# Patient Record
Sex: MALE | Race: WHITE | NOT HISPANIC OR LATINO | Employment: OTHER | URBAN - METROPOLITAN AREA
[De-identification: names, ages, dates, MRNs, and addresses within clinical notes are randomized per-mention and may not be internally consistent; named-entity substitution may affect disease eponyms.]

---

## 2017-05-03 PROBLEM — K22.70 BARRETT ESOPHAGUS: Status: ACTIVE | Noted: 2017-05-03

## 2017-05-17 PROBLEM — K22.2 ESOPHAGEAL STENOSIS: Status: ACTIVE | Noted: 2017-05-17

## 2019-06-05 PROBLEM — K22.70 BARRETT'S ESOPHAGUS: Status: ACTIVE | Noted: 2019-06-05

## 2021-03-04 PROBLEM — I73.9 CLAUDICATION, CLASS II: Status: ACTIVE | Noted: 2021-03-04

## 2023-01-01 ENCOUNTER — HOSPITAL ENCOUNTER (INPATIENT)
Facility: HOSPITAL | Age: 83
LOS: 14 days | DRG: 064 | End: 2023-12-07
Attending: EMERGENCY MEDICINE | Admitting: PSYCHIATRY & NEUROLOGY
Payer: MEDICARE

## 2023-01-01 ENCOUNTER — ANESTHESIA EVENT (OUTPATIENT)
Dept: NEUROSURGERY | Facility: HOSPITAL | Age: 83
End: 2023-01-01

## 2023-01-01 ENCOUNTER — ANESTHESIA (OUTPATIENT)
Dept: ENDOSCOPY | Facility: HOSPITAL | Age: 83
DRG: 064 | End: 2023-01-01
Payer: MEDICARE

## 2023-01-01 ENCOUNTER — ANESTHESIA EVENT (OUTPATIENT)
Dept: ENDOSCOPY | Facility: HOSPITAL | Age: 83
DRG: 064 | End: 2023-01-01
Payer: MEDICARE

## 2023-01-01 ENCOUNTER — ANESTHESIA (OUTPATIENT)
Dept: NEUROSURGERY | Facility: HOSPITAL | Age: 83
End: 2023-01-01

## 2023-01-01 VITALS
OXYGEN SATURATION: 91 % | TEMPERATURE: 97 F | HEIGHT: 63 IN | BODY MASS INDEX: 30.3 KG/M2 | WEIGHT: 171 LBS | SYSTOLIC BLOOD PRESSURE: 147 MMHG | DIASTOLIC BLOOD PRESSURE: 63 MMHG

## 2023-01-01 DIAGNOSIS — R56.9 SEIZURE-LIKE ACTIVITY: ICD-10-CM

## 2023-01-01 DIAGNOSIS — E11.9 TYPE 2 DIABETES MELLITUS WITHOUT COMPLICATION, WITHOUT LONG-TERM CURRENT USE OF INSULIN: ICD-10-CM

## 2023-01-01 DIAGNOSIS — I47.29 OTHER VENTRICULAR TACHYCARDIA: ICD-10-CM

## 2023-01-01 DIAGNOSIS — K72.00 ACUTE LIVER FAILURE WITHOUT HEPATIC COMA: ICD-10-CM

## 2023-01-01 DIAGNOSIS — I63.411 EMBOLIC STROKE INVOLVING RIGHT MIDDLE CEREBRAL ARTERY: ICD-10-CM

## 2023-01-01 DIAGNOSIS — I48.0 PAROXYSMAL ATRIAL FIBRILLATION: ICD-10-CM

## 2023-01-01 DIAGNOSIS — N17.9 AKI (ACUTE KIDNEY INJURY): ICD-10-CM

## 2023-01-01 DIAGNOSIS — I63.9 CEREBROVASCULAR ACCIDENT (CVA), UNSPECIFIED MECHANISM: Primary | ICD-10-CM

## 2023-01-01 DIAGNOSIS — J69.0 ASPIRATION PNEUMONIA DUE TO VOMIT, UNSPECIFIED LATERALITY, UNSPECIFIED PART OF LUNG: ICD-10-CM

## 2023-01-01 DIAGNOSIS — Z97.8 ENDOTRACHEALLY INTUBATED: ICD-10-CM

## 2023-01-01 DIAGNOSIS — I63.9 STROKE: ICD-10-CM

## 2023-01-01 DIAGNOSIS — R65.21 SEPTIC SHOCK: ICD-10-CM

## 2023-01-01 DIAGNOSIS — Z43.1 ENCOUNTER FOR PEG (PERCUTANEOUS ENDOSCOPIC GASTROSTOMY): ICD-10-CM

## 2023-01-01 DIAGNOSIS — D72.829 LEUKOCYTOSIS, UNSPECIFIED TYPE: ICD-10-CM

## 2023-01-01 DIAGNOSIS — D65 DIC (DISSEMINATED INTRAVASCULAR COAGULATION): ICD-10-CM

## 2023-01-01 DIAGNOSIS — R94.31 QT PROLONGATION: ICD-10-CM

## 2023-01-01 DIAGNOSIS — R13.10 DYSPHAGIA, UNSPECIFIED TYPE: ICD-10-CM

## 2023-01-01 DIAGNOSIS — R57.9 SHOCK: ICD-10-CM

## 2023-01-01 DIAGNOSIS — D69.6 THROMBOCYTOPENIA: ICD-10-CM

## 2023-01-01 DIAGNOSIS — I49.9 ARRHYTHMIA: ICD-10-CM

## 2023-01-01 DIAGNOSIS — A41.9 SEPTIC SHOCK: ICD-10-CM

## 2023-01-01 DIAGNOSIS — E87.5 HYPERKALEMIA: ICD-10-CM

## 2023-01-01 DIAGNOSIS — Z99.11 ON MECHANICALLY ASSISTED VENTILATION: ICD-10-CM

## 2023-01-01 LAB
ABO + RH BLD: NORMAL
ACINETOBACTER CALCOACETICUS/BAUMANNII COMPLEX: NOT DETECTED
ALBUMIN SERPL BCP-MCNC: 1.4 G/DL (ref 3.5–5.2)
ALBUMIN SERPL BCP-MCNC: 1.4 G/DL (ref 3.5–5.2)
ALBUMIN SERPL BCP-MCNC: 1.5 G/DL (ref 3.5–5.2)
ALBUMIN SERPL BCP-MCNC: 1.6 G/DL (ref 3.5–5.2)
ALBUMIN SERPL BCP-MCNC: 1.7 G/DL (ref 3.5–5.2)
ALBUMIN SERPL BCP-MCNC: 1.9 G/DL (ref 3.5–5.2)
ALBUMIN SERPL BCP-MCNC: 2.3 G/DL (ref 3.5–5.2)
ALBUMIN SERPL BCP-MCNC: 2.4 G/DL (ref 3.5–5.2)
ALBUMIN SERPL BCP-MCNC: 2.5 G/DL (ref 3.5–5.2)
ALBUMIN SERPL BCP-MCNC: 2.6 G/DL (ref 3.5–5.2)
ALBUMIN SERPL BCP-MCNC: 2.6 G/DL (ref 3.5–5.2)
ALBUMIN SERPL BCP-MCNC: 2.7 G/DL (ref 3.5–5.2)
ALBUMIN SERPL BCP-MCNC: 2.8 G/DL (ref 3.5–5.2)
ALBUMIN SERPL BCP-MCNC: 2.9 G/DL (ref 3.5–5.2)
ALBUMIN SERPL BCP-MCNC: 3 G/DL (ref 3.5–5.2)
ALBUMIN SERPL BCP-MCNC: 3 G/DL (ref 3.5–5.2)
ALLENS TEST: ABNORMAL
ALP SERPL-CCNC: 101 U/L (ref 55–135)
ALP SERPL-CCNC: 106 U/L (ref 55–135)
ALP SERPL-CCNC: 107 U/L (ref 55–135)
ALP SERPL-CCNC: 107 U/L (ref 55–135)
ALP SERPL-CCNC: 115 U/L (ref 55–135)
ALP SERPL-CCNC: 118 U/L (ref 55–135)
ALP SERPL-CCNC: 60 U/L (ref 55–135)
ALP SERPL-CCNC: 61 U/L (ref 55–135)
ALP SERPL-CCNC: 62 U/L (ref 55–135)
ALP SERPL-CCNC: 65 U/L (ref 55–135)
ALP SERPL-CCNC: 65 U/L (ref 55–135)
ALP SERPL-CCNC: 67 U/L (ref 55–135)
ALP SERPL-CCNC: 70 U/L (ref 55–135)
ALP SERPL-CCNC: 72 U/L (ref 55–135)
ALP SERPL-CCNC: 73 U/L (ref 55–135)
ALP SERPL-CCNC: 73 U/L (ref 55–135)
ALP SERPL-CCNC: 74 U/L (ref 55–135)
ALP SERPL-CCNC: 74 U/L (ref 55–135)
ALP SERPL-CCNC: 75 U/L (ref 55–135)
ALP SERPL-CCNC: 75 U/L (ref 55–135)
ALP SERPL-CCNC: 76 U/L (ref 55–135)
ALP SERPL-CCNC: 76 U/L (ref 55–135)
ALP SERPL-CCNC: 78 U/L (ref 55–135)
ALP SERPL-CCNC: 85 U/L (ref 55–135)
ALP SERPL-CCNC: 91 U/L (ref 55–135)
ALP SERPL-CCNC: 94 U/L (ref 55–135)
ALP SERPL-CCNC: 99 U/L (ref 55–135)
ALT SERPL W/O P-5'-P-CCNC: 1213 U/L (ref 10–44)
ALT SERPL W/O P-5'-P-CCNC: 28 U/L (ref 10–44)
ALT SERPL W/O P-5'-P-CCNC: 30 U/L (ref 10–44)
ALT SERPL W/O P-5'-P-CCNC: 30 U/L (ref 10–44)
ALT SERPL W/O P-5'-P-CCNC: 32 U/L (ref 10–44)
ALT SERPL W/O P-5'-P-CCNC: 33 U/L (ref 10–44)
ALT SERPL W/O P-5'-P-CCNC: 34 U/L (ref 10–44)
ALT SERPL W/O P-5'-P-CCNC: 36 U/L (ref 10–44)
ALT SERPL W/O P-5'-P-CCNC: 37 U/L (ref 10–44)
ALT SERPL W/O P-5'-P-CCNC: 37 U/L (ref 10–44)
ALT SERPL W/O P-5'-P-CCNC: 41 U/L (ref 10–44)
ALT SERPL W/O P-5'-P-CCNC: 51 U/L (ref 10–44)
ALT SERPL W/O P-5'-P-CCNC: 810 U/L (ref 10–44)
ALT SERPL W/O P-5'-P-CCNC: 862 U/L (ref 10–44)
ALT SERPL W/O P-5'-P-CCNC: 862 U/L (ref 10–44)
ALT SERPL W/O P-5'-P-CCNC: 871 U/L (ref 10–44)
ALT SERPL W/O P-5'-P-CCNC: 881 U/L (ref 10–44)
ALT SERPL W/O P-5'-P-CCNC: 885 U/L (ref 10–44)
ALT SERPL W/O P-5'-P-CCNC: 890 U/L (ref 10–44)
ALT SERPL W/O P-5'-P-CCNC: 893 U/L (ref 10–44)
ALT SERPL W/O P-5'-P-CCNC: 913 U/L (ref 10–44)
ALT SERPL W/O P-5'-P-CCNC: 916 U/L (ref 10–44)
ALT SERPL W/O P-5'-P-CCNC: 923 U/L (ref 10–44)
ALT SERPL W/O P-5'-P-CCNC: 927 U/L (ref 10–44)
ALT SERPL W/O P-5'-P-CCNC: 927 U/L (ref 10–44)
ALT SERPL W/O P-5'-P-CCNC: 958 U/L (ref 10–44)
ALT SERPL W/O P-5'-P-CCNC: 993 U/L (ref 10–44)
AMYLASE SERPL-CCNC: 46 U/L (ref 20–110)
ANION GAP SERPL CALC-SCNC: 10 MMOL/L (ref 8–16)
ANION GAP SERPL CALC-SCNC: 11 MMOL/L (ref 8–16)
ANION GAP SERPL CALC-SCNC: 12 MMOL/L (ref 8–16)
ANION GAP SERPL CALC-SCNC: 14 MMOL/L (ref 8–16)
ANION GAP SERPL CALC-SCNC: 15 MMOL/L (ref 8–16)
ANION GAP SERPL CALC-SCNC: 16 MMOL/L (ref 8–16)
ANION GAP SERPL CALC-SCNC: 17 MMOL/L (ref 8–16)
ANION GAP SERPL CALC-SCNC: 18 MMOL/L (ref 8–16)
ANION GAP SERPL CALC-SCNC: 19 MMOL/L (ref 8–16)
ANION GAP SERPL CALC-SCNC: 19 MMOL/L (ref 8–16)
ANION GAP SERPL CALC-SCNC: 21 MMOL/L (ref 8–16)
ANION GAP SERPL CALC-SCNC: 23 MMOL/L (ref 8–16)
ANION GAP SERPL CALC-SCNC: 29 MMOL/L (ref 8–16)
ANION GAP SERPL CALC-SCNC: 29 MMOL/L (ref 8–16)
ANISOCYTOSIS BLD QL SMEAR: SLIGHT
APTT PPP: 36.7 SEC (ref 21–32)
APTT PPP: 73.2 SEC (ref 21–32)
APTT PPP: 76.6 SEC (ref 21–32)
ASCENDING AORTA: 3 CM
ASCENDING AORTA: 3.06 CM
AST SERPL-CCNC: 1002 U/L (ref 10–40)
AST SERPL-CCNC: 118 U/L (ref 10–40)
AST SERPL-CCNC: 1252 U/L (ref 10–40)
AST SERPL-CCNC: 1311 U/L (ref 10–40)
AST SERPL-CCNC: 1429 U/L (ref 10–40)
AST SERPL-CCNC: 27 U/L (ref 10–40)
AST SERPL-CCNC: 27 U/L (ref 10–40)
AST SERPL-CCNC: 30 U/L (ref 10–40)
AST SERPL-CCNC: 31 U/L (ref 10–40)
AST SERPL-CCNC: 32 U/L (ref 10–40)
AST SERPL-CCNC: 34 U/L (ref 10–40)
AST SERPL-CCNC: 55 U/L (ref 10–40)
AST SERPL-CCNC: 596 U/L (ref 10–40)
AST SERPL-CCNC: 633 U/L (ref 10–40)
AST SERPL-CCNC: 636 U/L (ref 10–40)
AST SERPL-CCNC: 65 U/L (ref 10–40)
AST SERPL-CCNC: 657 U/L (ref 10–40)
AST SERPL-CCNC: 683 U/L (ref 10–40)
AST SERPL-CCNC: 70 U/L (ref 10–40)
AST SERPL-CCNC: 71 U/L (ref 10–40)
AST SERPL-CCNC: 718 U/L (ref 10–40)
AST SERPL-CCNC: 750 U/L (ref 10–40)
AST SERPL-CCNC: 759 U/L (ref 10–40)
AST SERPL-CCNC: 77 U/L (ref 10–40)
AST SERPL-CCNC: 772 U/L (ref 10–40)
AST SERPL-CCNC: 801 U/L (ref 10–40)
AST SERPL-CCNC: 841 U/L (ref 10–40)
AST SERPL-CCNC: 943 U/L (ref 10–40)
AST SERPL-CCNC: 97 U/L (ref 10–40)
AV INDEX (PROSTH): 0.68
AV INDEX (PROSTH): 0.82
AV MEAN GRADIENT: 4 MMHG
AV MEAN GRADIENT: 4 MMHG
AV PEAK GRADIENT: 5 MMHG
AV PEAK GRADIENT: 7 MMHG
AV VALVE AREA BY VELOCITY RATIO: 2.12 CM²
AV VALVE AREA BY VELOCITY RATIO: 2.68 CM²
AV VALVE AREA: 2.2 CM²
AV VALVE AREA: 2.67 CM²
AV VELOCITY RATIO: 0.66
AV VELOCITY RATIO: 0.82
BACTERIA #/AREA URNS AUTO: ABNORMAL /HPF
BACTERIA BLD CULT: ABNORMAL
BACTERIA BLD CULT: NORMAL
BACTERIA SPEC AEROBE CULT: ABNORMAL
BACTERIA SPEC AEROBE CULT: ABNORMAL
BACTEROIDES FRAGILIS: NOT DETECTED
BASO STIPL BLD QL SMEAR: ABNORMAL
BASO STIPL BLD QL SMEAR: ABNORMAL
BASOPHILS # BLD AUTO: 0.03 K/UL (ref 0–0.2)
BASOPHILS # BLD AUTO: 0.04 K/UL (ref 0–0.2)
BASOPHILS # BLD AUTO: 0.05 K/UL (ref 0–0.2)
BASOPHILS # BLD AUTO: 0.06 K/UL (ref 0–0.2)
BASOPHILS # BLD AUTO: 0.06 K/UL (ref 0–0.2)
BASOPHILS # BLD AUTO: 0.07 K/UL (ref 0–0.2)
BASOPHILS # BLD AUTO: 0.08 K/UL (ref 0–0.2)
BASOPHILS # BLD AUTO: 0.08 K/UL (ref 0–0.2)
BASOPHILS # BLD AUTO: 0.09 K/UL (ref 0–0.2)
BASOPHILS # BLD AUTO: 0.11 K/UL (ref 0–0.2)
BASOPHILS # BLD AUTO: 0.12 K/UL (ref 0–0.2)
BASOPHILS # BLD AUTO: ABNORMAL K/UL (ref 0–0.2)
BASOPHILS NFR BLD: 0 % (ref 0–1.9)
BASOPHILS NFR BLD: 0.3 % (ref 0–1.9)
BASOPHILS NFR BLD: 0.4 % (ref 0–1.9)
BASOPHILS NFR BLD: 0.5 % (ref 0–1.9)
BASOPHILS NFR BLD: 0.6 % (ref 0–1.9)
BILIRUB DIRECT SERPL-MCNC: 0.9 MG/DL (ref 0.1–0.3)
BILIRUB SERPL-MCNC: 0.6 MG/DL (ref 0.1–1)
BILIRUB SERPL-MCNC: 0.7 MG/DL (ref 0.1–1)
BILIRUB SERPL-MCNC: 0.8 MG/DL (ref 0.1–1)
BILIRUB SERPL-MCNC: 0.9 MG/DL (ref 0.1–1)
BILIRUB SERPL-MCNC: 1 MG/DL (ref 0.1–1)
BILIRUB SERPL-MCNC: 1.1 MG/DL (ref 0.1–1)
BILIRUB SERPL-MCNC: 1.3 MG/DL (ref 0.1–1)
BILIRUB SERPL-MCNC: 1.4 MG/DL (ref 0.1–1)
BILIRUB SERPL-MCNC: 1.5 MG/DL (ref 0.1–1)
BILIRUB SERPL-MCNC: 1.5 MG/DL (ref 0.1–1)
BILIRUB SERPL-MCNC: 1.6 MG/DL (ref 0.1–1)
BILIRUB SERPL-MCNC: 1.6 MG/DL (ref 0.1–1)
BILIRUB SERPL-MCNC: 2.1 MG/DL (ref 0.1–1)
BILIRUB UR QL STRIP: NEGATIVE
BLD GP AB SCN CELLS X3 SERPL QL: NORMAL
BLD PROD TYP BPU: NORMAL
BLOOD UNIT EXPIRATION DATE: NORMAL
BLOOD UNIT TYPE CODE: 5100
BLOOD UNIT TYPE: NORMAL
BSA FOR ECHO PROCEDURE: 1.86 M2
BSA FOR ECHO PROCEDURE: 1.86 M2
BUN SERPL-MCNC: 20 MG/DL (ref 8–23)
BUN SERPL-MCNC: 21 MG/DL (ref 8–23)
BUN SERPL-MCNC: 22 MG/DL (ref 8–23)
BUN SERPL-MCNC: 25 MG/DL (ref 8–23)
BUN SERPL-MCNC: 26 MG/DL (ref 8–23)
BUN SERPL-MCNC: 26 MG/DL (ref 8–23)
BUN SERPL-MCNC: 31 MG/DL (ref 8–23)
BUN SERPL-MCNC: 33 MG/DL (ref 8–23)
BUN SERPL-MCNC: 35 MG/DL (ref 8–23)
BUN SERPL-MCNC: 38 MG/DL (ref 8–23)
BUN SERPL-MCNC: 50 MG/DL (ref 8–23)
BUN SERPL-MCNC: 51 MG/DL (ref 8–23)
BUN SERPL-MCNC: 51 MG/DL (ref 8–23)
BUN SERPL-MCNC: 52 MG/DL (ref 8–23)
BUN SERPL-MCNC: 53 MG/DL (ref 8–23)
BUN SERPL-MCNC: 54 MG/DL (ref 8–23)
BUN SERPL-MCNC: 56 MG/DL (ref 8–23)
BUN SERPL-MCNC: 57 MG/DL (ref 8–23)
BUN SERPL-MCNC: 58 MG/DL (ref 8–23)
BUN SERPL-MCNC: 60 MG/DL (ref 8–23)
BUN SERPL-MCNC: 61 MG/DL (ref 8–23)
BUN SERPL-MCNC: 63 MG/DL (ref 8–23)
BUN SERPL-MCNC: 65 MG/DL (ref 8–23)
BURR CELLS BLD QL SMEAR: ABNORMAL
CA-I BLDV-SCNC: 0.9 MMOL/L (ref 1.06–1.42)
CA-I BLDV-SCNC: 0.98 MMOL/L (ref 1.06–1.42)
CA-I BLDV-SCNC: 1.01 MMOL/L (ref 1.06–1.42)
CA-I BLDV-SCNC: 1.01 MMOL/L (ref 1.06–1.42)
CA-I BLDV-SCNC: 1.02 MMOL/L (ref 1.06–1.42)
CALCIUM SERPL-MCNC: 6.8 MG/DL (ref 8.7–10.5)
CALCIUM SERPL-MCNC: 6.9 MG/DL (ref 8.7–10.5)
CALCIUM SERPL-MCNC: 6.9 MG/DL (ref 8.7–10.5)
CALCIUM SERPL-MCNC: 7 MG/DL (ref 8.7–10.5)
CALCIUM SERPL-MCNC: 7 MG/DL (ref 8.7–10.5)
CALCIUM SERPL-MCNC: 7.3 MG/DL (ref 8.7–10.5)
CALCIUM SERPL-MCNC: 7.4 MG/DL (ref 8.7–10.5)
CALCIUM SERPL-MCNC: 7.5 MG/DL (ref 8.7–10.5)
CALCIUM SERPL-MCNC: 7.8 MG/DL (ref 8.7–10.5)
CALCIUM SERPL-MCNC: 7.9 MG/DL (ref 8.7–10.5)
CALCIUM SERPL-MCNC: 8.3 MG/DL (ref 8.7–10.5)
CALCIUM SERPL-MCNC: 8.6 MG/DL (ref 8.7–10.5)
CALCIUM SERPL-MCNC: 8.7 MG/DL (ref 8.7–10.5)
CALCIUM SERPL-MCNC: 8.7 MG/DL (ref 8.7–10.5)
CALCIUM SERPL-MCNC: 8.8 MG/DL (ref 8.7–10.5)
CALCIUM SERPL-MCNC: 8.8 MG/DL (ref 8.7–10.5)
CALCIUM SERPL-MCNC: 9.1 MG/DL (ref 8.7–10.5)
CALCIUM SERPL-MCNC: 9.2 MG/DL (ref 8.7–10.5)
CALCIUM SERPL-MCNC: 9.5 MG/DL (ref 8.7–10.5)
CANDIDA ALBICANS: NOT DETECTED
CANDIDA AURIS: NOT DETECTED
CANDIDA GLABRATA: NOT DETECTED
CANDIDA KRUSEI: NOT DETECTED
CANDIDA PARAPSILOSIS: NOT DETECTED
CANDIDA TROPICALIS: NOT DETECTED
CHLORIDE SERPL-SCNC: 107 MMOL/L (ref 95–110)
CHLORIDE SERPL-SCNC: 107 MMOL/L (ref 95–110)
CHLORIDE SERPL-SCNC: 108 MMOL/L (ref 95–110)
CHLORIDE SERPL-SCNC: 109 MMOL/L (ref 95–110)
CHLORIDE SERPL-SCNC: 110 MMOL/L (ref 95–110)
CHLORIDE SERPL-SCNC: 112 MMOL/L (ref 95–110)
CHLORIDE SERPL-SCNC: 113 MMOL/L (ref 95–110)
CHLORIDE SERPL-SCNC: 114 MMOL/L (ref 95–110)
CHLORIDE SERPL-SCNC: 115 MMOL/L (ref 95–110)
CHLORIDE SERPL-SCNC: 116 MMOL/L (ref 95–110)
CHLORIDE SERPL-SCNC: 117 MMOL/L (ref 95–110)
CHLORIDE SERPL-SCNC: 117 MMOL/L (ref 95–110)
CHLORIDE SERPL-SCNC: 118 MMOL/L (ref 95–110)
CHLORIDE SERPL-SCNC: 118 MMOL/L (ref 95–110)
CHLORIDE SERPL-SCNC: 121 MMOL/L (ref 95–110)
CHLORIDE SERPL-SCNC: 122 MMOL/L (ref 95–110)
CHLORIDE SERPL-SCNC: 122 MMOL/L (ref 95–110)
CHLORIDE SERPL-SCNC: 123 MMOL/L (ref 95–110)
CHLORIDE SERPL-SCNC: 124 MMOL/L (ref 95–110)
CHLORIDE SERPL-SCNC: 124 MMOL/L (ref 95–110)
CHLORIDE SERPL-SCNC: 126 MMOL/L (ref 95–110)
CHLORIDE SERPL-SCNC: 126 MMOL/L (ref 95–110)
CHLORIDE SERPL-SCNC: 127 MMOL/L (ref 95–110)
CHOLEST SERPL-MCNC: 94 MG/DL (ref 120–199)
CHOLEST/HDLC SERPL: 2.9 {RATIO} (ref 2–5)
CK SERPL-CCNC: 1125 U/L (ref 20–200)
CK SERPL-CCNC: 2239 U/L (ref 20–200)
CK SERPL-CCNC: 2960 U/L (ref 20–200)
CK SERPL-CCNC: 3817 U/L (ref 20–200)
CK SERPL-CCNC: 5407 U/L (ref 20–200)
CK SERPL-CCNC: 6408 U/L (ref 20–200)
CK SERPL-CCNC: 7119 U/L (ref 20–200)
CLARITY UR REFRACT.AUTO: ABNORMAL
CLARITY UR REFRACT.AUTO: CLEAR
CLARITY UR REFRACT.AUTO: CLEAR
CO2 SERPL-SCNC: 10 MMOL/L (ref 23–29)
CO2 SERPL-SCNC: 12 MMOL/L (ref 23–29)
CO2 SERPL-SCNC: 13 MMOL/L (ref 23–29)
CO2 SERPL-SCNC: 14 MMOL/L (ref 23–29)
CO2 SERPL-SCNC: 14 MMOL/L (ref 23–29)
CO2 SERPL-SCNC: 15 MMOL/L (ref 23–29)
CO2 SERPL-SCNC: 16 MMOL/L (ref 23–29)
CO2 SERPL-SCNC: 17 MMOL/L (ref 23–29)
CO2 SERPL-SCNC: 17 MMOL/L (ref 23–29)
CO2 SERPL-SCNC: 18 MMOL/L (ref 23–29)
CO2 SERPL-SCNC: 19 MMOL/L (ref 23–29)
CO2 SERPL-SCNC: 20 MMOL/L (ref 23–29)
CO2 SERPL-SCNC: 20 MMOL/L (ref 23–29)
CO2 SERPL-SCNC: 21 MMOL/L (ref 23–29)
CO2 SERPL-SCNC: 22 MMOL/L (ref 23–29)
CO2 SERPL-SCNC: 7 MMOL/L (ref 23–29)
CODING SYSTEM: NORMAL
COLOR UR AUTO: ABNORMAL
COLOR UR AUTO: YELLOW
COLOR UR AUTO: YELLOW
CREAT SERPL-MCNC: 0.8 MG/DL (ref 0.5–1.4)
CREAT SERPL-MCNC: 0.9 MG/DL (ref 0.5–1.4)
CREAT SERPL-MCNC: 1 MG/DL (ref 0.5–1.4)
CREAT SERPL-MCNC: 1.1 MG/DL (ref 0.5–1.4)
CREAT SERPL-MCNC: 1.2 MG/DL (ref 0.5–1.4)
CREAT SERPL-MCNC: 1.3 MG/DL (ref 0.5–1.4)
CREAT SERPL-MCNC: 1.4 MG/DL (ref 0.5–1.4)
CREAT SERPL-MCNC: 1.4 MG/DL (ref 0.5–1.4)
CREAT SERPL-MCNC: 1.5 MG/DL (ref 0.5–1.4)
CREAT SERPL-MCNC: 1.6 MG/DL (ref 0.5–1.4)
CREAT SERPL-MCNC: 1.6 MG/DL (ref 0.5–1.4)
CREAT SERPL-MCNC: 1.7 MG/DL (ref 0.5–1.4)
CREAT SERPL-MCNC: 1.8 MG/DL (ref 0.5–1.4)
CREAT SERPL-MCNC: 2 MG/DL (ref 0.5–1.4)
CREAT SERPL-MCNC: 2 MG/DL (ref 0.5–1.4)
CREAT SERPL-MCNC: 2.1 MG/DL (ref 0.5–1.4)
CREAT SERPL-MCNC: 2.2 MG/DL (ref 0.5–1.4)
CREAT SERPL-MCNC: 2.3 MG/DL (ref 0.5–1.4)
CREAT SERPL-MCNC: 2.3 MG/DL (ref 0.5–1.4)
CREAT SERPL-MCNC: 2.8 MG/DL (ref 0.5–1.4)
CROSSMATCH INTERPRETATION: NORMAL
CRYPTOCOCCUS NEOFORMANS/GATTII: NOT DETECTED
CTX-M GENE: ABNORMAL
CV ECHO LV RWT: 0.39 CM
CV ECHO LV RWT: 0.48 CM
DELSYS: ABNORMAL
DIFFERENTIAL METHOD: ABNORMAL
DISPENSE STATUS: NORMAL
DOHLE BOD BLD QL SMEAR: PRESENT
DOP CALC AO PEAK VEL: 1.17 M/S
DOP CALC AO PEAK VEL: 1.34 M/S
DOP CALC AO VTI: 14.25 CM
DOP CALC AO VTI: 31.75 CM
DOP CALC LVOT AREA: 3.2 CM2
DOP CALC LVOT AREA: 3.3 CM2
DOP CALC LVOT DIAMETER: 2.03 CM
DOP CALC LVOT DIAMETER: 2.04 CM
DOP CALC LVOT PEAK VEL: 0.88 M/S
DOP CALC LVOT PEAK VEL: 0.96 M/S
DOP CALC LVOT STROKE VOLUME: 38.09 CM3
DOP CALC LVOT STROKE VOLUME: 69.81 CM3
DOP CALCLVOT PEAK VEL VTI: 11.66 CM
DOP CALCLVOT PEAK VEL VTI: 21.58 CM
E WAVE DECELERATION TIME: 222.89 MSEC
E WAVE DECELERATION TIME: 424.45 MSEC
E/A RATIO: 0.74
E/A RATIO: 1
E/E' RATIO: 6.13 M/S
E/E' RATIO: 9.86 M/S
ECHO LV POSTERIOR WALL: 0.83 CM (ref 0.6–1.1)
ECHO LV POSTERIOR WALL: 1 CM (ref 0.6–1.1)
EJECTION FRACTION: 53 %
EJECTION FRACTION: 60 %
ENTEROBACTER CLOACAE COMPLEX: NOT DETECTED
ENTEROBACTERALES: NOT DETECTED
ENTEROCOCCUS FAECALIS: NOT DETECTED
ENTEROCOCCUS FAECIUM: NOT DETECTED
EOSINOPHIL # BLD AUTO: 0 K/UL (ref 0–0.5)
EOSINOPHIL # BLD AUTO: 0.1 K/UL (ref 0–0.5)
EOSINOPHIL # BLD AUTO: 0.2 K/UL (ref 0–0.5)
EOSINOPHIL # BLD AUTO: 0.3 K/UL (ref 0–0.5)
EOSINOPHIL # BLD AUTO: ABNORMAL K/UL (ref 0–0.5)
EOSINOPHIL NFR BLD: 0 % (ref 0–8)
EOSINOPHIL NFR BLD: 0.1 % (ref 0–8)
EOSINOPHIL NFR BLD: 0.3 % (ref 0–8)
EOSINOPHIL NFR BLD: 0.4 % (ref 0–8)
EOSINOPHIL NFR BLD: 0.5 % (ref 0–8)
EOSINOPHIL NFR BLD: 0.6 % (ref 0–8)
EOSINOPHIL NFR BLD: 1 % (ref 0–8)
EOSINOPHIL NFR BLD: 1.5 % (ref 0–8)
EOSINOPHIL NFR BLD: 2 % (ref 0–8)
ERYTHROCYTE [DISTWIDTH] IN BLOOD BY AUTOMATED COUNT: 14 % (ref 11.5–14.5)
ERYTHROCYTE [DISTWIDTH] IN BLOOD BY AUTOMATED COUNT: 14.4 % (ref 11.5–14.5)
ERYTHROCYTE [DISTWIDTH] IN BLOOD BY AUTOMATED COUNT: 14.5 % (ref 11.5–14.5)
ERYTHROCYTE [DISTWIDTH] IN BLOOD BY AUTOMATED COUNT: 14.5 % (ref 11.5–14.5)
ERYTHROCYTE [DISTWIDTH] IN BLOOD BY AUTOMATED COUNT: 14.6 % (ref 11.5–14.5)
ERYTHROCYTE [DISTWIDTH] IN BLOOD BY AUTOMATED COUNT: 14.8 % (ref 11.5–14.5)
ERYTHROCYTE [DISTWIDTH] IN BLOOD BY AUTOMATED COUNT: 14.8 % (ref 11.5–14.5)
ERYTHROCYTE [DISTWIDTH] IN BLOOD BY AUTOMATED COUNT: 14.9 % (ref 11.5–14.5)
ERYTHROCYTE [DISTWIDTH] IN BLOOD BY AUTOMATED COUNT: 15 % (ref 11.5–14.5)
ERYTHROCYTE [DISTWIDTH] IN BLOOD BY AUTOMATED COUNT: 15 % (ref 11.5–14.5)
ERYTHROCYTE [DISTWIDTH] IN BLOOD BY AUTOMATED COUNT: 15.1 % (ref 11.5–14.5)
ERYTHROCYTE [DISTWIDTH] IN BLOOD BY AUTOMATED COUNT: 15.2 % (ref 11.5–14.5)
ERYTHROCYTE [DISTWIDTH] IN BLOOD BY AUTOMATED COUNT: 15.3 % (ref 11.5–14.5)
ERYTHROCYTE [DISTWIDTH] IN BLOOD BY AUTOMATED COUNT: 15.3 % (ref 11.5–14.5)
ERYTHROCYTE [DISTWIDTH] IN BLOOD BY AUTOMATED COUNT: 15.5 % (ref 11.5–14.5)
ERYTHROCYTE [DISTWIDTH] IN BLOOD BY AUTOMATED COUNT: 15.6 % (ref 11.5–14.5)
ERYTHROCYTE [DISTWIDTH] IN BLOOD BY AUTOMATED COUNT: 15.7 % (ref 11.5–14.5)
ERYTHROCYTE [DISTWIDTH] IN BLOOD BY AUTOMATED COUNT: 15.8 % (ref 11.5–14.5)
ERYTHROCYTE [SEDIMENTATION RATE] IN BLOOD BY WESTERGREN METHOD: 14 MM/H
ERYTHROCYTE [SEDIMENTATION RATE] IN BLOOD BY WESTERGREN METHOD: 16 MM/H
ERYTHROCYTE [SEDIMENTATION RATE] IN BLOOD BY WESTERGREN METHOD: 18 MM/H
ESCHERICHIA COLI: NOT DETECTED
EST. GFR  (NO RACE VARIABLE): 22 ML/MIN/1.73 M^2
EST. GFR  (NO RACE VARIABLE): 27.8 ML/MIN/1.73 M^2
EST. GFR  (NO RACE VARIABLE): 27.8 ML/MIN/1.73 M^2
EST. GFR  (NO RACE VARIABLE): 29.4 ML/MIN/1.73 M^2
EST. GFR  (NO RACE VARIABLE): 31 ML/MIN/1.73 M^2
EST. GFR  (NO RACE VARIABLE): 32.9 ML/MIN/1.73 M^2
EST. GFR  (NO RACE VARIABLE): 32.9 ML/MIN/1.73 M^2
EST. GFR  (NO RACE VARIABLE): 37.3 ML/MIN/1.73 M^2
EST. GFR  (NO RACE VARIABLE): 40 ML/MIN/1.73 M^2
EST. GFR  (NO RACE VARIABLE): 43 ML/MIN/1.73 M^2
EST. GFR  (NO RACE VARIABLE): 43 ML/MIN/1.73 M^2
EST. GFR  (NO RACE VARIABLE): 46.5 ML/MIN/1.73 M^2
EST. GFR  (NO RACE VARIABLE): 50.5 ML/MIN/1.73 M^2
EST. GFR  (NO RACE VARIABLE): 50.5 ML/MIN/1.73 M^2
EST. GFR  (NO RACE VARIABLE): 55.2 ML/MIN/1.73 M^2
EST. GFR  (NO RACE VARIABLE): >60 ML/MIN/1.73 M^2
ESTIMATED AVG GLUCOSE: 143 MG/DL (ref 68–131)
FIBRINOGEN PPP-MCNC: 106 MG/DL (ref 182–400)
FIBRINOGEN PPP-MCNC: 116 MG/DL (ref 182–400)
FIBRINOGEN PPP-MCNC: 140 MG/DL (ref 182–400)
FIBRINOGEN PPP-MCNC: 166 MG/DL (ref 182–400)
FIBRINOGEN PPP-MCNC: 175 MG/DL (ref 182–400)
FIO2: 100
FIO2: 30
FIO2: 40
FIO2: 50
FIO2: 50
FIO2: 60
FIO2: 70
FLOW: 10
FLOW: 15
FLOW: 4
FRACTIONAL SHORTENING: 31 % (ref 28–44)
FRACTIONAL SHORTENING: 39 % (ref 28–44)
GIANT PLATELETS BLD QL SMEAR: PRESENT
GLUCOSE SERPL-MCNC: 105 MG/DL (ref 70–110)
GLUCOSE SERPL-MCNC: 105 MG/DL (ref 70–110)
GLUCOSE SERPL-MCNC: 116 MG/DL (ref 70–110)
GLUCOSE SERPL-MCNC: 117 MG/DL (ref 70–110)
GLUCOSE SERPL-MCNC: 132 MG/DL (ref 70–110)
GLUCOSE SERPL-MCNC: 134 MG/DL (ref 70–110)
GLUCOSE SERPL-MCNC: 135 MG/DL (ref 70–110)
GLUCOSE SERPL-MCNC: 136 MG/DL (ref 70–110)
GLUCOSE SERPL-MCNC: 140 MG/DL (ref 70–110)
GLUCOSE SERPL-MCNC: 141 MG/DL (ref 70–110)
GLUCOSE SERPL-MCNC: 145 MG/DL (ref 70–110)
GLUCOSE SERPL-MCNC: 147 MG/DL (ref 70–110)
GLUCOSE SERPL-MCNC: 151 MG/DL (ref 70–110)
GLUCOSE SERPL-MCNC: 160 MG/DL (ref 70–110)
GLUCOSE SERPL-MCNC: 161 MG/DL (ref 70–110)
GLUCOSE SERPL-MCNC: 176 MG/DL (ref 70–110)
GLUCOSE SERPL-MCNC: 176 MG/DL (ref 70–110)
GLUCOSE SERPL-MCNC: 184 MG/DL (ref 70–110)
GLUCOSE SERPL-MCNC: 184 MG/DL (ref 70–110)
GLUCOSE SERPL-MCNC: 189 MG/DL (ref 70–110)
GLUCOSE SERPL-MCNC: 195 MG/DL (ref 70–110)
GLUCOSE SERPL-MCNC: 196 MG/DL (ref 70–110)
GLUCOSE SERPL-MCNC: 196 MG/DL (ref 70–110)
GLUCOSE SERPL-MCNC: 203 MG/DL (ref 70–110)
GLUCOSE SERPL-MCNC: 221 MG/DL (ref 70–110)
GLUCOSE SERPL-MCNC: 225 MG/DL (ref 70–110)
GLUCOSE SERPL-MCNC: 287 MG/DL (ref 70–110)
GLUCOSE SERPL-MCNC: 312 MG/DL (ref 70–110)
GLUCOSE SERPL-MCNC: 327 MG/DL (ref 70–110)
GLUCOSE SERPL-MCNC: 334 MG/DL (ref 70–110)
GLUCOSE SERPL-MCNC: 526 MG/DL (ref 70–110)
GLUCOSE UR QL STRIP: ABNORMAL
GRAM STN SPEC: ABNORMAL
HAEMOPHILUS INFLUENZAE: NOT DETECTED
HBA1C MFR BLD: 6.6 % (ref 4–5.6)
HCO3 UR-SCNC: 12.1 MMOL/L (ref 24–28)
HCO3 UR-SCNC: 12.2 MMOL/L (ref 24–28)
HCO3 UR-SCNC: 12.7 MMOL/L (ref 24–28)
HCO3 UR-SCNC: 13.1 MMOL/L (ref 24–28)
HCO3 UR-SCNC: 13.9 MMOL/L (ref 24–28)
HCO3 UR-SCNC: 15.1 MMOL/L (ref 24–28)
HCO3 UR-SCNC: 15.1 MMOL/L (ref 24–28)
HCO3 UR-SCNC: 15.5 MMOL/L (ref 24–28)
HCO3 UR-SCNC: 15.7 MMOL/L (ref 24–28)
HCO3 UR-SCNC: 15.7 MMOL/L (ref 24–28)
HCO3 UR-SCNC: 17 MMOL/L (ref 24–28)
HCO3 UR-SCNC: 17.5 MMOL/L (ref 24–28)
HCO3 UR-SCNC: 17.6 MMOL/L (ref 24–28)
HCO3 UR-SCNC: 17.7 MMOL/L (ref 24–28)
HCO3 UR-SCNC: 18.6 MMOL/L (ref 24–28)
HCO3 UR-SCNC: 18.9 MMOL/L (ref 24–28)
HCO3 UR-SCNC: 19.2 MMOL/L (ref 24–28)
HCO3 UR-SCNC: 20.4 MMOL/L (ref 24–28)
HCO3 UR-SCNC: 20.6 MMOL/L (ref 24–28)
HCO3 UR-SCNC: 21.4 MMOL/L (ref 24–28)
HCO3 UR-SCNC: 21.5 MMOL/L (ref 24–28)
HCO3 UR-SCNC: 21.7 MMOL/L (ref 24–28)
HCO3 UR-SCNC: 22.3 MMOL/L (ref 24–28)
HCO3 UR-SCNC: 22.5 MMOL/L (ref 24–28)
HCO3 UR-SCNC: 23.9 MMOL/L (ref 24–28)
HCO3 UR-SCNC: 7.5 MMOL/L (ref 24–28)
HCT VFR BLD AUTO: 18.9 % (ref 40–54)
HCT VFR BLD AUTO: 20.8 % (ref 40–54)
HCT VFR BLD AUTO: 21.7 % (ref 40–54)
HCT VFR BLD AUTO: 22.7 % (ref 40–54)
HCT VFR BLD AUTO: 22.8 % (ref 40–54)
HCT VFR BLD AUTO: 23.8 % (ref 40–54)
HCT VFR BLD AUTO: 25 % (ref 40–54)
HCT VFR BLD AUTO: 27.8 % (ref 40–54)
HCT VFR BLD AUTO: 28.8 % (ref 40–54)
HCT VFR BLD AUTO: 31.6 % (ref 40–54)
HCT VFR BLD AUTO: 34.6 % (ref 40–54)
HCT VFR BLD AUTO: 40.1 % (ref 40–54)
HCT VFR BLD AUTO: 41 % (ref 40–54)
HCT VFR BLD AUTO: 41.1 % (ref 40–54)
HCT VFR BLD AUTO: 41.5 % (ref 40–54)
HCT VFR BLD AUTO: 42.3 % (ref 40–54)
HCT VFR BLD AUTO: 42.9 % (ref 40–54)
HCT VFR BLD AUTO: 43.9 % (ref 40–54)
HCT VFR BLD AUTO: 44.1 % (ref 40–54)
HCT VFR BLD AUTO: 44.5 % (ref 40–54)
HCT VFR BLD AUTO: 45.7 % (ref 40–54)
HCT VFR BLD AUTO: 47.3 % (ref 40–54)
HCT VFR BLD AUTO: 51.5 % (ref 40–54)
HCT VFR BLD CALC: 16 %PCV (ref 36–54)
HCT VFR BLD CALC: 17 %PCV (ref 36–54)
HCT VFR BLD CALC: 17 %PCV (ref 36–54)
HCT VFR BLD CALC: 18 %PCV (ref 36–54)
HCT VFR BLD CALC: 19 %PCV (ref 36–54)
HCT VFR BLD CALC: 20 %PCV (ref 36–54)
HCT VFR BLD CALC: 20 %PCV (ref 36–54)
HCT VFR BLD CALC: 24 %PCV (ref 36–54)
HCT VFR BLD CALC: 25 %PCV (ref 36–54)
HCT VFR BLD CALC: 27 %PCV (ref 36–54)
HCT VFR BLD CALC: 29 %PCV (ref 36–54)
HCT VFR BLD CALC: 31 %PCV (ref 36–54)
HCT VFR BLD CALC: 37 %PCV (ref 36–54)
HCT VFR BLD CALC: 38 %PCV (ref 36–54)
HCT VFR BLD CALC: 38 %PCV (ref 36–54)
HCT VFR BLD CALC: 39 %PCV (ref 36–54)
HCV AB SERPL QL IA: NORMAL
HDLC SERPL-MCNC: 32 MG/DL (ref 40–75)
HDLC SERPL: 34 % (ref 20–50)
HGB BLD-MCNC: 10.2 G/DL (ref 14–18)
HGB BLD-MCNC: 10.3 G/DL (ref 14–18)
HGB BLD-MCNC: 12.5 G/DL (ref 14–18)
HGB BLD-MCNC: 12.5 G/DL (ref 14–18)
HGB BLD-MCNC: 12.8 G/DL (ref 14–18)
HGB BLD-MCNC: 13.2 G/DL (ref 14–18)
HGB BLD-MCNC: 13.4 G/DL (ref 14–18)
HGB BLD-MCNC: 13.7 G/DL (ref 14–18)
HGB BLD-MCNC: 13.8 G/DL (ref 14–18)
HGB BLD-MCNC: 13.9 G/DL (ref 14–18)
HGB BLD-MCNC: 13.9 G/DL (ref 14–18)
HGB BLD-MCNC: 14.3 G/DL (ref 14–18)
HGB BLD-MCNC: 14.4 G/DL (ref 14–18)
HGB BLD-MCNC: 16 G/DL (ref 14–18)
HGB BLD-MCNC: 6.1 G/DL (ref 14–18)
HGB BLD-MCNC: 6.5 G/DL (ref 14–18)
HGB BLD-MCNC: 7.1 G/DL (ref 14–18)
HGB BLD-MCNC: 7.5 G/DL (ref 14–18)
HGB BLD-MCNC: 7.6 G/DL (ref 14–18)
HGB BLD-MCNC: 8.3 G/DL (ref 14–18)
HGB BLD-MCNC: 8.4 G/DL (ref 14–18)
HGB BLD-MCNC: 9.1 G/DL (ref 14–18)
HGB BLD-MCNC: 9.2 G/DL (ref 14–18)
HGB UR QL STRIP: ABNORMAL
HIV 1+2 AB+HIV1 P24 AG SERPL QL IA: NORMAL
HOWELL-JOLLY BOD BLD QL SMEAR: ABNORMAL
HYALINE CASTS UR QL AUTO: 0 /LPF
HYALINE CASTS UR QL AUTO: 0 /LPF
HYPOCHROMIA BLD QL SMEAR: ABNORMAL
IMM GRANULOCYTES # BLD AUTO: 0.05 K/UL (ref 0–0.04)
IMM GRANULOCYTES # BLD AUTO: 0.05 K/UL (ref 0–0.04)
IMM GRANULOCYTES # BLD AUTO: 0.07 K/UL (ref 0–0.04)
IMM GRANULOCYTES # BLD AUTO: 0.07 K/UL (ref 0–0.04)
IMM GRANULOCYTES # BLD AUTO: 0.08 K/UL (ref 0–0.04)
IMM GRANULOCYTES # BLD AUTO: 0.09 K/UL (ref 0–0.04)
IMM GRANULOCYTES # BLD AUTO: 0.1 K/UL (ref 0–0.04)
IMM GRANULOCYTES # BLD AUTO: 0.13 K/UL (ref 0–0.04)
IMM GRANULOCYTES # BLD AUTO: 0.13 K/UL (ref 0–0.04)
IMM GRANULOCYTES # BLD AUTO: 0.15 K/UL (ref 0–0.04)
IMM GRANULOCYTES # BLD AUTO: 0.17 K/UL (ref 0–0.04)
IMM GRANULOCYTES # BLD AUTO: 0.44 K/UL (ref 0–0.04)
IMM GRANULOCYTES # BLD AUTO: 0.93 K/UL (ref 0–0.04)
IMM GRANULOCYTES # BLD AUTO: 0.95 K/UL (ref 0–0.04)
IMM GRANULOCYTES # BLD AUTO: 1.26 K/UL (ref 0–0.04)
IMM GRANULOCYTES # BLD AUTO: ABNORMAL K/UL (ref 0–0.04)
IMM GRANULOCYTES NFR BLD AUTO: 0.4 % (ref 0–0.5)
IMM GRANULOCYTES NFR BLD AUTO: 0.4 % (ref 0–0.5)
IMM GRANULOCYTES NFR BLD AUTO: 0.5 % (ref 0–0.5)
IMM GRANULOCYTES NFR BLD AUTO: 0.5 % (ref 0–0.5)
IMM GRANULOCYTES NFR BLD AUTO: 0.7 % (ref 0–0.5)
IMM GRANULOCYTES NFR BLD AUTO: 0.7 % (ref 0–0.5)
IMM GRANULOCYTES NFR BLD AUTO: 0.8 % (ref 0–0.5)
IMM GRANULOCYTES NFR BLD AUTO: 0.8 % (ref 0–0.5)
IMM GRANULOCYTES NFR BLD AUTO: 0.9 % (ref 0–0.5)
IMM GRANULOCYTES NFR BLD AUTO: 1 % (ref 0–0.5)
IMM GRANULOCYTES NFR BLD AUTO: 1.1 % (ref 0–0.5)
IMM GRANULOCYTES NFR BLD AUTO: 2 % (ref 0–0.5)
IMM GRANULOCYTES NFR BLD AUTO: 3.6 % (ref 0–0.5)
IMM GRANULOCYTES NFR BLD AUTO: 3.8 % (ref 0–0.5)
IMM GRANULOCYTES NFR BLD AUTO: 4.8 % (ref 0–0.5)
IMM GRANULOCYTES NFR BLD AUTO: ABNORMAL % (ref 0–0.5)
IMP GENE: ABNORMAL
INR PPP: 1.3 (ref 0.8–1.2)
INR PPP: 1.5 (ref 0.8–1.2)
INR PPP: 1.8 (ref 0.8–1.2)
INTERVENTRICULAR SEPTUM: 0.73 CM (ref 0.6–1.1)
INTERVENTRICULAR SEPTUM: 1.2 CM (ref 0.6–1.1)
IVRT: 83.73 MSEC
KETONES UR QL STRIP: ABNORMAL
KETONES UR QL STRIP: ABNORMAL
KETONES UR QL STRIP: NEGATIVE
KLEBSIELLA AEROGENES: NOT DETECTED
KLEBSIELLA OXYTOCA: NOT DETECTED
KLEBSIELLA PNEUMONIAE GROUP: NOT DETECTED
KPC: ABNORMAL
LA MAJOR: 5.11 CM
LA MAJOR: 5.53 CM
LA MINOR: 5.07 CM
LA MINOR: 5.13 CM
LA WIDTH: 3.53 CM
LA WIDTH: 4 CM
LACTATE SERPL-SCNC: 1 MMOL/L (ref 0.5–2.2)
LACTATE SERPL-SCNC: 1.2 MMOL/L (ref 0.5–2.2)
LACTATE SERPL-SCNC: 1.2 MMOL/L (ref 0.5–2.2)
LACTATE SERPL-SCNC: 1.6 MMOL/L (ref 0.5–2.2)
LACTATE SERPL-SCNC: 3.1 MMOL/L (ref 0.5–2.2)
LACTATE SERPL-SCNC: 3.7 MMOL/L (ref 0.5–2.2)
LACTATE SERPL-SCNC: 4.2 MMOL/L (ref 0.5–2.2)
LACTATE SERPL-SCNC: 5 MMOL/L (ref 0.5–2.2)
LACTATE SERPL-SCNC: 5.4 MMOL/L (ref 0.5–2.2)
LACTATE SERPL-SCNC: 5.6 MMOL/L (ref 0.5–2.2)
LDH SERPL L TO P-CCNC: 14.71 MMOL/L (ref 0.36–1.25)
LDH SERPL L TO P-CCNC: 16.66 MMOL/L (ref 0.36–1.25)
LDH SERPL L TO P-CCNC: 2.09 MMOL/L (ref 0.36–1.25)
LDH SERPL L TO P-CCNC: 2.54 MMOL/L (ref 0.36–1.25)
LDH SERPL L TO P-CCNC: 2489 U/L (ref 110–260)
LDH SERPL L TO P-CCNC: 3.32 MMOL/L (ref 0.36–1.25)
LDH SERPL L TO P-CCNC: 3.48 MMOL/L (ref 0.36–1.25)
LDH SERPL L TO P-CCNC: 3.79 MMOL/L (ref 0.36–1.25)
LDH SERPL L TO P-CCNC: 3.87 MMOL/L (ref 0.36–1.25)
LDH SERPL L TO P-CCNC: 4.23 MMOL/L (ref 0.36–1.25)
LDH SERPL L TO P-CCNC: 4.34 MMOL/L (ref 0.36–1.25)
LDH SERPL L TO P-CCNC: 4.58 MMOL/L (ref 0.36–1.25)
LDH SERPL L TO P-CCNC: 5.18 MMOL/L (ref 0.36–1.25)
LDH SERPL L TO P-CCNC: 5.54 MMOL/L (ref 0.36–1.25)
LDLC SERPL CALC-MCNC: 48.4 MG/DL (ref 63–159)
LEFT ATRIUM SIZE: 2.5 CM
LEFT ATRIUM SIZE: 2.69 CM
LEFT ATRIUM VOLUME INDEX MOD: 30.2 ML/M2
LEFT ATRIUM VOLUME INDEX: 22.7 ML/M2
LEFT ATRIUM VOLUME INDEX: 25 ML/M2
LEFT ATRIUM VOLUME MOD: 54.58 CM3
LEFT ATRIUM VOLUME: 41.08 CM3
LEFT ATRIUM VOLUME: 45.24 CM3
LEFT INTERNAL DIMENSION IN SYSTOLE: 2.57 CM (ref 2.1–4)
LEFT INTERNAL DIMENSION IN SYSTOLE: 2.96 CM (ref 2.1–4)
LEFT VENTRICLE DIASTOLIC VOLUME INDEX: 28.61 ML/M2
LEFT VENTRICLE DIASTOLIC VOLUME INDEX: 45.73 ML/M2
LEFT VENTRICLE DIASTOLIC VOLUME: 51.78 ML
LEFT VENTRICLE DIASTOLIC VOLUME: 82.77 ML
LEFT VENTRICLE MASS INDEX: 56 G/M2
LEFT VENTRICLE MASS INDEX: 87 G/M2
LEFT VENTRICLE SYSTOLIC VOLUME INDEX: 13.2 ML/M2
LEFT VENTRICLE SYSTOLIC VOLUME INDEX: 18.7 ML/M2
LEFT VENTRICLE SYSTOLIC VOLUME: 23.88 ML
LEFT VENTRICLE SYSTOLIC VOLUME: 33.87 ML
LEFT VENTRICULAR INTERNAL DIMENSION IN DIASTOLE: 4.2 CM (ref 3.5–6)
LEFT VENTRICULAR INTERNAL DIMENSION IN DIASTOLE: 4.29 CM (ref 3.5–6)
LEFT VENTRICULAR MASS: 101.48 G
LEFT VENTRICULAR MASS: 157.06 G
LEUKOCYTE ESTERASE UR QL STRIP: ABNORMAL
LEUKOCYTE ESTERASE UR QL STRIP: NEGATIVE
LEUKOCYTE ESTERASE UR QL STRIP: NEGATIVE
LIDOCAIN SERPL-MCNC: 3.5 MCG/ML (ref 1.5–5)
LIPASE SERPL-CCNC: 15 U/L (ref 4–60)
LISTERIA MONOCYTOGENES: NOT DETECTED
LV LATERAL E/E' RATIO: 4.18 M/S
LV LATERAL E/E' RATIO: 8.63 M/S
LV SEPTAL E/E' RATIO: 11.5 M/S
LV SEPTAL E/E' RATIO: 11.5 M/S
LYMPHOCYTES # BLD AUTO: 0.6 K/UL (ref 1–4.8)
LYMPHOCYTES # BLD AUTO: 0.7 K/UL (ref 1–4.8)
LYMPHOCYTES # BLD AUTO: 0.8 K/UL (ref 1–4.8)
LYMPHOCYTES # BLD AUTO: 0.9 K/UL (ref 1–4.8)
LYMPHOCYTES # BLD AUTO: 1.1 K/UL (ref 1–4.8)
LYMPHOCYTES # BLD AUTO: 1.1 K/UL (ref 1–4.8)
LYMPHOCYTES # BLD AUTO: 1.2 K/UL (ref 1–4.8)
LYMPHOCYTES # BLD AUTO: 1.3 K/UL (ref 1–4.8)
LYMPHOCYTES # BLD AUTO: 1.3 K/UL (ref 1–4.8)
LYMPHOCYTES # BLD AUTO: 1.4 K/UL (ref 1–4.8)
LYMPHOCYTES # BLD AUTO: 1.9 K/UL (ref 1–4.8)
LYMPHOCYTES # BLD AUTO: 2 K/UL (ref 1–4.8)
LYMPHOCYTES # BLD AUTO: 2.7 K/UL (ref 1–4.8)
LYMPHOCYTES # BLD AUTO: ABNORMAL K/UL (ref 1–4.8)
LYMPHOCYTES NFR BLD: 0 % (ref 18–48)
LYMPHOCYTES NFR BLD: 1 % (ref 18–48)
LYMPHOCYTES NFR BLD: 1 % (ref 18–48)
LYMPHOCYTES NFR BLD: 10 % (ref 18–48)
LYMPHOCYTES NFR BLD: 11.1 % (ref 18–48)
LYMPHOCYTES NFR BLD: 11.4 % (ref 18–48)
LYMPHOCYTES NFR BLD: 12.1 % (ref 18–48)
LYMPHOCYTES NFR BLD: 4.2 % (ref 18–48)
LYMPHOCYTES NFR BLD: 5 % (ref 18–48)
LYMPHOCYTES NFR BLD: 5 % (ref 18–48)
LYMPHOCYTES NFR BLD: 5.3 % (ref 18–48)
LYMPHOCYTES NFR BLD: 5.5 % (ref 18–48)
LYMPHOCYTES NFR BLD: 6.3 % (ref 18–48)
LYMPHOCYTES NFR BLD: 6.6 % (ref 18–48)
LYMPHOCYTES NFR BLD: 7 % (ref 18–48)
LYMPHOCYTES NFR BLD: 7.1 % (ref 18–48)
LYMPHOCYTES NFR BLD: 7.2 % (ref 18–48)
LYMPHOCYTES NFR BLD: 7.4 % (ref 18–48)
LYMPHOCYTES NFR BLD: 7.4 % (ref 18–48)
LYMPHOCYTES NFR BLD: 8.9 % (ref 18–48)
LYMPHOCYTES NFR BLD: 9.3 % (ref 18–48)
MAGNESIUM SERPL-MCNC: 1.9 MG/DL (ref 1.6–2.6)
MAGNESIUM SERPL-MCNC: 2 MG/DL (ref 1.6–2.6)
MAGNESIUM SERPL-MCNC: 2.1 MG/DL (ref 1.6–2.6)
MAGNESIUM SERPL-MCNC: 2.2 MG/DL (ref 1.6–2.6)
MAGNESIUM SERPL-MCNC: 2.3 MG/DL (ref 1.6–2.6)
MAGNESIUM SERPL-MCNC: 2.4 MG/DL (ref 1.6–2.6)
MAGNESIUM SERPL-MCNC: 2.5 MG/DL (ref 1.6–2.6)
MAGNESIUM SERPL-MCNC: 2.5 MG/DL (ref 1.6–2.6)
MAGNESIUM SERPL-MCNC: 2.6 MG/DL (ref 1.6–2.6)
MAGNESIUM SERPL-MCNC: 2.7 MG/DL (ref 1.6–2.6)
MAP: 7.2
MCH RBC QN AUTO: 27.6 PG (ref 27–31)
MCH RBC QN AUTO: 27.6 PG (ref 27–31)
MCH RBC QN AUTO: 27.7 PG (ref 27–31)
MCH RBC QN AUTO: 27.8 PG (ref 27–31)
MCH RBC QN AUTO: 27.9 PG (ref 27–31)
MCH RBC QN AUTO: 28 PG (ref 27–31)
MCH RBC QN AUTO: 28.1 PG (ref 27–31)
MCH RBC QN AUTO: 28.2 PG (ref 27–31)
MCH RBC QN AUTO: 28.2 PG (ref 27–31)
MCH RBC QN AUTO: 28.4 PG (ref 27–31)
MCH RBC QN AUTO: 28.4 PG (ref 27–31)
MCH RBC QN AUTO: 28.8 PG (ref 27–31)
MCH RBC QN AUTO: 28.8 PG (ref 27–31)
MCH RBC QN AUTO: 28.9 PG (ref 27–31)
MCH RBC QN AUTO: 29.1 PG (ref 27–31)
MCH RBC QN AUTO: 29.2 PG (ref 27–31)
MCH RBC QN AUTO: 29.2 PG (ref 27–31)
MCH RBC QN AUTO: 29.7 PG (ref 27–31)
MCH RBC QN AUTO: 29.9 PG (ref 27–31)
MCHC RBC AUTO-ENTMCNC: 29.5 G/DL (ref 32–36)
MCHC RBC AUTO-ENTMCNC: 30.1 G/DL (ref 32–36)
MCHC RBC AUTO-ENTMCNC: 30.4 G/DL (ref 32–36)
MCHC RBC AUTO-ENTMCNC: 30.4 G/DL (ref 32–36)
MCHC RBC AUTO-ENTMCNC: 30.5 G/DL (ref 32–36)
MCHC RBC AUTO-ENTMCNC: 31.1 G/DL (ref 32–36)
MCHC RBC AUTO-ENTMCNC: 31.1 G/DL (ref 32–36)
MCHC RBC AUTO-ENTMCNC: 31.2 G/DL (ref 32–36)
MCHC RBC AUTO-ENTMCNC: 31.3 G/DL (ref 32–36)
MCHC RBC AUTO-ENTMCNC: 31.3 G/DL (ref 32–36)
MCHC RBC AUTO-ENTMCNC: 31.9 G/DL (ref 32–36)
MCHC RBC AUTO-ENTMCNC: 31.9 G/DL (ref 32–36)
MCHC RBC AUTO-ENTMCNC: 32.2 G/DL (ref 32–36)
MCHC RBC AUTO-ENTMCNC: 32.2 G/DL (ref 32–36)
MCHC RBC AUTO-ENTMCNC: 32.3 G/DL (ref 32–36)
MCHC RBC AUTO-ENTMCNC: 32.6 G/DL (ref 32–36)
MCHC RBC AUTO-ENTMCNC: 32.7 G/DL (ref 32–36)
MCHC RBC AUTO-ENTMCNC: 32.7 G/DL (ref 32–36)
MCHC RBC AUTO-ENTMCNC: 32.9 G/DL (ref 32–36)
MCHC RBC AUTO-ENTMCNC: 33 G/DL (ref 32–36)
MCHC RBC AUTO-ENTMCNC: 33.2 G/DL (ref 32–36)
MCHC RBC AUTO-ENTMCNC: 33.3 G/DL (ref 32–36)
MCHC RBC AUTO-ENTMCNC: 35.3 G/DL (ref 32–36)
MCR-1: ABNORMAL
MCV RBC AUTO: 85 FL (ref 82–98)
MCV RBC AUTO: 86 FL (ref 82–98)
MCV RBC AUTO: 86 FL (ref 82–98)
MCV RBC AUTO: 87 FL (ref 82–98)
MCV RBC AUTO: 88 FL (ref 82–98)
MCV RBC AUTO: 88 FL (ref 82–98)
MCV RBC AUTO: 89 FL (ref 82–98)
MCV RBC AUTO: 90 FL (ref 82–98)
MCV RBC AUTO: 90 FL (ref 82–98)
MCV RBC AUTO: 91 FL (ref 82–98)
MCV RBC AUTO: 92 FL (ref 82–98)
MCV RBC AUTO: 93 FL (ref 82–98)
MCV RBC AUTO: 94 FL (ref 82–98)
MCV RBC AUTO: 95 FL (ref 82–98)
MCV RBC AUTO: 96 FL (ref 82–98)
MEC A/C AND MREJ (MRSA): ABNORMAL
MEC A/C: DETECTED
METAMYELOCYTES NFR BLD MANUAL: 1 %
METAMYELOCYTES NFR BLD MANUAL: 1 %
METAMYELOCYTES NFR BLD MANUAL: 2 %
METAMYELOCYTES NFR BLD MANUAL: 3 %
MICROSCOPIC COMMENT: ABNORMAL
MIN VOL: 10.2
MIN VOL: 11.3
MIN VOL: 11.5
MIN VOL: 12.4
MIN VOL: 12.4
MIN VOL: 12.8
MIN VOL: 13.8
MIN VOL: 14.3
MIN VOL: 14.4
MIN VOL: 15.3
MIN VOL: 9.3
MIN VOL: 9.98
MODE: ABNORMAL
MONOCYTES # BLD AUTO: 0.4 K/UL (ref 0.3–1)
MONOCYTES # BLD AUTO: 0.6 K/UL (ref 0.3–1)
MONOCYTES # BLD AUTO: 0.9 K/UL (ref 0.3–1)
MONOCYTES # BLD AUTO: 0.9 K/UL (ref 0.3–1)
MONOCYTES # BLD AUTO: 1 K/UL (ref 0.3–1)
MONOCYTES # BLD AUTO: 1.1 K/UL (ref 0.3–1)
MONOCYTES # BLD AUTO: 1.2 K/UL (ref 0.3–1)
MONOCYTES # BLD AUTO: 1.3 K/UL (ref 0.3–1)
MONOCYTES # BLD AUTO: 1.4 K/UL (ref 0.3–1)
MONOCYTES # BLD AUTO: 1.6 K/UL (ref 0.3–1)
MONOCYTES # BLD AUTO: 1.6 K/UL (ref 0.3–1)
MONOCYTES # BLD AUTO: 1.7 K/UL (ref 0.3–1)
MONOCYTES # BLD AUTO: 1.9 K/UL (ref 0.3–1)
MONOCYTES # BLD AUTO: ABNORMAL K/UL (ref 0.3–1)
MONOCYTES NFR BLD: 0 % (ref 4–15)
MONOCYTES NFR BLD: 1 % (ref 4–15)
MONOCYTES NFR BLD: 3.6 % (ref 4–15)
MONOCYTES NFR BLD: 4 % (ref 4–15)
MONOCYTES NFR BLD: 4.5 % (ref 4–15)
MONOCYTES NFR BLD: 5 % (ref 4–15)
MONOCYTES NFR BLD: 5.5 % (ref 4–15)
MONOCYTES NFR BLD: 6 % (ref 4–15)
MONOCYTES NFR BLD: 6.2 % (ref 4–15)
MONOCYTES NFR BLD: 6.4 % (ref 4–15)
MONOCYTES NFR BLD: 7 % (ref 4–15)
MONOCYTES NFR BLD: 7 % (ref 4–15)
MONOCYTES NFR BLD: 7.5 % (ref 4–15)
MONOCYTES NFR BLD: 7.7 % (ref 4–15)
MONOCYTES NFR BLD: 8.1 % (ref 4–15)
MONOCYTES NFR BLD: 8.5 % (ref 4–15)
MONOCYTES NFR BLD: 8.7 % (ref 4–15)
MONOCYTES NFR BLD: 9.4 % (ref 4–15)
MONOCYTES NFR BLD: 9.4 % (ref 4–15)
MONOCYTES NFR BLD: 9.9 % (ref 4–15)
MONOCYTES NFR BLD: 9.9 % (ref 4–15)
MPV, BLUE TOP: NORMAL FL (ref 9.2–12.9)
MV PEAK A VEL: 0.62 M/S
MV PEAK A VEL: 0.69 M/S
MV PEAK E VEL: 0.46 M/S
MV PEAK E VEL: 0.69 M/S
MV STENOSIS PRESSURE HALF TIME: 123.09 MS
MV STENOSIS PRESSURE HALF TIME: 64.64 MS
MV VALVE AREA P 1/2 METHOD: 1.79 CM2
MV VALVE AREA P 1/2 METHOD: 3.4 CM2
MYELOCYTES NFR BLD MANUAL: 1 %
MYELOCYTES NFR BLD MANUAL: 2 %
MYELOCYTES NFR BLD MANUAL: 3 %
NDM GENE: ABNORMAL
NEISSERIA MENINGITIDIS: NOT DETECTED
NEUTROPHILS # BLD AUTO: 10.8 K/UL (ref 1.8–7.7)
NEUTROPHILS # BLD AUTO: 11.4 K/UL (ref 1.8–7.7)
NEUTROPHILS # BLD AUTO: 13.7 K/UL (ref 1.8–7.7)
NEUTROPHILS # BLD AUTO: 15.1 K/UL (ref 1.8–7.7)
NEUTROPHILS # BLD AUTO: 16.8 K/UL (ref 1.8–7.7)
NEUTROPHILS # BLD AUTO: 17.4 K/UL (ref 1.8–7.7)
NEUTROPHILS # BLD AUTO: 21.2 K/UL (ref 1.8–7.7)
NEUTROPHILS # BLD AUTO: 21.3 K/UL (ref 1.8–7.7)
NEUTROPHILS # BLD AUTO: 22.2 K/UL (ref 1.8–7.7)
NEUTROPHILS # BLD AUTO: 8.5 K/UL (ref 1.8–7.7)
NEUTROPHILS # BLD AUTO: 9.2 K/UL (ref 1.8–7.7)
NEUTROPHILS # BLD AUTO: 9.3 K/UL (ref 1.8–7.7)
NEUTROPHILS # BLD AUTO: 9.4 K/UL (ref 1.8–7.7)
NEUTROPHILS # BLD AUTO: 9.9 K/UL (ref 1.8–7.7)
NEUTROPHILS # BLD AUTO: 9.9 K/UL (ref 1.8–7.7)
NEUTROPHILS NFR BLD: 74 % (ref 38–73)
NEUTROPHILS NFR BLD: 77.2 % (ref 38–73)
NEUTROPHILS NFR BLD: 78 % (ref 38–73)
NEUTROPHILS NFR BLD: 78.4 % (ref 38–73)
NEUTROPHILS NFR BLD: 79 % (ref 38–73)
NEUTROPHILS NFR BLD: 79 % (ref 38–73)
NEUTROPHILS NFR BLD: 79.1 % (ref 38–73)
NEUTROPHILS NFR BLD: 80.9 % (ref 38–73)
NEUTROPHILS NFR BLD: 81.7 % (ref 38–73)
NEUTROPHILS NFR BLD: 81.7 % (ref 38–73)
NEUTROPHILS NFR BLD: 82 % (ref 38–73)
NEUTROPHILS NFR BLD: 82.4 % (ref 38–73)
NEUTROPHILS NFR BLD: 83.9 % (ref 38–73)
NEUTROPHILS NFR BLD: 84 % (ref 38–73)
NEUTROPHILS NFR BLD: 85.3 % (ref 38–73)
NEUTROPHILS NFR BLD: 85.8 % (ref 38–73)
NEUTROPHILS NFR BLD: 86 % (ref 38–73)
NEUTROPHILS NFR BLD: 87 % (ref 38–73)
NEUTROPHILS NFR BLD: 87.9 % (ref 38–73)
NEUTROPHILS NFR BLD: 87.9 % (ref 38–73)
NEUTROPHILS NFR BLD: 91 % (ref 38–73)
NEUTROPHILS NFR BLD: 93 % (ref 38–73)
NEUTROPHILS NFR BLD: 96 % (ref 38–73)
NEUTS BAND NFR BLD MANUAL: 1 %
NEUTS BAND NFR BLD MANUAL: 1 %
NEUTS BAND NFR BLD MANUAL: 4 %
NEUTS BAND NFR BLD MANUAL: 7 %
NEUTS BAND NFR BLD MANUAL: 8 %
NEUTS BAND NFR BLD MANUAL: 9 %
NITRITE UR QL STRIP: NEGATIVE
NONHDLC SERPL-MCNC: 62 MG/DL
NRBC BLD-RTO: 0 /100 WBC
NRBC BLD-RTO: 1 /100 WBC
NRBC BLD-RTO: 15 /100 WBC
NRBC BLD-RTO: 17 /100 WBC
NRBC BLD-RTO: 3 /100 WBC
NRBC BLD-RTO: 5 /100 WBC
OVALOCYTES BLD QL SMEAR: ABNORMAL
OXA-48-LIKE: ABNORMAL
PCO2 BLDA: 21.7 MMHG (ref 35–45)
PCO2 BLDA: 22.7 MMHG (ref 35–45)
PCO2 BLDA: 22.9 MMHG (ref 35–45)
PCO2 BLDA: 23.1 MMHG (ref 35–45)
PCO2 BLDA: 23.2 MMHG (ref 35–45)
PCO2 BLDA: 23.4 MMHG (ref 35–45)
PCO2 BLDA: 23.6 MMHG (ref 35–45)
PCO2 BLDA: 23.6 MMHG (ref 35–45)
PCO2 BLDA: 25.3 MMHG (ref 35–45)
PCO2 BLDA: 25.6 MMHG (ref 35–45)
PCO2 BLDA: 25.7 MMHG (ref 35–45)
PCO2 BLDA: 27.1 MMHG (ref 35–45)
PCO2 BLDA: 27.2 MMHG (ref 35–45)
PCO2 BLDA: 27.4 MMHG (ref 35–45)
PCO2 BLDA: 28.2 MMHG (ref 35–45)
PCO2 BLDA: 29.1 MMHG (ref 35–45)
PCO2 BLDA: 29.2 MMHG (ref 35–45)
PCO2 BLDA: 29.5 MMHG (ref 35–45)
PCO2 BLDA: 32.6 MMHG (ref 35–45)
PCO2 BLDA: 32.6 MMHG (ref 35–45)
PCO2 BLDA: 34 MMHG (ref 35–45)
PCO2 BLDA: 35.4 MMHG (ref 35–45)
PCO2 BLDA: 35.8 MMHG (ref 35–45)
PCO2 BLDA: 36.9 MMHG (ref 35–45)
PCO2 BLDA: 38.1 MMHG (ref 35–45)
PCO2 BLDA: 40.3 MMHG (ref 35–45)
PEEP: 5
PF4 HEPARIN CMPLX AB SER QL: 2.53 OD (ref 0–0.4)
PH SMN: 7.09 [PH] (ref 7.35–7.45)
PH SMN: 7.12 [PH] (ref 7.35–7.45)
PH SMN: 7.24 [PH] (ref 7.35–7.45)
PH SMN: 7.32 [PH] (ref 7.35–7.45)
PH SMN: 7.35 [PH] (ref 7.35–7.45)
PH SMN: 7.36 [PH] (ref 7.35–7.45)
PH SMN: 7.37 [PH] (ref 7.35–7.45)
PH SMN: 7.37 [PH] (ref 7.35–7.45)
PH SMN: 7.38 [PH] (ref 7.35–7.45)
PH SMN: 7.39 [PH] (ref 7.35–7.45)
PH SMN: 7.39 [PH] (ref 7.35–7.45)
PH SMN: 7.4 [PH] (ref 7.35–7.45)
PH SMN: 7.4 [PH] (ref 7.35–7.45)
PH SMN: 7.41 [PH] (ref 7.35–7.45)
PH SMN: 7.41 [PH] (ref 7.35–7.45)
PH SMN: 7.42 [PH] (ref 7.35–7.45)
PH SMN: 7.42 [PH] (ref 7.35–7.45)
PH SMN: 7.44 [PH] (ref 7.35–7.45)
PH SMN: 7.45 [PH] (ref 7.35–7.45)
PH SMN: 7.45 [PH] (ref 7.35–7.45)
PH SMN: 7.46 [PH] (ref 7.35–7.45)
PH SMN: 7.47 [PH] (ref 7.35–7.45)
PH SMN: 7.5 [PH] (ref 7.35–7.45)
PH SMN: 7.59 [PH] (ref 7.35–7.45)
PH UR STRIP: 5 [PH] (ref 5–8)
PH UR STRIP: 6 [PH] (ref 5–8)
PH UR STRIP: 6 [PH] (ref 5–8)
PHOSPHATE SERPL-MCNC: 10.4 MG/DL (ref 2.7–4.5)
PHOSPHATE SERPL-MCNC: 2.4 MG/DL (ref 2.7–4.5)
PHOSPHATE SERPL-MCNC: 2.6 MG/DL (ref 2.7–4.5)
PHOSPHATE SERPL-MCNC: 2.8 MG/DL (ref 2.7–4.5)
PHOSPHATE SERPL-MCNC: 2.9 MG/DL (ref 2.7–4.5)
PHOSPHATE SERPL-MCNC: 3 MG/DL (ref 2.7–4.5)
PHOSPHATE SERPL-MCNC: 3 MG/DL (ref 2.7–4.5)
PHOSPHATE SERPL-MCNC: 3.4 MG/DL (ref 2.7–4.5)
PHOSPHATE SERPL-MCNC: 3.4 MG/DL (ref 2.7–4.5)
PHOSPHATE SERPL-MCNC: 3.6 MG/DL (ref 2.7–4.5)
PHOSPHATE SERPL-MCNC: 3.7 MG/DL (ref 2.7–4.5)
PHOSPHATE SERPL-MCNC: 4 MG/DL (ref 2.7–4.5)
PHOSPHATE SERPL-MCNC: 4.1 MG/DL (ref 2.7–4.5)
PHOSPHATE SERPL-MCNC: 4.3 MG/DL (ref 2.7–4.5)
PHOSPHATE SERPL-MCNC: 4.3 MG/DL (ref 2.7–4.5)
PHOSPHATE SERPL-MCNC: 5.1 MG/DL (ref 2.7–4.5)
PHOSPHATE SERPL-MCNC: 5.2 MG/DL (ref 2.7–4.5)
PHOSPHATE SERPL-MCNC: 5.5 MG/DL (ref 2.7–4.5)
PHOSPHATE SERPL-MCNC: 5.7 MG/DL (ref 2.7–4.5)
PHOSPHATE SERPL-MCNC: 6.6 MG/DL (ref 2.7–4.5)
PHOSPHATE SERPL-MCNC: 7.7 MG/DL (ref 2.7–4.5)
PHOSPHATE SERPL-MCNC: 8.5 MG/DL (ref 2.7–4.5)
PIP: 12
PIP: 12
PIP: 13
PIP: 14
PIP: 17
PIP: 18
PIP: 20
PISA TR MAX VEL: 2.3 M/S
PISA TR MAX VEL: 2.42 M/S
PLATELET # BLD AUTO: 166 K/UL (ref 150–450)
PLATELET # BLD AUTO: 17 K/UL (ref 150–450)
PLATELET # BLD AUTO: 181 K/UL (ref 150–450)
PLATELET # BLD AUTO: 198 K/UL (ref 150–450)
PLATELET # BLD AUTO: 201 K/UL (ref 150–450)
PLATELET # BLD AUTO: 203 K/UL (ref 150–450)
PLATELET # BLD AUTO: 21 K/UL (ref 150–450)
PLATELET # BLD AUTO: 210 K/UL (ref 150–450)
PLATELET # BLD AUTO: 226 K/UL (ref 150–450)
PLATELET # BLD AUTO: 23 K/UL (ref 150–450)
PLATELET # BLD AUTO: 240 K/UL (ref 150–450)
PLATELET # BLD AUTO: 241 K/UL (ref 150–450)
PLATELET # BLD AUTO: 246 K/UL (ref 150–450)
PLATELET # BLD AUTO: 25 K/UL (ref 150–450)
PLATELET # BLD AUTO: 25 K/UL (ref 150–450)
PLATELET # BLD AUTO: 26 K/UL (ref 150–450)
PLATELET # BLD AUTO: 28 K/UL (ref 150–450)
PLATELET # BLD AUTO: 28 K/UL (ref 150–450)
PLATELET # BLD AUTO: 29 K/UL (ref 150–450)
PLATELET # BLD AUTO: 40 K/UL (ref 150–450)
PLATELET # BLD AUTO: 42 K/UL (ref 150–450)
PLATELET # BLD AUTO: 54 K/UL (ref 150–450)
PLATELET # BLD AUTO: 59 K/UL (ref 150–450)
PLATELET BLD QL SMEAR: ABNORMAL
PLATELET, BLUE TOP: NORMAL K/UL (ref 150–450)
PMV BLD AUTO: 10 FL (ref 9.2–12.9)
PMV BLD AUTO: 10.1 FL (ref 9.2–12.9)
PMV BLD AUTO: 10.2 FL (ref 9.2–12.9)
PMV BLD AUTO: 10.5 FL (ref 9.2–12.9)
PMV BLD AUTO: 10.8 FL (ref 9.2–12.9)
PMV BLD AUTO: 11 FL (ref 9.2–12.9)
PMV BLD AUTO: 11.3 FL (ref 9.2–12.9)
PMV BLD AUTO: 12 FL (ref 9.2–12.9)
PMV BLD AUTO: 12.9 FL (ref 9.2–12.9)
PMV BLD AUTO: 14.1 FL (ref 9.2–12.9)
PMV BLD AUTO: 9.3 FL (ref 9.2–12.9)
PMV BLD AUTO: 9.4 FL (ref 9.2–12.9)
PMV BLD AUTO: 9.4 FL (ref 9.2–12.9)
PMV BLD AUTO: 9.5 FL (ref 9.2–12.9)
PMV BLD AUTO: 9.8 FL (ref 9.2–12.9)
PMV BLD AUTO: 9.9 FL (ref 9.2–12.9)
PMV BLD AUTO: ABNORMAL FL (ref 9.2–12.9)
PO2 BLDA: 105 MMHG (ref 80–100)
PO2 BLDA: 110 MMHG (ref 80–100)
PO2 BLDA: 112 MMHG (ref 80–100)
PO2 BLDA: 135 MMHG (ref 80–100)
PO2 BLDA: 14 MMHG (ref 40–60)
PO2 BLDA: 141 MMHG (ref 80–100)
PO2 BLDA: 150 MMHG (ref 80–100)
PO2 BLDA: 152 MMHG (ref 80–100)
PO2 BLDA: 153 MMHG (ref 80–100)
PO2 BLDA: 160 MMHG (ref 80–100)
PO2 BLDA: 162 MMHG (ref 80–100)
PO2 BLDA: 163 MMHG (ref 80–100)
PO2 BLDA: 172 MMHG (ref 80–100)
PO2 BLDA: 187 MMHG (ref 80–100)
PO2 BLDA: 190 MMHG (ref 80–100)
PO2 BLDA: 23 MMHG (ref 40–60)
PO2 BLDA: 255 MMHG (ref 80–100)
PO2 BLDA: 345 MMHG (ref 80–100)
PO2 BLDA: 406 MMHG (ref 80–100)
PO2 BLDA: 74 MMHG (ref 80–100)
PO2 BLDA: 85 MMHG (ref 80–100)
PO2 BLDA: 87 MMHG (ref 80–100)
PO2 BLDA: 92 MMHG (ref 80–100)
PO2 BLDA: 92 MMHG (ref 80–100)
PO2 BLDA: 94 MMHG (ref 80–100)
PO2 BLDA: 99 MMHG (ref 80–100)
POC BE: -1 MMOL/L
POC BE: -10 MMOL/L
POC BE: -11 MMOL/L
POC BE: -12 MMOL/L
POC BE: -14 MMOL/L
POC BE: -15 MMOL/L
POC BE: -18 MMOL/L
POC BE: -22 MMOL/L
POC BE: -3 MMOL/L
POC BE: -3 MMOL/L
POC BE: -4 MMOL/L
POC BE: -4 MMOL/L
POC BE: -5 MMOL/L
POC BE: -7 MMOL/L
POC BE: -8 MMOL/L
POC BE: -8 MMOL/L
POC BE: -9 MMOL/L
POC BE: 0 MMOL/L
POC BE: 0 MMOL/L
POC IONIZED CALCIUM: 0.85 MMOL/L (ref 1.06–1.42)
POC IONIZED CALCIUM: 0.94 MMOL/L (ref 1.06–1.42)
POC IONIZED CALCIUM: 0.98 MMOL/L (ref 1.06–1.42)
POC IONIZED CALCIUM: 1.05 MMOL/L (ref 1.06–1.42)
POC IONIZED CALCIUM: 1.08 MMOL/L (ref 1.06–1.42)
POC IONIZED CALCIUM: 1.09 MMOL/L (ref 1.06–1.42)
POC IONIZED CALCIUM: 1.11 MMOL/L (ref 1.06–1.42)
POC IONIZED CALCIUM: 1.11 MMOL/L (ref 1.06–1.42)
POC IONIZED CALCIUM: 1.13 MMOL/L (ref 1.06–1.42)
POC IONIZED CALCIUM: 1.16 MMOL/L (ref 1.06–1.42)
POC IONIZED CALCIUM: 1.19 MMOL/L (ref 1.06–1.42)
POC IONIZED CALCIUM: 1.2 MMOL/L (ref 1.06–1.42)
POC IONIZED CALCIUM: 1.22 MMOL/L (ref 1.06–1.42)
POC SATURATED O2: 100 % (ref 95–100)
POC SATURATED O2: 14 % (ref 95–100)
POC SATURATED O2: 25 % (ref 95–100)
POC SATURATED O2: 96 % (ref 95–100)
POC SATURATED O2: 97 % (ref 95–100)
POC SATURATED O2: 98 % (ref 95–100)
POC SATURATED O2: 99 % (ref 95–100)
POC TCO2: 13 MMOL/L (ref 23–27)
POC TCO2: 13 MMOL/L (ref 24–29)
POC TCO2: 14 MMOL/L (ref 23–27)
POC TCO2: 14 MMOL/L (ref 24–29)
POC TCO2: 15 MMOL/L (ref 23–27)
POC TCO2: 16 MMOL/L (ref 23–27)
POC TCO2: 17 MMOL/L (ref 23–27)
POC TCO2: 18 MMOL/L (ref 23–27)
POC TCO2: 19 MMOL/L (ref 23–27)
POC TCO2: 20 MMOL/L (ref 23–27)
POC TCO2: 21 MMOL/L (ref 23–27)
POC TCO2: 22 MMOL/L (ref 23–27)
POC TCO2: 22 MMOL/L (ref 23–27)
POC TCO2: 23 MMOL/L (ref 23–27)
POC TCO2: 25 MMOL/L (ref 23–27)
POC TCO2: 8 MMOL/L (ref 23–27)
POCT GLUCOSE: 119 MG/DL (ref 70–110)
POCT GLUCOSE: 122 MG/DL (ref 70–110)
POCT GLUCOSE: 123 MG/DL (ref 70–110)
POCT GLUCOSE: 126 MG/DL (ref 70–110)
POCT GLUCOSE: 127 MG/DL (ref 70–110)
POCT GLUCOSE: 128 MG/DL (ref 70–110)
POCT GLUCOSE: 130 MG/DL (ref 70–110)
POCT GLUCOSE: 133 MG/DL (ref 70–110)
POCT GLUCOSE: 133 MG/DL (ref 70–110)
POCT GLUCOSE: 134 MG/DL (ref 70–110)
POCT GLUCOSE: 137 MG/DL (ref 70–110)
POCT GLUCOSE: 140 MG/DL (ref 70–110)
POCT GLUCOSE: 140 MG/DL (ref 70–110)
POCT GLUCOSE: 141 MG/DL (ref 70–110)
POCT GLUCOSE: 141 MG/DL (ref 70–110)
POCT GLUCOSE: 142 MG/DL (ref 70–110)
POCT GLUCOSE: 143 MG/DL (ref 70–110)
POCT GLUCOSE: 144 MG/DL (ref 70–110)
POCT GLUCOSE: 146 MG/DL (ref 70–110)
POCT GLUCOSE: 146 MG/DL (ref 70–110)
POCT GLUCOSE: 147 MG/DL (ref 70–110)
POCT GLUCOSE: 148 MG/DL (ref 70–110)
POCT GLUCOSE: 149 MG/DL (ref 70–110)
POCT GLUCOSE: 149 MG/DL (ref 70–110)
POCT GLUCOSE: 151 MG/DL (ref 70–110)
POCT GLUCOSE: 151 MG/DL (ref 70–110)
POCT GLUCOSE: 152 MG/DL (ref 70–110)
POCT GLUCOSE: 155 MG/DL (ref 70–110)
POCT GLUCOSE: 156 MG/DL (ref 70–110)
POCT GLUCOSE: 158 MG/DL (ref 70–110)
POCT GLUCOSE: 158 MG/DL (ref 70–110)
POCT GLUCOSE: 160 MG/DL (ref 70–110)
POCT GLUCOSE: 160 MG/DL (ref 70–110)
POCT GLUCOSE: 163 MG/DL (ref 70–110)
POCT GLUCOSE: 165 MG/DL (ref 70–110)
POCT GLUCOSE: 165 MG/DL (ref 70–110)
POCT GLUCOSE: 170 MG/DL (ref 70–110)
POCT GLUCOSE: 171 MG/DL (ref 70–110)
POCT GLUCOSE: 172 MG/DL (ref 70–110)
POCT GLUCOSE: 174 MG/DL (ref 70–110)
POCT GLUCOSE: 174 MG/DL (ref 70–110)
POCT GLUCOSE: 177 MG/DL (ref 70–110)
POCT GLUCOSE: 178 MG/DL (ref 70–110)
POCT GLUCOSE: 179 MG/DL (ref 70–110)
POCT GLUCOSE: 180 MG/DL (ref 70–110)
POCT GLUCOSE: 180 MG/DL (ref 70–110)
POCT GLUCOSE: 181 MG/DL (ref 70–110)
POCT GLUCOSE: 186 MG/DL (ref 70–110)
POCT GLUCOSE: 189 MG/DL (ref 70–110)
POCT GLUCOSE: 190 MG/DL (ref 70–110)
POCT GLUCOSE: 192 MG/DL (ref 70–110)
POCT GLUCOSE: 193 MG/DL (ref 70–110)
POCT GLUCOSE: 197 MG/DL (ref 70–110)
POCT GLUCOSE: 201 MG/DL (ref 70–110)
POCT GLUCOSE: 202 MG/DL (ref 70–110)
POCT GLUCOSE: 203 MG/DL (ref 70–110)
POCT GLUCOSE: 209 MG/DL (ref 70–110)
POCT GLUCOSE: 212 MG/DL (ref 70–110)
POCT GLUCOSE: 218 MG/DL (ref 70–110)
POCT GLUCOSE: 220 MG/DL (ref 70–110)
POCT GLUCOSE: 226 MG/DL (ref 70–110)
POCT GLUCOSE: 227 MG/DL (ref 70–110)
POCT GLUCOSE: 230 MG/DL (ref 70–110)
POCT GLUCOSE: 233 MG/DL (ref 70–110)
POCT GLUCOSE: 234 MG/DL (ref 70–110)
POCT GLUCOSE: 241 MG/DL (ref 70–110)
POCT GLUCOSE: 257 MG/DL (ref 70–110)
POCT GLUCOSE: 260 MG/DL (ref 70–110)
POCT GLUCOSE: 266 MG/DL (ref 70–110)
POCT GLUCOSE: 266 MG/DL (ref 70–110)
POCT GLUCOSE: 271 MG/DL (ref 70–110)
POCT GLUCOSE: 282 MG/DL (ref 70–110)
POCT GLUCOSE: 285 MG/DL (ref 70–110)
POCT GLUCOSE: 287 MG/DL (ref 70–110)
POCT GLUCOSE: 295 MG/DL (ref 70–110)
POCT GLUCOSE: 300 MG/DL (ref 70–110)
POCT GLUCOSE: 304 MG/DL (ref 70–110)
POCT GLUCOSE: 312 MG/DL (ref 70–110)
POCT GLUCOSE: 312 MG/DL (ref 70–110)
POCT GLUCOSE: 314 MG/DL (ref 70–110)
POCT GLUCOSE: 315 MG/DL (ref 70–110)
POCT GLUCOSE: 326 MG/DL (ref 70–110)
POCT GLUCOSE: 333 MG/DL (ref 70–110)
POCT GLUCOSE: 334 MG/DL (ref 70–110)
POCT GLUCOSE: 335 MG/DL (ref 70–110)
POCT GLUCOSE: 340 MG/DL (ref 70–110)
POCT GLUCOSE: 344 MG/DL (ref 70–110)
POCT GLUCOSE: 346 MG/DL (ref 70–110)
POCT GLUCOSE: 347 MG/DL (ref 70–110)
POCT GLUCOSE: 348 MG/DL (ref 70–110)
POCT GLUCOSE: 357 MG/DL (ref 70–110)
POCT GLUCOSE: 475 MG/DL (ref 70–110)
POIKILOCYTOSIS BLD QL SMEAR: ABNORMAL
POIKILOCYTOSIS BLD QL SMEAR: ABNORMAL
POIKILOCYTOSIS BLD QL SMEAR: SLIGHT
POLYCHROMASIA BLD QL SMEAR: ABNORMAL
POTASSIUM BLD-SCNC: 3 MMOL/L (ref 3.5–5.1)
POTASSIUM BLD-SCNC: 3.2 MMOL/L (ref 3.5–5.1)
POTASSIUM BLD-SCNC: 3.3 MMOL/L (ref 3.5–5.1)
POTASSIUM BLD-SCNC: 3.6 MMOL/L (ref 3.5–5.1)
POTASSIUM BLD-SCNC: 3.7 MMOL/L (ref 3.5–5.1)
POTASSIUM BLD-SCNC: 3.8 MMOL/L (ref 3.5–5.1)
POTASSIUM BLD-SCNC: 3.8 MMOL/L (ref 3.5–5.1)
POTASSIUM BLD-SCNC: 3.9 MMOL/L (ref 3.5–5.1)
POTASSIUM BLD-SCNC: 4 MMOL/L (ref 3.5–5.1)
POTASSIUM BLD-SCNC: 4.2 MMOL/L (ref 3.5–5.1)
POTASSIUM BLD-SCNC: 4.2 MMOL/L (ref 3.5–5.1)
POTASSIUM BLD-SCNC: 4.6 MMOL/L (ref 3.5–5.1)
POTASSIUM BLD-SCNC: 4.6 MMOL/L (ref 3.5–5.1)
POTASSIUM BLD-SCNC: 4.9 MMOL/L (ref 3.5–5.1)
POTASSIUM BLD-SCNC: 5.4 MMOL/L (ref 3.5–5.1)
POTASSIUM SERPL-SCNC: 3 MMOL/L (ref 3.5–5.1)
POTASSIUM SERPL-SCNC: 3.2 MMOL/L (ref 3.5–5.1)
POTASSIUM SERPL-SCNC: 3.4 MMOL/L (ref 3.5–5.1)
POTASSIUM SERPL-SCNC: 3.4 MMOL/L (ref 3.5–5.1)
POTASSIUM SERPL-SCNC: 3.5 MMOL/L (ref 3.5–5.1)
POTASSIUM SERPL-SCNC: 3.6 MMOL/L (ref 3.5–5.1)
POTASSIUM SERPL-SCNC: 3.6 MMOL/L (ref 3.5–5.1)
POTASSIUM SERPL-SCNC: 3.7 MMOL/L (ref 3.5–5.1)
POTASSIUM SERPL-SCNC: 3.8 MMOL/L (ref 3.5–5.1)
POTASSIUM SERPL-SCNC: 3.8 MMOL/L (ref 3.5–5.1)
POTASSIUM SERPL-SCNC: 3.9 MMOL/L (ref 3.5–5.1)
POTASSIUM SERPL-SCNC: 4 MMOL/L (ref 3.5–5.1)
POTASSIUM SERPL-SCNC: 4.1 MMOL/L (ref 3.5–5.1)
POTASSIUM SERPL-SCNC: 4.2 MMOL/L (ref 3.5–5.1)
POTASSIUM SERPL-SCNC: 4.2 MMOL/L (ref 3.5–5.1)
POTASSIUM SERPL-SCNC: 4.3 MMOL/L (ref 3.5–5.1)
POTASSIUM SERPL-SCNC: 4.9 MMOL/L (ref 3.5–5.1)
POTASSIUM SERPL-SCNC: 5 MMOL/L (ref 3.5–5.1)
POTASSIUM SERPL-SCNC: 5.1 MMOL/L (ref 3.5–5.1)
POTASSIUM SERPL-SCNC: 5.6 MMOL/L (ref 3.5–5.1)
PROCALCITONIN SERPL IA-MCNC: 0.2 NG/ML
PROCALCITONIN SERPL IA-MCNC: 0.21 NG/ML
PROCALCITONIN SERPL IA-MCNC: 0.43 NG/ML
PROMYELOCYTES NFR BLD MANUAL: 1 %
PROMYELOCYTES NFR BLD MANUAL: 1 %
PROT SERPL-MCNC: 3.8 G/DL (ref 6–8.4)
PROT SERPL-MCNC: 4.2 G/DL (ref 6–8.4)
PROT SERPL-MCNC: 4.4 G/DL (ref 6–8.4)
PROT SERPL-MCNC: 4.5 G/DL (ref 6–8.4)
PROT SERPL-MCNC: 4.6 G/DL (ref 6–8.4)
PROT SERPL-MCNC: 4.6 G/DL (ref 6–8.4)
PROT SERPL-MCNC: 4.7 G/DL (ref 6–8.4)
PROT SERPL-MCNC: 4.8 G/DL (ref 6–8.4)
PROT SERPL-MCNC: 5 G/DL (ref 6–8.4)
PROT SERPL-MCNC: 5.2 G/DL (ref 6–8.4)
PROT SERPL-MCNC: 6.3 G/DL (ref 6–8.4)
PROT SERPL-MCNC: 6.4 G/DL (ref 6–8.4)
PROT SERPL-MCNC: 6.5 G/DL (ref 6–8.4)
PROT SERPL-MCNC: 6.5 G/DL (ref 6–8.4)
PROT SERPL-MCNC: 6.7 G/DL (ref 6–8.4)
PROT SERPL-MCNC: 7 G/DL (ref 6–8.4)
PROT SERPL-MCNC: 7.2 G/DL (ref 6–8.4)
PROT SERPL-MCNC: 7.4 G/DL (ref 6–8.4)
PROT SERPL-MCNC: 7.9 G/DL (ref 6–8.4)
PROT UR QL STRIP: ABNORMAL
PROTEUS SPECIES: NOT DETECTED
PROTHROMBIN TIME: 14 SEC (ref 9–12.5)
PROTHROMBIN TIME: 15.6 SEC (ref 9–12.5)
PROTHROMBIN TIME: 19 SEC (ref 9–12.5)
PS: 5
PSEUDOMONAS AERUGINOSA: NOT DETECTED
RA MAJOR: 4.6 CM
RA MAJOR: 4.86 CM
RA PRESSURE ESTIMATED: 3 MMHG
RA WIDTH: 2.28 CM
RA WIDTH: 3.47 CM
RBC # BLD AUTO: 2.12 M/UL (ref 4.6–6.2)
RBC # BLD AUTO: 2.19 M/UL (ref 4.6–6.2)
RBC # BLD AUTO: 2.44 M/UL (ref 4.6–6.2)
RBC # BLD AUTO: 2.6 M/UL (ref 4.6–6.2)
RBC # BLD AUTO: 2.67 M/UL (ref 4.6–6.2)
RBC # BLD AUTO: 2.81 M/UL (ref 4.6–6.2)
RBC # BLD AUTO: 2.96 M/UL (ref 4.6–6.2)
RBC # BLD AUTO: 3.12 M/UL (ref 4.6–6.2)
RBC # BLD AUTO: 3.2 M/UL (ref 4.6–6.2)
RBC # BLD AUTO: 3.57 M/UL (ref 4.6–6.2)
RBC # BLD AUTO: 3.62 M/UL (ref 4.6–6.2)
RBC # BLD AUTO: 4.4 M/UL (ref 4.6–6.2)
RBC # BLD AUTO: 4.43 M/UL (ref 4.6–6.2)
RBC # BLD AUTO: 4.5 M/UL (ref 4.6–6.2)
RBC # BLD AUTO: 4.69 M/UL (ref 4.6–6.2)
RBC # BLD AUTO: 4.78 M/UL (ref 4.6–6.2)
RBC # BLD AUTO: 4.9 M/UL (ref 4.6–6.2)
RBC # BLD AUTO: 4.94 M/UL (ref 4.6–6.2)
RBC # BLD AUTO: 4.97 M/UL (ref 4.6–6.2)
RBC # BLD AUTO: 5.04 M/UL (ref 4.6–6.2)
RBC # BLD AUTO: 5.13 M/UL (ref 4.6–6.2)
RBC # BLD AUTO: 5.2 M/UL (ref 4.6–6.2)
RBC # BLD AUTO: 5.8 M/UL (ref 4.6–6.2)
RBC #/AREA URNS AUTO: 14 /HPF (ref 0–4)
RBC #/AREA URNS AUTO: >100 /HPF (ref 0–4)
RBC #/AREA URNS AUTO: >100 /HPF (ref 0–4)
RIGHT VENTRICULAR END-DIASTOLIC DIMENSION: 3.31 CM
RIGHT VENTRICULAR END-DIASTOLIC DIMENSION: 3.42 CM
RV TB RVSP: 5 MMHG
SALMONELLA SP: NOT DETECTED
SAMPLE: ABNORMAL
SCHISTOCYTES BLD QL SMEAR: ABNORMAL
SCHISTOCYTES BLD QL SMEAR: PRESENT
SCHISTOCYTES BLD QL SMEAR: PRESENT
SERRATIA MARCESCENS: NOT DETECTED
SINUS: 3.13 CM
SINUS: 3.5 CM
SITE: ABNORMAL
SMUDGE CELLS BLD QL SMEAR: PRESENT
SODIUM BLD-SCNC: 135 MMOL/L (ref 136–145)
SODIUM BLD-SCNC: 137 MMOL/L (ref 136–145)
SODIUM BLD-SCNC: 139 MMOL/L (ref 136–145)
SODIUM BLD-SCNC: 140 MMOL/L (ref 136–145)
SODIUM BLD-SCNC: 140 MMOL/L (ref 136–145)
SODIUM BLD-SCNC: 141 MMOL/L (ref 136–145)
SODIUM BLD-SCNC: 143 MMOL/L (ref 136–145)
SODIUM BLD-SCNC: 143 MMOL/L (ref 136–145)
SODIUM BLD-SCNC: 145 MMOL/L (ref 136–145)
SODIUM BLD-SCNC: 146 MMOL/L (ref 136–145)
SODIUM BLD-SCNC: 146 MMOL/L (ref 136–145)
SODIUM BLD-SCNC: 149 MMOL/L (ref 136–145)
SODIUM BLD-SCNC: 150 MMOL/L (ref 136–145)
SODIUM BLD-SCNC: 151 MMOL/L (ref 136–145)
SODIUM BLD-SCNC: 156 MMOL/L (ref 136–145)
SODIUM SERPL-SCNC: 137 MMOL/L (ref 136–145)
SODIUM SERPL-SCNC: 138 MMOL/L (ref 136–145)
SODIUM SERPL-SCNC: 139 MMOL/L (ref 136–145)
SODIUM SERPL-SCNC: 140 MMOL/L (ref 136–145)
SODIUM SERPL-SCNC: 141 MMOL/L (ref 136–145)
SODIUM SERPL-SCNC: 142 MMOL/L (ref 136–145)
SODIUM SERPL-SCNC: 143 MMOL/L (ref 136–145)
SODIUM SERPL-SCNC: 143 MMOL/L (ref 136–145)
SODIUM SERPL-SCNC: 144 MMOL/L (ref 136–145)
SODIUM SERPL-SCNC: 144 MMOL/L (ref 136–145)
SODIUM SERPL-SCNC: 145 MMOL/L (ref 136–145)
SODIUM SERPL-SCNC: 145 MMOL/L (ref 136–145)
SODIUM SERPL-SCNC: 146 MMOL/L (ref 136–145)
SODIUM SERPL-SCNC: 147 MMOL/L (ref 136–145)
SODIUM SERPL-SCNC: 147 MMOL/L (ref 136–145)
SODIUM SERPL-SCNC: 148 MMOL/L (ref 136–145)
SODIUM SERPL-SCNC: 148 MMOL/L (ref 136–145)
SODIUM SERPL-SCNC: 150 MMOL/L (ref 136–145)
SODIUM SERPL-SCNC: 151 MMOL/L (ref 136–145)
SODIUM SERPL-SCNC: 152 MMOL/L (ref 136–145)
SODIUM SERPL-SCNC: 153 MMOL/L (ref 136–145)
SODIUM SERPL-SCNC: 155 MMOL/L (ref 136–145)
SODIUM SERPL-SCNC: 156 MMOL/L (ref 136–145)
SODIUM SERPL-SCNC: 157 MMOL/L (ref 136–145)
SODIUM SERPL-SCNC: 157 MMOL/L (ref 136–145)
SODIUM SERPL-SCNC: 160 MMOL/L (ref 136–145)
SODIUM SERPL-SCNC: 161 MMOL/L (ref 136–145)
SODIUM SERPL-SCNC: 163 MMOL/L (ref 136–145)
SODIUM SERPL-SCNC: 164 MMOL/L (ref 136–145)
SP GR UR STRIP: >1.03 (ref 1–1.03)
SP02: 100
SP02: 94
SP02: 95
SP02: 97
SP02: 98
SP02: 98
SP02: 99
SPECIMEN OUTDATE: NORMAL
SPHEROCYTES BLD QL SMEAR: ABNORMAL
SPONT RATE: 30
SQUAMOUS #/AREA URNS AUTO: 1 /HPF
SQUAMOUS #/AREA URNS AUTO: 3 /HPF
STAPHYLOCOCCUS AUREUS: NOT DETECTED
STAPHYLOCOCCUS EPIDERMIDIS: DETECTED
STAPHYLOCOCCUS LUGDUNESIS: NOT DETECTED
STAPHYLOCOCCUS SPECIES: ABNORMAL
STENOTROPHOMONAS MALTOPHILIA: NOT DETECTED
STJ: 2.73 CM
STJ: 3.02 CM
STREPTOCOCCUS AGALACTIAE: NOT DETECTED
STREPTOCOCCUS PNEUMONIAE: NOT DETECTED
STREPTOCOCCUS PYOGENES: NOT DETECTED
STREPTOCOCCUS SPECIES: NOT DETECTED
TDI LATERAL: 0.08 M/S
TDI LATERAL: 0.11 M/S
TDI SEPTAL: 0.04 M/S
TDI SEPTAL: 0.06 M/S
TDI: 0.07 M/S
TDI: 0.08 M/S
TOXIC GRANULES BLD QL SMEAR: PRESENT
TR MAX PG: 21 MMHG
TR MAX PG: 23 MMHG
TRANS ERYTHROCYTES VOL PATIENT: NORMAL ML
TRANS ERYTHROCYTES VOL PATIENT: NORMAL ML
TRICUSPID ANNULAR PLANE SYSTOLIC EXCURSION: 1.68 CM
TRICUSPID ANNULAR PLANE SYSTOLIC EXCURSION: 1.98 CM
TRIGL SERPL-MCNC: 127 MG/DL (ref 30–150)
TRIGL SERPL-MCNC: 139 MG/DL (ref 30–150)
TRIGL SERPL-MCNC: 68 MG/DL (ref 30–150)
TROPONIN I SERPL DL<=0.01 NG/ML-MCNC: 0.05 NG/ML (ref 0–0.03)
TROPONIN I SERPL DL<=0.01 NG/ML-MCNC: 0.1 NG/ML (ref 0–0.03)
TROPONIN I SERPL DL<=0.01 NG/ML-MCNC: 0.11 NG/ML (ref 0–0.03)
TROPONIN I SERPL DL<=0.01 NG/ML-MCNC: 0.12 NG/ML (ref 0–0.03)
TROPONIN I SERPL DL<=0.01 NG/ML-MCNC: 0.13 NG/ML (ref 0–0.03)
TROPONIN I SERPL DL<=0.01 NG/ML-MCNC: 0.13 NG/ML (ref 0–0.03)
TSH SERPL DL<=0.005 MIU/L-ACNC: 2.12 UIU/ML (ref 0.4–4)
TV REST PULMONARY ARTERY PRESSURE: 26 MMHG
UNIT NUMBER: NORMAL
UNSPECIFIED CRY UR QL COMP ASSIST: 1
URN SPEC COLLECT METH UR: ABNORMAL
VAN A/B: ABNORMAL
VANCOMYCIN SERPL-MCNC: 10.5 UG/ML
VANCOMYCIN SERPL-MCNC: 14.5 UG/ML
VANCOMYCIN SERPL-MCNC: 15.8 UG/ML
VANCOMYCIN SERPL-MCNC: <1.4 UG/ML
VIM GENE: ABNORMAL
VOL: 366
VT: 440
VT: 500
WBC # BLD AUTO: 11.66 K/UL (ref 3.9–12.7)
WBC # BLD AUTO: 11.76 K/UL (ref 3.9–12.7)
WBC # BLD AUTO: 11.86 K/UL (ref 3.9–12.7)
WBC # BLD AUTO: 11.87 K/UL (ref 3.9–12.7)
WBC # BLD AUTO: 11.91 K/UL (ref 3.9–12.7)
WBC # BLD AUTO: 13.63 K/UL (ref 3.9–12.7)
WBC # BLD AUTO: 13.92 K/UL (ref 3.9–12.7)
WBC # BLD AUTO: 16.77 K/UL (ref 3.9–12.7)
WBC # BLD AUTO: 17.54 K/UL (ref 3.9–12.7)
WBC # BLD AUTO: 18.33 K/UL (ref 3.9–12.7)
WBC # BLD AUTO: 20.07 K/UL (ref 3.9–12.7)
WBC # BLD AUTO: 20.96 K/UL (ref 3.9–12.7)
WBC # BLD AUTO: 21.98 K/UL (ref 3.9–12.7)
WBC # BLD AUTO: 23.88 K/UL (ref 3.9–12.7)
WBC # BLD AUTO: 24.11 K/UL (ref 3.9–12.7)
WBC # BLD AUTO: 25.25 K/UL (ref 3.9–12.7)
WBC # BLD AUTO: 25.8 K/UL (ref 3.9–12.7)
WBC # BLD AUTO: 25.81 K/UL (ref 3.9–12.7)
WBC # BLD AUTO: 25.81 K/UL (ref 3.9–12.7)
WBC # BLD AUTO: 26.23 K/UL (ref 3.9–12.7)
WBC # BLD AUTO: 31.59 K/UL (ref 3.9–12.7)
WBC # BLD AUTO: 32.67 K/UL (ref 3.9–12.7)
WBC # BLD AUTO: 9.72 K/UL (ref 3.9–12.7)
WBC #/AREA URNS AUTO: 2 /HPF (ref 0–5)
WBC #/AREA URNS AUTO: 3 /HPF (ref 0–5)
WBC #/AREA URNS AUTO: 5 /HPF (ref 0–5)
YEAST UR QL AUTO: ABNORMAL
Z-SCORE OF LEFT VENTRICULAR DIMENSION IN END DIASTOLE: -1.55
Z-SCORE OF LEFT VENTRICULAR DIMENSION IN END DIASTOLE: -1.76
Z-SCORE OF LEFT VENTRICULAR DIMENSION IN END SYSTOLE: -0.35
Z-SCORE OF LEFT VENTRICULAR DIMENSION IN END SYSTOLE: -1.47

## 2023-01-01 PROCEDURE — 25000003 PHARM REV CODE 250

## 2023-01-01 PROCEDURE — 94003 VENT MGMT INPAT SUBQ DAY: CPT

## 2023-01-01 PROCEDURE — 99223 1ST HOSP IP/OBS HIGH 75: CPT | Mod: ,,, | Performed by: NURSE PRACTITIONER

## 2023-01-01 PROCEDURE — 85610 PROTHROMBIN TIME: CPT | Performed by: PSYCHIATRY & NEUROLOGY

## 2023-01-01 PROCEDURE — 82800 BLOOD PH: CPT

## 2023-01-01 PROCEDURE — 93010 EKG 12-LEAD: ICD-10-PCS | Mod: ,,, | Performed by: INTERNAL MEDICINE

## 2023-01-01 PROCEDURE — 84132 ASSAY OF SERUM POTASSIUM: CPT

## 2023-01-01 PROCEDURE — 51798 US URINE CAPACITY MEASURE: CPT

## 2023-01-01 PROCEDURE — 94002 VENT MGMT INPAT INIT DAY: CPT

## 2023-01-01 PROCEDURE — 85049 AUTOMATED PLATELET COUNT: CPT | Performed by: PSYCHIATRY & NEUROLOGY

## 2023-01-01 PROCEDURE — 63600175 PHARM REV CODE 636 W HCPCS: Performed by: PSYCHIATRY & NEUROLOGY

## 2023-01-01 PROCEDURE — 63600175 PHARM REV CODE 636 W HCPCS: Performed by: STUDENT IN AN ORGANIZED HEALTH CARE EDUCATION/TRAINING PROGRAM

## 2023-01-01 PROCEDURE — 36415 COLL VENOUS BLD VENIPUNCTURE: CPT | Performed by: PHYSICIAN ASSISTANT

## 2023-01-01 PROCEDURE — 25000003 PHARM REV CODE 250: Performed by: NURSE PRACTITIONER

## 2023-01-01 PROCEDURE — 94761 N-INVAS EAR/PLS OXIMETRY MLT: CPT | Mod: XB

## 2023-01-01 PROCEDURE — 84100 ASSAY OF PHOSPHORUS: CPT | Mod: 91

## 2023-01-01 PROCEDURE — 25000003 PHARM REV CODE 250: Performed by: REGISTERED NURSE

## 2023-01-01 PROCEDURE — 82803 BLOOD GASES ANY COMBINATION: CPT

## 2023-01-01 PROCEDURE — 99223 PR INITIAL HOSPITAL CARE,LEVL III: ICD-10-PCS | Mod: ,,, | Performed by: NURSE PRACTITIONER

## 2023-01-01 PROCEDURE — 37799 UNLISTED PX VASCULAR SURGERY: CPT

## 2023-01-01 PROCEDURE — 83735 ASSAY OF MAGNESIUM: CPT | Mod: 91

## 2023-01-01 PROCEDURE — 84443 ASSAY THYROID STIM HORMONE: CPT | Performed by: PHYSICIAN ASSISTANT

## 2023-01-01 PROCEDURE — 99900035 HC TECH TIME PER 15 MIN (STAT)

## 2023-01-01 PROCEDURE — 63600175 PHARM REV CODE 636 W HCPCS

## 2023-01-01 PROCEDURE — 25000003 PHARM REV CODE 250: Performed by: NURSE ANESTHETIST, CERTIFIED REGISTERED

## 2023-01-01 PROCEDURE — P9612 CATHETERIZE FOR URINE SPEC: HCPCS

## 2023-01-01 PROCEDURE — 87154 CUL TYP ID BLD PTHGN 6+ TRGT: CPT | Performed by: NURSE PRACTITIONER

## 2023-01-01 PROCEDURE — 63600175 PHARM REV CODE 636 W HCPCS: Performed by: NURSE PRACTITIONER

## 2023-01-01 PROCEDURE — 84145 PROCALCITONIN (PCT): CPT | Performed by: NURSE PRACTITIONER

## 2023-01-01 PROCEDURE — 84100 ASSAY OF PHOSPHORUS: CPT | Performed by: NURSE PRACTITIONER

## 2023-01-01 PROCEDURE — 83605 ASSAY OF LACTIC ACID: CPT

## 2023-01-01 PROCEDURE — 36600 WITHDRAWAL OF ARTERIAL BLOOD: CPT

## 2023-01-01 PROCEDURE — 85384 FIBRINOGEN ACTIVITY: CPT

## 2023-01-01 PROCEDURE — 99499 UNLISTED E&M SERVICE: CPT | Mod: ,,, | Performed by: NURSE PRACTITIONER

## 2023-01-01 PROCEDURE — 25000242 PHARM REV CODE 250 ALT 637 W/ HCPCS: Performed by: NURSE ANESTHETIST, CERTIFIED REGISTERED

## 2023-01-01 PROCEDURE — 63600175 PHARM REV CODE 636 W HCPCS: Performed by: REGISTERED NURSE

## 2023-01-01 PROCEDURE — 97112 NEUROMUSCULAR REEDUCATION: CPT

## 2023-01-01 PROCEDURE — 83735 ASSAY OF MAGNESIUM: CPT | Mod: 91 | Performed by: PSYCHIATRY & NEUROLOGY

## 2023-01-01 PROCEDURE — P9035 PLATELET PHERES LEUKOREDUCED: HCPCS

## 2023-01-01 PROCEDURE — 99223 PR INITIAL HOSPITAL CARE,LEVL III: ICD-10-PCS | Mod: GC,,, | Performed by: HOSPITALIST

## 2023-01-01 PROCEDURE — 83605 ASSAY OF LACTIC ACID: CPT | Mod: 91 | Performed by: NURSE PRACTITIONER

## 2023-01-01 PROCEDURE — 81001 URINALYSIS AUTO W/SCOPE: CPT | Performed by: NURSE PRACTITIONER

## 2023-01-01 PROCEDURE — 99291 PR CRITICAL CARE, E/M 30-74 MINUTES: ICD-10-PCS | Mod: FS,,, | Performed by: PSYCHIATRY & NEUROLOGY

## 2023-01-01 PROCEDURE — 83735 ASSAY OF MAGNESIUM: CPT | Performed by: NURSE PRACTITIONER

## 2023-01-01 PROCEDURE — 27201018 HC KIT, PEG (ANY): Performed by: INTERNAL MEDICINE

## 2023-01-01 PROCEDURE — 99900026 HC AIRWAY MAINTENANCE (STAT)

## 2023-01-01 PROCEDURE — 80053 COMPREHEN METABOLIC PANEL: CPT | Mod: 91

## 2023-01-01 PROCEDURE — 84295 ASSAY OF SERUM SODIUM: CPT

## 2023-01-01 PROCEDURE — 84478 ASSAY OF TRIGLYCERIDES: CPT

## 2023-01-01 PROCEDURE — D9220A PRA ANESTHESIA: ICD-10-PCS | Mod: ANES,,, | Performed by: ANESTHESIOLOGY

## 2023-01-01 PROCEDURE — 84132 ASSAY OF SERUM POTASSIUM: CPT | Performed by: NURSE PRACTITIONER

## 2023-01-01 PROCEDURE — 25000003 PHARM REV CODE 250: Performed by: PHYSICIAN ASSISTANT

## 2023-01-01 PROCEDURE — 99233 SBSQ HOSP IP/OBS HIGH 50: CPT | Mod: FS,,, | Performed by: PSYCHIATRY & NEUROLOGY

## 2023-01-01 PROCEDURE — 99291 CRITICAL CARE FIRST HOUR: CPT | Mod: FS,,, | Performed by: PSYCHIATRY & NEUROLOGY

## 2023-01-01 PROCEDURE — 82330 ASSAY OF CALCIUM: CPT

## 2023-01-01 PROCEDURE — 99499 NO LOS: ICD-10-PCS | Mod: ,,, | Performed by: NURSE PRACTITIONER

## 2023-01-01 PROCEDURE — 99233 PR SUBSEQUENT HOSPITAL CARE,LEVL III: ICD-10-PCS | Mod: FS,,, | Performed by: PSYCHIATRY & NEUROLOGY

## 2023-01-01 PROCEDURE — 85027 COMPLETE CBC AUTOMATED: CPT | Performed by: PSYCHIATRY & NEUROLOGY

## 2023-01-01 PROCEDURE — 25000003 PHARM REV CODE 250: Performed by: PSYCHIATRY & NEUROLOGY

## 2023-01-01 PROCEDURE — 83735 ASSAY OF MAGNESIUM: CPT | Performed by: PHYSICIAN ASSISTANT

## 2023-01-01 PROCEDURE — 84100 ASSAY OF PHOSPHORUS: CPT

## 2023-01-01 PROCEDURE — 80053 COMPREHEN METABOLIC PANEL: CPT | Performed by: NURSE PRACTITIONER

## 2023-01-01 PROCEDURE — 99291 PR CRITICAL CARE, E/M 30-74 MINUTES: ICD-10-PCS | Mod: ,,, | Performed by: NURSE PRACTITIONER

## 2023-01-01 PROCEDURE — 51702 INSERT TEMP BLADDER CATH: CPT

## 2023-01-01 PROCEDURE — 99223 PR INITIAL HOSPITAL CARE,LEVL III: ICD-10-PCS | Mod: ,,, | Performed by: INTERNAL MEDICINE

## 2023-01-01 PROCEDURE — 63600175 PHARM REV CODE 636 W HCPCS: Performed by: PHYSICIAN ASSISTANT

## 2023-01-01 PROCEDURE — 94640 AIRWAY INHALATION TREATMENT: CPT

## 2023-01-01 PROCEDURE — 25000242 PHARM REV CODE 250 ALT 637 W/ HCPCS: Performed by: PHYSICIAN ASSISTANT

## 2023-01-01 PROCEDURE — 85347 COAGULATION TIME ACTIVATED: CPT

## 2023-01-01 PROCEDURE — 63600175 PHARM REV CODE 636 W HCPCS: Performed by: NURSE ANESTHETIST, CERTIFIED REGISTERED

## 2023-01-01 PROCEDURE — 85027 COMPLETE CBC AUTOMATED: CPT | Performed by: NURSE PRACTITIONER

## 2023-01-01 PROCEDURE — 95720 EEG PHY/QHP EA INCR W/VEEG: CPT | Mod: ,,, | Performed by: STUDENT IN AN ORGANIZED HEALTH CARE EDUCATION/TRAINING PROGRAM

## 2023-01-01 PROCEDURE — 43246 PR EGD, FLEX, W/PLCMT, GASTROSTOMY TUBE: ICD-10-PCS | Mod: ,,, | Performed by: INTERNAL MEDICINE

## 2023-01-01 PROCEDURE — 25000003 PHARM REV CODE 250: Performed by: STUDENT IN AN ORGANIZED HEALTH CARE EDUCATION/TRAINING PROGRAM

## 2023-01-01 PROCEDURE — 99233 PR SUBSEQUENT HOSPITAL CARE,LEVL III: ICD-10-PCS | Mod: ,,, | Performed by: PSYCHIATRY & NEUROLOGY

## 2023-01-01 PROCEDURE — 84132 ASSAY OF SERUM POTASSIUM: CPT | Performed by: PSYCHIATRY & NEUROLOGY

## 2023-01-01 PROCEDURE — 82150 ASSAY OF AMYLASE: CPT

## 2023-01-01 PROCEDURE — 85014 HEMATOCRIT: CPT

## 2023-01-01 PROCEDURE — D9220A PRA ANESTHESIA: Mod: ANES,,, | Performed by: ANESTHESIOLOGY

## 2023-01-01 PROCEDURE — 83690 ASSAY OF LIPASE: CPT

## 2023-01-01 PROCEDURE — 25500020 PHARM REV CODE 255: Performed by: PSYCHIATRY & NEUROLOGY

## 2023-01-01 PROCEDURE — 27100171 HC OXYGEN HIGH FLOW UP TO 24 HOURS

## 2023-01-01 PROCEDURE — 85027 COMPLETE CBC AUTOMATED: CPT | Mod: 91

## 2023-01-01 PROCEDURE — P9021 RED BLOOD CELLS UNIT: HCPCS

## 2023-01-01 PROCEDURE — 87077 CULTURE AEROBIC IDENTIFY: CPT | Performed by: NURSE PRACTITIONER

## 2023-01-01 PROCEDURE — 94761 N-INVAS EAR/PLS OXIMETRY MLT: CPT

## 2023-01-01 PROCEDURE — 84100 ASSAY OF PHOSPHORUS: CPT | Performed by: PSYCHIATRY & NEUROLOGY

## 2023-01-01 PROCEDURE — 97168 OT RE-EVAL EST PLAN CARE: CPT

## 2023-01-01 PROCEDURE — 93005 ELECTROCARDIOGRAM TRACING: CPT

## 2023-01-01 PROCEDURE — 82542 COL CHROMOTOGRAPHY QUAL/QUAN: CPT | Performed by: STUDENT IN AN ORGANIZED HEALTH CARE EDUCATION/TRAINING PROGRAM

## 2023-01-01 PROCEDURE — 86850 RBC ANTIBODY SCREEN: CPT | Performed by: NURSE PRACTITIONER

## 2023-01-01 PROCEDURE — 99233 SBSQ HOSP IP/OBS HIGH 50: CPT | Mod: FS,,, | Performed by: NURSE PRACTITIONER

## 2023-01-01 PROCEDURE — 99233 PR SUBSEQUENT HOSPITAL CARE,LEVL III: ICD-10-PCS | Mod: FS,,, | Performed by: NURSE PRACTITIONER

## 2023-01-01 PROCEDURE — 80053 COMPREHEN METABOLIC PANEL: CPT

## 2023-01-01 PROCEDURE — 80048 BASIC METABOLIC PNL TOTAL CA: CPT | Mod: XB | Performed by: PSYCHIATRY & NEUROLOGY

## 2023-01-01 PROCEDURE — 84132 ASSAY OF SERUM POTASSIUM: CPT | Performed by: PHYSICIAN ASSISTANT

## 2023-01-01 PROCEDURE — 99291 PR CRITICAL CARE, E/M 30-74 MINUTES: ICD-10-PCS | Mod: 25,,, | Performed by: NURSE PRACTITIONER

## 2023-01-01 PROCEDURE — 99285 EMERGENCY DEPT VISIT HI MDM: CPT | Mod: 25

## 2023-01-01 PROCEDURE — P9012 CRYOPRECIPITATE EACH UNIT: HCPCS

## 2023-01-01 PROCEDURE — 85025 COMPLETE CBC W/AUTO DIFF WBC: CPT | Performed by: PHYSICIAN ASSISTANT

## 2023-01-01 PROCEDURE — 99223 1ST HOSP IP/OBS HIGH 75: CPT | Mod: ,,, | Performed by: INTERNAL MEDICINE

## 2023-01-01 PROCEDURE — P9021 RED BLOOD CELLS UNIT: HCPCS | Performed by: PSYCHIATRY & NEUROLOGY

## 2023-01-01 PROCEDURE — 83605 ASSAY OF LACTIC ACID: CPT | Mod: 91 | Performed by: PSYCHIATRY & NEUROLOGY

## 2023-01-01 PROCEDURE — 51701 INSERT BLADDER CATHETER: CPT

## 2023-01-01 PROCEDURE — 90945 DIALYSIS ONE EVALUATION: CPT

## 2023-01-01 PROCEDURE — 85730 THROMBOPLASTIN TIME PARTIAL: CPT | Performed by: REGISTERED NURSE

## 2023-01-01 PROCEDURE — 83735 ASSAY OF MAGNESIUM: CPT | Mod: 91 | Performed by: STUDENT IN AN ORGANIZED HEALTH CARE EDUCATION/TRAINING PROGRAM

## 2023-01-01 PROCEDURE — 99223 1ST HOSP IP/OBS HIGH 75: CPT | Mod: ,,, | Performed by: PHYSICIAN ASSISTANT

## 2023-01-01 PROCEDURE — 31500 INSERT EMERGENCY AIRWAY: CPT | Mod: ,,, | Performed by: PSYCHIATRY & NEUROLOGY

## 2023-01-01 PROCEDURE — 82550 ASSAY OF CK (CPK): CPT | Performed by: NURSE PRACTITIONER

## 2023-01-01 PROCEDURE — 82330 ASSAY OF CALCIUM: CPT | Mod: 91 | Performed by: PSYCHIATRY & NEUROLOGY

## 2023-01-01 PROCEDURE — 85025 COMPLETE CBC W/AUTO DIFF WBC: CPT | Performed by: PSYCHIATRY & NEUROLOGY

## 2023-01-01 PROCEDURE — 85025 COMPLETE CBC W/AUTO DIFF WBC: CPT | Performed by: NURSE PRACTITIONER

## 2023-01-01 PROCEDURE — 99291 CRITICAL CARE FIRST HOUR: CPT | Mod: 25,,, | Performed by: NURSE PRACTITIONER

## 2023-01-01 PROCEDURE — 99292 PR CRITICAL CARE, ADDL 30 MIN: ICD-10-PCS | Mod: 25,,, | Performed by: PSYCHIATRY & NEUROLOGY

## 2023-01-01 PROCEDURE — 82565 ASSAY OF CREATININE: CPT | Performed by: REGISTERED NURSE

## 2023-01-01 PROCEDURE — 87040 BLOOD CULTURE FOR BACTERIA: CPT | Performed by: NURSE PRACTITIONER

## 2023-01-01 PROCEDURE — 85384 FIBRINOGEN ACTIVITY: CPT | Performed by: REGISTERED NURSE

## 2023-01-01 PROCEDURE — 97535 SELF CARE MNGMENT TRAINING: CPT

## 2023-01-01 PROCEDURE — 97163 PT EVAL HIGH COMPLEX 45 MIN: CPT

## 2023-01-01 PROCEDURE — 84484 ASSAY OF TROPONIN QUANT: CPT | Mod: 91 | Performed by: NURSE PRACTITIONER

## 2023-01-01 PROCEDURE — 36620 INSERTION CATHETER ARTERY: CPT | Mod: ,,, | Performed by: PSYCHIATRY & NEUROLOGY

## 2023-01-01 PROCEDURE — 85007 BL SMEAR W/DIFF WBC COUNT: CPT | Mod: 91

## 2023-01-01 PROCEDURE — 20000000 HC ICU ROOM

## 2023-01-01 PROCEDURE — 83615 LACTATE (LD) (LDH) ENZYME: CPT

## 2023-01-01 PROCEDURE — 80076 HEPATIC FUNCTION PANEL: CPT | Performed by: REGISTERED NURSE

## 2023-01-01 PROCEDURE — 43246 EGD PLACE GASTROSTOMY TUBE: CPT | Mod: ,,, | Performed by: INTERNAL MEDICINE

## 2023-01-01 PROCEDURE — 99291 PR CRITICAL CARE, E/M 30-74 MINUTES: ICD-10-PCS | Mod: ,,, | Performed by: PSYCHIATRY & NEUROLOGY

## 2023-01-01 PROCEDURE — 80069 RENAL FUNCTION PANEL: CPT | Performed by: STUDENT IN AN ORGANIZED HEALTH CARE EDUCATION/TRAINING PROGRAM

## 2023-01-01 PROCEDURE — 27000513 HC SENSOR FLOTRAC

## 2023-01-01 PROCEDURE — 27201089 HC SNARE, DISP (ANY): Performed by: INTERNAL MEDICINE

## 2023-01-01 PROCEDURE — 86965 POOLING BLOOD PLATELETS: CPT

## 2023-01-01 PROCEDURE — 87040 BLOOD CULTURE FOR BACTERIA: CPT | Mod: 59

## 2023-01-01 PROCEDURE — 86901 BLOOD TYPING SEROLOGIC RH(D): CPT | Performed by: PSYCHIATRY & NEUROLOGY

## 2023-01-01 PROCEDURE — 83735 ASSAY OF MAGNESIUM: CPT

## 2023-01-01 PROCEDURE — 85025 COMPLETE CBC W/AUTO DIFF WBC: CPT | Mod: 91 | Performed by: NURSE PRACTITIONER

## 2023-01-01 PROCEDURE — 86920 COMPATIBILITY TEST SPIN: CPT | Performed by: PSYCHIATRY & NEUROLOGY

## 2023-01-01 PROCEDURE — 84100 ASSAY OF PHOSPHORUS: CPT | Mod: 91 | Performed by: PSYCHIATRY & NEUROLOGY

## 2023-01-01 PROCEDURE — 86022 PLATELET ANTIBODIES: CPT

## 2023-01-01 PROCEDURE — 84484 ASSAY OF TROPONIN QUANT: CPT | Performed by: NURSE PRACTITIONER

## 2023-01-01 PROCEDURE — 80202 ASSAY OF VANCOMYCIN: CPT | Performed by: PSYCHIATRY & NEUROLOGY

## 2023-01-01 PROCEDURE — 99291 PR CRITICAL CARE, E/M 30-74 MINUTES: ICD-10-PCS | Mod: FS,,, | Performed by: NURSE PRACTITIONER

## 2023-01-01 PROCEDURE — 92610 EVALUATE SWALLOWING FUNCTION: CPT

## 2023-01-01 PROCEDURE — 97530 THERAPEUTIC ACTIVITIES: CPT

## 2023-01-01 PROCEDURE — 99292 CRITICAL CARE ADDL 30 MIN: CPT | Mod: FS,,, | Performed by: PSYCHIATRY & NEUROLOGY

## 2023-01-01 PROCEDURE — 99291 CRITICAL CARE FIRST HOUR: CPT | Mod: ,,, | Performed by: NURSE PRACTITIONER

## 2023-01-01 PROCEDURE — 85610 PROTHROMBIN TIME: CPT

## 2023-01-01 PROCEDURE — 31500 INTUBATION: ICD-10-PCS | Mod: ,,, | Performed by: PSYCHIATRY & NEUROLOGY

## 2023-01-01 PROCEDURE — 87106 FUNGI IDENTIFICATION YEAST: CPT | Performed by: NURSE PRACTITIONER

## 2023-01-01 PROCEDURE — 87070 CULTURE OTHR SPECIMN AEROBIC: CPT | Performed by: NURSE PRACTITIONER

## 2023-01-01 PROCEDURE — 80176 ASSAY OF LIDOCAINE: CPT | Performed by: PSYCHIATRY & NEUROLOGY

## 2023-01-01 PROCEDURE — D9220A PRA ANESTHESIA: ICD-10-PCS | Mod: CRNA,,, | Performed by: NURSE ANESTHETIST, CERTIFIED REGISTERED

## 2023-01-01 PROCEDURE — 83036 HEMOGLOBIN GLYCOSYLATED A1C: CPT | Performed by: PHYSICIAN ASSISTANT

## 2023-01-01 PROCEDURE — 99233 SBSQ HOSP IP/OBS HIGH 50: CPT | Mod: ,,, | Performed by: INTERNAL MEDICINE

## 2023-01-01 PROCEDURE — 99233 SBSQ HOSP IP/OBS HIGH 50: CPT | Mod: ,,, | Performed by: NURSE PRACTITIONER

## 2023-01-01 PROCEDURE — 82550 ASSAY OF CK (CPK): CPT

## 2023-01-01 PROCEDURE — 20600001 HC STEP DOWN PRIVATE ROOM

## 2023-01-01 PROCEDURE — 37000008 HC ANESTHESIA 1ST 15 MINUTES: Performed by: INTERNAL MEDICINE

## 2023-01-01 PROCEDURE — 86920 COMPATIBILITY TEST SPIN: CPT

## 2023-01-01 PROCEDURE — D9220A PRA ANESTHESIA: ICD-10-PCS | Mod: CRNA,,, | Performed by: STUDENT IN AN ORGANIZED HEALTH CARE EDUCATION/TRAINING PROGRAM

## 2023-01-01 PROCEDURE — 99233 SBSQ HOSP IP/OBS HIGH 50: CPT | Mod: ,,, | Performed by: PSYCHIATRY & NEUROLOGY

## 2023-01-01 PROCEDURE — 43246 EGD PLACE GASTROSTOMY TUBE: CPT | Performed by: INTERNAL MEDICINE

## 2023-01-01 PROCEDURE — 27200966 HC CLOSED SUCTION SYSTEM

## 2023-01-01 PROCEDURE — 85007 BL SMEAR W/DIFF WBC COUNT: CPT | Performed by: PSYCHIATRY & NEUROLOGY

## 2023-01-01 PROCEDURE — 85007 BL SMEAR W/DIFF WBC COUNT: CPT

## 2023-01-01 PROCEDURE — 85384 FIBRINOGEN ACTIVITY: CPT | Mod: 91

## 2023-01-01 PROCEDURE — 99292 PR CRITICAL CARE, ADDL 30 MIN: ICD-10-PCS | Mod: FS,,, | Performed by: PSYCHIATRY & NEUROLOGY

## 2023-01-01 PROCEDURE — 84295 ASSAY OF SERUM SODIUM: CPT | Performed by: NURSE PRACTITIONER

## 2023-01-01 PROCEDURE — 99223 PR INITIAL HOSPITAL CARE,LEVL III: ICD-10-PCS | Mod: ,,, | Performed by: PHYSICIAN ASSISTANT

## 2023-01-01 PROCEDURE — 82330 ASSAY OF CALCIUM: CPT | Mod: 91 | Performed by: STUDENT IN AN ORGANIZED HEALTH CARE EDUCATION/TRAINING PROGRAM

## 2023-01-01 PROCEDURE — 84295 ASSAY OF SERUM SODIUM: CPT | Mod: 91 | Performed by: PHYSICIAN ASSISTANT

## 2023-01-01 PROCEDURE — 99291 PR CRITICAL CARE, E/M 30-74 MINUTES: ICD-10-PCS | Mod: 25,,, | Performed by: PSYCHIATRY & NEUROLOGY

## 2023-01-01 PROCEDURE — 27000221 HC OXYGEN, UP TO 24 HOURS

## 2023-01-01 PROCEDURE — 84132 ASSAY OF SERUM POTASSIUM: CPT | Mod: 91

## 2023-01-01 PROCEDURE — 99291 CRITICAL CARE FIRST HOUR: CPT | Mod: GC,,, | Performed by: PSYCHIATRY & NEUROLOGY

## 2023-01-01 PROCEDURE — 99291 CRITICAL CARE FIRST HOUR: CPT | Mod: FS,,, | Performed by: NURSE PRACTITIONER

## 2023-01-01 PROCEDURE — D9220A PRA ANESTHESIA: Mod: CRNA,,, | Performed by: STUDENT IN AN ORGANIZED HEALTH CARE EDUCATION/TRAINING PROGRAM

## 2023-01-01 PROCEDURE — 99291 CRITICAL CARE FIRST HOUR: CPT | Mod: 25,,, | Performed by: PSYCHIATRY & NEUROLOGY

## 2023-01-01 PROCEDURE — 83605 ASSAY OF LACTIC ACID: CPT | Mod: 91 | Performed by: PHYSICIAN ASSISTANT

## 2023-01-01 PROCEDURE — 85384 FIBRINOGEN ACTIVITY: CPT | Mod: 91 | Performed by: REGISTERED NURSE

## 2023-01-01 PROCEDURE — 82550 ASSAY OF CK (CPK): CPT | Mod: 91

## 2023-01-01 PROCEDURE — 94010 BREATHING CAPACITY TEST: CPT

## 2023-01-01 PROCEDURE — 80053 COMPREHEN METABOLIC PANEL: CPT | Mod: 91 | Performed by: PSYCHIATRY & NEUROLOGY

## 2023-01-01 PROCEDURE — 37000008 HC ANESTHESIA 1ST 15 MINUTES

## 2023-01-01 PROCEDURE — 86850 RBC ANTIBODY SCREEN: CPT

## 2023-01-01 PROCEDURE — 93010 ELECTROCARDIOGRAM REPORT: CPT | Mod: ,,, | Performed by: INTERNAL MEDICINE

## 2023-01-01 PROCEDURE — 99233 PR SUBSEQUENT HOSPITAL CARE,LEVL III: ICD-10-PCS | Mod: ,,, | Performed by: INTERNAL MEDICINE

## 2023-01-01 PROCEDURE — 81001 URINALYSIS AUTO W/SCOPE: CPT

## 2023-01-01 PROCEDURE — 85025 COMPLETE CBC W/AUTO DIFF WBC: CPT

## 2023-01-01 PROCEDURE — 83735 ASSAY OF MAGNESIUM: CPT | Mod: 91 | Performed by: NURSE PRACTITIONER

## 2023-01-01 PROCEDURE — 83605 ASSAY OF LACTIC ACID: CPT | Performed by: NURSE PRACTITIONER

## 2023-01-01 PROCEDURE — 37000009 HC ANESTHESIA EA ADD 15 MINS

## 2023-01-01 PROCEDURE — 84100 ASSAY OF PHOSPHORUS: CPT | Performed by: PHYSICIAN ASSISTANT

## 2023-01-01 PROCEDURE — 99291 CRITICAL CARE FIRST HOUR: CPT | Mod: ,,, | Performed by: PSYCHIATRY & NEUROLOGY

## 2023-01-01 PROCEDURE — D9220A PRA ANESTHESIA: Mod: CRNA,,, | Performed by: NURSE ANESTHETIST, CERTIFIED REGISTERED

## 2023-01-01 PROCEDURE — 87040 BLOOD CULTURE FOR BACTERIA: CPT

## 2023-01-01 PROCEDURE — 87040 BLOOD CULTURE FOR BACTERIA: CPT | Mod: 59 | Performed by: NURSE PRACTITIONER

## 2023-01-01 PROCEDURE — 99292 CRITICAL CARE ADDL 30 MIN: CPT | Mod: 25,,, | Performed by: PSYCHIATRY & NEUROLOGY

## 2023-01-01 PROCEDURE — 84295 ASSAY OF SERUM SODIUM: CPT | Performed by: PHYSICIAN ASSISTANT

## 2023-01-01 PROCEDURE — 83735 ASSAY OF MAGNESIUM: CPT | Performed by: PSYCHIATRY & NEUROLOGY

## 2023-01-01 PROCEDURE — 82550 ASSAY OF CK (CPK): CPT | Performed by: PHYSICIAN ASSISTANT

## 2023-01-01 PROCEDURE — 94150 VITAL CAPACITY TEST: CPT

## 2023-01-01 PROCEDURE — 99291 PR CRITICAL CARE, E/M 30-74 MINUTES: ICD-10-PCS | Mod: GC,,, | Performed by: PSYCHIATRY & NEUROLOGY

## 2023-01-01 PROCEDURE — 85007 BL SMEAR W/DIFF WBC COUNT: CPT | Mod: 91 | Performed by: PSYCHIATRY & NEUROLOGY

## 2023-01-01 PROCEDURE — 80053 COMPREHEN METABOLIC PANEL: CPT | Performed by: PSYCHIATRY & NEUROLOGY

## 2023-01-01 PROCEDURE — 85027 COMPLETE CBC AUTOMATED: CPT | Mod: 91 | Performed by: PSYCHIATRY & NEUROLOGY

## 2023-01-01 PROCEDURE — 85007 BL SMEAR W/DIFF WBC COUNT: CPT | Performed by: NURSE PRACTITIONER

## 2023-01-01 PROCEDURE — 80053 COMPREHEN METABOLIC PANEL: CPT | Mod: 91 | Performed by: PHYSICIAN ASSISTANT

## 2023-01-01 PROCEDURE — 95720 PR EEG, W/VIDEO, CONT RECORD, I&R, >12<26 HRS: ICD-10-PCS | Mod: ,,, | Performed by: STUDENT IN AN ORGANIZED HEALTH CARE EDUCATION/TRAINING PROGRAM

## 2023-01-01 PROCEDURE — 85027 COMPLETE CBC AUTOMATED: CPT

## 2023-01-01 PROCEDURE — 87205 SMEAR GRAM STAIN: CPT | Performed by: NURSE PRACTITIONER

## 2023-01-01 PROCEDURE — 86803 HEPATITIS C AB TEST: CPT | Performed by: PHYSICIAN ASSISTANT

## 2023-01-01 PROCEDURE — 82330 ASSAY OF CALCIUM: CPT | Performed by: PSYCHIATRY & NEUROLOGY

## 2023-01-01 PROCEDURE — 87186 SC STD MICRODIL/AGAR DIL: CPT | Performed by: NURSE PRACTITIONER

## 2023-01-01 PROCEDURE — 97167 OT EVAL HIGH COMPLEX 60 MIN: CPT

## 2023-01-01 PROCEDURE — 80061 LIPID PANEL: CPT | Performed by: PHYSICIAN ASSISTANT

## 2023-01-01 PROCEDURE — 36430 TRANSFUSION BLD/BLD COMPNT: CPT

## 2023-01-01 PROCEDURE — 80053 COMPREHEN METABOLIC PANEL: CPT | Mod: 91 | Performed by: NURSE PRACTITIONER

## 2023-01-01 PROCEDURE — 36410 VNPNXR 3YR/> PHY/QHP DX/THER: CPT

## 2023-01-01 PROCEDURE — 87389 HIV-1 AG W/HIV-1&-2 AB AG IA: CPT | Performed by: PHYSICIAN ASSISTANT

## 2023-01-01 PROCEDURE — 99223 1ST HOSP IP/OBS HIGH 75: CPT | Mod: GC,,, | Performed by: HOSPITALIST

## 2023-01-01 PROCEDURE — 36620 ARTERIAL LINE: ICD-10-PCS | Mod: ,,, | Performed by: PSYCHIATRY & NEUROLOGY

## 2023-01-01 PROCEDURE — 37000009 HC ANESTHESIA EA ADD 15 MINS: Performed by: INTERNAL MEDICINE

## 2023-01-01 PROCEDURE — C1751 CATH, INF, PER/CENT/MIDLINE: HCPCS

## 2023-01-01 PROCEDURE — 99233 PR SUBSEQUENT HOSPITAL CARE,LEVL III: ICD-10-PCS | Mod: ,,, | Performed by: NURSE PRACTITIONER

## 2023-01-01 PROCEDURE — 76937 US GUIDE VASCULAR ACCESS: CPT

## 2023-01-01 RX ORDER — LORAZEPAM 2 MG/ML
INJECTION INTRAMUSCULAR
Status: COMPLETED
Start: 2023-01-01 | End: 2023-01-01

## 2023-01-01 RX ORDER — DIPHENHYDRAMINE HYDROCHLORIDE 50 MG/ML
INJECTION INTRAMUSCULAR; INTRAVENOUS
Status: COMPLETED
Start: 2023-01-01 | End: 2023-01-01

## 2023-01-01 RX ORDER — METOPROLOL SUCCINATE 100 MG/1
100 TABLET, EXTENDED RELEASE ORAL DAILY
COMMUNITY
Start: 2023-01-01

## 2023-01-01 RX ORDER — DILTIAZEM HYDROCHLORIDE 5 MG/ML
5 INJECTION INTRAVENOUS
Status: COMPLETED | OUTPATIENT
Start: 2023-01-01 | End: 2023-01-01

## 2023-01-01 RX ORDER — DEXTROSE MONOHYDRATE 100 MG/ML
INJECTION, SOLUTION INTRAVENOUS CONTINUOUS PRN
Status: DISCONTINUED | OUTPATIENT
Start: 2023-01-01 | End: 2023-01-01

## 2023-01-01 RX ORDER — ACETAMINOPHEN 325 MG/1
650 TABLET ORAL EVERY 6 HOURS PRN
Status: DISCONTINUED | OUTPATIENT
Start: 2023-01-01 | End: 2023-01-01

## 2023-01-01 RX ORDER — ROCURONIUM BROMIDE 10 MG/ML
70 INJECTION, SOLUTION INTRAVENOUS ONCE
Status: COMPLETED | OUTPATIENT
Start: 2023-01-01 | End: 2023-01-01

## 2023-01-01 RX ORDER — DEXMEDETOMIDINE HYDROCHLORIDE 4 UG/ML
0-.6 INJECTION, SOLUTION INTRAVENOUS CONTINUOUS
Status: DISCONTINUED | OUTPATIENT
Start: 2023-01-01 | End: 2023-01-01

## 2023-01-01 RX ORDER — INDOMETHACIN 25 MG/1
CAPSULE ORAL
Status: DISPENSED
Start: 2023-01-01 | End: 2023-01-01

## 2023-01-01 RX ORDER — FENTANYL CITRATE 50 UG/ML
25 INJECTION, SOLUTION INTRAMUSCULAR; INTRAVENOUS ONCE
Status: COMPLETED | OUTPATIENT
Start: 2023-01-01 | End: 2023-01-01

## 2023-01-01 RX ORDER — INDOMETHACIN 25 MG/1
50 CAPSULE ORAL ONCE
Status: COMPLETED | OUTPATIENT
Start: 2023-01-01 | End: 2023-01-01

## 2023-01-01 RX ORDER — OLANZAPINE 10 MG/2ML
5 INJECTION, POWDER, FOR SOLUTION INTRAMUSCULAR ONCE AS NEEDED
Status: COMPLETED | OUTPATIENT
Start: 2023-01-01 | End: 2023-01-01

## 2023-01-01 RX ORDER — DEXMEDETOMIDINE HYDROCHLORIDE 100 UG/ML
INJECTION, SOLUTION INTRAVENOUS
Status: DISCONTINUED | OUTPATIENT
Start: 2023-01-01 | End: 2023-01-01

## 2023-01-01 RX ORDER — LABETALOL HCL 20 MG/4 ML
10 SYRINGE (ML) INTRAVENOUS EVERY 4 HOURS PRN
Status: DISCONTINUED | OUTPATIENT
Start: 2023-01-01 | End: 2023-01-01

## 2023-01-01 RX ORDER — SODIUM CHLORIDE 0.9 % (FLUSH) 0.9 %
10 SYRINGE (ML) INJECTION
Status: DISCONTINUED | OUTPATIENT
Start: 2023-01-01 | End: 2023-01-01

## 2023-01-01 RX ORDER — NOREPINEPHRINE BITARTRATE/D5W 4MG/250ML
0-.2 PLASTIC BAG, INJECTION (ML) INTRAVENOUS CONTINUOUS
Status: DISCONTINUED | OUTPATIENT
Start: 2023-01-01 | End: 2023-01-01

## 2023-01-01 RX ORDER — ORAL SEMAGLUTIDE 14 MG/1
14 TABLET ORAL DAILY
COMMUNITY
Start: 2023-01-01

## 2023-01-01 RX ORDER — LORAZEPAM 2 MG/ML
2 INJECTION INTRAMUSCULAR
Status: DISCONTINUED | OUTPATIENT
Start: 2023-01-01 | End: 2023-01-01

## 2023-01-01 RX ORDER — LIDOCAINE HYDROCHLORIDE ANHYDROUS AND DEXTROSE MONOHYDRATE .8; 5 G/100ML; G/100ML
1 INJECTION, SOLUTION INTRAVENOUS CONTINUOUS
Status: DISCONTINUED | OUTPATIENT
Start: 2023-01-01 | End: 2023-01-01

## 2023-01-01 RX ORDER — POLYETHYLENE GLYCOL 3350 17 G/17G
17 POWDER, FOR SOLUTION ORAL 2 TIMES DAILY
Status: DISCONTINUED | OUTPATIENT
Start: 2023-01-01 | End: 2023-01-01

## 2023-01-01 RX ORDER — POTASSIUM CHLORIDE 7.45 MG/ML
40 INJECTION INTRAVENOUS
Status: DISCONTINUED | OUTPATIENT
Start: 2023-01-01 | End: 2023-01-01

## 2023-01-01 RX ORDER — ALBUTEROL SULFATE 90 UG/1
AEROSOL, METERED RESPIRATORY (INHALATION)
Status: DISCONTINUED | OUTPATIENT
Start: 2023-01-01 | End: 2023-01-01

## 2023-01-01 RX ORDER — MAGNESIUM SULFATE HEPTAHYDRATE 40 MG/ML
2 INJECTION, SOLUTION INTRAVENOUS
Status: DISCONTINUED | OUTPATIENT
Start: 2023-01-01 | End: 2023-01-01

## 2023-01-01 RX ORDER — ATORVASTATIN CALCIUM 40 MG/1
40 TABLET, FILM COATED ORAL DAILY
Status: DISCONTINUED | OUTPATIENT
Start: 2023-01-01 | End: 2023-01-01

## 2023-01-01 RX ORDER — SODIUM,POTASSIUM PHOSPHATES 280-250MG
2 POWDER IN PACKET (EA) ORAL
Status: DISCONTINUED | OUTPATIENT
Start: 2023-01-01 | End: 2023-01-01

## 2023-01-01 RX ORDER — INSULIN ASPART 100 [IU]/ML
0-10 INJECTION, SOLUTION INTRAVENOUS; SUBCUTANEOUS EVERY 6 HOURS PRN
Status: DISCONTINUED | OUTPATIENT
Start: 2023-01-01 | End: 2023-01-01

## 2023-01-01 RX ORDER — PHENYLEPHRINE HCL IN 0.9% NACL 20MG/250ML
0-5 PLASTIC BAG, INJECTION (ML) INTRAVENOUS CONTINUOUS
Status: DISCONTINUED | OUTPATIENT
Start: 2023-01-01 | End: 2023-01-01

## 2023-01-01 RX ORDER — HYDROCODONE BITARTRATE AND ACETAMINOPHEN 500; 5 MG/1; MG/1
TABLET ORAL
Status: DISCONTINUED | OUTPATIENT
Start: 2023-01-01 | End: 2023-01-01

## 2023-01-01 RX ORDER — ATROPINE SULFATE 0.1 MG/ML
INJECTION INTRAVENOUS
Status: DISPENSED
Start: 2023-01-01 | End: 2023-01-01

## 2023-01-01 RX ORDER — HYDRALAZINE HYDROCHLORIDE 20 MG/ML
10 INJECTION INTRAMUSCULAR; INTRAVENOUS EVERY 4 HOURS PRN
Status: DISCONTINUED | OUTPATIENT
Start: 2023-01-01 | End: 2023-01-01

## 2023-01-01 RX ORDER — METOPROLOL TARTRATE 1 MG/ML
5 INJECTION, SOLUTION INTRAVENOUS ONCE
Status: DISCONTINUED | OUTPATIENT
Start: 2023-01-01 | End: 2023-01-01

## 2023-01-01 RX ORDER — INDOMETHACIN 25 MG/1
CAPSULE ORAL
Status: COMPLETED
Start: 2023-01-01 | End: 2023-01-01

## 2023-01-01 RX ORDER — FENTANYL CITRATE-0.9 % NACL/PF 10 MCG/ML
0-200 PLASTIC BAG, INJECTION (ML) INTRAVENOUS CONTINUOUS
Status: DISCONTINUED | OUTPATIENT
Start: 2023-01-01 | End: 2023-01-01

## 2023-01-01 RX ORDER — AMOXICILLIN 250 MG
2 CAPSULE ORAL 2 TIMES DAILY
Status: DISCONTINUED | OUTPATIENT
Start: 2023-01-01 | End: 2023-01-01

## 2023-01-01 RX ORDER — PHENYLEPHRINE HCL IN 0.9% NACL 20MG/250ML
PLASTIC BAG, INJECTION (ML) INTRAVENOUS
Status: COMPLETED
Start: 2023-01-01 | End: 2023-01-01

## 2023-01-01 RX ORDER — LORAZEPAM 2 MG/ML
1 INJECTION INTRAMUSCULAR
Status: DISCONTINUED | OUTPATIENT
Start: 2023-01-01 | End: 2023-01-01 | Stop reason: HOSPADM

## 2023-01-01 RX ORDER — AMLODIPINE BESYLATE 10 MG/1
10 TABLET ORAL DAILY
COMMUNITY
Start: 2023-01-01

## 2023-01-01 RX ORDER — MAGNESIUM SULFATE HEPTAHYDRATE 40 MG/ML
4 INJECTION, SOLUTION INTRAVENOUS
Status: DISCONTINUED | OUTPATIENT
Start: 2023-01-01 | End: 2023-01-01

## 2023-01-01 RX ORDER — POTASSIUM CHLORIDE 29.8 MG/ML
40 INJECTION INTRAVENOUS ONCE
Status: COMPLETED | OUTPATIENT
Start: 2023-01-01 | End: 2023-01-01

## 2023-01-01 RX ORDER — HALOPERIDOL 5 MG/ML
1 INJECTION INTRAMUSCULAR ONCE
Status: COMPLETED | OUTPATIENT
Start: 2023-01-01 | End: 2023-01-01

## 2023-01-01 RX ORDER — DIPHENHYDRAMINE HYDROCHLORIDE 50 MG/ML
25 INJECTION INTRAMUSCULAR; INTRAVENOUS ONCE
Status: COMPLETED | OUTPATIENT
Start: 2023-01-01 | End: 2023-01-01

## 2023-01-01 RX ORDER — SODIUM CHLORIDE, SODIUM LACTATE, POTASSIUM CHLORIDE, CALCIUM CHLORIDE 600; 310; 30; 20 MG/100ML; MG/100ML; MG/100ML; MG/100ML
INJECTION, SOLUTION INTRAVENOUS CONTINUOUS
Status: DISCONTINUED | OUTPATIENT
Start: 2023-01-01 | End: 2023-01-01

## 2023-01-01 RX ORDER — CALCIUM GLUCONATE 20 MG/ML
INJECTION, SOLUTION INTRAVENOUS
Status: DISCONTINUED
Start: 2023-01-01 | End: 2023-01-01 | Stop reason: WASHOUT

## 2023-01-01 RX ORDER — HYDROMORPHONE HYDROCHLORIDE 1 MG/ML
0.5 INJECTION, SOLUTION INTRAMUSCULAR; INTRAVENOUS; SUBCUTANEOUS ONCE
Status: COMPLETED | OUTPATIENT
Start: 2023-01-01 | End: 2023-01-01

## 2023-01-01 RX ORDER — IPRATROPIUM BROMIDE AND ALBUTEROL SULFATE 2.5; .5 MG/3ML; MG/3ML
3 SOLUTION RESPIRATORY (INHALATION) EVERY 4 HOURS PRN
Status: DISCONTINUED | OUTPATIENT
Start: 2023-01-01 | End: 2023-01-01

## 2023-01-01 RX ORDER — ACETAMINOPHEN 650 MG/20.3ML
650 LIQUID ORAL EVERY 4 HOURS PRN
Status: DISCONTINUED | OUTPATIENT
Start: 2023-01-01 | End: 2023-01-01

## 2023-01-01 RX ORDER — LEVETIRACETAM 500 MG/5ML
INJECTION, SOLUTION, CONCENTRATE INTRAVENOUS
Status: COMPLETED
Start: 2023-01-01 | End: 2023-01-01

## 2023-01-01 RX ORDER — EMPAGLIFLOZIN 25 MG/1
25 TABLET, FILM COATED ORAL DAILY
COMMUNITY
Start: 2023-01-01

## 2023-01-01 RX ORDER — POLYETHYLENE GLYCOL 3350 17 G/17G
17 POWDER, FOR SOLUTION ORAL DAILY
Status: DISCONTINUED | OUTPATIENT
Start: 2023-01-01 | End: 2023-01-01

## 2023-01-01 RX ORDER — LEVETIRACETAM 500 MG/5ML
2000 INJECTION, SOLUTION, CONCENTRATE INTRAVENOUS ONCE
Status: COMPLETED | OUTPATIENT
Start: 2023-01-01 | End: 2023-01-01

## 2023-01-01 RX ORDER — PROPOFOL 10 MG/ML
0-50 INJECTION, EMULSION INTRAVENOUS CONTINUOUS
Status: DISCONTINUED | OUTPATIENT
Start: 2023-01-01 | End: 2023-01-01

## 2023-01-01 RX ORDER — VASOPRESSIN 20 [USP'U]/ML
INJECTION, SOLUTION INTRAMUSCULAR; SUBCUTANEOUS
Status: DISCONTINUED | OUTPATIENT
Start: 2023-01-01 | End: 2023-01-01

## 2023-01-01 RX ORDER — LANOLIN ALCOHOL/MO/W.PET/CERES
800 CREAM (GRAM) TOPICAL
Status: DISCONTINUED | OUTPATIENT
Start: 2023-01-01 | End: 2023-01-01

## 2023-01-01 RX ORDER — ERYTHROMYCIN 5 MG/G
OINTMENT OPHTHALMIC 4 TIMES DAILY
Status: DISCONTINUED | OUTPATIENT
Start: 2023-01-01 | End: 2023-01-01

## 2023-01-01 RX ORDER — NOREPINEPHRINE BITARTRATE/D5W 8 MG/250ML
0-3 PLASTIC BAG, INJECTION (ML) INTRAVENOUS CONTINUOUS
Status: DISCONTINUED | OUTPATIENT
Start: 2023-01-01 | End: 2023-01-01

## 2023-01-01 RX ORDER — INDOMETHACIN 25 MG/1
100 CAPSULE ORAL ONCE
Status: COMPLETED | OUTPATIENT
Start: 2023-01-01 | End: 2023-01-01

## 2023-01-01 RX ORDER — SODIUM CHLORIDE, SODIUM LACTATE, POTASSIUM CHLORIDE, CALCIUM CHLORIDE 600; 310; 30; 20 MG/100ML; MG/100ML; MG/100ML; MG/100ML
INJECTION, SOLUTION INTRAVENOUS CONTINUOUS
Status: ACTIVE | OUTPATIENT
Start: 2023-01-01 | End: 2023-01-01

## 2023-01-01 RX ORDER — LEVETIRACETAM 500 MG/5ML
750 INJECTION, SOLUTION, CONCENTRATE INTRAVENOUS EVERY 12 HOURS
Status: DISCONTINUED | OUTPATIENT
Start: 2023-01-01 | End: 2023-01-01 | Stop reason: HOSPADM

## 2023-01-01 RX ORDER — NOREPINEPHRINE BITARTRATE/D5W 4MG/250ML
0-3 PLASTIC BAG, INJECTION (ML) INTRAVENOUS CONTINUOUS
Status: DISCONTINUED | OUTPATIENT
Start: 2023-01-01 | End: 2023-01-01

## 2023-01-01 RX ORDER — HYDROCODONE BITARTRATE AND ACETAMINOPHEN 500; 5 MG/1; MG/1
TABLET ORAL CONTINUOUS
Status: DISCONTINUED | OUTPATIENT
Start: 2023-01-01 | End: 2023-01-01

## 2023-01-01 RX ORDER — PHENYLEPHRINE HYDROCHLORIDE 10 MG/ML
INJECTION INTRAVENOUS
Status: DISCONTINUED | OUTPATIENT
Start: 2023-01-01 | End: 2023-01-01

## 2023-01-01 RX ORDER — MAGNESIUM SULFATE HEPTAHYDRATE 40 MG/ML
2 INJECTION, SOLUTION INTRAVENOUS ONCE
Status: COMPLETED | OUTPATIENT
Start: 2023-01-01 | End: 2023-01-01

## 2023-01-01 RX ORDER — POTASSIUM CHLORIDE 14.9 MG/ML
20 INJECTION INTRAVENOUS ONCE
Status: COMPLETED | OUTPATIENT
Start: 2023-01-01 | End: 2023-01-01

## 2023-01-01 RX ORDER — FENTANYL CITRATE 50 UG/ML
50 INJECTION, SOLUTION INTRAMUSCULAR; INTRAVENOUS ONCE AS NEEDED
Status: DISCONTINUED | OUTPATIENT
Start: 2023-01-01 | End: 2023-01-01

## 2023-01-01 RX ORDER — CALCIUM GLUCONATE 20 MG/ML
1 INJECTION, SOLUTION INTRAVENOUS
Status: DISCONTINUED | OUTPATIENT
Start: 2023-01-01 | End: 2023-01-01

## 2023-01-01 RX ORDER — MIDAZOLAM HYDROCHLORIDE 1 MG/ML
INJECTION, SOLUTION INTRAMUSCULAR; INTRAVENOUS
Status: DISCONTINUED | OUTPATIENT
Start: 2023-01-01 | End: 2023-01-01

## 2023-01-01 RX ORDER — LIDOCAINE HYDROCHLORIDE 20 MG/ML
INJECTION INTRAVENOUS
Status: COMPLETED
Start: 2023-01-01 | End: 2023-01-01

## 2023-01-01 RX ORDER — PROPOFOL 10 MG/ML
INJECTION, EMULSION INTRAVENOUS
Status: DISPENSED
Start: 2023-01-01 | End: 2023-01-01

## 2023-01-01 RX ORDER — FUROSEMIDE 10 MG/ML
40 INJECTION INTRAMUSCULAR; INTRAVENOUS ONCE
Status: DISCONTINUED | OUTPATIENT
Start: 2023-01-01 | End: 2023-01-01

## 2023-01-01 RX ORDER — FAMOTIDINE 20 MG/1
20 TABLET, FILM COATED ORAL DAILY
Status: DISCONTINUED | OUTPATIENT
Start: 2023-01-01 | End: 2023-01-01

## 2023-01-01 RX ORDER — NAPROXEN SODIUM 220 MG/1
81 TABLET, FILM COATED ORAL DAILY
Status: DISCONTINUED | OUTPATIENT
Start: 2023-01-01 | End: 2023-01-01

## 2023-01-01 RX ORDER — ONDANSETRON 2 MG/ML
INJECTION INTRAMUSCULAR; INTRAVENOUS
Status: DISCONTINUED | OUTPATIENT
Start: 2023-01-01 | End: 2023-01-01

## 2023-01-01 RX ORDER — PHENYLEPHRINE HCL IN 0.9% NACL 1 MG/10 ML
SYRINGE (ML) INTRAVENOUS
Status: DISPENSED
Start: 2023-01-01 | End: 2023-01-01

## 2023-01-01 RX ORDER — PHENYLEPHRINE HCL IN 0.9% NACL 20MG/250ML
PLASTIC BAG, INJECTION (ML) INTRAVENOUS
Status: DISPENSED
Start: 2023-01-01 | End: 2023-01-01

## 2023-01-01 RX ORDER — FENTANYL CITRATE-0.9 % NACL/PF 10 MCG/ML
50 PLASTIC BAG, INJECTION (ML) INTRAVENOUS CONTINUOUS
Status: DISCONTINUED | OUTPATIENT
Start: 2023-01-01 | End: 2023-01-01 | Stop reason: HOSPADM

## 2023-01-01 RX ORDER — GLUCAGON 1 MG
1 KIT INJECTION
Status: DISCONTINUED | OUTPATIENT
Start: 2023-01-01 | End: 2023-01-01

## 2023-01-01 RX ORDER — DILTIAZEM HYDROCHLORIDE 30 MG/1
30 TABLET, FILM COATED ORAL EVERY 6 HOURS
Status: DISCONTINUED | OUTPATIENT
Start: 2023-01-01 | End: 2023-01-01

## 2023-01-01 RX ORDER — FUROSEMIDE 10 MG/ML
40 INJECTION INTRAMUSCULAR; INTRAVENOUS ONCE
Status: COMPLETED | OUTPATIENT
Start: 2023-01-01 | End: 2023-01-01

## 2023-01-01 RX ORDER — SILODOSIN 4 MG/1
4 CAPSULE ORAL DAILY
Status: DISCONTINUED | OUTPATIENT
Start: 2023-01-01 | End: 2023-01-01

## 2023-01-01 RX ORDER — CLONIDINE HYDROCHLORIDE 0.1 MG/1
0.1 TABLET ORAL 2 TIMES DAILY
COMMUNITY
Start: 2023-01-01

## 2023-01-01 RX ORDER — ROCURONIUM BROMIDE 10 MG/ML
INJECTION, SOLUTION INTRAVENOUS
Status: DISCONTINUED | OUTPATIENT
Start: 2023-01-01 | End: 2023-01-01

## 2023-01-01 RX ORDER — MORPHINE SULFATE 1 MG/ML
0-10 INJECTION, SOLUTION INTRAVENOUS CONTINUOUS
Status: DISCONTINUED | OUTPATIENT
Start: 2023-01-01 | End: 2023-01-01 | Stop reason: HOSPADM

## 2023-01-01 RX ORDER — FLUDROCORTISONE ACETATE 0.1 MG/1
100 TABLET ORAL 2 TIMES DAILY
Status: DISCONTINUED | OUTPATIENT
Start: 2023-01-01 | End: 2023-01-01

## 2023-01-01 RX ORDER — DEXMEDETOMIDINE HYDROCHLORIDE 4 UG/ML
0-1.4 INJECTION, SOLUTION INTRAVENOUS CONTINUOUS
Status: DISCONTINUED | OUTPATIENT
Start: 2023-01-01 | End: 2023-01-01

## 2023-01-01 RX ORDER — FUROSEMIDE 10 MG/ML
20 INJECTION INTRAMUSCULAR; INTRAVENOUS ONCE
Status: COMPLETED | OUTPATIENT
Start: 2023-01-01 | End: 2023-01-01

## 2023-01-01 RX ORDER — OMEPRAZOLE 20 MG/1
20 CAPSULE, DELAYED RELEASE ORAL 2 TIMES DAILY
COMMUNITY
Start: 2023-01-01

## 2023-01-01 RX ORDER — FLUDROCORTISONE ACETATE 0.1 MG/1
100 TABLET ORAL DAILY
Status: DISCONTINUED | OUTPATIENT
Start: 2023-01-01 | End: 2023-01-01

## 2023-01-01 RX ORDER — FAMOTIDINE 20 MG/1
20 TABLET, FILM COATED ORAL 2 TIMES DAILY
Status: DISCONTINUED | OUTPATIENT
Start: 2023-01-01 | End: 2023-01-01

## 2023-01-01 RX ORDER — IODIXANOL 320 MG/ML
50 INJECTION, SOLUTION INTRAVASCULAR
Status: COMPLETED | OUTPATIENT
Start: 2023-01-01 | End: 2023-01-01

## 2023-01-01 RX ORDER — ETOMIDATE 2 MG/ML
30 INJECTION INTRAVENOUS ONCE
Status: COMPLETED | OUTPATIENT
Start: 2023-01-01 | End: 2023-01-01

## 2023-01-01 RX ORDER — FENTANYL CITRATE 50 UG/ML
50 INJECTION, SOLUTION INTRAMUSCULAR; INTRAVENOUS ONCE
Status: COMPLETED | OUTPATIENT
Start: 2023-01-01 | End: 2023-01-01

## 2023-01-01 RX ORDER — KETAMINE HCL IN 0.9 % NACL 50 MG/5 ML
SYRINGE (ML) INTRAVENOUS
Status: DISCONTINUED | OUTPATIENT
Start: 2023-01-01 | End: 2023-01-01

## 2023-01-01 RX ORDER — HEPARIN SODIUM 5000 [USP'U]/ML
5000 INJECTION, SOLUTION INTRAVENOUS; SUBCUTANEOUS EVERY 8 HOURS
Status: DISCONTINUED | OUTPATIENT
Start: 2023-01-01 | End: 2023-01-01

## 2023-01-01 RX ORDER — PHENYLEPHRINE HCL IN 0.9% NACL 20MG/250ML
0-3 PLASTIC BAG, INJECTION (ML) INTRAVENOUS CONTINUOUS
Status: DISCONTINUED | OUTPATIENT
Start: 2023-01-01 | End: 2023-01-01

## 2023-01-01 RX ORDER — PROPOFOL 10 MG/ML
VIAL (ML) INTRAVENOUS
Status: DISCONTINUED | OUTPATIENT
Start: 2023-01-01 | End: 2023-01-01

## 2023-01-01 RX ORDER — LIDOCAINE HYDROCHLORIDE 20 MG/ML
0.5 INJECTION INTRAVENOUS ONCE
Status: COMPLETED | OUTPATIENT
Start: 2023-01-01 | End: 2023-01-01

## 2023-01-01 RX ORDER — INSULIN ASPART 100 [IU]/ML
0-15 INJECTION, SOLUTION INTRAVENOUS; SUBCUTANEOUS EVERY 4 HOURS PRN
Status: DISCONTINUED | OUTPATIENT
Start: 2023-01-01 | End: 2023-01-01

## 2023-01-01 RX ORDER — POTASSIUM CHLORIDE 7.45 MG/ML
80 INJECTION INTRAVENOUS
Status: DISCONTINUED | OUTPATIENT
Start: 2023-01-01 | End: 2023-01-01

## 2023-01-01 RX ORDER — FENTANYL CITRATE 50 UG/ML
25 INJECTION, SOLUTION INTRAMUSCULAR; INTRAVENOUS
Status: DISCONTINUED | OUTPATIENT
Start: 2023-01-01 | End: 2023-01-01

## 2023-01-01 RX ORDER — GLIPIZIDE 10 MG/1
10 TABLET ORAL 2 TIMES DAILY
COMMUNITY
Start: 2023-01-01

## 2023-01-01 RX ORDER — DILTIAZEM HYDROCHLORIDE 5 MG/ML
5 INJECTION INTRAVENOUS ONCE
Status: COMPLETED | OUTPATIENT
Start: 2023-01-01 | End: 2023-01-01

## 2023-01-01 RX ORDER — ASPIRIN 300 MG/1
300 SUPPOSITORY RECTAL DAILY
Status: DISCONTINUED | OUTPATIENT
Start: 2023-01-01 | End: 2023-01-01

## 2023-01-01 RX ORDER — LIDOCAINE HYDROCHLORIDE 20 MG/ML
2 INJECTION, SOLUTION EPIDURAL; INFILTRATION; INTRACAUDAL; PERINEURAL ONCE
Status: COMPLETED | OUTPATIENT
Start: 2023-01-01 | End: 2023-01-01

## 2023-01-01 RX ORDER — POTASSIUM CHLORIDE 7.45 MG/ML
20 INJECTION INTRAVENOUS ONCE
Status: DISCONTINUED | OUTPATIENT
Start: 2023-01-01 | End: 2023-01-01

## 2023-01-01 RX ORDER — NOREPINEPHRINE BITARTRATE/D5W 4MG/250ML
PLASTIC BAG, INJECTION (ML) INTRAVENOUS
Status: COMPLETED
Start: 2023-01-01 | End: 2023-01-01

## 2023-01-01 RX ORDER — FENTANYL CITRATE 50 UG/ML
50 INJECTION, SOLUTION INTRAMUSCULAR; INTRAVENOUS
Status: DISCONTINUED | OUTPATIENT
Start: 2023-01-01 | End: 2023-01-01

## 2023-01-01 RX ORDER — CALCIUM GLUCONATE 20 MG/ML
3 INJECTION, SOLUTION INTRAVENOUS
Status: DISCONTINUED | OUTPATIENT
Start: 2023-01-01 | End: 2023-01-01

## 2023-01-01 RX ORDER — INDOMETHACIN 25 MG/1
CAPSULE ORAL
Status: DISCONTINUED
Start: 2023-01-01 | End: 2023-01-01

## 2023-01-01 RX ORDER — FUROSEMIDE 10 MG/ML
INJECTION INTRAMUSCULAR; INTRAVENOUS
Status: COMPLETED
Start: 2023-01-01 | End: 2023-01-01

## 2023-01-01 RX ORDER — TAMSULOSIN HYDROCHLORIDE 0.4 MG/1
0.4 CAPSULE ORAL DAILY
COMMUNITY
Start: 2023-01-01

## 2023-01-01 RX ORDER — OLANZAPINE 10 MG/2ML
10 INJECTION, POWDER, FOR SOLUTION INTRAMUSCULAR ONCE AS NEEDED
Status: DISCONTINUED | OUTPATIENT
Start: 2023-01-01 | End: 2023-01-01

## 2023-01-01 RX ORDER — POTASSIUM CHLORIDE 14.9 MG/ML
60 INJECTION INTRAVENOUS ONCE
Status: COMPLETED | OUTPATIENT
Start: 2023-01-01 | End: 2023-01-01

## 2023-01-01 RX ORDER — LIDOCAINE HYDROCHLORIDE 20 MG/ML
1 INJECTION INTRAVENOUS ONCE
Status: COMPLETED | OUTPATIENT
Start: 2023-01-01 | End: 2023-01-01

## 2023-01-01 RX ORDER — LIDOCAINE HYDROCHLORIDE 20 MG/ML
INJECTION INTRAVENOUS
Status: DISCONTINUED | OUTPATIENT
Start: 2023-01-01 | End: 2023-01-01

## 2023-01-01 RX ORDER — ACETAMINOPHEN 650 MG/1
650 SUPPOSITORY RECTAL EVERY 6 HOURS PRN
Status: DISCONTINUED | OUTPATIENT
Start: 2023-01-01 | End: 2023-01-01

## 2023-01-01 RX ORDER — POLYMYXIN B SULFATE AND TRIMETHOPRIM 1; 10000 MG/ML; [USP'U]/ML
1 SOLUTION OPHTHALMIC 4 TIMES DAILY
Status: DISCONTINUED | OUTPATIENT
Start: 2023-01-01 | End: 2023-01-01

## 2023-01-01 RX ORDER — CALCIUM GLUCONATE 20 MG/ML
2 INJECTION, SOLUTION INTRAVENOUS
Status: DISCONTINUED | OUTPATIENT
Start: 2023-01-01 | End: 2023-01-01

## 2023-01-01 RX ORDER — MIDAZOLAM HYDROCHLORIDE 1 MG/ML
2 INJECTION INTRAMUSCULAR; INTRAVENOUS ONCE
Status: COMPLETED | OUTPATIENT
Start: 2023-01-01 | End: 2023-01-01

## 2023-01-01 RX ORDER — ONDANSETRON 2 MG/ML
4 INJECTION INTRAMUSCULAR; INTRAVENOUS EVERY 6 HOURS PRN
Status: DISCONTINUED | OUTPATIENT
Start: 2023-01-01 | End: 2023-01-01 | Stop reason: HOSPADM

## 2023-01-01 RX ORDER — SODIUM CHLORIDE 9 MG/ML
INJECTION, SOLUTION INTRAVENOUS CONTINUOUS
Status: DISCONTINUED | OUTPATIENT
Start: 2023-01-01 | End: 2023-01-01

## 2023-01-01 RX ORDER — AMOXICILLIN 250 MG
1 CAPSULE ORAL DAILY
Status: DISCONTINUED | OUTPATIENT
Start: 2023-01-01 | End: 2023-01-01

## 2023-01-01 RX ORDER — ATORVASTATIN CALCIUM 20 MG/1
20 TABLET, FILM COATED ORAL DAILY
COMMUNITY
Start: 2023-01-01

## 2023-01-01 RX ORDER — FENTANYL CITRATE 50 UG/ML
INJECTION, SOLUTION INTRAMUSCULAR; INTRAVENOUS
Status: DISCONTINUED | OUTPATIENT
Start: 2023-01-01 | End: 2023-01-01

## 2023-01-01 RX ORDER — LEVETIRACETAM 500 MG/5ML
750 INJECTION, SOLUTION, CONCENTRATE INTRAVENOUS EVERY 12 HOURS
Status: DISCONTINUED | OUTPATIENT
Start: 2023-01-01 | End: 2023-01-01

## 2023-01-01 RX ORDER — POTASSIUM CHLORIDE 7.45 MG/ML
60 INJECTION INTRAVENOUS
Status: DISCONTINUED | OUTPATIENT
Start: 2023-01-01 | End: 2023-01-01

## 2023-01-01 RX ORDER — SCOLOPAMINE TRANSDERMAL SYSTEM 1 MG/1
1 PATCH, EXTENDED RELEASE TRANSDERMAL
Status: DISCONTINUED | OUTPATIENT
Start: 2023-01-01 | End: 2023-01-01 | Stop reason: HOSPADM

## 2023-01-01 RX ORDER — INSULIN ASPART 100 [IU]/ML
0-10 INJECTION, SOLUTION INTRAVENOUS; SUBCUTANEOUS EVERY 4 HOURS PRN
Status: DISCONTINUED | OUTPATIENT
Start: 2023-01-01 | End: 2023-01-01

## 2023-01-01 RX ORDER — OXYBUTYNIN CHLORIDE 5 MG/1
5 TABLET ORAL DAILY
COMMUNITY
Start: 2023-01-01

## 2023-01-01 RX ORDER — PROPOFOL 10 MG/ML
INJECTION, EMULSION INTRAVENOUS
Status: COMPLETED
Start: 2023-01-01 | End: 2023-01-01

## 2023-01-01 RX ADMIN — LIDOCAINE HYDROCHLORIDE 1 MG/MIN: 8 INJECTION, SOLUTION INTRAVENOUS at 02:12

## 2023-01-01 RX ADMIN — PIPERACILLIN SODIUM AND TAZOBACTAM SODIUM 4.5 G: 4; .5 INJECTION, POWDER, FOR SOLUTION INTRAVENOUS at 09:11

## 2023-01-01 RX ADMIN — ERYTHROMYCIN: 5 OINTMENT OPHTHALMIC at 09:12

## 2023-01-01 RX ADMIN — ACETAMINOPHEN 650 MG: 325 TABLET ORAL at 10:11

## 2023-01-01 RX ADMIN — SILODOSIN 4 MG: 4 CAPSULE ORAL at 08:12

## 2023-01-01 RX ADMIN — ASPIRIN 81 MG CHEWABLE TABLET 81 MG: 81 TABLET CHEWABLE at 08:12

## 2023-01-01 RX ADMIN — POLYMYXIN B SULFATE AND TRIMETHOPRIM 1 DROP: 10000; 1 SOLUTION OPHTHALMIC at 12:12

## 2023-01-01 RX ADMIN — Medication 0.5 MG/HR: at 07:12

## 2023-01-01 RX ADMIN — FENTANYL CITRATE 25 MCG: 50 INJECTION INTRAMUSCULAR; INTRAVENOUS at 05:12

## 2023-01-01 RX ADMIN — ERYTHROMYCIN: 5 OINTMENT OPHTHALMIC at 12:12

## 2023-01-01 RX ADMIN — Medication 0.5 MCG/KG/MIN: at 02:12

## 2023-01-01 RX ADMIN — SILODOSIN 4 MG: 4 CAPSULE ORAL at 08:11

## 2023-01-01 RX ADMIN — INSULIN ASPART 8 UNITS: 100 INJECTION, SOLUTION INTRAVENOUS; SUBCUTANEOUS at 12:12

## 2023-01-01 RX ADMIN — INDOMETHACIN 50 MEQ: 25 CAPSULE ORAL at 12:12

## 2023-01-01 RX ADMIN — LORAZEPAM 1 MG: 2 INJECTION INTRAMUSCULAR; INTRAVENOUS at 07:12

## 2023-01-01 RX ADMIN — INSULIN ASPART 9 UNITS: 100 INJECTION, SOLUTION INTRAVENOUS; SUBCUTANEOUS at 04:12

## 2023-01-01 RX ADMIN — POTASSIUM CHLORIDE 20 MEQ: 200 INJECTION, SOLUTION INTRAVENOUS at 05:12

## 2023-01-01 RX ADMIN — AMIODARONE HYDROCHLORIDE 1 MG/MIN: 1.8 INJECTION, SOLUTION INTRAVENOUS at 12:12

## 2023-01-01 RX ADMIN — DILTIAZEM HYDROCHLORIDE 7.5 MG/HR: 5 INJECTION INTRAVENOUS at 01:11

## 2023-01-01 RX ADMIN — SODIUM BICARBONATE 50 MEQ: 84 INJECTION, SOLUTION INTRAVENOUS at 12:12

## 2023-01-01 RX ADMIN — SENNOSIDES AND DOCUSATE SODIUM 1 TABLET: 8.6; 5 TABLET ORAL at 08:11

## 2023-01-01 RX ADMIN — POLYMYXIN B SULFATE AND TRIMETHOPRIM 1 DROP: 10000; 1 SOLUTION OPHTHALMIC at 09:12

## 2023-01-01 RX ADMIN — HYPROMELLOSE 2910 2 DROP: 5 SOLUTION/ DROPS OPHTHALMIC at 02:11

## 2023-01-01 RX ADMIN — VANCOMYCIN HYDROCHLORIDE 750 MG: 750 INJECTION, POWDER, LYOPHILIZED, FOR SOLUTION INTRAVENOUS at 03:12

## 2023-01-01 RX ADMIN — POLYMYXIN B SULFATE AND TRIMETHOPRIM 1 DROP: 10000; 1 SOLUTION OPHTHALMIC at 08:12

## 2023-01-01 RX ADMIN — HYDROCORTISONE SODIUM SUCCINATE 100 MG: 100 INJECTION, POWDER, FOR SOLUTION INTRAMUSCULAR; INTRAVENOUS at 01:12

## 2023-01-01 RX ADMIN — DILTIAZEM HYDROCHLORIDE 5 MG: 5 INJECTION INTRAVENOUS at 11:11

## 2023-01-01 RX ADMIN — DILTIAZEM HYDROCHLORIDE 5 MG/HR: 5 INJECTION INTRAVENOUS at 02:12

## 2023-01-01 RX ADMIN — IPRATROPIUM BROMIDE AND ALBUTEROL SULFATE 3 ML: 2.5; .5 SOLUTION RESPIRATORY (INHALATION) at 02:11

## 2023-01-01 RX ADMIN — DILTIAZEM HYDROCHLORIDE 30 MG: 30 TABLET, FILM COATED ORAL at 05:12

## 2023-01-01 RX ADMIN — SODIUM CHLORIDE, POTASSIUM CHLORIDE, SODIUM LACTATE AND CALCIUM CHLORIDE: 600; 310; 30; 20 INJECTION, SOLUTION INTRAVENOUS at 11:11

## 2023-01-01 RX ADMIN — MIDAZOLAM HYDROCHLORIDE 1 MG: 1 INJECTION, SOLUTION INTRAMUSCULAR; INTRAVENOUS at 11:12

## 2023-01-01 RX ADMIN — AMIODARONE HYDROCHLORIDE 1 MG/MIN: 1.8 INJECTION, SOLUTION INTRAVENOUS at 02:12

## 2023-01-01 RX ADMIN — CALCIUM GLUCONATE 1 G: 98 INJECTION, SOLUTION INTRAVENOUS at 09:12

## 2023-01-01 RX ADMIN — AMIODARONE HYDROCHLORIDE 1 MG/MIN: 1.8 INJECTION, SOLUTION INTRAVENOUS at 06:12

## 2023-01-01 RX ADMIN — SENNOSIDES AND DOCUSATE SODIUM 1 TABLET: 8.6; 5 TABLET ORAL at 09:11

## 2023-01-01 RX ADMIN — CEFTRIAXONE 1 G: 1 INJECTION, POWDER, FOR SOLUTION INTRAMUSCULAR; INTRAVENOUS at 10:11

## 2023-01-01 RX ADMIN — POLYMYXIN B SULFATE AND TRIMETHOPRIM 1 DROP: 10000; 1 SOLUTION OPHTHALMIC at 09:11

## 2023-01-01 RX ADMIN — INSULIN DETEMIR 3 UNITS: 100 INJECTION, SOLUTION SUBCUTANEOUS at 08:12

## 2023-01-01 RX ADMIN — DEXMEDETOMIDINE HYDROCHLORIDE 0.4 MCG/KG/HR: 4 INJECTION, SOLUTION INTRAVENOUS at 07:11

## 2023-01-01 RX ADMIN — HYDROCORTISONE SODIUM SUCCINATE 100 MG: 100 INJECTION, POWDER, FOR SOLUTION INTRAMUSCULAR; INTRAVENOUS at 05:12

## 2023-01-01 RX ADMIN — CALCIUM GLUCONATE 1 G: 98 INJECTION, SOLUTION INTRAVENOUS at 02:12

## 2023-01-01 RX ADMIN — PROPOFOL 20 MG: 10 INJECTION, EMULSION INTRAVENOUS at 11:12

## 2023-01-01 RX ADMIN — NITROGLYCERIN 0.5 INCH: 20 OINTMENT TOPICAL at 05:12

## 2023-01-01 RX ADMIN — FLUDROCORTISONE ACETATE 100 MCG: 0.1 TABLET ORAL at 09:12

## 2023-01-01 RX ADMIN — POLYETHYLENE GLYCOL 3350 17 G: 17 POWDER, FOR SOLUTION ORAL at 09:12

## 2023-01-01 RX ADMIN — VASOPRESSIN 1 UNITS: 20 INJECTION INTRAVENOUS at 07:11

## 2023-01-01 RX ADMIN — ACETAMINOPHEN 650 MG: 650 SOLUTION ORAL at 03:12

## 2023-01-01 RX ADMIN — NOREPINEPHRINE BITARTRATE 2.1 MCG/KG/MIN: 1 INJECTION, SOLUTION, CONCENTRATE INTRAVENOUS at 03:12

## 2023-01-01 RX ADMIN — DEXMEDETOMIDINE 12 MCG: 100 INJECTION, SOLUTION, CONCENTRATE INTRAVENOUS at 10:12

## 2023-01-01 RX ADMIN — INSULIN ASPART 2 UNITS: 100 INJECTION, SOLUTION INTRAVENOUS; SUBCUTANEOUS at 05:11

## 2023-01-01 RX ADMIN — LEVETIRACETAM 750 MG: 100 INJECTION INTRAVENOUS at 08:11

## 2023-01-01 RX ADMIN — CALCIUM GLUCONATE 1 G: 98 INJECTION, SOLUTION INTRAVENOUS at 01:12

## 2023-01-01 RX ADMIN — POTASSIUM CHLORIDE 40 MEQ: 7.46 INJECTION, SOLUTION INTRAVENOUS at 02:11

## 2023-01-01 RX ADMIN — LEVETIRACETAM 750 MG: 100 INJECTION, SOLUTION INTRAVENOUS at 09:11

## 2023-01-01 RX ADMIN — SENNOSIDES AND DOCUSATE SODIUM 2 TABLET: 8.6; 5 TABLET ORAL at 08:12

## 2023-01-01 RX ADMIN — ATORVASTATIN CALCIUM 40 MG: 40 TABLET, FILM COATED ORAL at 08:12

## 2023-01-01 RX ADMIN — HEPARIN SODIUM 5000 UNITS: 5000 INJECTION INTRAVENOUS; SUBCUTANEOUS at 05:11

## 2023-01-01 RX ADMIN — MICAFUNGIN SODIUM 100 MG: 100 INJECTION, POWDER, LYOPHILIZED, FOR SOLUTION INTRAVENOUS at 01:12

## 2023-01-01 RX ADMIN — DILTIAZEM HYDROCHLORIDE 5 MG: 5 INJECTION INTRAVENOUS at 01:11

## 2023-01-01 RX ADMIN — SODIUM CHLORIDE, POTASSIUM CHLORIDE, SODIUM LACTATE AND CALCIUM CHLORIDE: 600; 310; 30; 20 INJECTION, SOLUTION INTRAVENOUS at 02:12

## 2023-01-01 RX ADMIN — INSULIN ASPART 1 UNITS: 100 INJECTION, SOLUTION INTRAVENOUS; SUBCUTANEOUS at 12:12

## 2023-01-01 RX ADMIN — LEVETIRACETAM 750 MG: 100 INJECTION INTRAVENOUS at 08:12

## 2023-01-01 RX ADMIN — ASPIRIN 300 MG: 300 SUPPOSITORY RECTAL at 09:12

## 2023-01-01 RX ADMIN — PHENYLEPHRINE HYDROCHLORIDE 0.5 MCG/KG/MIN: 10 INJECTION INTRAVENOUS at 07:12

## 2023-01-01 RX ADMIN — HEPARIN SODIUM 5000 UNITS: 5000 INJECTION INTRAVENOUS; SUBCUTANEOUS at 09:11

## 2023-01-01 RX ADMIN — LEVETIRACETAM 750 MG: 100 INJECTION INTRAVENOUS at 09:12

## 2023-01-01 RX ADMIN — INSULIN ASPART 2 UNITS: 100 INJECTION, SOLUTION INTRAVENOUS; SUBCUTANEOUS at 04:11

## 2023-01-01 RX ADMIN — ERYTHROMYCIN: 5 OINTMENT OPHTHALMIC at 08:12

## 2023-01-01 RX ADMIN — INSULIN HUMAN 20.95 UNITS/HR: 1 INJECTION, SOLUTION INTRAVENOUS at 01:12

## 2023-01-01 RX ADMIN — VASOPRESSIN 0.04 UNITS/MIN: 20 INJECTION INTRAVENOUS at 01:12

## 2023-01-01 RX ADMIN — Medication 50 MCG/HR: at 10:12

## 2023-01-01 RX ADMIN — INSULIN ASPART 6 UNITS: 100 INJECTION, SOLUTION INTRAVENOUS; SUBCUTANEOUS at 11:12

## 2023-01-01 RX ADMIN — HYDROCORTISONE SODIUM SUCCINATE 100 MG: 100 INJECTION, POWDER, FOR SOLUTION INTRAMUSCULAR; INTRAVENOUS at 02:12

## 2023-01-01 RX ADMIN — FENTANYL CITRATE 50 MCG: 50 INJECTION INTRAMUSCULAR; INTRAVENOUS at 09:12

## 2023-01-01 RX ADMIN — Medication 3 MCG/KG/MIN: at 04:12

## 2023-01-01 RX ADMIN — SODIUM CHLORIDE: 9 INJECTION, SOLUTION INTRAVENOUS at 01:11

## 2023-01-01 RX ADMIN — SODIUM CHLORIDE, POTASSIUM CHLORIDE, SODIUM LACTATE AND CALCIUM CHLORIDE: 600; 310; 30; 20 INJECTION, SOLUTION INTRAVENOUS at 12:12

## 2023-01-01 RX ADMIN — NOREPINEPHRINE BITARTRATE 0.46 MCG/KG/MIN: 8 INJECTION, SOLUTION INTRAVENOUS at 04:12

## 2023-01-01 RX ADMIN — ROCURONIUM BROMIDE 20 MG: 10 INJECTION INTRAVENOUS at 07:11

## 2023-01-01 RX ADMIN — POLYMYXIN B SULFATE AND TRIMETHOPRIM 1 DROP: 10000; 1 SOLUTION OPHTHALMIC at 04:12

## 2023-01-01 RX ADMIN — VASOPRESSIN 0.04 UNITS/MIN: 20 INJECTION INTRAVENOUS at 09:12

## 2023-01-01 RX ADMIN — PHENYLEPHRINE HYDROCHLORIDE 200 MCG: 10 INJECTION INTRAVENOUS at 11:12

## 2023-01-01 RX ADMIN — PIPERACILLIN SODIUM AND TAZOBACTAM SODIUM 4.5 G: 4; .5 INJECTION, POWDER, FOR SOLUTION INTRAVENOUS at 09:12

## 2023-01-01 RX ADMIN — SODIUM CHLORIDE 500 ML: 0.9 INJECTION, SOLUTION INTRAVENOUS at 12:11

## 2023-01-01 RX ADMIN — POTASSIUM CHLORIDE 40 MEQ: 29.8 INJECTION, SOLUTION INTRAVENOUS at 05:12

## 2023-01-01 RX ADMIN — PHENYLEPHRINE HYDROCHLORIDE 200 MCG: 10 INJECTION INTRAVENOUS at 07:11

## 2023-01-01 RX ADMIN — HYPROMELLOSE 2910 2 DROP: 5 SOLUTION/ DROPS OPHTHALMIC at 08:12

## 2023-01-01 RX ADMIN — DEXMEDETOMIDINE 4 MCG: 100 INJECTION, SOLUTION, CONCENTRATE INTRAVENOUS at 11:12

## 2023-01-01 RX ADMIN — INSULIN HUMAN 17.95 UNITS/HR: 1 INJECTION, SOLUTION INTRAVENOUS at 06:12

## 2023-01-01 RX ADMIN — PIPERACILLIN SODIUM AND TAZOBACTAM SODIUM 4.5 G: 4; .5 INJECTION, POWDER, FOR SOLUTION INTRAVENOUS at 05:11

## 2023-01-01 RX ADMIN — ROCURONIUM BROMIDE 70 MG: 10 INJECTION INTRAVENOUS at 02:12

## 2023-01-01 RX ADMIN — NOREPINEPHRINE BITARTRATE 0.02 MCG/KG/MIN: 1 INJECTION, SOLUTION, CONCENTRATE INTRAVENOUS at 09:12

## 2023-01-01 RX ADMIN — INDOMETHACIN 100 MEQ: 25 CAPSULE ORAL at 01:12

## 2023-01-01 RX ADMIN — FENTANYL CITRATE 25 MCG: 50 INJECTION INTRAMUSCULAR; INTRAVENOUS at 09:12

## 2023-01-01 RX ADMIN — DILTIAZEM HYDROCHLORIDE 30 MG: 30 TABLET, FILM COATED ORAL at 11:12

## 2023-01-01 RX ADMIN — OLANZAPINE 5 MG: 10 INJECTION, POWDER, LYOPHILIZED, FOR SOLUTION INTRAMUSCULAR at 10:11

## 2023-01-01 RX ADMIN — NOREPINEPHRINE BITARTRATE 0.02 MCG/KG/MIN: 8 INJECTION, SOLUTION INTRAVENOUS at 05:12

## 2023-01-01 RX ADMIN — INSULIN ASPART 2 UNITS: 100 INJECTION, SOLUTION INTRAVENOUS; SUBCUTANEOUS at 04:12

## 2023-01-01 RX ADMIN — AMIODARONE HYDROCHLORIDE 0.5 MG/MIN: 50 INJECTION, SOLUTION INTRAVENOUS at 09:12

## 2023-01-01 RX ADMIN — Medication 25 MCG/HR: at 09:11

## 2023-01-01 RX ADMIN — CEFTRIAXONE 1 G: 1 INJECTION, POWDER, FOR SOLUTION INTRAMUSCULAR; INTRAVENOUS at 12:12

## 2023-01-01 RX ADMIN — POTASSIUM BICARBONATE 50 MEQ: 978 TABLET, EFFERVESCENT ORAL at 03:11

## 2023-01-01 RX ADMIN — INSULIN HUMAN 3 UNITS/HR: 1 INJECTION, SOLUTION INTRAVENOUS at 09:12

## 2023-01-01 RX ADMIN — GLYCOPYRROLATE 0.2 MG: 0.2 INJECTION, SOLUTION INTRAMUSCULAR; INTRAVENOUS at 07:11

## 2023-01-01 RX ADMIN — FENTANYL CITRATE 25 MCG: 50 INJECTION INTRAMUSCULAR; INTRAVENOUS at 04:11

## 2023-01-01 RX ADMIN — SODIUM BICARBONATE 100 MEQ: 84 INJECTION, SOLUTION INTRAVENOUS at 01:12

## 2023-01-01 RX ADMIN — ATORVASTATIN CALCIUM 40 MG: 40 TABLET, FILM COATED ORAL at 09:11

## 2023-01-01 RX ADMIN — Medication 0.5 MCG/KG/MIN: at 12:12

## 2023-01-01 RX ADMIN — Medication 50 MCG/HR: at 01:12

## 2023-01-01 RX ADMIN — HYDROCORTISONE SODIUM SUCCINATE 100 MG: 100 INJECTION, POWDER, FOR SOLUTION INTRAMUSCULAR; INTRAVENOUS at 09:12

## 2023-01-01 RX ADMIN — Medication 2.5 MCG/KG/MIN: at 03:12

## 2023-01-01 RX ADMIN — FENTANYL CITRATE 25 MCG: 50 INJECTION, SOLUTION INTRAMUSCULAR; INTRAVENOUS at 10:12

## 2023-01-01 RX ADMIN — Medication 5 MCG/KG/MIN: at 05:12

## 2023-01-01 RX ADMIN — SODIUM CHLORIDE, SODIUM GLUCONATE, SODIUM ACETATE, POTASSIUM CHLORIDE, MAGNESIUM CHLORIDE, SODIUM PHOSPHATE, DIBASIC, AND POTASSIUM PHOSPHATE: .53; .5; .37; .037; .03; .012; .00082 INJECTION, SOLUTION INTRAVENOUS at 07:11

## 2023-01-01 RX ADMIN — LEVETIRACETAM 750 MG: 100 INJECTION INTRAVENOUS at 09:11

## 2023-01-01 RX ADMIN — PHENYLEPHRINE HYDROCHLORIDE 5 MCG/KG/MIN: 10 INJECTION INTRAVENOUS at 05:12

## 2023-01-01 RX ADMIN — INSULIN DETEMIR 3 UNITS: 100 INJECTION, SOLUTION SUBCUTANEOUS at 12:12

## 2023-01-01 RX ADMIN — INSULIN HUMAN 2.3 UNITS/HR: 1 INJECTION, SOLUTION INTRAVENOUS at 11:12

## 2023-01-01 RX ADMIN — INSULIN ASPART 8 UNITS: 100 INJECTION, SOLUTION INTRAVENOUS; SUBCUTANEOUS at 08:12

## 2023-01-01 RX ADMIN — SENNOSIDES AND DOCUSATE SODIUM 2 TABLET: 8.6; 5 TABLET ORAL at 09:12

## 2023-01-01 RX ADMIN — ERYTHROMYCIN: 5 OINTMENT OPHTHALMIC at 05:12

## 2023-01-01 RX ADMIN — DILTIAZEM HYDROCHLORIDE 30 MG: 30 TABLET, FILM COATED ORAL at 06:12

## 2023-01-01 RX ADMIN — NITROGLYCERIN 0.5 INCH: 20 OINTMENT TOPICAL at 12:12

## 2023-01-01 RX ADMIN — MAGNESIUM SULFATE HEPTAHYDRATE 2 G: 40 INJECTION, SOLUTION INTRAVENOUS at 02:12

## 2023-01-01 RX ADMIN — CEFTRIAXONE 1 G: 1 INJECTION, POWDER, FOR SOLUTION INTRAMUSCULAR; INTRAVENOUS at 11:12

## 2023-01-01 RX ADMIN — SODIUM CHLORIDE, POTASSIUM CHLORIDE, SODIUM LACTATE AND CALCIUM CHLORIDE: 600; 310; 30; 20 INJECTION, SOLUTION INTRAVENOUS at 03:12

## 2023-01-01 RX ADMIN — FLUDROCORTISONE ACETATE 100 MCG: 0.1 TABLET ORAL at 08:12

## 2023-01-01 RX ADMIN — MAGNESIUM SULFATE HEPTAHYDRATE 2 G: 40 INJECTION, SOLUTION INTRAVENOUS at 01:11

## 2023-01-01 RX ADMIN — SODIUM BICARBONATE 100 MEQ: 84 INJECTION, SOLUTION INTRAVENOUS at 12:12

## 2023-01-01 RX ADMIN — SODIUM BICARBONATE 50 MEQ: 84 INJECTION, SOLUTION INTRAVENOUS at 06:12

## 2023-01-01 RX ADMIN — SODIUM CHLORIDE, SODIUM GLUCONATE, SODIUM ACETATE, POTASSIUM CHLORIDE, MAGNESIUM CHLORIDE, SODIUM PHOSPHATE, DIBASIC, AND POTASSIUM PHOSPHATE: .53; .5; .37; .037; .03; .012; .00082 INJECTION, SOLUTION INTRAVENOUS at 10:12

## 2023-01-01 RX ADMIN — HEPARIN SODIUM 5000 UNITS: 5000 INJECTION INTRAVENOUS; SUBCUTANEOUS at 05:12

## 2023-01-01 RX ADMIN — Medication 50 MCG/HR: at 07:11

## 2023-01-01 RX ADMIN — LIDOCAINE HYDROCHLORIDE 100 MG: 20 INJECTION INTRAVENOUS at 10:12

## 2023-01-01 RX ADMIN — SODIUM CHLORIDE, POTASSIUM CHLORIDE, SODIUM LACTATE AND CALCIUM CHLORIDE 500 ML: 600; 310; 30; 20 INJECTION, SOLUTION INTRAVENOUS at 05:11

## 2023-01-01 RX ADMIN — LIDOCAINE HYDROCHLORIDE 77.6 MG: 20 INJECTION INTRAVENOUS at 10:12

## 2023-01-01 RX ADMIN — HYDROMORPHONE HYDROCHLORIDE 0.5 MG: 1 INJECTION, SOLUTION INTRAMUSCULAR; INTRAVENOUS; SUBCUTANEOUS at 12:11

## 2023-01-01 RX ADMIN — LIDOCAINE HYDROCHLORIDE 38.8 MG: 20 INJECTION INTRAVENOUS at 01:12

## 2023-01-01 RX ADMIN — VASOPRESSIN 0.04 UNITS/MIN: 20 INJECTION INTRAVENOUS at 06:12

## 2023-01-01 RX ADMIN — MICAFUNGIN SODIUM 100 MG: 100 INJECTION, POWDER, LYOPHILIZED, FOR SOLUTION INTRAVENOUS at 12:12

## 2023-01-01 RX ADMIN — FENTANYL CITRATE 50 MCG: 50 INJECTION, SOLUTION INTRAMUSCULAR; INTRAVENOUS at 11:12

## 2023-01-01 RX ADMIN — DILTIAZEM HYDROCHLORIDE 2.5 MG/HR: 5 INJECTION INTRAVENOUS at 12:11

## 2023-01-01 RX ADMIN — ACETAMINOPHEN 650 MG: 650 SUPPOSITORY RECTAL at 11:11

## 2023-01-01 RX ADMIN — FUROSEMIDE 40 MG: 10 INJECTION, SOLUTION INTRAVENOUS at 10:12

## 2023-01-01 RX ADMIN — DEXMEDETOMIDINE 8 MCG: 100 INJECTION, SOLUTION, CONCENTRATE INTRAVENOUS at 11:12

## 2023-01-01 RX ADMIN — DIPHENHYDRAMINE HYDROCHLORIDE 25 MG: 50 INJECTION, SOLUTION INTRAMUSCULAR; INTRAVENOUS at 09:11

## 2023-01-01 RX ADMIN — ERYTHROMYCIN: 5 OINTMENT OPHTHALMIC at 09:11

## 2023-01-01 RX ADMIN — PHENYLEPHRINE HYDROCHLORIDE 5 MCG/KG/MIN: 10 INJECTION INTRAVENOUS at 01:12

## 2023-01-01 RX ADMIN — PIPERACILLIN SODIUM AND TAZOBACTAM SODIUM 4.5 G: 4; .5 INJECTION, POWDER, FOR SOLUTION INTRAVENOUS at 05:12

## 2023-01-01 RX ADMIN — SODIUM CHLORIDE: 9 INJECTION, SOLUTION INTRAVENOUS at 12:11

## 2023-01-01 RX ADMIN — INDOMETHACIN 50 MEQ: 25 CAPSULE ORAL at 04:12

## 2023-01-01 RX ADMIN — ACETAMINOPHEN 650 MG: 650 SUPPOSITORY RECTAL at 12:11

## 2023-01-01 RX ADMIN — Medication 25 MCG/HR: at 12:11

## 2023-01-01 RX ADMIN — SODIUM BICARBONATE 100 MEQ: 84 INJECTION, SOLUTION INTRAVENOUS at 05:12

## 2023-01-01 RX ADMIN — INSULIN ASPART 4 UNITS: 100 INJECTION, SOLUTION INTRAVENOUS; SUBCUTANEOUS at 08:12

## 2023-01-01 RX ADMIN — HEPARIN SODIUM 5000 UNITS: 5000 INJECTION INTRAVENOUS; SUBCUTANEOUS at 02:11

## 2023-01-01 RX ADMIN — AMIODARONE HYDROCHLORIDE 0.5 MG/MIN: 1.8 INJECTION, SOLUTION INTRAVENOUS at 02:12

## 2023-01-01 RX ADMIN — VASOPRESSIN 2 UNITS: 20 INJECTION INTRAVENOUS at 11:12

## 2023-01-01 RX ADMIN — CALCIUM GLUCONATE 1 G: 98 INJECTION, SOLUTION INTRAVENOUS at 10:12

## 2023-01-01 RX ADMIN — NOREPINEPHRINE BITARTRATE 0.48 MCG/KG/MIN: 8 INJECTION, SOLUTION INTRAVENOUS at 08:12

## 2023-01-01 RX ADMIN — ERYTHROMYCIN: 5 OINTMENT OPHTHALMIC at 04:12

## 2023-01-01 RX ADMIN — FAMOTIDINE 20 MG: 20 TABLET ORAL at 08:12

## 2023-01-01 RX ADMIN — POTASSIUM CHLORIDE 60 MEQ: 200 INJECTION, SOLUTION INTRAVENOUS at 04:12

## 2023-01-01 RX ADMIN — VANCOMYCIN HYDROCHLORIDE 1500 MG: 1.5 INJECTION, POWDER, LYOPHILIZED, FOR SOLUTION INTRAVENOUS at 02:12

## 2023-01-01 RX ADMIN — SODIUM BICARBONATE 50 MEQ: 84 INJECTION, SOLUTION INTRAVENOUS at 04:12

## 2023-01-01 RX ADMIN — Medication 50 MCG/HR: at 04:12

## 2023-01-01 RX ADMIN — ALBUTEROL SULFATE 2 PUFF: 108 AEROSOL, METERED RESPIRATORY (INHALATION) at 10:12

## 2023-01-01 RX ADMIN — HALOPERIDOL LACTATE 1 MG: 5 INJECTION, SOLUTION INTRAMUSCULAR at 02:11

## 2023-01-01 RX ADMIN — AMIODARONE HYDROCHLORIDE 1 MG/MIN: 1.8 INJECTION, SOLUTION INTRAVENOUS at 04:12

## 2023-01-01 RX ADMIN — PIPERACILLIN SODIUM AND TAZOBACTAM SODIUM 4.5 G: 4; .5 INJECTION, POWDER, FOR SOLUTION INTRAVENOUS at 06:11

## 2023-01-01 RX ADMIN — PROPOFOL 40 MCG/KG/MIN: 10 INJECTION, EMULSION INTRAVENOUS at 04:12

## 2023-01-01 RX ADMIN — IPRATROPIUM BROMIDE AND ALBUTEROL SULFATE 3 ML: 2.5; .5 SOLUTION RESPIRATORY (INHALATION) at 04:11

## 2023-01-01 RX ADMIN — ASPIRIN 300 MG: 300 SUPPOSITORY RECTAL at 08:11

## 2023-01-01 RX ADMIN — Medication 20 MG: at 10:12

## 2023-01-01 RX ADMIN — FUROSEMIDE 20 MG: 10 INJECTION, SOLUTION INTRAVENOUS at 05:11

## 2023-01-01 RX ADMIN — VASOPRESSIN 0.04 UNITS/MIN: 20 INJECTION INTRAVENOUS at 10:12

## 2023-01-01 RX ADMIN — HEPARIN SODIUM 5000 UNITS: 5000 INJECTION INTRAVENOUS; SUBCUTANEOUS at 06:11

## 2023-01-01 RX ADMIN — DEXMEDETOMIDINE HYDROCHLORIDE 0.2 MCG/KG/HR: 4 INJECTION, SOLUTION INTRAVENOUS at 05:11

## 2023-01-01 RX ADMIN — FUROSEMIDE 40 MG: 10 INJECTION, SOLUTION INTRAVENOUS at 11:12

## 2023-01-01 RX ADMIN — FUROSEMIDE 40 MG: 10 INJECTION, SOLUTION INTRAVENOUS at 08:12

## 2023-01-01 RX ADMIN — HYPROMELLOSE 2910 2 DROP: 5 SOLUTION/ DROPS OPHTHALMIC at 09:11

## 2023-01-01 RX ADMIN — PIPERACILLIN SODIUM AND TAZOBACTAM SODIUM 4.5 G: 4; .5 INJECTION, POWDER, FOR SOLUTION INTRAVENOUS at 01:12

## 2023-01-01 RX ADMIN — Medication 0.02 MCG/KG/MIN: at 05:12

## 2023-01-01 RX ADMIN — DEXMEDETOMIDINE HYDROCHLORIDE 0.6 MCG/KG/HR: 4 INJECTION, SOLUTION INTRAVENOUS at 09:12

## 2023-01-01 RX ADMIN — NOREPINEPHRINE BITARTRATE 0.02 MCG/KG/MIN: 4 INJECTION, SOLUTION INTRAVENOUS at 05:12

## 2023-01-01 RX ADMIN — DILTIAZEM HYDROCHLORIDE 30 MG: 30 TABLET, FILM COATED ORAL at 12:12

## 2023-01-01 RX ADMIN — VANCOMYCIN HYDROCHLORIDE 1500 MG: 1.5 INJECTION, POWDER, LYOPHILIZED, FOR SOLUTION INTRAVENOUS at 05:11

## 2023-01-01 RX ADMIN — POLYETHYLENE GLYCOL 3350 17 G: 17 POWDER, FOR SOLUTION ORAL at 11:12

## 2023-01-01 RX ADMIN — FAMOTIDINE 20 MG: 20 TABLET, FILM COATED ORAL at 08:11

## 2023-01-01 RX ADMIN — POTASSIUM CHLORIDE 40 MEQ: 29.8 INJECTION, SOLUTION INTRAVENOUS at 01:12

## 2023-01-01 RX ADMIN — SODIUM CHLORIDE, POTASSIUM CHLORIDE, SODIUM LACTATE AND CALCIUM CHLORIDE: 600; 310; 30; 20 INJECTION, SOLUTION INTRAVENOUS at 09:12

## 2023-01-01 RX ADMIN — CALCIUM GLUCONATE 1 G: 98 INJECTION, SOLUTION INTRAVENOUS at 03:12

## 2023-01-01 RX ADMIN — ACETAMINOPHEN 650 MG: 650 SUPPOSITORY RECTAL at 10:11

## 2023-01-01 RX ADMIN — INSULIN ASPART 2 UNITS: 100 INJECTION, SOLUTION INTRAVENOUS; SUBCUTANEOUS at 12:11

## 2023-01-01 RX ADMIN — VASOPRESSIN 0.04 UNITS/MIN: 20 INJECTION INTRAVENOUS at 12:12

## 2023-01-01 RX ADMIN — CHLOROTHIAZIDE SODIUM 500 MG: 500 INJECTION, POWDER, LYOPHILIZED, FOR SOLUTION INTRAVENOUS at 06:12

## 2023-01-01 RX ADMIN — HEPARIN SODIUM 5000 UNITS: 5000 INJECTION INTRAVENOUS; SUBCUTANEOUS at 10:11

## 2023-01-01 RX ADMIN — INSULIN ASPART 1 UNITS: 100 INJECTION, SOLUTION INTRAVENOUS; SUBCUTANEOUS at 10:11

## 2023-01-01 RX ADMIN — NOREPINEPHRINE BITARTRATE 0.54 MCG/KG/MIN: 8 INJECTION, SOLUTION INTRAVENOUS at 01:12

## 2023-01-01 RX ADMIN — NOREPINEPHRINE BITARTRATE 0.38 MCG/KG/MIN: 8 INJECTION, SOLUTION INTRAVENOUS at 12:12

## 2023-01-01 RX ADMIN — PHENYLEPHRINE HYDROCHLORIDE 5 MCG/KG/MIN: 10 INJECTION INTRAVENOUS at 09:12

## 2023-01-01 RX ADMIN — MIDAZOLAM 2 MG: 1 INJECTION INTRAMUSCULAR; INTRAVENOUS at 02:11

## 2023-01-01 RX ADMIN — ETOMIDATE 30 MG: 2 INJECTION INTRAVENOUS at 01:12

## 2023-01-01 RX ADMIN — DEXMEDETOMIDINE HYDROCHLORIDE 0.2 MCG/KG/HR: 4 INJECTION, SOLUTION INTRAVENOUS at 01:12

## 2023-01-01 RX ADMIN — VASOPRESSIN 0.04 UNITS/MIN: 20 INJECTION INTRAVENOUS at 05:12

## 2023-01-01 RX ADMIN — Medication 10 MG: at 11:12

## 2023-01-01 RX ADMIN — NOREPINEPHRINE BITARTRATE 0.36 MCG/KG/MIN: 8 INJECTION, SOLUTION INTRAVENOUS at 08:12

## 2023-01-01 RX ADMIN — FENTANYL CITRATE 25 MCG: 50 INJECTION INTRAMUSCULAR; INTRAVENOUS at 07:12

## 2023-01-01 RX ADMIN — INSULIN ASPART 2 UNITS: 100 INJECTION, SOLUTION INTRAVENOUS; SUBCUTANEOUS at 12:12

## 2023-01-01 RX ADMIN — AMIODARONE HYDROCHLORIDE 150 MG: 1.5 INJECTION, SOLUTION INTRAVENOUS at 01:12

## 2023-01-01 RX ADMIN — NOREPINEPHRINE BITARTRATE 0.38 MCG/KG/MIN: 8 INJECTION, SOLUTION INTRAVENOUS at 05:12

## 2023-01-01 RX ADMIN — LIDOCAINE HYDROCHLORIDE 38.8 MG: 20 INJECTION INTRAVENOUS at 12:12

## 2023-01-01 RX ADMIN — FUROSEMIDE 240 MG: 10 INJECTION, SOLUTION INTRAMUSCULAR; INTRAVENOUS at 07:12

## 2023-01-01 RX ADMIN — CALCIUM GLUCONATE 1 G: 98 INJECTION, SOLUTION INTRAVENOUS at 05:12

## 2023-01-01 RX ADMIN — PROPOFOL 10 MCG/KG/MIN: 10 INJECTION, EMULSION INTRAVENOUS at 10:12

## 2023-01-01 RX ADMIN — INSULIN ASPART 4 UNITS: 100 INJECTION, SOLUTION INTRAVENOUS; SUBCUTANEOUS at 05:12

## 2023-01-01 RX ADMIN — HYDROCORTISONE SODIUM SUCCINATE 100 MG: 100 INJECTION, POWDER, FOR SOLUTION INTRAMUSCULAR; INTRAVENOUS at 04:12

## 2023-01-01 RX ADMIN — FAMOTIDINE 20 MG: 20 TABLET, FILM COATED ORAL at 09:11

## 2023-01-01 RX ADMIN — FENTANYL CITRATE 50 MCG: 50 INJECTION INTRAMUSCULAR; INTRAVENOUS at 12:12

## 2023-01-01 RX ADMIN — ASPIRIN 81 MG CHEWABLE TABLET 81 MG: 81 TABLET CHEWABLE at 02:11

## 2023-01-01 RX ADMIN — PHENYLEPHRINE HYDROCHLORIDE 2.5 MCG/KG/MIN: 10 INJECTION INTRAVENOUS at 05:12

## 2023-01-01 RX ADMIN — PROPOFOL 100 MG: 10 INJECTION, EMULSION INTRAVENOUS at 07:11

## 2023-01-01 RX ADMIN — INSULIN ASPART 6 UNITS: 100 INJECTION, SOLUTION INTRAVENOUS; SUBCUTANEOUS at 05:12

## 2023-01-01 RX ADMIN — DEXTROSE 750 ML: 5 SOLUTION INTRAVENOUS at 02:11

## 2023-01-01 RX ADMIN — POLYETHYLENE GLYCOL 3350 17 G: 17 POWDER, FOR SOLUTION ORAL at 08:12

## 2023-01-01 RX ADMIN — POLYMYXIN B SULFATE AND TRIMETHOPRIM 1 DROP: 10000; 1 SOLUTION OPHTHALMIC at 05:12

## 2023-01-01 RX ADMIN — POLYMYXIN B SULFATE AND TRIMETHOPRIM 1 DROP: 10000; 1 SOLUTION OPHTHALMIC at 04:11

## 2023-01-01 RX ADMIN — PIPERACILLIN SODIUM AND TAZOBACTAM SODIUM 4.5 G: 4; .5 INJECTION, POWDER, FOR SOLUTION INTRAVENOUS at 10:11

## 2023-01-01 RX ADMIN — GLYCOPYRROLATE 0.2 MG: 0.2 INJECTION, SOLUTION INTRAMUSCULAR; INTRAVENOUS at 11:12

## 2023-01-01 RX ADMIN — OLANZAPINE 5 MG: 10 INJECTION, POWDER, LYOPHILIZED, FOR SOLUTION INTRAMUSCULAR at 01:11

## 2023-01-01 RX ADMIN — NOREPINEPHRINE BITARTRATE 0.38 MCG/KG/MIN: 8 INJECTION, SOLUTION INTRAVENOUS at 09:12

## 2023-01-01 RX ADMIN — PHENYLEPHRINE HYDROCHLORIDE 3 MCG/KG/MIN: 10 INJECTION INTRAVENOUS at 01:12

## 2023-01-01 RX ADMIN — POLYMYXIN B SULFATE AND TRIMETHOPRIM 1 DROP: 10000; 1 SOLUTION OPHTHALMIC at 01:12

## 2023-01-01 RX ADMIN — CEFTRIAXONE 1 G: 1 INJECTION, POWDER, FOR SOLUTION INTRAMUSCULAR; INTRAVENOUS at 11:11

## 2023-01-01 RX ADMIN — SODIUM BICARBONATE 50 MEQ: 84 INJECTION, SOLUTION INTRAVENOUS at 01:12

## 2023-01-01 RX ADMIN — VASOPRESSIN 0.06 UNITS/MIN: 20 INJECTION INTRAVENOUS at 07:12

## 2023-01-01 RX ADMIN — INSULIN ASPART 2 UNITS: 100 INJECTION, SOLUTION INTRAVENOUS; SUBCUTANEOUS at 05:12

## 2023-01-01 RX ADMIN — VANCOMYCIN HYDROCHLORIDE 1000 MG: 1 INJECTION, POWDER, LYOPHILIZED, FOR SOLUTION INTRAVENOUS at 04:11

## 2023-01-01 RX ADMIN — FENTANYL CITRATE 25 MCG: 50 INJECTION INTRAMUSCULAR; INTRAVENOUS at 05:11

## 2023-01-01 RX ADMIN — IPRATROPIUM BROMIDE AND ALBUTEROL SULFATE 3 ML: 2.5; .5 SOLUTION RESPIRATORY (INHALATION) at 10:12

## 2023-01-01 RX ADMIN — Medication 3.5 MCG/KG/MIN: at 05:12

## 2023-01-01 RX ADMIN — IODIXANOL 50 ML: 320 INJECTION, SOLUTION INTRAVASCULAR at 08:11

## 2023-01-01 RX ADMIN — FLUDROCORTISONE ACETATE 100 MCG: 0.1 TABLET ORAL at 01:12

## 2023-01-01 RX ADMIN — SODIUM CHLORIDE 1000 ML: 9 INJECTION, SOLUTION INTRAVENOUS at 12:12

## 2023-01-01 RX ADMIN — SODIUM BICARBONATE 50 MEQ: 84 INJECTION, SOLUTION INTRAVENOUS at 02:12

## 2023-01-01 RX ADMIN — LEVETIRACETAM 2000 MG: 100 INJECTION, SOLUTION INTRAVENOUS at 08:11

## 2023-01-01 RX ADMIN — ERYTHROMYCIN: 5 OINTMENT OPHTHALMIC at 04:11

## 2023-01-01 RX ADMIN — NOREPINEPHRINE BITARTRATE 0.34 MCG/KG/MIN: 8 INJECTION, SOLUTION INTRAVENOUS at 01:12

## 2023-01-01 RX ADMIN — FENTANYL CITRATE 25 MCG: 50 INJECTION, SOLUTION INTRAMUSCULAR; INTRAVENOUS at 11:12

## 2023-01-01 RX ADMIN — FENTANYL CITRATE 50 MCG: 50 INJECTION INTRAMUSCULAR; INTRAVENOUS at 09:11

## 2023-01-01 RX ADMIN — INDOMETHACIN 100 MEQ: 25 CAPSULE ORAL at 12:12

## 2023-01-01 RX ADMIN — Medication 75 MCG/HR: at 04:11

## 2023-01-01 RX ADMIN — NOREPINEPHRINE BITARTRATE 0.26 MCG/KG/MIN: 1 INJECTION, SOLUTION, CONCENTRATE INTRAVENOUS at 02:12

## 2023-01-01 RX ADMIN — LIDOCAINE HYDROCHLORIDE 38.8 MG: 20 INJECTION, SOLUTION INTRAVENOUS at 01:12

## 2023-01-01 RX ADMIN — POLYMYXIN B SULFATE AND TRIMETHOPRIM 1 DROP: 10000; 1 SOLUTION OPHTHALMIC at 10:12

## 2023-01-01 RX ADMIN — VANCOMYCIN HYDROCHLORIDE 1250 MG: 1.25 INJECTION, POWDER, LYOPHILIZED, FOR SOLUTION INTRAVENOUS at 02:12

## 2023-01-01 RX ADMIN — ERYTHROMYCIN: 5 OINTMENT OPHTHALMIC at 01:12

## 2023-01-01 RX ADMIN — INSULIN HUMAN 10.9 UNITS/HR: 1 INJECTION, SOLUTION INTRAVENOUS at 04:12

## 2023-01-01 RX ADMIN — DILTIAZEM HYDROCHLORIDE 7.5 MG/HR: 5 INJECTION INTRAVENOUS at 08:11

## 2023-01-01 RX ADMIN — VASOPRESSIN 0.04 UNITS/MIN: 20 INJECTION INTRAVENOUS at 03:12

## 2023-01-01 RX ADMIN — INSULIN ASPART 2 UNITS: 100 INJECTION, SOLUTION INTRAVENOUS; SUBCUTANEOUS at 11:12

## 2023-01-01 RX ADMIN — PIPERACILLIN SODIUM AND TAZOBACTAM SODIUM 4.5 G: 4; .5 INJECTION, POWDER, FOR SOLUTION INTRAVENOUS at 11:11

## 2023-01-01 RX ADMIN — INSULIN HUMAN 7.95 UNITS/HR: 1 INJECTION, SOLUTION INTRAVENOUS at 09:12

## 2023-01-01 RX ADMIN — ATORVASTATIN CALCIUM 40 MG: 40 TABLET, FILM COATED ORAL at 08:11

## 2023-01-01 RX ADMIN — SODIUM BICARBONATE 100 MEQ: 84 INJECTION, SOLUTION INTRAVENOUS at 02:12

## 2023-01-01 RX ADMIN — INSULIN ASPART 12 UNITS: 100 INJECTION, SOLUTION INTRAVENOUS; SUBCUTANEOUS at 04:12

## 2023-01-01 RX ADMIN — PIPERACILLIN SODIUM AND TAZOBACTAM SODIUM 4.5 G: 4; .5 INJECTION, POWDER, FOR SOLUTION INTRAVENOUS at 02:12

## 2023-01-01 RX ADMIN — ONDANSETRON 4 MG: 2 INJECTION INTRAMUSCULAR; INTRAVENOUS at 07:11

## 2023-01-01 RX ADMIN — POTASSIUM CHLORIDE 40 MEQ: 7.46 INJECTION, SOLUTION INTRAVENOUS at 10:11

## 2023-01-01 RX ADMIN — INSULIN ASPART 4 UNITS: 100 INJECTION, SOLUTION INTRAVENOUS; SUBCUTANEOUS at 05:11

## 2023-01-01 RX ADMIN — CALCIUM GLUCONATE 1 G: 98 INJECTION, SOLUTION INTRAVENOUS at 06:12

## 2023-01-01 RX ADMIN — ASPIRIN 300 MG: 300 SUPPOSITORY RECTAL at 02:11

## 2023-01-01 RX ADMIN — DILTIAZEM HYDROCHLORIDE 7.5 MG/HR: 5 INJECTION INTRAVENOUS at 10:11

## 2023-01-01 RX ADMIN — Medication 20 MG: at 07:12

## 2023-01-01 RX ADMIN — INSULIN ASPART 1 UNITS: 100 INJECTION, SOLUTION INTRAVENOUS; SUBCUTANEOUS at 08:12

## 2023-01-01 RX ADMIN — PIPERACILLIN SODIUM AND TAZOBACTAM SODIUM 4.5 G: 4; .5 INJECTION, POWDER, FOR SOLUTION INTRAVENOUS at 02:11

## 2023-01-01 RX ADMIN — SODIUM CHLORIDE, SODIUM LACTATE, POTASSIUM CHLORIDE, AND CALCIUM CHLORIDE 1000 ML: .6; .31; .03; .02 INJECTION, SOLUTION INTRAVENOUS at 03:12

## 2023-01-01 RX ADMIN — FAMOTIDINE 20 MG: 20 TABLET ORAL at 09:12

## 2023-01-01 RX ADMIN — LIDOCAINE HYDROCHLORIDE 40 MG: 20 INJECTION, SOLUTION EPIDURAL; INFILTRATION; INTRACAUDAL; PERINEURAL at 12:12

## 2023-01-01 RX ADMIN — DEXMEDETOMIDINE HYDROCHLORIDE 0.6 MCG/KG/HR: 4 INJECTION INTRAVENOUS at 08:11

## 2023-01-01 RX ADMIN — SILODOSIN 4 MG: 4 CAPSULE ORAL at 02:11

## 2023-01-01 RX ADMIN — INDOMETHACIN 100 MEQ: 25 CAPSULE ORAL at 05:12

## 2023-01-01 RX ADMIN — ERYTHROMYCIN: 5 OINTMENT OPHTHALMIC at 10:12

## 2023-01-01 RX ADMIN — LABETALOL HYDROCHLORIDE 0.05 MG/KG/HR: 5 INJECTION INTRAVENOUS at 08:12

## 2023-01-01 RX ADMIN — ASPIRIN 81 MG CHEWABLE TABLET 81 MG: 81 TABLET CHEWABLE at 08:11

## 2023-01-01 RX ADMIN — ASPIRIN 300 MG: 300 SUPPOSITORY RECTAL at 09:11

## 2023-01-01 RX ADMIN — ASPIRIN 300 MG: 300 SUPPOSITORY RECTAL at 10:11

## 2023-01-01 RX ADMIN — SUGAMMADEX 200 MG: 100 INJECTION, SOLUTION INTRAVENOUS at 08:11

## 2023-07-20 PROBLEM — R94.39 ABNORMAL MYOCARDIAL PERFUSION STUDY: Status: ACTIVE | Noted: 2023-01-01

## 2023-07-20 PROBLEM — I25.10 CORONARY ATHEROSCLEROSIS OF NATIVE CORONARY ARTERY: Status: ACTIVE | Noted: 2023-01-01

## 2023-11-23 PROBLEM — R56.9 SEIZURE-LIKE ACTIVITY: Status: ACTIVE | Noted: 2023-01-01

## 2023-11-23 PROBLEM — Z97.8 ENDOTRACHEALLY INTUBATED: Status: ACTIVE | Noted: 2023-01-01

## 2023-11-23 PROBLEM — E11.9 DIABETES MELLITUS: Status: ACTIVE | Noted: 2023-01-01

## 2023-11-23 PROBLEM — D72.829 LEUKOCYTOSIS: Status: ACTIVE | Noted: 2023-01-01

## 2023-11-23 PROBLEM — E87.5 HYPERKALEMIA: Status: ACTIVE | Noted: 2023-01-01

## 2023-11-23 PROBLEM — I63.411 EMBOLIC STROKE INVOLVING RIGHT MIDDLE CEREBRAL ARTERY: Status: ACTIVE | Noted: 2023-01-01

## 2023-11-23 PROBLEM — G81.94 HEMIPARESIS, LEFT: Status: ACTIVE | Noted: 2023-01-01

## 2023-11-24 NOTE — HPI
Tim Rhodes is a 83 y.o. male with PMHx of CAD, HF,  DM who was transferred from [facility] for R MCA stroke and possible intervention on suspected R MCA occlusion. Patient initially presented to OSH with AMS and seizure activity. LKN 11/22/23 at 10pm. Out of treatment window for thrombolytics. CT at OSH with early ischemic changes in R MCA territory and hyperdense R MCA sign concerning for R MCA occlusion. Patient was intubated and sedated at OSH for airway protection. Repeat CT on arrival [place] [facility] [place] stable in comparison to prior. Patient independent at baseline per daughter. Patient was taken to IR 11/23 for angio with attempted thrombectomy. NSGY consulted for evaluation. Patient spontaneously opening eyes and moving the right side, withdraws to pain in the LLE. Per family at bedside, occasionally nods appropriately to questions.

## 2023-11-24 NOTE — HPI
The patient is an 83-year-old with PMHx of  CAD and DM admitted to [facility] with large R MCA stroke and seizure.Per chart review, in emergency department at [facility]  presented with with altered mental status and focal seizure activity.  Daughter states patient was able to talk to her on the phone at 10:00 p.m. last night was at baseline.  Patient living in assisted living but still very functional.  Daughter states patien still drives on occasion.  She does not believe he takes any blood thinners.  She went to check on him is afternoon and he had fallen next to the toilet.  Patient unable to provide any history.    Noted to have tonic-clonic activity to the right upper extremity on arrival.  Patient with incomprehensible speech.  Copious vomiting on EMS arrival per paramedic.  Patient unable to contribute to history.  Last presumed normal was 10:00 p.m. last night. Out of window for thrombolytics.  Patient intubated for airway protection at outside hospital. On arrival [facility] with large R MCA - stroke team consulted and plan for IR. Patient loaded with Keppra, EEG pending. Lactic acid 11.9 on arrival, pancx and initiated broad spec abx. Patient admitted to [facility] for close monitoring and higher level of care.

## 2023-11-24 NOTE — ASSESSMENT & PLAN NOTE
Seizure like activity noted at outside facility  Loaded w/ 2gm of Keppra   Continue maintenance Keppra 750mg BID  Seizure precautions  EEG neg and dced on 11/24

## 2023-11-24 NOTE — PROGRESS NOTES
Pharmacist Renal Dose Adjustment Note    Tim Rhodes is a 83 y.o. male being treated with the medication famotidine    Patient Data:    Vital Signs (Most Recent):  Temp: 98.3 °F (36.8 °C) (11/23/23 2017)  Pulse: 90 (11/23/23 2017)  Resp: 20 (11/23/23 2105)  BP: 127/66 (11/23/23 2017)  SpO2: 100 % (11/23/23 2017) Vital Signs (72h Range):  Temp:  [98.3 °F (36.8 °C)-99.7 °F (37.6 °C)]   Pulse:  []   Resp:  [14-39]   BP: ()/(52-78)   SpO2:  [57 %-100 %]      Recent Labs   Lab 11/23/23  1339 11/23/23 1915   CREATININE 1.53* 1.4     Serum creatinine: 1.4 mg/dL 11/23/23 1915  Estimated creatinine clearance: 38 mL/min    Medication: famotidine 20 mg twice daily will be changed to famotidine 20 mg daily    Juliet Liriano, PharmD

## 2023-11-24 NOTE — ASSESSMENT & PLAN NOTE
Noted on arrival to OSH, reported to have RUE seizure like activity  Treated with keppra prior to transfer  Recommend EEG

## 2023-11-24 NOTE — ED PROVIDER NOTES
Encounter Date: 11/23/2023       History     Chief Complaint   Patient presents with    Cerebrovascular Accident     Right MCA transfer from [facility].      HPI      Tim Rhodes is a 83 y.o. male with PMH of diabetes, CHF, presenting to [facility] ED for stroke.  Patient presenting as a transfer from [facility] for right MCA stroke/seizure.  Patient's last known normal was approximately 10:00 p.m. on 11/22.  Patient resides in assisted living where he was found down in the bathroom.  Patient taken to outside hospital where CT head revealed right MCA stroke.  Patient was not in the window for thrombolytics, transferred for possible IR management.  Patient was given a Keppra load.  Workup otherwise noted for lactic of 12, leukocytosis.  Patient was given Zosyn at outside hospital.    Review of patient's allergies indicates:  No Known Allergies  Past Medical History:   Diagnosis Date    Arthritis     Weiss's esophagus     CHF (congestive heart failure)     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/23/2023    GERD (gastroesophageal reflux disease)     Type 2 diabetes mellitus      History reviewed. No pertinent surgical history.  History reviewed. No pertinent family history.     Review of Systems   Unable to perform ROS: Patient unresponsive       Physical Exam     Initial Vitals   BP Pulse Resp Temp SpO2   11/23/23 1826 11/23/23 1826 11/23/23 1831 11/23/23 2017 11/23/23 1826   (!) 111/54 68 18 98.3 °F (36.8 °C) 95 %      MAP       --                Physical Exam    Nursing note and vitals reviewed.  Constitutional: He appears ill. He is sedated and intubated.   HENT:   Head: Normocephalic.   Eyes: Pupils are equal, round, and reactive to light.   Cardiovascular:  Normal rate.           Pulmonary/Chest: He is intubated.     Neurological: He is unresponsive. GCS eye subscore is 1. GCS verbal subscore is 1. GCS motor subscore is 4.   Right side withdrawals from pain, patient's left slid  exhibits no movement to painful stimuli         ED Course   Procedures  Labs Reviewed   COMPREHENSIVE METABOLIC PANEL - Abnormal; Notable for the following components:       Result Value    Chloride 112 (*)     CO2 17 (*)     Glucose 196 (*)     BUN 25 (*)     Calcium 8.3 (*)     Albumin 3.0 (*)     AST 77 (*)     eGFR 50.5 (*)     All other components within normal limits   LIPID PANEL - Abnormal; Notable for the following components:    Cholesterol 94 (*)     HDL 32 (*)     LDL Cholesterol 48.4 (*)     All other components within normal limits   PHOSPHORUS - Abnormal; Notable for the following components:    Phosphorus 5.2 (*)     All other components within normal limits   CULTURE, BLOOD   CULTURE, RESPIRATORY   MAGNESIUM   HIV 1 / 2 ANTIBODY   HEPATITIS C ANTIBODY   CK   URINALYSIS, REFLEX TO URINE CULTURE   URINALYSIS, REFLEX TO URINE CULTURE   HEMOGLOBIN A1C   POCT GLUCOSE MONITORING CONTINUOUS          Imaging Results               X-Ray Chest 1 View (Final result)  Result time 11/23/23 21:06:49      Final result by Heather Lopez MD (11/23/23 21:06:49)                   Impression:      Low lying ET tube.  Consider retracting 3-4 cm.  Suboptimally positioned feeding tube.  Recommend readjusting.  Otherwise, no significant change.    This report was flagged in Epic as abnormal.      Electronically signed by: Heather Lopez  Date:    11/23/2023  Time:    21:06               Narrative:    EXAMINATION:  CHEST ONE VIEW    CLINICAL HISTORY:  eval PNA;    TECHNIQUE:  One view of the chest.    COMPARISON:  11/23/2023 13:47    FINDINGS:  The endotracheal tube is seen just above the brianna.  The tip of the feeding tube has been slightly pulled back and is near the GE junction.  Consider advancing.  The cardiac silhouette is enlarged.  Vascular calcification is seen at the aortic knob.  There is no focal consolidation, pneumothorax, or pleural effusion.  The lung volumes are slightly low.  The bones are  diffusely osteopenic.                                       IR Angiogram Carotid Cerebral Bilateral inc Arch (In process)                       CT Head Without Contrast (Final result)  Result time 11/23/23 18:57:48      Final result by Roldan Loepz MD (11/23/23 18:57:48)                   Impression:      Acute right MCA territory infarction, similar to prior.  No infarct extension or hemorrhagic conversion.  Associated hyperdense vessel sign likely involving the right M1 and a proximal M2 branch.    Changes of generalized cerebral volume loss.    Paranasal sinus disease as above.    This report was flagged in Epic as abnormal.    Electronically signed by resident: Apolinar Haskins  Date:    11/23/2023  Time:    18:16    Electronically signed by: Roldan Lopez MD  Date:    11/23/2023  Time:    18:57               Narrative:    EXAMINATION:  CT HEAD WITHOUT CONTRAST    CLINICAL HISTORY:  Stroke, follow up;    TECHNIQUE:  Low dose axial CT images obtained throughout the head without the use of intravenous contrast.  Axial, sagittal and coronal reconstructions were performed.    COMPARISON:  CT head from same date    FINDINGS:  Loss of gray-white matter differentiation involving the right frontoparietal lobe and insular cortex, similar to same date CT, in keeping with acute right MCA infarction.  No infarct extension or hemorrhagic conversion.  Associated hyperdense vessel sign likely involving right M1 and a proximal M2 branch.    Generalized cerebral volume loss.  No intracranial hemorrhage.  No mass effect.    No extra-axial blood or fluid collections identified.    Ventricles are unchanged in size and configuration.  No overt hydrocephalus.    Calvarium is intact.  Mild mucosal thickening involving the paranasal sinuses with patchy opacification of the ethmoid air cells and mucous retention cyst in the bilateral maxillary antra.  Minimal bilateral mastoid air cell effusions.  Extracranial soft tissues are  unremarkable.                                       Medications   vancomycin 1,250 mg in dextrose 5 % (D5W) 250 mL IVPB (Vial-Mate) (has no administration in time range)   sodium chloride 0.9% flush 10 mL (has no administration in time range)   atorvastatin tablet 40 mg (has no administration in time range)   acetaminophen tablet 650 mg (has no administration in time range)   piperacillin-tazobactam (ZOSYN) 4.5 g in dextrose 5 % in water (D5W) 100 mL IVPB (MB+) (has no administration in time range)   vancomycin - pharmacy to dose ( Intravenous Random Level Due 11/24/23 1200)   senna-docusate 8.6-50 mg per tablet 1 tablet (has no administration in time range)   sodium bicarbonate solution 50 mEq (has no administration in time range)   ondansetron injection 4 mg (has no administration in time range)   levETIRAcetam injection 750 mg (has no administration in time range)   sodium chloride 0.9% flush 10 mL (has no administration in time range)   fentaNYL 2500 mcg in 0.9% sodium chloride 250 mL infusion premix (titrating) (25 mcg/hr Intravenous New Bag 11/23/23 2116)   glucagon (human recombinant) injection 1 mg (has no administration in time range)   insulin aspart U-100 pen 0-10 Units (has no administration in time range)   dextrose 10% bolus 125 mL 125 mL (has no administration in time range)   dextrose 10% bolus 250 mL 250 mL (has no administration in time range)   famotidine tablet 20 mg (has no administration in time range)   levETIRAcetam injection 2,000 mg (2,000 mg Intravenous Given 11/23/23 2043)   iodixanoL (VISIPAQUE 320) injection 50 mL (50 mLs Intra-arterial Given 11/23/23 2008)   fentaNYL 50 mcg/mL injection 50 mcg (50 mcg Intravenous Given 11/23/23 2105)     Medical Decision Making  83-year-old male with recent right MCA stroke presenting from outside hospital for stroke team/interventional radiology evaluation.  Initially, patient hemodynamically stable, arriving intubated/sedated on propofol.    Patient  taken immediately to CT scanner and code stroke called on triage.  CT head ordered per vascular neurology/IR recommendations.  Broaden patient's workup to include blood cultures due to elevated lactic and leukocytosis.  At it CPK due to unknown time down.  Extended broad-spectrum antibiotics to include vancomycin/Zosyn.  Discussed with Neuro Critical Care who agreed to admit patient to their service for further management.    Amount and/or Complexity of Data Reviewed  External Data Reviewed: labs, radiology, ECG and notes.  Labs: ordered. Decision-making details documented in ED Course.  Radiology: ordered and independent interpretation performed.    Risk  Decision regarding hospitalization.                                   Clinical Impression:  Final diagnoses:  [I63.9] Cerebrovascular accident (CVA), unspecified mechanism (Primary)          ED Disposition Condition    Admit Stable                Carla Brooks MD  Resident  11/23/23 8796

## 2023-11-24 NOTE — ASSESSMENT & PLAN NOTE
Patient intubated at outside facility for airway protection  Vent Mode: A/C  Oxygen Concentration (%):  [50-80] 50  Resp Rate Total:  [14 br/min-27 br/min] 14 br/min  Vt Set:  [500 mL] 500 mL  PEEP/CPAP:  [5 cmH20] 5 cmH20  Mean Airway Pressure:  [7.4 cmH20-9.2 cmH20] 8.5 cmH20  Follow cxrs  Abg

## 2023-11-24 NOTE — PROCEDURES
[facility] EEG/VIDEO MONITORING REPORT  Tim Rhodes  [id number]  1940    DATE OF SERVICE: 11/24/23  DATE OF ADMISSION: 11/23/2023  6:05 PM    ADMITTING/REQUESTING PROVIDER: Lb Weaver MD    REASON FOR CONSULT: 84 yo male presenting with acute R MCA stroke, seizure activity prior to intubation.  EEG cap to evaluate for ongoing seizures.     METHODOLOGY   Electroencephalographic (EEG) recording is with electrodes placed according to the International 10-20 placement system.  Thirty two (32) channels of digital signal (sampling rate of 512/sec) including T1 and T2 was simultaneously recorded from the scalp and may include  EKG, EMG, and/or eye monitors.  Recording band pass was 0.1 to 512 hz.  Digital video recording of the patient is simultaneously recorded with the EEG.  The patient is instructed report clinical symptoms which may occur during the recording session.  EEG and video recording is stored and archived in digital format.  Activation procedures which include photic stimulation, hyperventilation and instructing patients to perform simple task are done in selected patients.   The EEG is displayed on a monitor screen and can be reviewed using different montages.  Computer assisted analysis is employed to detect spike and electrographic seizure activity.   The entire record is submitted for computer analysis.  The entire recording is visually reviewed and the times identified by computer analysis as being spikes or seizures are reviewed again.  Compresses spectral analysis (CSA) is also performed on the activity recorded from each individual channel.  This is displayed as a power display of frequencies from 0 to 30 Hz over time.   The CSA is reviewed looking for asymmetries in power between homologous areas of the scalp and then compared with the original EEG recording.     Beautylish software is also utilized in the review of this study.  This software suite analyzes the EEG recording in multiple domains.   Coherence and rhythmicity is computed to identify EEG sections which may contain organized seizures.  Each channel undergoes analysis to detect presence of spike and sharp waves which have special and morphological characteristic of epileptic activity.  The routine EEG recording is converted from spacial into frequency domain.  This is then displayed comparing homologous areas to identify areas of significant asymmetry.  Algorithm to identify non-cortically generated artifact is used to separate eye movement, EMG and other artifact from the EEG.      RECORDING TIMES  Start on 11/23/23 at 20:54  Stop on 11/24/23 at 07:00  Start 11/24/23 at 07:00  Stop 11/24/23 at 14:45 7 hrs 43  A total of 17 hours and 6 minutes of EEG recording is obtained.    EEG FINDINGS  Background activity:   Within the limitation of a significant amount of movement/lead artifact on an electrode cap EEG, the background is composed of generalized delta activity with some intermittent theta frequencies overriding.  At times, there appears to be a subtle asymmetry with an attenuation of faster frequencies over the right hemisphere.          Sleep:  No sleep architecture seen    Activation procedures:   Hyperventilation is not performed  Photic stimulation is not performed    Cardiac Monitor:   Electrode caps do not have EKG capabilities    Impression:   Within the limitation of a significant amount of lead/movement artifact, this is an abnormal electrode cap EEG monitoring study because of a severe degree of background slowing consistent with severe diffuse cerebral dysfunction.  This is also evidence of more focal dysfunction in the right hemisphere.  There are no pushbutton activations.  There are no apparent epileptiform discharges or electrographic seizures.  Depending on the clinical scenario, consider additional monitoring with standard scalp electrodes.    Yokasta Flores MD  [facility]   Department of Neurology

## 2023-11-24 NOTE — HOSPITAL COURSE
11/24/2023 MRI complete and reviewed, Nsgy consulted, EEG neg and dced  11/25/2023: initiate baby ASA and sq heparin, follow CT head in AM, SBP <180, initiate tube feeds  11/26/2023 EKG and if afib happens again will treat, Extubated 11/26 at 1400, Low dose precedex for agitation  11/27/2023 EKG, NS 75 cc/hr, consult IR for peg  11/28/23: consult GI for peg  11/29/23: CXR in am  11/30/23: awaiting PEG tube  12/1/23:PEG today  12/2/2023: start tube feeds, decrease enteral water flushed to 200 q 6 hr, stepdown to stroke team   12/3/2023: pending stepdown to stroke team, decrease enteral water flushes to 100 q6h, add Detemir and increase PRN SSI   12/04/2023 stepped up with shock and respiratory failure requiring 3 pressors  12/05/2023 Amio restarted overnight for a-fib w/ RVR and multiple runs of v-tach. Continues to require increased pressor support.  12/06/2023 V-tach episodes continued overnight, now with longer runs. Currently on lidocaine gtt. Received 1 unit PRBC, 1 unit platelets, and 1 unit of cryo overnight. Off phenylephrine, will attempt to wean levophed as BP allows.

## 2023-11-24 NOTE — HPI
Tim Rhodes is a 83 y.o. male with PMHx of CAD, HF,  DM who presented to [facility] with AMS and seizure activity. History obtained via chart review. Patient was LKN yesterday at 10pm. Patient's daughter went to check on him and found that he had fallen next to toilet. At OSH ED, patient was noted to have seizure like activity in RUE, vomiting, and R gaze. He was intubated for airway protection and given keppra. WBC 28 and lactic 11, blood cultures ordered and patient started on zosyn. CT at OSH with R MCA infarct and hyperdense R MCA sign concerning for LVO. Patient transferred to [facility] for possible IR intervention. Per daughter, patient lives independently in an apartment complex for seniors, ambulates independently, occasionally drives.

## 2023-11-24 NOTE — ANESTHESIA PREPROCEDURE EVALUATION
[facility]  Anesthesia Pre-Operative Evaluation       Patient Name: Tim Rhodes  YOB: 1940  MRN: [id number]  [facility]: [id number]      Code Status: Full Code   Date of Procedure: 11/23/2023  Anesthesia: Choice Procedure: Procedure(s) (LRB):  ANGIOGRAM-CEREBRAL (N/A)  Pre-Operative Diagnosis: Cerebrovascular accident (CVA), unspecified mechanism [I63.9]  Proceduralist: Surgeon(s) and Role:     * SurgeonValery - Primary        SUBJECTIVE:   Tim Rhodes is a 83 y.o. male who  has a past medical history of Arthritis, Weiss's esophagus, CHF (congestive heart failure), GERD (gastroesophageal reflux disease), and Type 2 diabetes mellitus. The patient is an 83-year-old with PMHx of  CAD and DM admitted to [facility] with large R MCA stroke and seizure. in emergency department with altered mental status and focal seizure activity.  Daughter states patient was able to talk to her on the phone at 10:00 p.m. last night was at baseline.  Patient in assisted living but still very functional.  Daughter states patient is still drives on occasion.  She does not believe he takes any blood thinners.  She went to check on him is afternoon and he had fallen next to the toilet.  Patient unable to provide any history.  Not hypoglycemic per paramedic.  Noted to have tonic-clonic activity to the right upper extremity on arrival.  Patient with incomprehensible speech.  Copious vomiting on EMS arrival per paramedic.  Patient unable to contribute to history.  Last presumed normal was 10:00 p.m. last night.  Patient initially was a Manuel Li which limited historical information.       Anticoagulants   Medication Route Frequency       he is not on any long-term medications.   ALLERGIES:   Review of patient's allergies indicates:  No Known Allergies  LDA:   Vent Mode: A/C  Oxygen Concentration (%):  [] 50  Resp Rate Total:  [14 br/min-27 br/min] 22 br/min  Vt Set:  [500 mL] 500 mL  PEEP/CPAP:  [5 cmH20] 5  cmH20  Mean Airway Pressure:  [7.4 cmH20] 7.4 cmH20      Lines/Drains/Airways       Drain  Duration                  NG/OG Tube 11/23/23 1343 [place] sump 18 Fr. Center mouth <1 day         Urethral Catheter 11/23/23 1345 Temperature probe 16 Fr. <1 day              Airway  Duration                  Airway - Non-Surgical 11/23/23 1340 <1 day         Airway - Non-Surgical 11/23/23 1341 <1 day              Peripheral Intravenous Line  Duration                  Peripheral IV - Single Lumen 11/23/23 1330 18 G Left Forearm <1 day         Peripheral IV - Single Lumen 11/23/23 1400 18 G Right Wrist <1 day                  MEDICATIONS:     Antibiotics (From admission, onward)      Start     Stop Route Frequency Ordered    11/23/23 2215  piperacillin-tazobactam (ZOSYN) 4.5 g in dextrose 5 % in water (D5W) 100 mL IVPB (MB+)         -- IV Every 8 hours (non-standard times) 11/23/23 1849 11/23/23 1949  vancomycin - pharmacy to dose  (vancomycin IVPB (PEDS and ADULTS))        See Hyperspace for full Linked Orders Report.    -- IV pharmacy to manage frequency 11/23/23 1849 11/23/23 1845  vancomycin 1,250 mg in dextrose 5 % (D5W) 250 mL IVPB (Vial-Mate)         11/24/23 0644 IV ED 1 Time 11/23/23 1807          VTE Risk Mitigation (From admission, onward)           Ordered     Reason for No Pharmacological VTE Prophylaxis  Once        Question:  Reasons:  Answer:  Physician Provided (leave comment)    11/23/23 1823     IP VTE HIGH RISK PATIENT  Once         11/23/23 1823     Place sequential compression device  Until discontinued         11/23/23 1823                  Current Facility-Administered Medications   Medication Dose Route Frequency Provider Last Rate Last Admin    acetaminophen tablet 650 mg  650 mg Oral Q6H PRN Obdulia Veras NP        [START ON 11/24/2023] atorvastatin tablet 40 mg  40 mg Oral Daily Obdulia Veras NP        fentaNYL 50 mcg/mL injection 25 mcg  25 mcg Intravenous Q1H PRN Obdulia Veras NP         levETIRAcetam injection 2,000 mg  2,000 mg Intravenous Once Obdulia VerasNICHOLAS        [START ON 11/24/2023] levETIRAcetam injection 750 mg  750 mg Intravenous Q12H Eda NICHOLAS Steiner        ondansetron injection 4 mg  4 mg Intravenous Q6H PRN Marine VerasNICHOLAS rivera        piperacillin-tazobactam (ZOSYN) 4.5 g in dextrose 5 % in water (D5W) 100 mL IVPB (MB+)  4.5 g Intravenous Q8H Eda NICHOLAS Steiner        [START ON 11/24/2023] senna-docusate 8.6-50 mg per tablet 1 tablet  1 tablet Per OG tube Daily Eda NICHOLAS Steiner        sodium bicarbonate solution 50 mEq  50 mEq Intravenous Once Marine VerasNICHOLAS rivera        sodium chloride 0.9% flush 10 mL  10 mL Intravenous PRN Eda NICHOLAS Steiner        vancomycin - pharmacy to dose   Intravenous pharmacy to manage frequency Eda NICHOLAS Steiner        vancomycin 1,250 mg in dextrose 5 % (D5W) 250 mL IVPB (Vial-Mate)  1,250 mg Intravenous ED 1 Time Carla Brooks MD         No current outpatient medications on file.     Facility-Administered Medications Ordered in Other Encounters   Medication Dose Route Frequency Provider Last Rate Last Admin    glycopyrrolate (PF) injection   Intravenous PRN Juanita Pruitt CRNA   0.2 mg at 11/23/23 1916    isolyte infusion   Intravenous Continuous PRN Juanita Pruitt CRNA   New Bag at 11/23/23 1911    PHENYLephrine injection   Intravenous PRN Juanita Pruitt CRNA   200 mcg at 11/23/23 1917    propofol (DIPRIVAN) 10 mg/mL infusion   Intravenous PRN Juanita Pruitt CRNA   100 mg at 11/23/23 1911    rocuronium injection   Intravenous PRN Juanita Pruitt CRNA   20 mg at 11/23/23 1911          History:   There are no hospital problems to display for this patient.    Surgical History:    has no past surgical history on file.   Social History:    has no history on file for sexual activity.       OBJECTIVE:     Vital Signs (Most Recent):  Pulse: 68 (11/23/23 1826)  Resp: 18 (11/23/23 1831)  BP: (!) 111/54 (11/23/23 1826)  SpO2: 95 % (11/23/23 1826) Vital  Signs Range (Last 24H):  Temp:  [36.9 °C (98.4 °F)-37.6 °C (99.7 °F)]   Pulse:  []   Resp:  [14-39]   BP: ()/(52-78)   SpO2:  [57 %-100 %]        There is no height or weight on file to calculate BMI.   Wt Readings from Last 4 Encounters:   11/23/23 77 kg (169 lb 12.1 oz)   11/23/23 77.7 kg (171 lb 4.8 oz)     Significant Labs:  Lab Results   Component Value Date    WBC 28.00 (H) 11/23/2023    HGB 16.0 11/23/2023    HCT 49.4 11/23/2023     11/23/2023     11/23/2023    K 5.4 (H) 11/23/2023     11/23/2023    CREATININE 1.53 (H) 11/23/2023    BUN 25 (H) 11/23/2023    CO2 8 (LL) 11/23/2023     (H) 11/23/2023    CALCIUM 9.7 11/23/2023    ALKPHOS 96 11/23/2023    ALT 60 (H) 11/23/2023    AST 71 11/23/2023    ALBUMIN 4.9 11/23/2023    TROPONINI 0.063 11/23/2023     No LMP for male patient.  Recent Results (from the past 72 hour(s))   CBC auto differential    Collection Time: 11/23/23  1:39 PM   Result Value Ref Range    WBC 28.00 (H) 3.90 - 12.70 K/uL    RBC 5.61 4.60 - 6.20 M/uL    Hemoglobin 16.0 14.0 - 18.0 g/dL    Hematocrit 49.4 40.0 - 54.0 %    MCV 88 82 - 98 fL    MCH 28.4 27.0 - 31.0 pg    MCHC 32.3 32.0 - 36.0 g/dL    RDW 15.4 (H) 11.5 - 14.5 %    Platelets 250 150 - 450 K/uL    MPV 8.0 7.4 - 10.4 fL    Lymph # CANCELED 1.0 - 4.8 K/uL    Mono # CANCELED 0.3 - 1.0 K/uL    Eos # CANCELED 0.0 - 0.5 K/uL    Baso # CANCELED 0.00 - 0.20 K/uL    nRBC 0 0 /100 WBC    Gran % 84.0 (H) 38.0 - 73.0 %    Lymph % 6.0 (L) 18.0 - 48.0 %    Mono % 8.0 4.0 - 15.0 %    Eosinophil % 0.0 0.0 - 8.0 %    Basophil % 0.0 0.0 - 1.9 %    Bands 1.0 %    Myelocytes 1.0 %    Platelet Estimate Appears normal     Aniso Slight     Differential Method Manual    Comprehensive metabolic panel    Collection Time: 11/23/23  1:39 PM   Result Value Ref Range    Sodium 140 136 - 145 mmol/L    Potassium 5.4 (H) 3.5 - 5.1 mmol/L    Chloride 105 95 - 110 mmol/L    CO2 8 (LL) 23 - 29 mmol/L    Glucose 388 (H) 74 - 106  "mg/dL    BUN 25 (H) 9 - 20 mg/dL    Creatinine 1.53 (H) 0.80 - 1.50 mg/dL    Calcium 9.7 8.4 - 10.2 mg/dL    Total Protein 8.3 (H) 6.3 - 8.2 g/dL    Albumin 4.9 3.5 - 5.2 g/dL    Total Bilirubin 1.0 0.2 - 1.3 mg/dL    Alkaline Phosphatase 96 38 - 145 U/L    AST 71 U/L    ALT 60 (H) 10 - 44 U/L    Anion Gap 27 (H) 8 - 16 mmol/L    eGFR 36 (A) >60 mL/min/1.73 m^2   Troponin I    Collection Time: 11/23/23  1:39 PM   Result Value Ref Range    Troponin I 0.063 0.012 - 0.034 ng/mL   Lactic acid, plasma    Collection Time: 11/23/23  1:39 PM   Result Value Ref Range    Lactate (Lactic Acid) 11.9 (HH) 0.7 - 2.0 mmol/L   POCT Blood Gas with co-oximetry Arterial    Collection Time: 11/23/23  2:37 PM   Result Value Ref Range    POC PH 7.286 (LL) 7.350 - 7.450    POC PCO2 36.1 35.0 - 45.0 mmHg    POC PO2 415.3 (H) 75.0 - 100.0 mmHg    POC HCO3 16.8 (L) 21.0 - 29.0 mmol/L    POC BE(B) -8.9 (L) -3.3 - 1.2 mmol/L    POC THb 16.1 14.0 - 18.0 g/dL    POC O2Hb 99.2 95.0 - 100.0 %    POC COHb 0.3 0.0 - 2.0 %    POC MetHb 0.3 0.0 - 2.0 %    POC FIO2 100.0 %    O2DEVICE Ventilator     Date of Draw [id number]     Time of Draw 143723     Analyzed by: González Summers CRT     Allens Test +     Site RRAD     Sample Source Arterial     TECH González Summers CRT (Analyzer - [id number])     Mode A/C     Vt 500.0 mL    Specimen source Blood     Respiratory Rate 14.0 b/min    CPAP PEEP 5.0 cmH2O    PEEP 5.0 cmH2O    Tidal Volume 500.0 mL    Notified Time 11/23/2023 2:38:57 PM     Notified By MARM     Notified Who DR ADRIAN        EKG:   No results found for this or any previous visit.    TTE:  No results found for this or any previous visit.  No results found for: "EF"   No results found for this or any previous visit.  TATO:  No results found for this or any previous visit.  Stress Test:  No results found for this or any previous visit.     LHC:  No results found for this or any previous visit.     PFT:  No results found for: "FEV1", "FVC", "EWQ0LAH", "TLC", " ""DLCO"   ASSESSMENT/PLAN:         Pre-op Assessment    I have reviewed the Patient Summary Reports.     I have reviewed the Nursing Notes.    I have reviewed the Medications.     Review of Systems  Anesthesia Hx:  No problems with previous Anesthesia                Hematology/Oncology:  Hematology Normal   Oncology Normal                                   EENT/Dental:  EENT/Dental Normal           Cardiovascular:  Cardiovascular Normal Exercise tolerance: good                                           Pulmonary:  Pulmonary Normal                       Renal/:  Renal/ Normal                 Hepatic/GI:  Hepatic/GI Normal                 Musculoskeletal:  Musculoskeletal Normal                Neurological:  Neurology Normal                                      Endocrine:  Endocrine Normal            Dermatological:  Skin Normal    Psych:  Psychiatric Normal                    Physical Exam  General: Well nourished    Airway:  Mallampati: III / II  Mouth Opening: Normal  TM Distance: Normal  Tongue: Normal  Neck ROM: Normal ROM    Dental:  Intact        Anesthesia Plan  Type of Anesthesia, risks & benefits discussed:    Anesthesia Type: Gen ETT, Gen Natural Airway, MAC  Intra-op Monitoring Plan: Standard ASA Monitors  Post Op Pain Control Plan: multimodal analgesia and IV/PO Opioids PRN  Induction:  IV  Airway Plan: Direct and Video, Post-Induction  Informed Consent: Informed consent signed with the Patient and all parties understand the risks and agree with anesthesia plan.  All questions answered.   ASA Score: 3 Emergent  Day of Surgery Review of History & Physical: H&P completed by Anesthesiologist.  Anesthesia Plan Notes: Chart reviewed, patient interviewed and examined.  The anesthetic plan was explained.  Risks, benefits, and alternatives were discussed. Questions were answered and the consent was signed.        HAILEY Worrell M.D.         Ready For Surgery From Anesthesia Perspective.     .      "

## 2023-11-24 NOTE — PROCEDURES
Radiology Post-Procedure Note    Pre Op Diagnosis: R ICA/MCA occlusion    Post Op Diagnosis: same    Procedure: Cerebral angiogram and attempted thrombectomy and angioplasty    Procedure performed by: Kermit Suárez MD    Written Informed Consent Obtained: Yes    Specimen Removed: NO    Estimated Blood Loss: Minimal    Procedure report:     A 8F sheath was placed into the right femoral artery and a 5F Cristian catheter was advanced into the aortic arch.  The right common carotid arteries were subselected and angiography of the brain was performed after injection into each of these vessels.    Preliminary interpretation: occlusion of R ICA at origin, unable to cross distally to perform angioplasty and stenting. Attempted thrombectomy at origin, tici 0.  Please see Imaging report for full details.    A right femoral artery angiogram was performed, the sheath removed and hemostasis achieved using angioseal.  No hematoma was present at the time of hemostasis.    The patient tolerated the procedure well.         Kermit Suárez MD  Vascular and Interventional Neurology  , Department of Neurology  Section Head, Vascular Neurology  Departments of Neurology, Neurosurgery and Radiology  [facility] [place], [place]

## 2023-11-24 NOTE — PT/OT/SLP PROGRESS
Speech Language Pathology      Tim Rhodes  MRN: [id number]  9075/[id number] A    Patient not seen today secondary to patient intubated. Please re consult upon extubation and when medically appropriate.

## 2023-11-24 NOTE — SUBJECTIVE & OBJECTIVE
Medications Prior to Admission   Medication Sig Dispense Refill Last Dose    amLODIPine (NORVASC) 10 MG tablet Take 10 mg by mouth once daily.       atorvastatin (LIPITOR) 20 MG tablet Take 20 mg by mouth once daily.       cloNIDine (CATAPRES) 0.1 MG tablet Take 0.1 mg by mouth 2 (two) times daily.       glipiZIDE (GLUCOTROL) 10 MG tablet Take 10 mg by mouth 2 (two) times daily.       JARDIANCE 25 mg tablet Take 25 mg by mouth once daily.       metoprolol succinate (TOPROL-XL) 100 MG 24 hr tablet Take 100 mg by mouth once daily.       omeprazole (PRILOSEC) 20 MG capsule Take 20 mg by mouth 2 (two) times daily.       oxybutynin (DITROPAN) 5 MG Tab Take 5 mg by mouth once daily.       RYBELSUS 14 mg tablet Take 14 mg by mouth once daily.       tamsulosin (FLOMAX) 0.4 mg Cap Take 0.4 mg by mouth once daily.          Review of patient's allergies indicates:  No Known Allergies    Past Medical History:   Diagnosis Date    Arthritis     Weiss's esophagus     CHF (congestive heart failure)     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/23/2023    GERD (gastroesophageal reflux disease)     Type 2 diabetes mellitus      Past Surgical History:   Procedure Laterality Date    CEREBRAL ANGIOGRAM N/A 11/23/2023    Procedure: ANGIOGRAM-CEREBRAL;  Surgeon: Valery Hyman;  Location: [facility];  Service: Anesthesiology;  Laterality: N/A;     Family History    None       Tobacco Use    Smoking status: Not on file    Smokeless tobacco: Not on file   Substance and Sexual Activity    Alcohol use: Not on file    Drug use: Not on file    Sexual activity: Not on file     Review of Systems  Objective:     Weight: 77.6 kg (171 lb)  Body mass index is 30.29 kg/m².  Vital Signs (Most Recent):  Temp: 98.8 °F (37.1 °C) (11/24/23 1501)  Pulse: (!) 58 (11/24/23 1530)  Resp: 14 (11/24/23 1530)  BP: (!) 103/58 (11/24/23 1529)  SpO2: (!) 94 % (11/24/23 1530) Vital Signs (24h Range):  Temp:  [98.3 °F (36.8 °C)-98.9 °F (37.2 °C)] 98.8 °F  "(37.1 °C)  Pulse:  [55-94] 58  Resp:  [13-36] 14  SpO2:  [94 %-100 %] 94 %  BP: ()/(47-78) 103/58     Date 11/24/23 0700 - 11/25/23 0659   Shift 2279-7204 6086-4090 5666-1708 24 Hour Total   INTAKE   I.V.(mL/kg) 74.6(1) 7.5(0.1)  82.1(1.1)   NG/GT 0   0   IV Piggyback 0 5.4  5.4   Shift Total(mL/kg) 74.6(1) 12.9(0.2)  87.6(1.1)   OUTPUT   Shift Total(mL/kg)       Weight (kg) 77.6 77.6 77.6 77.6              Vent Mode: A/C  Oxygen Concentration (%):  [50-80] 50  Resp Rate Total:  [14 br/min-27 br/min] 14 br/min  Vt Set:  [500 mL] 500 mL  PEEP/CPAP:  [5 cmH20] 5 cmH20  Mean Airway Pressure:  [7.4 cmH20-9.2 cmH20] 8.5 cmH20             NG/OG Tube 11/23/23 1343 [place] sump 18 Fr. Center mouth (Active)   Placement Check placement verified by x-ray 11/24/23 1501   Intake (mL) - Formula Tube Feeding 0 11/24/23 1201          Physical Exam     Neurosurgery Physical Exam    General: well developed, well nourished, mild distress secondary to ET tube   Head: normocephalic  GCS: Motor: 5/Verbal: T/Eyes: 4 GCS Total: 10 T  Cranial nerves: positive cough, gag, corneal reflex  Eyes: pupils equal, round, reactive to light with accomodation   Motor Strength:Moves right UE/LE spontaneously antigravity. Withdraws in LLE. No movement in LUE.       Significant Labs:  Recent Labs   Lab 11/23/23  1339 11/23/23  1915 11/24/23  0345   * 196* 160*    143 146*   K 5.4* 4.1 4.1    112* 114*   CO2 8* 17* 18*   BUN 25* 25* 26*   CREATININE 1.53* 1.4 1.2   CALCIUM 9.7 8.3* 8.6*   MG  --  1.9 2.1     Recent Labs   Lab 11/23/23  1339 11/23/23 2025 11/23/23 2035 11/24/23  0345   WBC 28.00*  --  20.07* 17.54*   HGB 16.0  --  13.8* 13.9*   HCT 49.4 39 42.9 42.3     --  203 201     No results for input(s): "LABPT", "INR", "APTT" in the last 48 hours.  Microbiology Results (last 7 days)       Procedure Component Value Units Date/Time    Blood culture #2 **CANNOT BE ORDERED STAT** [[id number]] Collected: 11/24/23 0131    " Order Status: Completed Specimen: Blood Updated: 11/24/23 0915     Blood Culture, Routine No Growth to date    Blood culture #1 **CANNOT BE ORDERED STAT** [[id number]] Collected: 11/23/23 1808    Order Status: Completed Specimen: Blood Updated: 11/24/23 0345     Blood Culture, Routine No Growth to date    Culture, Respiratory with Gram Stain [[id number]]     Order Status: No result Specimen: Respiratory           Recent Lab Results  (Last 5 results in the past 24 hours)        11/24/23  1328   11/24/23  1206   11/24/23  1029   11/24/23  0803   11/24/23  0527        Allens Test         Pass       Ascending aorta     3.00           Ao peak selene     1.34           Ao VTI     31.75           AV valve area     2.20           RACHELLE by Velocity Ratio     2.12           AV mean gradient     4           AV index (prosthetic)     0.68           AV peak gradient     7           AV Velocity Ratio     0.66           Site         LR       BSA     1.86           CPK       5407         Left Ventricle Relative Wall Thickness     0.48           E/A ratio     1.00           E/E' ratio     9.86           EF     60           E wave deceleration time     222.89           FS     39           IVRT     83.73           IVSd     1.2           LA WIDTH     4.00           Lactate, Lavell   1.0  Comment: Falsely low lactic acid results can be found in samples   containing >=13.0 mg/dL total bilirubin and/or >=3.5 mg/dL   direct bilirubin.       1.2  Comment: Falsely low lactic acid results can be found in samples   containing >=13.0 mg/dL total bilirubin and/or >=3.5 mg/dL   direct bilirubin.           Left Atrium Major Axis     5.53           Left Atrium Minor Axis     5.13           LA size     2.50           LA volume     45.24                54.58           LA vol index     25.0           LA Volume Index (Mod)     30.2           LVOT area     3.2           LV LATERAL E/E' RATIO     8.63           LV SEPTAL E/E' RATIO     11.50           LV EDV BP      51.78           LV Diastolic Volume Index     28.61           LVIDd     4.2           LVIDs     2.57           LV mass     157.06           LV Mass Index     87           LVOT diameter     2.03           LVOT peak padilla     0.88           LVOT stroke volume     69.81           LVOT peak VTI     21.58           LV ESV BP     23.88           LV Systolic Volume Index     13.2           Mean e'     0.07           MV valve area p 1/2 method     3.40           MV Peak A Padilla     0.69           MV Peak E Padilla     0.69           MV stenosis pressure 1/2 time     64.64           POC BE         -4       POC HCO3         20.6       POC PCO2         32.6       POC PH         7.409       POC PO2         94       POC SATURATED O2         97       POC TCO2         22       POCT Glucose 126               Posterior Wall     1.0           RA Major Axis     4.60           RA Width     3.47           RVDD     3.42           Sample         ARTERIAL       Sinus     3.50           STJ     2.73           TAPSE     1.98           TDI SEPTAL     0.06           TDI LATERAL     0.08           Triscuspid Valve Regurgitation Peak Gradient     21           TR Max Padilla     2.30           Vancomycin, Random   <1.4             ZLVIDD     -1.76           ZLVIDS     -1.47                                All pertinent labs from the last 24 hours have been reviewed.    Significant Diagnostics:  I have reviewed all pertinent imaging results/findings within the past 24 hours.

## 2023-11-24 NOTE — PT/OT/SLP PROGRESS
Physical Therapy      Patient Name:  Tim Rhodes   MRN:  [id number]    Patient not seen today secondary to Therapist assessment (Pt intubated/sedated, only appropriate for one discipline of therapy at this time, OT following). Will follow-up as appropriate.  Jolie Mota, PT

## 2023-11-24 NOTE — PROGRESS NOTES
[facility] - Neuro Critical Care  Vascular Neurology  [facility]  Progress Note    Assessment/Plan:     * Embolic stroke involving right middle cerebral artery  Tim Rhodes is a 83 y.o. male with PMHx of CAD, HF,  DM who was transferred from [facility] for R MCA stroke and possible intervention on suspected R MCA occlusion. Patient initially presented to OSH with AMS and seizure activity. LKN 11/22/23 at 10pm. Out of treatment window for thrombolytics. CT at OSH with early ischemic changes in R MCA territory and hyperdense R MCA sign concerning for R MCA occlusion. Patient was intubated and sedated at OSH for airway protection.Repeat CT on arrival to [facility] appears stable in comparison to prior. Patient independent at baseline per daughter. Discussed patient with neuro IR and patient taken to IR for possible thrombectomy, now s/p unsuccessful IR.    NAEO, remains intubated/sedated. EEG with severe slowing but no evidence of seizures. MRI brain confirms large R MCA infarcts, echo with EF 60% and apical akinesis. Infectious workup ongoing. Etiology ESUS at this time.      Antithrombotics for secondary stroke prevention: Antiplatelets: Aspirin: 81 mg daily    Statins for secondary stroke prevention: Statins: Atorvastatin- 40 mg daily    Aggressive risk factor modification: DM, CAD     Rehab efforts: The patient has been evaluated by a stroke team provider and the therapy needs have been fully considered based off the presenting complaints and exam findings. The following therapy evaluations are needed:  PT/OT/SLP when appropriate    Diagnostics ordered/pending: None     VTE prophylaxis: Heparin 5000 units SQ every 8 hours  Mechanical prophylaxis: Place SCDs    BP parameters: Infarct: Post unsucessful thrombectomy, SBP <220    Diabetes mellitus  Stroke risk factor  A1c 6.6  BG goal while inpatient 140-180  SSI PRN    Hemiparesis, left  2/2 stroke  PT/OT to evaluate and treat when  appropriate    Leukocytosis  WBC 28 and lactic 11 on arrival to OSH  Leukocytosis and lactic improving, WBC 17 and lactic 1.0 today  BCx with NGTD  On vanc and zosyn    Endotracheally intubated  Intubated at OSH for airway protection    Seizure-like activity  Noted on arrival to OSH, reported to have RUE seizure like activity  Treated with keppra prior to transfer  EEG with severe slowing but no electrographic seizures  Seizure ppx with keppra 750 BID         11/24/2023 NAEO, remains intubated/sedated. EEG with severe slowing but no evidence of seizures. MRI brain confirms large R MCA infarcts, echo with EF 60% and apical akinesis. Infectious workup ongoing.      STROKE DOCUMENTATION   Acute Stroke Times   Last Known Normal Date: 11/22/23  Last Known Normal Time: 2200  Unknown Symptom Onset Date: Unknown Date  Unknown Symptom Onset Time: Unknown Time  Stroke Team Called Date: 11/23/23  Stroke Team Called Time: 1804  Stroke Team Arrival Date: 11/23/23  Stroke Team Arrival Time: 1809  CT Interpretation Time: 1813  Thrombolytic Therapy Recommended: No  Thrombectomy Recommended: Yes  Decision to Treat Time for IR: 1822    NIH Scale:  1a. Level of Consciousness: 2-->Not alert, requires repeated stimulation to attend, or is obtunded and requires strong or painful stimulation to make movements (not stereotyped)  1b. LOC Questions: 2-->Answers neither question correctly  1c. LOC Commands: 2-->Performs neither task correctly  2. Best Gaze: 0-->Normal  3. Visual: 0-->No visual loss  4. Facial Palsy: 0-->Normal symmetrical movements  5a. Motor Arm, Left: 4-->No movement  5b. Motor Arm, Right: 3-->No effort against gravity, limb falls  6a. Motor Leg, Left: 4-->No movement  6b. Motor Leg, Right: 3-->No effort against gravity, leg falls to bed immediately  7. Limb Ataxia: 0-->Absent  8. Sensory: 2-->Severe to total sensory loss, patient is not aware of being touched in the face, arm, and leg  9. Best Language: 3-->Mute, global  aphasia, no usable speech or auditory comprehension  10. Dysarthria: (UN) Intubated or other physical barrier  11. Extinction and Inattention (formerly Neglect): 0-->No abnormality  Total (NIH Stroke Scale): 25       Modified McDowell Score: 0  Win Coma Scale:    ABCD2 Score:    [id number] Score:   HAS -BLED Score:   ICH Score:   Hunt & Acevedo Classification:      Hemorrhagic change of an Ischemic Stroke: Does this patient have an ischemic stroke with hemorrhagic changes? No     Neurologic Chief Complaint: R MCA stroke    Subjective:     Interval History: Patient is seen for follow-up neurological assessment and treatment recommendations:   NAEO, remains intubated/sedated. EEG with severe slowing but no evidence of seizures. MRI brain confirms large R MCA infarcts, echo with EF 60% and apical akinesis. Infectious workup ongoing.    HPI, Past Medical, Family, and Social History remains the same as documented in the initial encounter.     Review of Systems   Unable to perform ROS: Intubated     Scheduled Meds:   atorvastatin  40 mg Oral Daily    famotidine  20 mg Per OG tube Daily    levETIRAcetam (Keppra) IV (PEDS and ADULTS)  750 mg Intravenous Q12H    piperacillin-tazobactam (Zosyn) IV (PEDS and ADULTS) (extended infusion is not appropriate)  4.5 g Intravenous Q8H    senna-docusate 8.6-50 mg  1 tablet Per OG tube Daily     Continuous Infusions:   fentanyl 75 mcg/hr (11/24/23 1101)     PRN Meds:acetaminophen, dextrose 10%, dextrose 10%, glucagon (human recombinant), insulin aspart U-100, ondansetron, sodium chloride 0.9%, sodium chloride 0.9%, Pharmacy to dose Vancomycin consult **AND** vancomycin - pharmacy to dose    Objective:     Vital Signs (Most Recent):  Temp: 98.9 °F (37.2 °C) (11/24/23 1101)  Pulse: (!) 56 (11/24/23 1131)  Resp: 14 (11/24/23 1131)  BP: (!) 155/67 (11/24/23 1101)  SpO2: 96 % (11/24/23 1131)  BP Location: Left arm    Vital Signs Range (Last 24H):  Temp:  [98.3 °F (36.8 °C)-99.7 °F (37.6  "°C)]   Pulse:  []   Resp:  [13-39]   BP: ()/(47-78)   SpO2:  [57 %-100 %]   BP Location: Left arm       Physical Exam  Vitals reviewed.   Constitutional:       General: He is not in acute distress.     Interventions: He is intubated.   HENT:      Head: Normocephalic and atraumatic.   Cardiovascular:      Rate and Rhythm: Normal rate.   Pulmonary:      Effort: No respiratory distress. He is intubated.   Skin:     General: Skin is warm.          Neurological Exam:   LOC: obtunded  Attention Span: poor  Language: Intubated, not following commands  Articulation: Untestable due to intubation  Orientation: Untestable due to intubation  Motor: extensor posturing in LUE, TF in LLE, moves R side spontaneously and withdraws to painful stimuli  Sensation: Iván-anesthesia left  Tone: Flaccid  LUE       Laboratory:  CMP:   Recent Labs   Lab 11/24/23 0345   CALCIUM 8.6*   ALBUMIN 3.0*   PROT 6.7   *   K 4.1   CO2 18*   *   BUN 26*   CREATININE 1.2   ALKPHOS 74   ALT 37   *   BILITOT 0.8     CBC:   Recent Labs   Lab 11/24/23 0345   WBC 17.54*   RBC 4.94   HGB 13.9*   HCT 42.3      MCV 86   MCH 28.1   MCHC 32.9     Lipid Panel:   Recent Labs   Lab 11/23/23 1915   CHOL 94*   LDLCALC 48.4*   HDL 32*   TRIG 68     Coagulation: No results for input(s): "PT", "INR", "APTT" in the last 168 hours.  Hgb A1C:   Recent Labs   Lab 11/23/23 1915   HGBA1C 6.6*     TSH:   Recent Labs   Lab 11/23/23 1915   TSH 2.121       Diagnostic Results     Brain imaging:  MRI brain without contrast 11/24/2023  Impression:  Large right MCA territorial infarct without hemorrhagic conversion.  Currently very mild mass effect.  Loss of the right carotid flow void consistent with occlusion versus slow flow.     CT Head 11/23/23 1812  Impression:  Acute right MCA territory infarction, similar to prior.  No infarct extension or hemorrhagic conversion.  Associated hyperdense vessel sign likely involving the right M1 and a " proximal M2 branch.  Changes of generalized cerebral volume loss.  Paranasal sinus disease as above.     CT Head 11/23/23 1416  FINDINGS:  Decreased attenuation in sulcal effacement right MCA distribution consistent with acute in Ca infarct.  Hyperdense right MCA sign consistent with MCA thrombus.  No hydrocephalus.  No signs of acute hemorrhage.  Moderate underlying volume loss.  Right maxillary mucous retention cyst.  Impression:  Acute right MCA infarct.       Vessel Imaging:  IR cerebral angiogram 11/23/2023  Preliminary interpretation: occlusion of R ICA at origin, unable to cross distally to perform angioplasty and stenting. Attempted thrombectomy at origin, tici 0.  Please see Imaging report for full details.        Cardiac Evaluation:   TTE 11/24/2023    Left Ventricle: The left ventricle is normal in size. Ventricular mass is normal. Normal wall thickness. There is concentric remodeling. Regional wall motion abnormalities present - see diagram for wall motion findings. There is normal systolic function. Ejection fraction by visual approximation is 60%. There is normal diastolic function.    Right Ventricle: Normal right ventricular cavity size. Wall thickness is normal. Right ventricle wall motion  is normal. Systolic function is normal.    Aortic Valve: The aortic valve is a trileaflet valve. There is mild aortic valve sclerosis. There is annular calcification present. There is mild aortic regurgitation.    IVC/SVC: Patient is ventilated, cannot use IVC diameter to estimate right atrial pressure. PASP is at least 24mmHg.    Yadi Cordon PA-C  [facility]  Department of Vascular Neurology   [facility] - Neuro Critical [facility]

## 2023-11-24 NOTE — ASSESSMENT & PLAN NOTE
Noted on arrival to OSH, reported to have RUE seizure like activity  Treated with keppra prior to transfer  EEG with severe slowing but no electrographic seizures  Seizure ppx with keppra 750 BID

## 2023-11-24 NOTE — CONSULTS
Consulted for possible R MCA thrombectomy.  Consented family.  Anesthesia to provide sedation.    ASA: 3  Mall: unknown - intubated.    Plan for angio +/- thrombectomy.    Admit to [facility] post procedure.          Kermit Suárez MD  Vascular and Interventional Neurology  , Department of Neurology  Section Head, Vascular Neurology  Departments of Neurology, Neurosurgery and Radiology  [facility] [place], [place]

## 2023-11-24 NOTE — ASSESSMENT & PLAN NOTE
WBC 28 and lactic 11 on arrival to OSH  Leukocytosis and lactic improving, WBC 17 and lactic 1.0 today  BCx with NGTD  On vanc and zosyn

## 2023-11-24 NOTE — ASSESSMENT & PLAN NOTE
Tim Rhodes is a 83 y.o. male with PMHx of CAD, HF,  DM who was transferred from [facility] for R MCA stroke and possible intervention on suspected R MCA occlusion. Patient initially presented to OSH with AMS and seizure activity. LKN 11/22/23 at 10pm. Out of treatment window for thrombolytics. CT at OSH with early ischemic changes in R MCA territory and hyperdense R MCA sign concerning for R MCA occlusion. Patient was intubated and sedated at OSH for airway protection.Repeat CT on arrival to [facility] appears stable in comparison to prior. Patient independent at baseline per daughter. Discussed patient with neuro IR and patient taken to IR for possible thrombectomy, now s/p unsuccessful IR.    NAEO, remains intubated/sedated. EEG with severe slowing but no evidence of seizures. MRI brain confirms large R MCA infarcts, echo with EF 60% and apical akinesis. Infectious workup ongoing. Etiology ESUS at this time.      Antithrombotics for secondary stroke prevention: Antiplatelets: Aspirin: 81 mg daily    Statins for secondary stroke prevention: Statins: Atorvastatin- 40 mg daily    Aggressive risk factor modification: DM, CAD     Rehab efforts: The patient has been evaluated by a stroke team provider and the therapy needs have been fully considered based off the presenting complaints and exam findings. The following therapy evaluations are needed:  PT/OT/SLP when appropriate    Diagnostics ordered/pending: None     VTE prophylaxis: Heparin 5000 units SQ every 8 hours  Mechanical prophylaxis: Place SCDs    BP parameters: Infarct: Post unsucessful thrombectomy, SBP <220

## 2023-11-24 NOTE — PROCEDURES
Preliminary EEG Read  EEG cap reviewed 20:54-22:09    Background:   Composed of generalized delta/theta activity, at times with some increased delta slowing over the right hemisphere noted.  No seizures are seen during period reviewed.         Impression:   Right hemispheric focal dysfunction and severe encephalopathy, no seizure activity.     Please hit the EEG button with any clinical activity concerning for seizures and describe what you see.    Detailed report to follow.     Yokasta Flores MD  [facility]   Department of Neurology

## 2023-11-24 NOTE — HOSPITAL COURSE
11/24/2023 NAEO, remains intubated/sedated. EEG with severe slowing but no evidence of seizures. MRI brain confirms large R MCA infarcts, echo with EF 60% and apical akinesis. Infectious workup ongoing.  11/25/2023 NAEO, remains intubated. Follows simple commands, moving RUE/BLE voluntarily with no movement to LUE. ASA started for stroke ppx.   11/26/2023 Patient with afib overnight, fentanyl increased. Butcher placed due to retention. Extubated today at 1400, on 4L NC at the time of visit. Low dose precedex for agitation.    11/27/2023 Extubated yesterday. Tolerating without complications. Precedex stopped secondary to bradycardia. Neuro exam stable.   11/28/2023 Patient with increased agitation requiring 3x doses of zyprexa and 1x dose of haldol for restlessness. In midst of agitation, patient required cardizem gtt to be initiated. Patient has had difficulty tolerating NGT placement after extubation. Gastroenterology was consulted for possible PEG placement. Max temp today 100.9. Blood cultures are in progress. Consider obtaining UA. Follow-up CTH with large infarct, relatively unchanged.   11/29/2023 Patient with decreased urine output, increasing WBC, tachypnea, w/accessory muscle use overnight.  UA + for UTI.  CXR w/R pneumothorax on xray stable.  GI consulted by primary team for PEG.  12/02/2023 NAEO, neuro exam unchanged. Patient now s/p PEG with GI. On rocephin for UTI, consider urine culture for sensitivity. Continue ICU care.  12/03/2023 NAEON. Neuro exam stable. Patient tolerating tube feeds through peg. Patient stepped down to NPU.

## 2023-11-24 NOTE — PROGRESS NOTES
"     Pharmacokinetic Initial Assessment & Plan: IV Vancomycin     Initiate intravenous vancomycin with loading dose of 1250 mg once   Subsequent doses based on random Vancomycin levels  Obtain a Vancomycin random tomorrow on 11/24 @ 1200  Desired empiric serum trough concentration is 15 to 20 mcg/mL     Pharmacy will continue to follow and monitor vancomycin.  [id number] with any questions regarding this assessment.      Thank you for the consult,   Neyda Alexander             Patient brief summary:  Tim Rhodes is a 83 y.o. male initiated on antimicrobial therapy with IV Vancomycin for treatment of Opportunistic infection prophylaxis     Drug Allergies:   Review of patient's allergies indicates:  No Known Allergies    Actual Body Weight:   77 kg     Renal Function:   CrCl cannot be calculated (Unknown ideal weight.).,     Dialysis Method (if applicable):  N/A    CBC (last 72 hours):  Recent Labs   Lab Result Units 11/23/23  1339   WBC K/uL 28.00*   Hemoglobin g/dL 16.0   Hematocrit % 49.4   Platelets K/uL 250   Gran % % 84.0*   Lymph % % 6.0*   Mono % % 8.0   Eosinophil % % 0.0   Basophil % % 0.0   Differential Method  Manual       Metabolic Panel (last 72 hours):  Recent Labs   Lab Result Units 11/23/23  1339   Sodium mmol/L 140   Potassium mmol/L 5.4*   Chloride mmol/L 105   CO2 mmol/L 8*   Glucose mg/dL 388*   BUN mg/dL 25*   Creatinine mg/dL 1.53*   Albumin g/dL 4.9   Total Bilirubin mg/dL 1.0   Alkaline Phosphatase U/L 96   AST U/L 71*   ALT U/L 60*       Drug levels (last 3 results):  No results for input(s): "VANCOMYCINRA", "VANCORANDOM", "VANCOMYCINPE", "VANCOPEAK", "VANCOMYCINTR", "VANCOTROUGH" in the last 72 hours.    Microbiologic Results:  Microbiology Results (last 7 days)       Procedure Component Value Units Date/Time    Culture, Respiratory with Gram Stain [[id number]]     Order Status: No result Specimen: Respiratory     Blood culture #1 **CANNOT BE ORDERED STAT** [[id number]]     Order Status: Sent " Specimen: Blood     Blood culture #2 **CANNOT BE ORDERED STAT** [[id number]]     Order Status: Sent Specimen: Blood

## 2023-11-24 NOTE — SUBJECTIVE & OBJECTIVE
Past Medical History:   Diagnosis Date    Arthritis     Weiss's esophagus     CHF (congestive heart failure)     GERD (gastroesophageal reflux disease)     Type 2 diabetes mellitus      History reviewed. No pertinent surgical history.       Review of patient's allergies indicates:  No Known Allergies    Medications: I have reviewed the current medication administration record.    (Not in a hospital admission)      Review of Systems   Unable to perform ROS: Intubated     Objective:     Vital Signs (Most Recent):  Pulse:  (Anesthesia at bedside; Refer to anesthesia record) (11/23/23 1909)  Resp:  (Anesthesia at bedside; Refer to anesthesia record) (11/23/23 1909)  BP:  (Anesthesia at bedside; Refer to anesthesia record) (11/23/23 1909)  SpO2:  (Anesthesia at bedside; Refer to anesthesia record) (11/23/23 1909)    Vital Signs Range (Last 24H):  Temp:  [98.4 °F (36.9 °C)-99.7 °F (37.6 °C)]   Pulse:  []   Resp:  [14-39]   BP: ()/(52-78)   SpO2:  [57 %-100 %]        Physical Exam  Vitals reviewed.   Constitutional:       General: He is not in acute distress.     Interventions: He is intubated.   HENT:      Head: Normocephalic and atraumatic.   Cardiovascular:      Rate and Rhythm: Normal rate.   Pulmonary:      Effort: No respiratory distress. He is intubated.   Skin:     General: Skin is warm and dry.              Neurological Exam:   LOC: obtunded  Attention Span: poor  Language: Intubated, not following commands  Articulation: Untestable due to severe aphasia   Orientation: Untestable due to severe aphasia   Motor: extensor posturing in LUE, TF in LLE, moves R side spontaneously and withdraws to painful stimuli  Sensation: Iván-anesthesia left  Tone: Flaccid  LUE       Laboratory:  CMP:   Recent Labs   Lab 11/23/23  1339   CALCIUM 9.7   ALBUMIN 4.9   PROT 8.3*      K 5.4*   CO2 8*      BUN 25*   CREATININE 1.53*   ALKPHOS 96   ALT 60*   AST 71*   BILITOT 1.0     CBC:   Recent Labs   Lab  "11/23/23  1339   WBC 28.00*   RBC 5.61   HGB 16.0   HCT 49.4      MCV 88   MCH 28.4   MCHC 32.3     Lipid Panel: No results for input(s): "CHOL", "LDLCALC", "HDL", "TRIG" in the last 168 hours.  Coagulation: No results for input(s): "PT", "INR", "APTT" in the last 168 hours.  Hgb A1C: No results for input(s): "HGBA1C" in the last 168 hours.  TSH: No results for input(s): "TSH" in the last 168 hours.    Diagnostic Results:      Brain imaging:  MRI Pending      CT Head 11/23/23 1812  Impression:     Acute right MCA territory infarction, similar to prior.  No infarct extension or hemorrhagic conversion.  Associated hyperdense vessel sign likely involving the right M1 and a proximal M2 branch.     Changes of generalized cerebral volume loss.     Paranasal sinus disease as above.      CT Head 11/23/23 1416  FINDINGS:  Decreased attenuation in sulcal effacement right MCA distribution consistent with acute in Ca infarct.  Hyperdense right MCA sign consistent with MCA thrombus.  No hydrocephalus.  No signs of acute hemorrhage.  Moderate underlying volume loss.  Right maxillary mucous retention cyst.     Impression:     Acute right MCA infarct.     Vessel Imaging:  IR Angio pending    Cardiac Evaluation:   TTE pending    "

## 2023-11-24 NOTE — PROGRESS NOTES
Pharmacokinetic Assessment: IV Vancomycin  Progress Note      Assessment/Plan:  - Random level is undetectable. Did not receive loading dose in ED.   - Goal trough 10-20 mcg/mL  - Renal function improved w/ adequate UOP (net neutral)  -  Will load pt now with 1500 mg x1 now f/b 1000 mg q24h  - Collect trough on 11/27 at 1500, or sooner if clinically indicated      Drug levels (last 3 results):  Recent Labs   Lab Result Units 11/24/23  1206   Vancomycin, Random ug/mL <1.4     Pharmacy will continue to follow and monitor vancomycin.    Please contact pharmacy at extension [phone] for questions regarding this assessment.      Yasemin Loaiza, PharmD, BCCCP  Neurocritical Care Clinical Pharmacist  Ext. [phone]          Patient brief summary:  Tim Rhodes is a 83 y.o. male initiated on antimicrobial therapy with IV Vancomycin for treatment of sepsis      Drug Allergies:   Review of patient's allergies indicates:  No Known Allergies      Renal Function:   Estimated Creatinine Clearance: 44.5 mL/min (based on SCr of 1.2 mg/dL).,     Dialysis Method (if applicable):  N/A

## 2023-11-24 NOTE — PT/OT/SLP EVAL
Occupational Therapy   Evaluation    Name: Tim Rhodes  MRN: [id number]  Admitting Diagnosis: Embolic stroke involving right middle cerebral artery  Recent Surgery: Procedure(s) (LRB):  ANGIOGRAM-CEREBRAL (N/A) 1 Day Post-Op    Recommendations:     Discharge Recommendations: Moderate Intensity Therapy  Discharge Equipment Recommendations:   (pending extubation)  Barriers to discharge:  None    Assessment:     Tim Rhodes is a 83 y.o. male with a medical diagnosis of Embolic stroke involving right middle cerebral artery.  He presents with performance deficits affecting function: weakness, abnormal tone, decreased ROM, impaired cognition, impaired endurance, impaired sensation, decreased coordination, impaired coordination, impaired fine motor, decreased upper extremity function, impaired self care skills, impaired functional mobility, decreased lower extremity function, decreased safety awareness, gait instability, impaired balance.      Rehab Prognosis: Good; patient would benefit from acute skilled OT services to address these deficits and reach maximum level of function.       Plan:     Patient to be seen 3 x/week to address the above listed problems via sensory integration, cognitive retraining, neuromuscular re-education, therapeutic exercises, therapeutic activities, self-care/home management  Plan of Care Expires: 12/22/23  Plan of Care Reviewed with: patient    Subjective     Patient:  Nonverbal; intubated    Occupational Profile:  Per dtg:  Patient resides in [place] in low income housing apt on first floor with 6 steps to enter and ramp access also available.  PTA patient independent with ADLs including occassionally driving.  Patienit is right handed.  Hobbies: fishing.  Currently owns no DME.  Wears diabetic shoes.  Dtg reports patient was holding on to her when walking and losing his balance and reported his weaks were getting weaker.       Pain/Comfort:  Pain Rating 1: 0/10  Pain Rating  Post-Intervention 1: 0/10    Patients cultural, spiritual, Mandaeism conflicts given the current situation: no    Objective:     Communicated with: Nurse prior to session.  Patient found supine with pulse ox (continuous), bed alarm, blood pressure cuff, ventilator, restraints, Condom Catheter, peripheral IV, telemetry, EEG upon OT entry to room.    General Precautions: Standard, aspiration, fall, NPO  Orthopedic Precautions: N/A  Braces: N/A  Respiratory Status: Ventilator    Occupational Performance:    Bed Mobility:    Patient completed Rolling/Turning to Left with  total assistance  Patient completed Rolling/Turning to Right with total assistance    Functional Mobility/Transfers:  Dependent drawsheet transfers    Activities of Daily Living:  Feeding:  NPO    Grooming: total assistance      Cognitive/Visual Perceptual:  Cognitive/Psychosocial Skills:     -       Oriented to: Daily orientation   -       Follows Commands/attention:Unable to follow commands  -       Communication: Nonverbal    Physical Exam:  Postural examination/scapula alignment:    -       Rounded shoulders  Upper Extremity Range of Motion:     -       Right Upper Extremity: AAROM WNL  -       Left Upper Extremity: PROM WNL    AMPAC 6 Click ADL:  AMPAC Total Score: 6    Treatment & Education:  Daily orientation provided.  PROM performed left UE and AAROM right UE and bilateral LEs one set x 10 rep in all planes of motion with stretches provided at end range.  Assistance and facilitation provided for upward rotation of the scapula during shoulder flexion and abduction to promote orientation of glenoid fossa of scapula to humeral head for prevention of post-stroke hemiplegic pain.    Patient unable to follow commands.  Provided multi-sensory stimulation to prevent sensory deprivation and improve clinical outcomes.  Positioning provided for midline orientation with bilateral UEs elevated and heels lifted off mattress.   Gentle cervical rotation  provided.   Family not present during the session.    Patient left supine with all lines intact, call button in reach, and bed alarm on    GOALS:   Multidisciplinary Problems       Occupational Therapy Goals          Problem: Occupational Therapy    Goal Priority Disciplines Outcome Interventions   Occupational Therapy Goal     OT, PT/OT Ongoing, Progressing    Description: Goals set 11/24 to be addressed for 14 days with expiration date, 12/8:  Patient will increase functional independence with ADLs by performing:    Patient will demonstrate rolling to the right with mod assist.  Not met   Patient will demonstrate rolling to the left with mod assist.   Not met  Patient will demonstrate supine -sit with mod  assist.   Not met  Patient will demonstrate stand pivot transfers with mod assist.   Not met  Patient will demonstrate grooming while seated with mod assist.   Not met  Patient will demonstrate upper body dressing with mod assist while seated EOB.   Not met  Patient will demonstrate lower body dressing with mod assist while seated EOB.   Not met  Patient will demonstrate toileting with mod assist.   Not met  Patient will demonstrate bathing while seated EOB with mod assist.   Not met  Patient's family / caregiver will demonstrate independence and safety with assisting patient with self-care skills and functional mobility.     Not met  Patient's family / caregiver will demonstrate independence with providing ROM and changes in bed positioning.   Not met                             History:     Past Medical History:   Diagnosis Date    Arthritis     Weiss's esophagus     CHF (congestive heart failure)     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/23/2023    GERD (gastroesophageal reflux disease)     Type 2 diabetes mellitus        History reviewed. No pertinent surgical history.    Time Tracking:     OT Date of Treatment: 11/24/23  OT Start Time: 0450; 8:28  OT Stop Time: 0508; 8:38  OT Total Time  (min): 28 min    Billable Minutes:Evaluation 20  Neuromuscular Re-education 8    11/24/2023

## 2023-11-24 NOTE — ASSESSMENT & PLAN NOTE
83 y.o. male with PMHx of CAD, HF, HTN, DM who was transferred from [facility] for R MCA stroke and possible intervention on suspected R MCA occlusion. Patient initially presented to OSH with AMS and seizure activity. LKN 11/22/23 at 10pm. Out of treatment window for thrombolytics. CT at OSH with early ischemic changes in R MCA territory and hyperdense R MCA sign concerning for R MCA occlusion. Patient was intubated and sedated at OSH for airway protection.   Stroke team consulted  SBP <220  TTE/EKG/A1C/lipid panel penidg  Neuro checks q 1 hr  Thrombectomy unsuccessful   Statin   PT/OT  SLP when appropriate   CTH complete  MRI pending  A1C 6.6  SSI  Lipid profile complete  TTE complete  MRI complete and reviewed, Nsgy consulted, EEG neg and dced  Holding VTE ppx for pending Nsgy recs

## 2023-11-24 NOTE — H&P
[facility] - Neuro Critical Care  Neurocritical Care  History & Physical    Admit Date: 11/23/2023  Service Date: 11/23/2023  Length of Stay: 0    Subjective:     Chief Complaint: Embolic stroke involving right middle cerebral artery    History of Present Illness: The patient is an 83-year-old with PMHx of  CAD and DM admitted to [facility] with large R MCA stroke and seizure.Per chart review, in emergency department at [facility]  presented with with altered mental status and focal seizure activity.  Daughter states patient was able to talk to her on the phone at 10:00 p.m. last night was at baseline.  Patient living in assisted living but still very functional.  Daughter states patien still drives on occasion.  She does not believe he takes any blood thinners.  She went to check on him is afternoon and he had fallen next to the toilet.  Patient unable to provide any history.    Noted to have tonic-clonic activity to the right upper extremity on arrival.  Patient with incomprehensible speech.  Copious vomiting on EMS arrival per paramedic.  Patient unable to contribute to history.  Last presumed normal was 10:00 p.m. last night. Out of window for thrombolytics.  Patient intubated for airway protection at outside hospital. On arrival [facility] with large R MCA - stroke team consulted and plan for IR. Patient loaded with Keppra, EEG pending. Lactic acid 11.9 on arrival, pancx and initiated broad spec abx. Patient admitted to [facility] for close monitoring and higher level of care.          Past Medical History:   Diagnosis Date    Arthritis     Weiss's esophagus     CHF (congestive heart failure)     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/23/2023    GERD (gastroesophageal reflux disease)     Type 2 diabetes mellitus      History reviewed. No pertinent surgical history.   Current Facility-Administered Medications on File Prior to Encounter   Medication Dose Route Frequency Provider Last Rate Last Admin     [COMPLETED] etomidate injection 20 mg  20 mg Intravenous ED 1 Time Mich Yates MD   20 mg at 11/23/23 1327    [COMPLETED] fentaNYL 50 mcg/mL injection 100 mcg  100 mcg Intravenous ED 1 Time Pilo Aceves MD   100 mcg at 11/23/23 1712    [COMPLETED] levETIRAcetam in NaCl (iso-os) IVPB 2,000 mg  2,000 mg Intravenous ED 1 Time Mich Yates MD   Stopped at 11/23/23 1515    [COMPLETED] piperacillin-tazobactam 3.375 g in dextrose 5 % 100 mL IVPB (ready to mix)  3.375 g Intravenous ED 1 Time Mich Yates  mL/hr at 11/23/23 1617 3.375 g at 11/23/23 1617    [COMPLETED] rocuronium injection 80 mg  80 mg Intravenous Once Mich Yates MD   80 mg at 11/23/23 1338    [COMPLETED] sodium chloride 0.9% bolus 2,331 mL 2,331 mL  30 mL/kg Intravenous ED 1 Time Mich Yates MD   Stopped at 11/23/23 1550    [DISCONTINUED] LORazepam injection 4 mg  4 mg Intravenous ED 1 Time Mich Yates MD        [DISCONTINUED] propofol (DIPRIVAN) 10 mg/mL infusion  0-50 mcg/kg/min Intravenous Continuous Mich Yates MD 25.6 mL/hr at 11/23/23 1620 55 mcg/kg/min at 11/23/23 1620     No current outpatient medications on file prior to encounter.      Allergies: Patient has no known allergies.    N/A  Family history is reviewed and has not changed      Review of Systems  Objective:     Vitals:    Pulse:  (Anesthesia at bedside; Refer to anesthesia record)  Rhythm: other (see comments) (Anesthesia at bedside; Refer to anesthesia record)  BP:  (Anesthesia at bedside; Refer to anesthesia record)  MAP (mmHg): 78  Resp:  (Anesthesia at bedside; Refer to anesthesia record)  ETCO2 (mmHg):  (Anesthesia at bedside; Refer to anesthesia record)  SpO2:  (Anesthesia at bedside; Refer to anesthesia record)  Oxygen Concentration (%):  (Anesthesia at bedside; Refer to anesthesia record)  Vent Mode: A/C  Set Rate: 14 BPM  Vt Set: 500 mL  PEEP/CPAP: 5 cmH20  Peak Airway Pressure: 20 cmH20  Mean Airway Pressure: 7.4 cmH20  Plateau  Pressure: 0 cmH20    Temp  Min: 98.4 °F (36.9 °C)  Max: 99.7 °F (37.6 °C)  Pulse  Min: 68  Max: 117  BP  Min: 93/52  Max: 165/78  MAP (mmHg)  Min: 66  Max: 112  Resp  Min: 14  Max: 39  SpO2  Min: 57 %  Max: 100 %  Oxygen Concentration (%)  Min: 50  Max: 100    No intake/output data recorded.            Physical Exam  GA:comfortable, no acute distress.   HEENT: No scleral icterus or JVD.   Pulmonary: Clear to auscultation A/L.   Cardiac: RRR S1 & S2 w/o rubs/murmurs/gallops.   Abdominal: Bowel sounds present x 4. No appreciable hepatosplenomegaly.  Skin: No jaundice, rashes, or visible lesions.  Neuro:  --GCS: E1 Vt1 M4  --Mental Status:  doesn't open eyes, doesn't follow commands  --CN II-XII grossly intact.   --Pupils 2mm, PERRL.   --Corneal reflex, gag, cough intact.  --R side withdraws to pain  -- L side doesn't respond to painful stimuli    Today I personally reviewed pertinent medications, lines/drains/airways, imaging, cardiology results, laboratory results, microbiology results,         Assessment/Plan:     Neuro  * Embolic stroke involving right middle cerebral artery  83 y.o. male with PMHx of CAD, HF,  DM who was transferred from [facility] for R MCA stroke and possible intervention on suspected R MCA occlusion. Patient initially presented to OSH with AMS and seizure activity. LKN 11/22/23 at 10pm. Out of treatment window for thrombolytics. CT at OSH with early ischemic changes in R MCA territory and hyperdense R MCA sign concerning for R MCA occlusion. Patient was intubated and sedated at OSH for airway protection.     Stroke team consulted  SBP <220  TTE/EKG/A1C/lipid panel penidg  Neuro checks q 1 hr    Antithrombotics for secondary stroke prevention:  pending IR    Statins for secondary stroke prevention and hyperlipidemia, if present:   Statins: Atorvastatin- 40 mg daily    Aggressive risk factor modification: HTN     Rehab efforts: The patient has been evaluated by a stroke team provider and  the therapy needs have been fully considered based off the presenting complaints and exam findings. The following therapy evaluations are needed: PT evaluate and treat, OT evaluate and treat, SLP evaluate and treat    Diagnostics ordered/pending: CT scan of head without contrast to asses brain parenchyma, HgbA1C to assess blood glucose levels, Lipid Profile to assess cholesterol levels, TTE to assess cardiac function/status     VTE prophylaxis: None: Reason for No Pharmacological VTE Prophylaxis: Pending cerebral angiogram    BP parameters: Infarct: No intervention, SBP <220        Hemiparesis, 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  PT/OT      Seizure-like activity  Seizure like activity noted at outside facility  Loaded w/ 2gm of Keppra   Continue maintenance Keppra 750mg BID  EEG   Seizure precautions    Renal/  Hyperkalemia  K 5.4 on admission  Pending repeat      Oncology  Leukocytosis  POA  Pancx   Initiated broad spectrum abx  Trend lactic acid    Palliative Care  Endotracheally intubated  Patient intubated at outside facility for airway protection  Vent Mode: A/C  Oxygen Concentration (%):  [] 80  Resp Rate Total:  [14 br/min-27 br/min] 14 br/min  Vt Set:  [500 mL] 500 mL  PEEP/CPAP:  [5 cmH20] 5 cmH20  Mean Airway Pressure:  [7.4 cmH20-8.5 cmH20] 8.5 cmH20  Follow cxr  Abg        Diabetes  -A1C pending  - PRN moderate dose SSI    The patient is being Prophylaxed for:  Venous Thromboembolism with: Not Applicable   Stress Ulcer with: H2B  Ventilator Pneumonia with: chlorhexidine oral care    N/A  Family history is reviewed and has not changed     Activity Orders            Turn patient starting at 11/23 2000    Progressive Mobility Protocol (mobilize patient to their highest level of functioning at least twice daily) starting at 11/23 2000    Elevate HOB starting at 11/23 1821    Diet NPO: NPO starting at 11/23 1821          Full Code    Obdulia Veras NP  Neurocritical Care  [facility] - Neuro Critical  Care    Critical condition in that Patient has a condition that poses threat to life and bodily function: R MCA stroke, seizure     50 minutes of Critical care time was spent personally by me on the following activities: development of treatment plan with patient or surrogate and bedside caregivers, discussions with consultants, evaluation of patient's response to treatment, examination of patient, ordering and performing treatments and interventions, ordering and review of laboratory studies, ordering and review of radiographic studies, pulse oximetry, antibiotic titration if applicable, vasopressor titration if applicable, re-evaluation of patient's condition. This critical care time did not overlap with that of any other provider or involve time for any procedures. There is high probability for acute neurological change leading to clinical and possibly life-threatening deterioration requiring highest level of physician preparedness for urgent intervention.

## 2023-11-24 NOTE — ASSESSMENT & PLAN NOTE
Tim Rhodes is a 83 y.o. male with PMHx of CAD, HF,  DM who was transferred from [facility] for R MCA stroke and possible intervention on suspected R MCA occlusion. Patient initially presented to OSH with AMS and seizure activity. LKN 11/22/23 at 10pm. Out of treatment window for thrombolytics. CT at OSH with early ischemic changes in R MCA territory and hyperdense R MCA sign concerning for R MCA occlusion. Patient was intubated and sedated at OSH for airway protection.    Repeat CT on arrival to [facility] appears stable in comparison to prior. Baseline unclear, but per chart review patient lives in assisted living and is fairly functional, occasionally will drive. Discussed patient with neuro IR and patient taken to IR for possible thrombectomy.    Etiology ESUS at this time. TTE and MRI pending    Antithrombotics for secondary stroke prevention: Antiplatelets: None: hold until stroke burden is determined    Statins for secondary stroke prevention and hyperlipidemia, if present:   Statins: Atorvastatin- 40 mg daily    Aggressive risk factor modification: DM, CAD     Rehab efforts: The patient has been evaluated by a stroke team provider and the therapy needs have been fully considered based off the presenting complaints and exam findings. The following therapy evaluations are needed:  PT/OT/SLP when appropriate    Diagnostics ordered/pending: HgbA1C to assess blood glucose levels, Lipid Profile to assess cholesterol levels, MRI head without contrast to assess brain parenchyma, TTE to assess cardiac function/status     VTE prophylaxis: Heparin 5000 units SQ every 8 hours  Mechanical prophylaxis: Place SCDs    BP parameters: Infarct: Post sucessful thrombectomy, SBP <140  Post unsucessful thrombectomy, SBP <220

## 2023-11-24 NOTE — ASSESSMENT & PLAN NOTE
83 y.o. male with PMHx of CAD, HF,  DM who was transferred from [facility] for R MCA stroke and possible intervention on suspected R MCA occlusion. Patient initially presented to OSH with AMS and seizure activity. LKN 11/22/23 at 10pm. Out of treatment window for thrombolytics. CT at OSH with early ischemic changes in R MCA territory and hyperdense R MCA sign concerning for R MCA occlusion. Patient was intubated and sedated at OSH for airway protection.     Stroke team consulted  SBP <220  TTE/EKG/A1C/lipid panel penidg  Neuro checks q 1 hr    Antithrombotics for secondary stroke prevention:  pending IR    Statins for secondary stroke prevention and hyperlipidemia, if present:   Statins: Atorvastatin- 40 mg daily    Aggressive risk factor modification: HTN     Rehab efforts: The patient has been evaluated by a stroke team provider and the therapy needs have been fully considered based off the presenting complaints and exam findings. The following therapy evaluations are needed: PT evaluate and treat, OT evaluate and treat, SLP evaluate and treat    Diagnostics ordered/pending: CT scan of head without contrast to asses brain parenchyma, HgbA1C to assess blood glucose levels, Lipid Profile to assess cholesterol levels, TTE to assess cardiac function/status     VTE prophylaxis: None: Reason for No Pharmacological VTE Prophylaxis: Pending cerebral angiogram    BP parameters: Infarct: No intervention, SBP <220

## 2023-11-24 NOTE — PLAN OF CARE
Patient sedated and intubated, no distress noted, will continue to monitor. VSS. Consents signed, H/P done. Labs reviewed. Patient in IR Room 200 for cerebral angiogram with possible thrombectomy procedure. Warm blankets applied to patient. Patient prepped and draped in sterile fashion. Anesthesia at bedside; Refer to anesthesia record regarding sedation and vital signs.

## 2023-11-24 NOTE — ASSESSMENT & PLAN NOTE
Seizure like activity noted at outside facility  Loaded w/ 2gm of Keppra   Continue maintenance Keppra 750mg BID  EEG   Seizure precautions

## 2023-11-24 NOTE — PLAN OF CARE
[facility]  Initial Discharge Assessment       Primary Care Provider: No, Primary Doctor    Admission Diagnosis: Stroke [I63.9]  Cerebrovascular accident (CVA), unspecified mechanism [I63.9]    Admission Date: 11/23/2023  Expected Discharge Date: 12/1/2023    Transition of Care Barriers: None    Payor: [employer]ARE / Plan: [employer] / Product Type: Medicare Advantage /     No emergency contact information on file.    Discharge Plan A: Home with family  Discharge Plan B: Home with family, Home Health    No Pharmacies Listed    Initial Assessment (most recent)       Adult Discharge Assessment - 11/24/23 7182          Discharge Assessment    Assessment Type Discharge Planning Assessment     Confirmed/corrected address, phone number and insurance Yes     Confirmed Demographics Correct on Facesheet     Source of Information patient;family     If unable to respond/provide information was family/caregiver contacted? Yes     Contact Name/Number rolly Michael (on phone) [phone] and Carlita Rhodes (daughter in room) [phone]     Communicated XIOMARA with patient/caregiver Date not available/Unable to determine     Reason For Admission Embolic stroke involving right middle cerebral artery     People in Home alone     Facility Arrived From: [facility]     Do you expect to return to your current living situation? Yes     Do you have help at home or someone to help you manage your care at home? No     Prior to hospitilization cognitive status: Alert/Oriented     Current cognitive status: Alert/Oriented     Walking or Climbing Stairs --   diabetic shoes    Dressing/Bathing --   none    Equipment Currently Used at Home other (see comments)   diabetic shoes    Readmission within 30 days? No     Patient currently being followed by outpatient case management? No     Do you currently have service(s) that help you manage your care at home? No     Do you take  prescription medications? Yes     Do you have prescription coverage? Yes     Coverage PHN     Do you have any problems affording any of your prescribed medications? No     Is the patient taking medications as prescribed? yes     Who is going to help you get home at discharge? family     How do you get to doctors appointments? car, drives self;family or friend will provide     Are you on dialysis? No     DME Needed Upon Discharge  other (see comments)   TBD    Discharge Plan discussed with: Adult children;Patient     Transition of Care Barriers None     Discharge Plan A Home with family     Discharge Plan B Home with family;Home Health        Physical Activity    On average, how many days per week do you engage in moderate to strenuous exercise (like a brisk walk)? --   Patient intubated unable to answer    On average, how many minutes do you engage in exercise at this level? --   Patient intubated unable to answer       Financial Resource Strain    How hard is it for you to pay for the very basics like food, housing, medical care, and heating? --   Patient intubated unable to answer       Housing Stability    In the last 12 months, was there a time when you were not able to pay the mortgage or rent on time? No   per daughter    In the last 12 months, how many places have you lived? 1   per daughter    In the last 12 months, was there a time when you did not have a steady place to sleep or slept in a shelter (including now)? No   per daughter       Transportation Needs    In the past 12 months, has lack of transportation kept you from medical appointments or from getting medications? --   Patient intubated unable to answer    In the past 12 months, has lack of transportation kept you from meetings, work, or from getting things needed for daily living? --   Patient intubated unable to answer       Food Insecurity    Within the past 12 months, you worried that your food would run out before you got the money to buy  more. --   Patient intubated unable to answer    Within the past 12 months, the food you bought just didn't last and you didn't have money to get more. --   Patient intubated unable to answer       Stress    Do you feel stress - tense, restless, nervous, or anxious, or unable to sleep at night because your mind is troubled all the time - these days? --   Patient intubated unable to answer       Social Connections    In a typical week, how many times do you talk on the phone with family, friends, or neighbors? --   Patient intubated unable to answer    How often do you get together with friends or relatives? --   Patient intubated unable to answer    How often do you attend Rastafari or Muslim services? --   Patient intubated unable to answer    Do you belong to any clubs or organizations such as Rastafari groups, unions, [employer] or athletic groups, or school groups? --   Patient intubated unable to answer    How often do you attend meetings of the clubs or organizations you belong to? --   Patient intubated unable to answer    Are you , , , , never , or living with a partner? --   Patient intubated unable to answer       Alcohol Use    Q1: How often do you have a drink containing alcohol? --   Patient intubated unable to answer    Q2: How many drinks containing alcohol do you have on a typical day when you are drinking? --   Patient intubated unable to answer    Q3: How often do you have six or more drinks on one occasion? --   Patient intubated unable to answer                     CM met with the patient and family at the bedside to discuss the discharge needs. Patient intubated and unable to respond to answers. Daughters at the bedside and 2 adult grandchildren also at the bedside. Gave them the discharge hand book once they verified his name and date of birth. Patient was not on coumadin and not dialysis. Patient lives in the [facility] , [address] [place]  [id number]. He did not have home health. Only DME patient had was his diabetic shoes. Family interested in BSD.     Marya Bernardo RN    [phone]

## 2023-11-24 NOTE — PLAN OF CARE
Cerebral angiogram with thrombectomy procedure completed. Patient tolerated well. Patient sedated and intubated, no distress noted, will continue to monitor. VSS. 8F Angioseal closure device deployed in right femoral artery; hemostasis achieved at 1949. Patient to lay flat for 2 hours until 2149  on 11/23/23 per MD. Right groin site clean, dry, and intact; no bleeding or hematoma noted. No parameters for SBP per MD. Patient to be transferred to Neuro ICU for post-procedural recovery per MD. Phone report given to ILIR Morales. Anesthesia at bedside; Refer to anesthesia record for further information regarding sedation and vital signs.

## 2023-11-24 NOTE — PLAN OF CARE
Goals remain appropriate.  Problem: Occupational Therapy  Goal: Occupational Therapy Goal  Description: Goals set 11/24 to be addressed for 14 days with expiration date, 12/8:  Patient will increase functional independence with ADLs by performing:    Patient will demonstrate rolling to the right with mod assist.  Not met   Patient will demonstrate rolling to the left with mod assist.   Not met  Patient will demonstrate supine -sit with mod  assist.   Not met  Patient will demonstrate stand pivot transfers with mod assist.   Not met  Patient will demonstrate grooming while seated with mod assist.   Not met  Patient will demonstrate upper body dressing with mod assist while seated EOB.   Not met  Patient will demonstrate lower body dressing with mod assist while seated EOB.   Not met  Patient will demonstrate toileting with mod assist.   Not met  Patient will demonstrate bathing while seated EOB with mod assist.   Not met  Patient's family / caregiver will demonstrate independence and safety with assisting patient with self-care skills and functional mobility.     Not met  Patient's family / caregiver will demonstrate independence with providing ROM and changes in bed positioning.   Not met        Outcome: Ongoing, Progressing

## 2023-11-24 NOTE — ASSESSMENT & PLAN NOTE
83 M who presents with R MCA territory infarct. NSGY consulted for hemicrani watch.     --Patient admitted to ICU on telemetry; NCC/stroke teams are primary      -q1h neurochecks in ICU  --All labs and diagnostics reviewed  --MRI brain w/o contrast 11/24 shows large right MCA territory infarct. No midline shift. No hemorrhage.        -Further imaging per primary team, NSGY only recommends future scans if exam decompensates  --SBP recs and management per primary team  --AEDs per primary team   --HOB >30  --No neurosurgical intervention at this time.   --Continue maximal medical therapy and stroke work-up per primary teams. Na > 145.   --Notify NSGY immediately with any changes in neuro status. NSGY will sign off.   --Discussed with Dr. Sanchez

## 2023-11-24 NOTE — SUBJECTIVE & OBJECTIVE
Past Medical History:   Diagnosis Date    Arthritis     Weiss's esophagus     CHF (congestive heart failure)     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/23/2023    GERD (gastroesophageal reflux disease)     Type 2 diabetes mellitus      Past Surgical History:   Procedure Laterality Date    CEREBRAL ANGIOGRAM N/A 11/23/2023    Procedure: ANGIOGRAM-CEREBRAL;  Surgeon: Valery Hyman;  Location: [facility];  Service: Anesthesiology;  Laterality: N/A;      Current Facility-Administered Medications on File Prior to Encounter   Medication Dose Route Frequency Provider Last Rate Last Admin    [COMPLETED] fentaNYL 50 mcg/mL injection 100 mcg  100 mcg Intravenous ED 1 Time Pilo Aceves MD   100 mcg at 11/23/23 1712    [DISCONTINUED] propofol (DIPRIVAN) 10 mg/mL infusion  0-50 mcg/kg/min Intravenous Continuous Mich Yates MD 25.6 mL/hr at 11/23/23 1620 55 mcg/kg/min at 11/23/23 1620     Current Outpatient Medications on File Prior to Encounter   Medication Sig Dispense Refill    amLODIPine (NORVASC) 10 MG tablet Take 10 mg by mouth once daily.      atorvastatin (LIPITOR) 20 MG tablet Take 20 mg by mouth once daily.      cloNIDine (CATAPRES) 0.1 MG tablet Take 0.1 mg by mouth 2 (two) times daily.      glipiZIDE (GLUCOTROL) 10 MG tablet Take 10 mg by mouth 2 (two) times daily.      JARDIANCE 25 mg tablet Take 25 mg by mouth once daily.      metoprolol succinate (TOPROL-XL) 100 MG 24 hr tablet Take 100 mg by mouth once daily.      omeprazole (PRILOSEC) 20 MG capsule Take 20 mg by mouth 2 (two) times daily.      oxybutynin (DITROPAN) 5 MG Tab Take 5 mg by mouth once daily.      RYBELSUS 14 mg tablet Take 14 mg by mouth once daily.      tamsulosin (FLOMAX) 0.4 mg Cap Take 0.4 mg by mouth once daily.        Allergies: Patient has no known allergies.    N/A  Family history is reviewed and has not changed      Review of Systems  Unable to assess due to intubation   Objective:     Vitals:    Temp: 98.8  °F (37.1 °C)  Pulse: (!) 58  Rhythm: normal sinus rhythm, sinus bradycardia  BP: (!) 103/58  MAP (mmHg): 77  Resp: 14  SpO2: (!) 94 %  Oxygen Concentration (%): 50  Vent Mode: A/C  Set Rate: 14 BPM  Vt Set: 500 mL  PEEP/CPAP: 5 cmH20  Peak Airway Pressure: 19 cmH20  Mean Airway Pressure: 8.5 cmH20  Plateau Pressure: 0 cmH20    Temp  Min: 98.3 °F (36.8 °C)  Max: 98.9 °F (37.2 °C)  Pulse  Min: 55  Max: 94  BP  Min: 98/50  Max: 156/78  MAP (mmHg)  Min: 66  Max: 109  Resp  Min: 13  Max: 36  SpO2  Min: 94 %  Max: 100 %  Oxygen Concentration (%)  Min: 50  Max: 80    11/23 0701 - 11/24 0700  In: 641.5 [I.V.:41.9]  Out: 400 [Urine:400]            Physical Exam  GA:comfortable, no acute distress.   HEENT: No scleral icterus or JVD.   Pulmonary: Clear to auscultation A/L.   Cardiac: RRR S1 & S2 w/o rubs/murmurs/gallops.   Abdominal: Bowel sounds present x 4. No appreciable hepatosplenomegaly.  Skin: No jaundice, rashes, or visible lesions.  Neuro:  --GCS: E1 Vt1 M4  --Mental Status:  doesn't open eyes, doesn't follow commands  --CN II-XII grossly intact.   --Pupils 2mm, PERRL.   --Corneal reflex, gag, cough intact.  --R side withdraws to pain  -- L side doesn't respond to painful stimuli    Today I personally reviewed pertinent medications, lines/drains/airways, imaging, cardiology results, laboratory results, microbiology results,

## 2023-11-24 NOTE — ASSESSMENT & PLAN NOTE
Patient intubated at outside facility for airway protection  Vent Mode: A/C  Oxygen Concentration (%):  [] 80  Resp Rate Total:  [14 br/min-27 br/min] 14 br/min  Vt Set:  [500 mL] 500 mL  PEEP/CPAP:  [5 cmH20] 5 cmH20  Mean Airway Pressure:  [7.4 cmH20-8.5 cmH20] 8.5 cmH20  Follow cxr  Abg

## 2023-11-24 NOTE — CONSULTS
[facility] - Emergency Dept  Vascular Neurology  Comprehensive Stroke Center  Consult Note    Inpatient consult to Vascular (Stroke) Neurology  Consult performed by: Juliet Leal PA-C  Consult ordered by: Obdulia Veras NP        Assessment/Plan:     Patient is a 83 y.o. year old male with:    * Embolic stroke involving right middle cerebral artery  Tim Rhodes is a 83 y.o. male with PMHx of CAD, HF,  DM who was transferred from [facility] for R MCA stroke and possible intervention on suspected R MCA occlusion. Patient initially presented to OSH with AMS and seizure activity. LKN 11/22/23 at 10pm. Out of treatment window for thrombolytics. CT at OSH with early ischemic changes in R MCA territory and hyperdense R MCA sign concerning for R MCA occlusion. Patient was intubated and sedated at OSH for airway protection.    Repeat CT on arrival to [facility] appears stable in comparison to prior. Patient independent at baseline per daughter. Discussed patient with neuro IR and patient taken to IR for possible thrombectomy.    Etiology ESUS at this time. TTE and MRI pending    Antithrombotics for secondary stroke prevention: Antiplatelets: None: hold until stroke burden is determined    Statins for secondary stroke prevention and hyperlipidemia, if present:   Statins: Atorvastatin- 40 mg daily    Aggressive risk factor modification: DM, CAD     Rehab efforts: The patient has been evaluated by a stroke team provider and the therapy needs have been fully considered based off the presenting complaints and exam findings. The following therapy evaluations are needed:  PT/OT/SLP when appropriate    Diagnostics ordered/pending: HgbA1C to assess blood glucose levels, Lipid Profile to assess cholesterol levels, MRI head without contrast to assess brain parenchyma, TTE to assess cardiac function/status     VTE prophylaxis: Heparin 5000 units SQ every 8 hours  Mechanical prophylaxis: Place SCDs    BP parameters: Infarct:  Post sucessful thrombectomy, SBP <140  Post unsucessful thrombectomy, SBP <220        Hemiparesis, left  2/2 stroke  PT/OT to evaluate and treat when appropriate    Leukocytosis  WBC 28 and lactic 11 on arrival to OSH  Blood cultures pending  On vanc and zosyn    Endotracheally intubated  Intubated at OSH for airway protection    Seizure-like activity  Noted on arrival to OSH, reported to have RUE seizure like activity  Treated with keppra prior to transfer  Recommend EEG        STROKE DOCUMENTATION     Acute Stroke Times   Last Known Normal Date: 11/22/23  Last Known Normal Time: 2200  Unknown Symptom Onset Date: Unknown Date  Unknown Symptom Onset Time: Unknown Time  Stroke Team Called Date: 11/23/23  Stroke Team Called Time: 1804  Stroke Team Arrival Date: 11/23/23  Stroke Team Arrival Time: 1809  CT Interpretation Time: 1813  Thrombolytic Therapy Recommended: No  Thrombectomy Recommended: Yes  Decision to Treat Time for IR: 1822    NIH Scale:  Interval: baseline  1a. Level of Consciousness: 2-->Not alert, requires repeated stimulation to attend, or is obtunded and requires strong or painful stimulation to make movements (not stereotyped)  1b. LOC Questions: 2-->Answers neither question correctly  1c. LOC Commands: 2-->Performs neither task correctly  2. Best Gaze: 0-->Normal  3. Visual: 0-->No visual loss  4. Facial Palsy: 0-->Normal symmetrical movements  5a. Motor Arm, Left: 4-->No movement  5b. Motor Arm, Right: 3-->No effort against gravity, limb falls  6a. Motor Leg, Left: 4-->No movement  6b. Motor Leg, Right: 3-->No effort against gravity, leg falls to bed immediately  7. Limb Ataxia: 0-->Absent  8. Sensory: 2-->Severe to total sensory loss, patient is not aware of being touched in the face, arm, and leg  9. Best Language: 3-->Mute, global aphasia, no usable speech or auditory comprehension  10. Dysarthria: (UN) Intubated or other physical barrier  11. Extinction and Inattention (formerly Neglect): 0-->No  abnormality  Total (NIH Stroke Scale): 25    Modified [place] Score: 0  [place]on Coma Scale:    ABCD2 Score:    [id number] Score:   HAS -BLED Score:   ICH Score:   Hunt & Acevedo Classification:       Thrombolysis Candidate? No, Out of window - Symptom onset > 4.5 hours    Delays to Thrombolysis?  Not Applicable    Interventional Revascularization Candidate?   Is the patient eligible for mechanical endovascular reperfusion (LAZARUS)?  Yes    Delays to Thrombectomy? Not Applicable    Hemorrhagic change of an Ischemic Stroke: Does this patient have an ischemic stroke with hemorrhagic changes? No     Subjective:     History of Present Illness:  Tim Rhodes is a 83 y.o. male with PMHx of CAD, HF,  DM who presented to [facility] with AMS and seizure activity. History obtained via chart review. Patient was LKN yesterday at 10pm. Patient's daughter went to check on him and found that he had fallen next to toilet. At OSH ED, patient was noted to have seizure like activity in RUE, vomiting, and R gaze. He was intubated for airway protection and given keppra. WBC 28 and lactic 11, blood cultures ordered and patient started on zosyn. CT at OSH with R MCA infarct and hyperdense R MCA sign concerning for LVO. Patient transferred to [facility] for possible IR intervention. Baseline unclear, but per chart review patient lives in assisted living and is fairly functional, occasionally will drive.          Past Medical History:   Diagnosis Date    Arthritis     Weiss's esophagus     CHF (congestive heart failure)     GERD (gastroesophageal reflux disease)     Type 2 diabetes mellitus      History reviewed. No pertinent surgical history.       Review of patient's allergies indicates:  No Known Allergies    Medications: I have reviewed the current medication administration record.    (Not in a hospital admission)      Review of Systems   Unable to perform ROS: Intubated     Objective:     Vital Signs (Most Recent):  Pulse:  (Anesthesia  "at bedside; Refer to anesthesia record) (11/23/23 1909)  Resp:  (Anesthesia at bedside; Refer to anesthesia record) (11/23/23 1909)  BP:  (Anesthesia at bedside; Refer to anesthesia record) (11/23/23 1909)  SpO2:  (Anesthesia at bedside; Refer to anesthesia record) (11/23/23 1909)    Vital Signs Range (Last 24H):  Temp:  [98.4 °F (36.9 °C)-99.7 °F (37.6 °C)]   Pulse:  []   Resp:  [14-39]   BP: ()/(52-78)   SpO2:  [57 %-100 %]        Physical Exam  Vitals reviewed.   Constitutional:       General: He is not in acute distress.     Interventions: He is intubated.   HENT:      Head: Normocephalic and atraumatic.   Cardiovascular:      Rate and Rhythm: Normal rate.   Pulmonary:      Effort: No respiratory distress. He is intubated.   Skin:     General: Skin is warm and dry.              Neurological Exam:   LOC: obtunded  Attention Span: poor  Language: Intubated, not following commands  Articulation: Untestable due to severe aphasia   Orientation: Untestable due to severe aphasia   Motor: extensor posturing in LUE, TF in LLE, moves R side spontaneously and withdraws to painful stimuli  Sensation: Iván-anesthesia left  Tone: Flaccid  LUE       Laboratory:  CMP:   Recent Labs   Lab 11/23/23  1339   CALCIUM 9.7   ALBUMIN 4.9   PROT 8.3*      K 5.4*   CO2 8*      BUN 25*   CREATININE 1.53*   ALKPHOS 96   ALT 60*   AST 71*   BILITOT 1.0     CBC:   Recent Labs   Lab 11/23/23  1339   WBC 28.00*   RBC 5.61   HGB 16.0   HCT 49.4      MCV 88   MCH 28.4   MCHC 32.3     Lipid Panel: No results for input(s): "CHOL", "LDLCALC", "HDL", "TRIG" in the last 168 hours.  Coagulation: No results for input(s): "PT", "INR", "APTT" in the last 168 hours.  Hgb A1C: No results for input(s): "HGBA1C" in the last 168 hours.  TSH: No results for input(s): "TSH" in the last 168 hours.    Diagnostic Results:      Brain imaging:  MRI Pending      CT Head 11/23/23 1812  Impression:     Acute right MCA territory " infarction, similar to prior.  No infarct extension or hemorrhagic conversion.  Associated hyperdense vessel sign likely involving the right M1 and a proximal M2 branch.     Changes of generalized cerebral volume loss.     Paranasal sinus disease as above.      CT Head 11/23/23 1416  FINDINGS:  Decreased attenuation in sulcal effacement right MCA distribution consistent with acute in Ca infarct.  Hyperdense right MCA sign consistent with MCA thrombus.  No hydrocephalus.  No signs of acute hemorrhage.  Moderate underlying volume loss.  Right maxillary mucous retention cyst.     Impression:     Acute right MCA infarct.     Vessel Imaging:  IR Angio pending    Cardiac Evaluation:   TTE pending      Juliet Leal PA-C  Comprehensive Stroke Center  Department of Vascular Neurology   [facility] - Emergency Dept

## 2023-11-24 NOTE — PLAN OF CARE
"[facility] Care Plan    POC reviewed with Tim Rhodes and family at 0630. Pt unable to verbalized understanding due to intubation. Questions and concerns addressed. Will continue to monitor. See below and flowsheets for full assessment and VS info.     - MRI completed   - EEG in place   - Fentanyl ggt started; titrate as needed to maintain RASS goal of -3   - Butcher catheter dc'd      Is this a stroke patient? yes- Stroke booklet reviewed with patient and family, risk factors identified for patient and stroke booklet remains at bedside for ongoing education.     Neuro:  Mc[place]ee Coma Scale  Best Eye Response: 1-->(E1) none  Best Motor Response: 5-->(M5) localizes pain  Best Verbal Response: 1-->(V1) none  McKee Coma Scale Score: 7  Assessment Qualifiers: patient intubated  Pupil PERRLA: yes     24hr Temp:  [98.3 °F (36.8 °C)-99.7 °F (37.6 °C)]     CV:   Rhythm: normal sinus rhythm  BP goals:   SBP < 220  MAP > 65    Resp:      Vent Mode: A/C  Set Rate: 14 BPM  Oxygen Concentration (%): 50  Vt Set: 500 mL  PEEP/CPAP: 5 cmH20    Plan: wean to extubate    GI/:     Diet/Nutrition Received: NPO  Last Bowel Movement: 11/23/23  Voiding Characteristics: urethral catheter (bladder)    Intake/Output Summary (Last 24 hours) at 11/24/2023 0646  Last data filed at 11/24/2023 0402  Gross per 24 hour   Intake 641.51 ml   Output 400 ml   Net 241.51 ml          Labs/Accuchecks:  Recent Labs   Lab 11/24/23  0345   WBC 17.54*   RBC 4.94   HGB 13.9*   HCT 42.3         Recent Labs   Lab 11/24/23  0345   *   K 4.1   CO2 18*   *   BUN 26*   CREATININE 1.2   ALKPHOS 74   ALT 37   *   BILITOT 0.8    No results for input(s): "PROTIME", "INR", "APTT", "HEPANTIXA" in the last 168 hours.   Recent Labs   Lab 11/23/23  1339 11/23/23  1915 11/24/23  0056   CPK  --    < > 7119*   TROPONINI 0.063  --   --     < > = values in this interval not displayed.       Electrolytes: No replacement orders  Accuchecks: Q6H    Gtts:   " fentanyl 75 mcg/hr (11/24/23 0402)       LDA/Wounds:  Lines/Drains/Airways       Drain  Duration                  NG/OG Tube 11/23/23 1343 Ramsey sump 18 Fr. Center mouth <1 day              Airway  Duration                  Airway - Non-Surgical 11/23/23 1340 Endotracheal Tube <1 day              Peripheral Intravenous Line  Duration                  Peripheral IV - Single Lumen 11/23/23 1330 18 G Left Forearm <1 day         Peripheral IV - Single Lumen 11/23/23 1400 18 G Right Wrist <1 day                  Wounds: Yes  Wound care consulted: No

## 2023-11-24 NOTE — CARE UPDATE
Patient arrived to [facility] from IR     Report received from: ILIR Rios    Type of stroke/diagnosis:  R MCA     No TNK/TPA    Thrombectomy end time - 1949 (TICI 0)    Current symptoms: Spontaneous movement of RUE, LLE, RLL. No movement to LUE. Pupils PERRLA    Skin Assessment done: Yes, bruising to left knee   Wounds noted: none  *If wounds noted, was Wound Care consulted? no  *If wounds noted, LDA placed? no  Skin Assessment Verified by:  Carmen Coley Completed? No; intubated    Patient Belongings on Admit: simon    [facility] notified: Raymon MABRY

## 2023-11-24 NOTE — TRANSFER OF CARE
"Anesthesia Transfer of Care Note    Patient: Tim Monroyvant    Procedure(s) Performed: Procedure(s) (LRB):  ANGIOGRAM-CEREBRAL (N/A)    Patient location: ICU    Anesthesia Type: general    Transport from OR: Transported from OR intubated on 100% O2 by AMBU with adequate controlled ventilation. Upon arrival to PACU/ICU, patient attached to ventilator and auscultated to confirm bilateral breath sounds and adequate TV. Continuous ECG monitoring in transport. Continuous SpO2 monitoring in transport    Post pain: adequate analgesia    Post assessment: no apparent anesthetic complications and tolerated procedure well    Post vital signs: stable    Level of consciousness: sedated    Nausea/Vomiting: no nausea/vomiting    Complications: none    Transfer of care protocol was followed      Last vitals: Visit Vitals  /66 (BP Location: Left arm, Patient Position: Lying)   Pulse 90   Temp 36.8 °C (98.3 °F) (Axillary)   Resp 18   Ht 5' 3" (1.6 m)   Wt 77 kg (169 lb 12.1 oz)   SpO2 100%   BMI 30.07 kg/m²     "

## 2023-11-24 NOTE — PROGRESS NOTES
[facility]  Neurocritical Care  Progress Note    Admit Date: 11/23/2023  Service Date: 11/24/2023  Length of Stay: 1    Subjective:     Chief Complaint: Embolic stroke involving right middle cerebral artery    History of Present Illness: The patient is an 83-year-old with PMHx of  CAD and DM admitted to [facility] with large R MCA stroke and seizure.Per chart review, in emergency department at [facility]  presented with with altered mental status and focal seizure activity.  Daughter states patient was able to talk to her on the phone at 10:00 p.m. last night was at baseline.  Patient living in assisted living but still very functional.  Daughter states patien still drives on occasion.  She does not believe he takes any blood thinners.  She went to check on him is afternoon and he had fallen next to the toilet.  Patient unable to provide any history.    Noted to have tonic-clonic activity to the right upper extremity on arrival.  Patient with incomprehensible speech.  Copious vomiting on EMS arrival per paramedic.  Patient unable to contribute to history.  Last presumed normal was 10:00 p.m. last night. Out of window for thrombolytics.  Patient intubated for airway protection at outside hospital. On arrival CTH with large R MCA - stroke team consulted and plan for IR. Patient loaded with Keppra, EEG pending. Lactic acid 11.9 on arrival, pancx and initiated broad spec abx. Patient admitted to [facility] for close monitoring and higher level of care.        Hospital Course: 11/24/2023 MRI complete and reviewed, Nsgy consulted, EEG neg and dced      Past Medical History:   Diagnosis Date    Arthritis     Weiss's esophagus     CHF (congestive heart failure)     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/23/2023    GERD (gastroesophageal reflux disease)     Type 2 diabetes mellitus      Past Surgical History:   Procedure Laterality Date    CEREBRAL ANGIOGRAM N/A 11/23/2023    Procedure:  ANGIOGRAM-CEREBRAL;  Surgeon: Valery Hyman;  Location: [facility];  Service: Anesthesiology;  Laterality: N/A;      Current Facility-Administered Medications on File Prior to Encounter   Medication Dose Route Frequency Provider Last Rate Last Admin    [COMPLETED] fentaNYL 50 mcg/mL injection 100 mcg  100 mcg Intravenous ED 1 Time Pilo Aceves MD   100 mcg at 11/23/23 1712    [DISCONTINUED] propofol (DIPRIVAN) 10 mg/mL infusion  0-50 mcg/kg/min Intravenous Continuous Mich Yates MD 25.6 mL/hr at 11/23/23 1620 55 mcg/kg/min at 11/23/23 1620     Current Outpatient Medications on File Prior to Encounter   Medication Sig Dispense Refill    amLODIPine (NORVASC) 10 MG tablet Take 10 mg by mouth once daily.      atorvastatin (LIPITOR) 20 MG tablet Take 20 mg by mouth once daily.      cloNIDine (CATAPRES) 0.1 MG tablet Take 0.1 mg by mouth 2 (two) times daily.      glipiZIDE (GLUCOTROL) 10 MG tablet Take 10 mg by mouth 2 (two) times daily.      JARDIANCE 25 mg tablet Take 25 mg by mouth once daily.      metoprolol succinate (TOPROL-XL) 100 MG 24 hr tablet Take 100 mg by mouth once daily.      omeprazole (PRILOSEC) 20 MG capsule Take 20 mg by mouth 2 (two) times daily.      oxybutynin (DITROPAN) 5 MG Tab Take 5 mg by mouth once daily.      RYBELSUS 14 mg tablet Take 14 mg by mouth once daily.      tamsulosin (FLOMAX) 0.4 mg Cap Take 0.4 mg by mouth once daily.        Allergies: Patient has no known allergies.    N/A  Family history is reviewed and has not changed      Review of Systems  Unable to assess due to intubation   Objective:     Vitals:    Temp: 98.8 °F (37.1 °C)  Pulse: (!) 58  Rhythm: normal sinus rhythm, sinus bradycardia  BP: (!) 103/58  MAP (mmHg): 77  Resp: 14  SpO2: (!) 94 %  Oxygen Concentration (%): 50  Vent Mode: A/C  Set Rate: 14 BPM  Vt Set: 500 mL  PEEP/CPAP: 5 cmH20  Peak Airway Pressure: 19 cmH20  Mean Airway Pressure: 8.5 cmH20  Plateau Pressure: 0 cmH20    Temp  Min: 98.3 °F (36.8 °C)   Max: 98.9 °F (37.2 °C)  Pulse  Min: 55  Max: 94  BP  Min: 98/50  Max: 156/78  MAP (mmHg)  Min: 66  Max: 109  Resp  Min: 13  Max: 36  SpO2  Min: 94 %  Max: 100 %  Oxygen Concentration (%)  Min: 50  Max: 80    11/23 0701 - 11/24 0700  In: 641.5 [I.V.:41.9]  Out: 400 [Urine:400]            Physical Exam  GA:comfortable, no acute distress.   HEENT: No scleral icterus or JVD.   Pulmonary: Clear to auscultation A/L.   Cardiac: RRR S1 & S2 w/o rubs/murmurs/gallops.   Abdominal: Bowel sounds present x 4. No appreciable hepatosplenomegaly.  Skin: No jaundice, rashes, or visible lesions.  Neuro:  --GCS: E1 Vt1 M4  --Mental Status:  doesn't open eyes, doesn't follow commands  --CN II-XII grossly intact.   --Pupils 2mm, PERRL.   --Corneal reflex, gag, cough intact.  --R side withdraws to pain  -- L side doesn't respond to painful stimuli    Today I personally reviewed pertinent medications, lines/drains/airways, imaging, cardiology results, laboratory results, microbiology results,         Assessment/Plan:     Neuro  * Embolic stroke involving right middle cerebral artery  83 y.o. male with PMHx of CAD, HF, HTN, DM who was transferred from [facility] for R MCA stroke and possible intervention on suspected R MCA occlusion. Patient initially presented to OSH with AMS and seizure activity. LKN 11/22/23 at 10pm. Out of treatment window for thrombolytics. CT at OSH with early ischemic changes in R MCA territory and hyperdense R MCA sign concerning for R MCA occlusion. Patient was intubated and sedated at OSH for airway protection.   Stroke team consulted  SBP <220  TTE/EKG/A1C/lipid panel penidg  Neuro checks q 1 hr  Thrombectomy unsuccessful   Statin   PT/OT  SLP when appropriate   CTH complete  MRI pending  A1C 6.6  SSI  Lipid profile complete  TTE complete  MRI complete and reviewed, Nsgy consulted, EEG neg and dced  Holding VTE ppx for pending Nsgy recs      Hemiparesis, left  Due to R MCA  stroke  PT/OT      Seizure-like activity  Seizure like activity noted at outside facility  Loaded w/ 2gm of Keppra   Continue maintenance Keppra 750mg BID  Seizure precautions  EEG neg and dced on 11/24    Renal/  Hyperkalemia  K 5.4 on admission  Repeat K WNL      Oncology  Leukocytosis  POA  Pancx   Initiated broad spectrum abx  Trend lactic acid    Endocrine  Diabetes mellitus  Hx of DM  A1C 6.6  PRN moderate dose SSI     Palliative Care  Endotracheally intubated  Patient intubated at outside facility for airway protection  Vent Mode: A/C  Oxygen Concentration (%):  [50-80] 50  Resp Rate Total:  [14 br/min-27 br/min] 14 br/min  Vt Set:  [500 mL] 500 mL  PEEP/CPAP:  [5 cmH20] 5 cmH20  Mean Airway Pressure:  [7.4 cmH20-9.2 cmH20] 8.5 cmH20  Follow cxrs  Abg          Activity Orders            Turn patient starting at 11/23 2000    Progressive Mobility Protocol (mobilize patient to their highest level of functioning at least twice daily) starting at 11/23 2000    Elevate HOB starting at 11/23 1821    Diet NPO: NPO starting at 11/23 1821          Full Code    Antony Murrell NP  Neurocritical Care  [facility]

## 2023-11-24 NOTE — CONSULTS
[facility]  Neurosurgery  Consult Note    Inpatient consult to Neurosurgery  Consult performed by: Nimisha Monsivais PA-C  Consult ordered by: Antony Murrell, NICHOLAS        Subjective:     Chief Complaint/Reason for Admission: R MCA infarct     History of Present Illness: Tim Rhodes is a 83 y.o. male with PMHx of CAD, HF,  DM who was transferred from [facility] for R MCA stroke and possible intervention on suspected R MCA occlusion. Patient initially presented to OSH with AMS and seizure activity. LKN 11/22/23 at 10pm. Out of treatment window for thrombolytics. CT at OSH with early ischemic changes in R MCA territory and hyperdense R MCA sign concerning for R MCA occlusion. Patient was intubated and sedated at OSH for airway protection. Repeat CT on arrival to [facility] appears stable in comparison to prior. Patient independent at baseline per daughter. Patient was taken to IR 11/23 for angio with attempted thrombectomy. NSGY consulted for evaluation. Patient spontaneously opening eyes and moving the right side, withdraws to pain in the LLE. Per family at bedside, occasionally nods appropriately to questions.     Medications Prior to Admission   Medication Sig Dispense Refill Last Dose    amLODIPine (NORVASC) 10 MG tablet Take 10 mg by mouth once daily.       atorvastatin (LIPITOR) 20 MG tablet Take 20 mg by mouth once daily.       cloNIDine (CATAPRES) 0.1 MG tablet Take 0.1 mg by mouth 2 (two) times daily.       glipiZIDE (GLUCOTROL) 10 MG tablet Take 10 mg by mouth 2 (two) times daily.       JARDIANCE 25 mg tablet Take 25 mg by mouth once daily.       metoprolol succinate (TOPROL-XL) 100 MG 24 hr tablet Take 100 mg by mouth once daily.       omeprazole (PRILOSEC) 20 MG capsule Take 20 mg by mouth 2 (two) times daily.       oxybutynin (DITROPAN) 5 MG Tab Take 5 mg by mouth once daily.       RYBELSUS 14 mg tablet Take 14 mg by mouth once daily.       tamsulosin (FLOMAX) 0.4 mg Cap Take 0.4 mg  by mouth once daily.          Review of patient's allergies indicates:  No Known Allergies    Past Medical History:   Diagnosis Date    Arthritis     Weiss's esophagus     CHF (congestive heart failure)     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/23/2023    GERD (gastroesophageal reflux disease)     Type 2 diabetes mellitus      Past Surgical History:   Procedure Laterality Date    CEREBRAL ANGIOGRAM N/A 11/23/2023    Procedure: ANGIOGRAM-CEREBRAL;  Surgeon: SurgeonValery;  Location: [facility];  Service: Anesthesiology;  Laterality: N/A;     Family History    None       Tobacco Use    Smoking status: Not on file    Smokeless tobacco: Not on file   Substance and Sexual Activity    Alcohol use: Not on file    Drug use: Not on file    Sexual activity: Not on file     Review of Systems  Objective:     Weight: 77.6 kg (171 lb)  Body mass index is 30.29 kg/m².  Vital Signs (Most Recent):  Temp: 98.8 °F (37.1 °C) (11/24/23 1501)  Pulse: (!) 58 (11/24/23 1530)  Resp: 14 (11/24/23 1530)  BP: (!) 103/58 (11/24/23 1529)  SpO2: (!) 94 % (11/24/23 1530) Vital Signs (24h Range):  Temp:  [98.3 °F (36.8 °C)-98.9 °F (37.2 °C)] 98.8 °F (37.1 °C)  Pulse:  [55-94] 58  Resp:  [13-36] 14  SpO2:  [94 %-100 %] 94 %  BP: ()/(47-78) 103/58     Date 11/24/23 0700 - 11/25/23 0659   Shift 4946-8889 7953-8771 7836-0395 24 Hour Total   INTAKE   I.V.(mL/kg) 74.6(1) 7.5(0.1)  82.1(1.1)   NG/GT 0   0   IV Piggyback 0 5.4  5.4   Shift Total(mL/kg) 74.6(1) 12.9(0.2)  87.6(1.1)   OUTPUT   Shift Total(mL/kg)       Weight (kg) 77.6 77.6 77.6 77.6              Vent Mode: A/C  Oxygen Concentration (%):  [50-80] 50  Resp Rate Total:  [14 br/min-27 br/min] 14 br/min  Vt Set:  [500 mL] 500 mL  PEEP/CPAP:  [5 cmH20] 5 cmH20  Mean Airway Pressure:  [7.4 cmH20-9.2 cmH20] 8.5 cmH20             NG/OG Tube 11/23/23 1343 Ashley sump 18 Fr. Center mouth (Active)   Placement Check placement verified by x-ray 11/24/23 1501   Intake (mL) -  "Formula Tube Feeding 0 11/24/23 1201          Physical Exam     Neurosurgery Physical Exam    General: well developed, well nourished, mild distress secondary to ET tube   Head: normocephalic  GCS: Motor: 5/Verbal: T/Eyes: 4 GCS Total: 10 T  Cranial nerves: positive cough, gag, corneal reflex  Eyes: pupils equal, round, reactive to light with accomodation   Motor Strength:Moves right UE/LE spontaneously antigravity. Withdraws in LLE. No movement in LUE.       Significant Labs:  Recent Labs   Lab 11/23/23  1339 11/23/23  1915 11/24/23  0345   * 196* 160*    143 146*   K 5.4* 4.1 4.1    112* 114*   CO2 8* 17* 18*   BUN 25* 25* 26*   CREATININE 1.53* 1.4 1.2   CALCIUM 9.7 8.3* 8.6*   MG  --  1.9 2.1     Recent Labs   Lab 11/23/23  1339 11/23/23 2025 11/23/23 2035 11/24/23  0345   WBC 28.00*  --  20.07* 17.54*   HGB 16.0  --  13.8* 13.9*   HCT 49.4 39 42.9 42.3     --  203 201     No results for input(s): "LABPT", "INR", "APTT" in the last 48 hours.  Microbiology Results (last 7 days)       Procedure Component Value Units Date/Time    Blood culture #2 **CANNOT BE ORDERED STAT** [[id number]] Collected: 11/24/23 0131    Order Status: Completed Specimen: Blood Updated: 11/24/23 0915     Blood Culture, Routine No Growth to date    Blood culture #1 **CANNOT BE ORDERED STAT** [[id number]] Collected: 11/23/23 1808    Order Status: Completed Specimen: Blood Updated: 11/24/23 0345     Blood Culture, Routine No Growth to date    Culture, Respiratory with Gram Stain [[id number]]     Order Status: No result Specimen: Respiratory           Recent Lab Results  (Last 5 results in the past 24 hours)        11/24/23  1328   11/24/23  1206   11/24/23  1029   11/24/23  0803   11/24/23  0527        Allens Test         Pass       Ascending aorta     3.00           Ao peak selene     1.34           Ao VTI     31.75           AV valve area     2.20           RACHELLE by Velocity Ratio     2.12           AV mean gradient    "  4           AV index (prosthetic)     0.68           AV peak gradient     7           AV Velocity Ratio     0.66           Site         LR       BSA     1.86           CPK       5407         Left Ventricle Relative Wall Thickness     0.48           E/A ratio     1.00           E/E' ratio     9.86           EF     60           E wave deceleration time     222.89           FS     39           IVRT     83.73           IVSd     1.2           LA WIDTH     4.00           Lactate, Lavell   1.0  Comment: Falsely low lactic acid results can be found in samples   containing >=13.0 mg/dL total bilirubin and/or >=3.5 mg/dL   direct bilirubin.       1.2  Comment: Falsely low lactic acid results can be found in samples   containing >=13.0 mg/dL total bilirubin and/or >=3.5 mg/dL   direct bilirubin.           Left Atrium Major Axis     5.53           Left Atrium Minor Axis     5.13           LA size     2.50           LA volume     45.24                54.58           LA vol index     25.0           LA Volume Index (Mod)     30.2           LVOT area     3.2           LV LATERAL E/E' RATIO     8.63           LV SEPTAL E/E' RATIO     11.50           LV EDV BP     51.78           LV Diastolic Volume Index     28.61           LVIDd     4.2           LVIDs     2.57           LV mass     157.06           LV Mass Index     87           LVOT diameter     2.03           LVOT peak padilla     0.88           LVOT stroke volume     69.81           LVOT peak VTI     21.58           LV ESV BP     23.88           LV Systolic Volume Index     13.2           Mean e'     0.07           MV valve area p 1/2 method     3.40           MV Peak A Padilla     0.69           MV Peak E Padilla     0.69           MV stenosis pressure 1/2 time     64.64           POC BE         -4       POC HCO3         20.6       POC PCO2         32.6       POC PH         7.409       POC PO2         94       POC SATURATED O2         97       POC TCO2         22       POCT Glucose 126                Posterior Wall     1.0           RA Major Axis     4.60           RA Width     3.47           RVDD     3.42           Sample         ARTERIAL       Sinus     3.50           STJ     2.73           TAPSE     1.98           TDI SEPTAL     0.06           TDI LATERAL     0.08           Triscuspid Valve Regurgitation Peak Gradient     21           TR Max Padilla     2.30           Vancomycin, Random   <1.4             ZLVIDD     -1.76           ZLVIDS     -1.47                                All pertinent labs from the last 24 hours have been reviewed.    Significant Diagnostics:  I have reviewed all pertinent imaging results/findings within the past 24 hours.  Assessment/Plan:     * Embolic stroke involving right middle cerebral artery  83 M who presents with R MCA territory infarct. NSGY consulted for hemicrani watch.     --Patient admitted to ICU on telemetry; NCC/stroke teams are primary      -q1h neurochecks in ICU  --All labs and diagnostics reviewed  --MRI brain w/o contrast 11/24 shows large right MCA territory infarct. No midline shift. No hemorrhage.        -Further imaging per primary team, NSGY only recommends future scans if exam decompensates  --SBP recs and management per primary team  --AEDs per primary team   --HOB >30  --No neurosurgical intervention at this time.   --Continue maximal medical therapy and stroke work-up per primary teams. Na > 145.   --Notify NSGY immediately with any changes in neuro status. NSGY will sign off.   --Discussed with Dr. Sanchez         Thank you for your consult. I will sign off. Please contact us if you have any additional questions.    Nimisha Monsivais PA-C  Neurosurgery  [facility]

## 2023-11-24 NOTE — SUBJECTIVE & OBJECTIVE
Past Medical History:   Diagnosis Date    Arthritis     Weiss's esophagus     CHF (congestive heart failure)     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/23/2023    GERD (gastroesophageal reflux disease)     Type 2 diabetes mellitus      History reviewed. No pertinent surgical history.   Current Facility-Administered Medications on File Prior to Encounter   Medication Dose Route Frequency Provider Last Rate Last Admin    [COMPLETED] etomidate injection 20 mg  20 mg Intravenous ED 1 Time Mich Yates MD   20 mg at 11/23/23 1327    [COMPLETED] fentaNYL 50 mcg/mL injection 100 mcg  100 mcg Intravenous ED 1 Time Pilo Aceves MD   100 mcg at 11/23/23 1712    [COMPLETED] levETIRAcetam in NaCl (iso-os) IVPB 2,000 mg  2,000 mg Intravenous ED 1 Time Mich Yates MD   Stopped at 11/23/23 1515    [COMPLETED] piperacillin-tazobactam 3.375 g in dextrose 5 % 100 mL IVPB (ready to mix)  3.375 g Intravenous ED 1 Time Mich Yates  mL/hr at 11/23/23 1617 3.375 g at 11/23/23 1617    [COMPLETED] rocuronium injection 80 mg  80 mg Intravenous Once Mich Yates MD   80 mg at 11/23/23 1338    [COMPLETED] sodium chloride 0.9% bolus 2,331 mL 2,331 mL  30 mL/kg Intravenous ED 1 Time Mich Yates MD   Stopped at 11/23/23 1550    [DISCONTINUED] LORazepam injection 4 mg  4 mg Intravenous ED 1 Time Mich Yates MD        [DISCONTINUED] propofol (DIPRIVAN) 10 mg/mL infusion  0-50 mcg/kg/min Intravenous Continuous Mich Yates MD 25.6 mL/hr at 11/23/23 1620 55 mcg/kg/min at 11/23/23 1620     No current outpatient medications on file prior to encounter.      Allergies: Patient has no known allergies.    N/A  Family history is reviewed and has not changed      Review of Systems  Objective:     Vitals:    Pulse:  (Anesthesia at bedside; Refer to anesthesia record)  Rhythm: other (see comments) (Anesthesia at bedside; Refer to anesthesia record)  BP:  (Anesthesia at bedside; Refer to  anesthesia record)  MAP (mmHg): 78  Resp:  (Anesthesia at bedside; Refer to anesthesia record)  ETCO2 (mmHg):  (Anesthesia at bedside; Refer to anesthesia record)  SpO2:  (Anesthesia at bedside; Refer to anesthesia record)  Oxygen Concentration (%):  (Anesthesia at bedside; Refer to anesthesia record)  Vent Mode: A/C  Set Rate: 14 BPM  Vt Set: 500 mL  PEEP/CPAP: 5 cmH20  Peak Airway Pressure: 20 cmH20  Mean Airway Pressure: 7.4 cmH20  Plateau Pressure: 0 cmH20    Temp  Min: 98.4 °F (36.9 °C)  Max: 99.7 °F (37.6 °C)  Pulse  Min: 68  Max: 117  BP  Min: 93/52  Max: 165/78  MAP (mmHg)  Min: 66  Max: 112  Resp  Min: 14  Max: 39  SpO2  Min: 57 %  Max: 100 %  Oxygen Concentration (%)  Min: 50  Max: 100    No intake/output data recorded.            Physical Exam  GA:comfortable, no acute distress.   HEENT: No scleral icterus or JVD.   Pulmonary: Clear to auscultation A/L.   Cardiac: RRR S1 & S2 w/o rubs/murmurs/gallops.   Abdominal: Bowel sounds present x 4. No appreciable hepatosplenomegaly.  Skin: No jaundice, rashes, or visible lesions.  Neuro:  --GCS: E1 Vt1 M4  --Mental Status:  doesn't open eyes, doesn't follow commands  --CN II-XII grossly intact.   --Pupils 2mm, PERRL.   --Corneal reflex, gag, cough intact.  --R side withdraws to pain  -- L side doesn't respond to painful stimuli    Today I personally reviewed pertinent medications, lines/drains/airways, imaging, cardiology results, laboratory results, microbiology results,

## 2023-11-25 PROBLEM — J69.0 ASPIRATION PNEUMONIA DUE TO VOMIT: Status: ACTIVE | Noted: 2023-01-01

## 2023-11-25 NOTE — PROGRESS NOTES
[facility] - Neuro Critical Care  Vascular Neurology  [facility]  Progress Note    Assessment/Plan:     * Embolic stroke involving right middle cerebral artery  Tim Rhodes is a 83 y.o. male with PMHx of CAD, HF,  DM that was transferred from [facility] with suspected R MCA occlusion for possible intervention. Initially presented to OSH with AMS and seizure activity, LKN OOW for TNK. CTH showed early ischemic changes in R MCA territory and hyperdense R MCA sign concerning for R MCA occlusion. Patient was intubated and sedated at OSH for airway protection.Repeat CT on arrival to [facility] appears stable in comparison to prior. Patient independent at baseline per daughter. Discussed patient with neuro IR and patient taken to IR for possible thrombectomy, now s/p unsuccessful IR. MRI brain confirms large R MCA infarcts. Echo with EF 60% and apical akinesis. Stroke etiology ESUS at this time, will need 30d cardiac event monitor at discharge for further stroke workup.    NAEO, remains intubated. Follows simple commands, moving RUE/BLE voluntarily with no movement to LUE. ASA started for stroke ppx.       Antithrombotics for secondary stroke prevention: Antiplatelets: Aspirin: 81 mg daily    Statins for secondary stroke prevention: Statins: Atorvastatin- 40 mg daily    Aggressive risk factor modification: DM, CAD     Rehab efforts: The patient has been evaluated by a stroke team provider and the therapy needs have been fully considered based off the presenting complaints and exam findings. The following therapy evaluations are needed:  PT/OT/SLP when appropriate    Diagnostics ordered/pending: None     VTE prophylaxis: Heparin 5000 units SQ every 8 hours  Mechanical prophylaxis: Place SCDs    BP parameters: Infarct: Post unsucessful thrombectomy, SBP <220    Diabetes mellitus  Stroke risk factor  A1c 6.6  BG goal while inpatient 140-180  SSI PRN    Hemiparesis, left  2/2 stroke  PT/OT to evaluate and  treat when appropriate    Leukocytosis  WBC 28 and lactic 11 on arrival to OSH  Leukocytosis and lactic improving  BCx with NGTD  On vanc and zosyn    Endotracheally intubated  Intubated at OSH for airway protection    Seizure-like activity  Noted on arrival to OSH, reported to have RUE seizure like activity  Treated with keppra prior to transfer  EEG with severe slowing but no electrographic seizures  Seizure ppx with keppra 750 BID         11/24/2023 NAEO, remains intubated/sedated. EEG with severe slowing but no evidence of seizures. MRI brain confirms large R MCA infarcts, echo with EF 60% and apical akinesis. Infectious workup ongoing.  11/25/2023 NAEO, remains intubated. Follows simple commands, moving RUE/BLE voluntarily with no movement to LUE. ASA started for stroke ppx.     STROKE DOCUMENTATION   Acute Stroke Times   Last Known Normal Date: 11/22/23  Last Known Normal Time: 2200  Unknown Symptom Onset Date: Unknown Date  Unknown Symptom Onset Time: Unknown Time  Stroke Team Called Date: 11/23/23  Stroke Team Called Time: 1804  Stroke Team Arrival Date: 11/23/23  Stroke Team Arrival Time: 1809  CT Interpretation Time: 1813  Thrombolytic Therapy Recommended: No  Thrombectomy Recommended: Yes  Decision to Treat Time for IR: 1822    NIH Scale:  1a. Level of Consciousness: 2-->Not alert, requires repeated stimulation to attend, or is obtunded and requires strong or painful stimulation to make movements (not stereotyped)  1b. LOC Questions: 2-->Answers neither question correctly  1c. LOC Commands: 0-->Performs both tasks correctly  2. Best Gaze: 0-->Normal  3. Visual: 0-->No visual loss  4. Facial Palsy: 0-->Normal symmetrical movements  5a. Motor Arm, Left: 4-->No movement  5b. Motor Arm, Right: 0-->No drift, limb holds 90 (or 45) degrees for full 10 secs  6a. Motor Leg, Left: 3-->No effort against gravity, leg falls to bed immediately  6b. Motor Leg, Right: 3-->No effort against gravity, leg falls to bed  immediately  7. Limb Ataxia: 0-->Absent  8. Sensory: 2-->Severe to total sensory loss, patient is not aware of being touched in the face, arm, and leg  9. Best Language: 3-->Mute, global aphasia, no usable speech or auditory comprehension  10. Dysarthria: (UN) Intubated or other physical barrier  11. Extinction and Inattention (formerly Neglect): 0-->No abnormality  Total (NIH Stroke Scale): 19       Modified Clio Score: 0  Win Coma Scale:    ABCD2 Score:    [id number] Score:   HAS -BLED Score:   ICH Score:   Hunt & Acevedo Classification:      Hemorrhagic change of an Ischemic Stroke: Does this patient have an ischemic stroke with hemorrhagic changes? No     Neurologic Chief Complaint: R MCA stroke    Subjective:     Interval History: Patient is seen for follow-up neurological assessment and treatment recommendations:   NAEO, remains intubated. Follows simple commands, moving RUE/BLE voluntarily with no movement to LUE. ASA started for stroke ppx.     HPI, Past Medical, Family, and Social History remains the same as documented in the initial encounter.     Review of Systems   Unable to perform ROS: Intubated     Scheduled Meds:   aspirin  81 mg Per OG tube Daily    [START ON 11/26/2023] atorvastatin  40 mg Per OG tube Daily    famotidine  20 mg Per OG tube Daily    heparin (porcine)  5,000 Units Subcutaneous Q8H    levETIRAcetam (Keppra) IV (PEDS and ADULTS)  750 mg Intravenous Q12H    piperacillin-tazobactam (Zosyn) IV (PEDS and ADULTS) (extended infusion is not appropriate)  4.5 g Intravenous Q8H    senna-docusate 8.6-50 mg  1 tablet Per OG tube Daily    silodosin  4 mg Per OG tube Daily    vancomycin (VANCOCIN) IV (PEDS and ADULTS)  1,000 mg Intravenous Q24H     Continuous Infusions:   fentanyl       PRN Meds:acetaminophen, dextrose 10%, dextrose 10%, hydrALAZINE, labetalol, ondansetron, sodium chloride 0.9%, sodium chloride 0.9%, Pharmacy to dose Vancomycin consult **AND** vancomycin - pharmacy to  "dose    Objective:     Vital Signs (Most Recent):  Temp: (S) (!) 100.6 °F (38.1 °C) (11/25/23 1115)  Pulse: (!) 54 (11/25/23 1530)  Resp: 14 (11/25/23 1530)  BP: (!) 146/71 (11/25/23 1530)  SpO2: 99 % (11/25/23 1530)  BP Location: Left arm    Vital Signs Range (Last 24H):  Temp:  [98.6 °F (37 °C)-100.6 °F (38.1 °C)]   Pulse:  [54-98]   Resp:  []   BP: ()/()   SpO2:  [94 %-100 %]   BP Location: Left arm       Physical Exam  Vitals reviewed.   Constitutional:       General: He is not in acute distress.     Interventions: He is intubated.   HENT:      Head: Normocephalic and atraumatic.   Cardiovascular:      Rate and Rhythm: Normal rate.   Pulmonary:      Effort: No respiratory distress. He is intubated.   Skin:     General: Skin is warm.   Neurological:      Motor: Weakness present.      Comments: Exam limited due to intubated/sedated          Neurological Exam:   LOC: obtunded  Attention Span: poor  Language: Intubated  Articulation: Untestable due to intubation  Orientation: Untestable due to intubation  Motor: Arm left  Plegia 0/5  Leg left  Paresis: 2/5  Arm right  Paresis: 2/5  Leg right Paresis: 2/5  Sensation: Iván-anesthesia left  Tone: Flaccid  LUE       Laboratory:  CMP:   Recent Labs   Lab 11/25/23 0049   CALCIUM 8.6*   ALBUMIN 2.8*   PROT 6.4   *   K 3.9   CO2 19*   *   BUN 22   CREATININE 1.0   ALKPHOS 75   ALT 36   AST 97*   BILITOT 0.8       CBC:   Recent Labs   Lab 11/25/23 0049   WBC 13.92*   RBC 4.69   HGB 13.2*   HCT 41.0      MCV 87   MCH 28.1   MCHC 32.2       Lipid Panel:   Recent Labs   Lab 11/23/23 1915   CHOL 94*   LDLCALC 48.4*   HDL 32*   TRIG 68       Coagulation: No results for input(s): "PT", "INR", "APTT" in the last 168 hours.  Hgb A1C:   Recent Labs   Lab 11/23/23 1915   HGBA1C 6.6*       TSH:   Recent Labs   Lab 11/23/23 1915   TSH 2.121         Diagnostic Results     Brain imaging:  MRI brain without contrast 11/24/2023  Impression:  Large " right MCA territorial infarct without hemorrhagic conversion.  Currently very mild mass effect.  Loss of the right carotid flow void consistent with occlusion versus slow flow.     CT Head 11/23/23 1812  Impression:  Acute right MCA territory infarction, similar to prior.  No infarct extension or hemorrhagic conversion.  Associated hyperdense vessel sign likely involving the right M1 and a proximal M2 branch.  Changes of generalized cerebral volume loss.  Paranasal sinus disease as above.     CT Head 11/23/23 1416  FINDINGS:  Decreased attenuation in sulcal effacement right MCA distribution consistent with acute in Ca infarct.  Hyperdense right MCA sign consistent with MCA thrombus.  No hydrocephalus.  No signs of acute hemorrhage.  Moderate underlying volume loss.  Right maxillary mucous retention cyst.  Impression:  Acute right MCA infarct.       Vessel Imaging:  IR cerebral angiogram 11/23/2023  Preliminary interpretation: occlusion of R ICA at origin, unable to cross distally to perform angioplasty and stenting. Attempted thrombectomy at origin, tici 0.  Please see Imaging report for full details.        Cardiac Evaluation:   TTE 11/24/2023    Left Ventricle: The left ventricle is normal in size. Ventricular mass is normal. Normal wall thickness. There is concentric remodeling. Regional wall motion abnormalities present - see diagram for wall motion findings. There is normal systolic function. Ejection fraction by visual approximation is 60%. There is normal diastolic function.    Right Ventricle: Normal right ventricular cavity size. Wall thickness is normal. Right ventricle wall motion  is normal. Systolic function is normal.    Aortic Valve: The aortic valve is a trileaflet valve. There is mild aortic valve sclerosis. There is annular calcification present. There is mild aortic regurgitation.    IVC/SVC: Patient is ventilated, cannot use IVC diameter to estimate right atrial pressure. PASP is at least  24mmHg.    Yadi Cordon PA-C  [facility]  Department of Vascular Neurology   [facility] - Neuro Critical Care

## 2023-11-25 NOTE — ASSESSMENT & PLAN NOTE
Patient intubated at outside facility for airway protection  Vent Mode: A/C  Oxygen Concentration (%):  [40-50] 40  Resp Rate Total:  [14 br/min-23 br/min] 18 br/min  Vt Set:  [500 mL] 500 mL  PEEP/CPAP:  [5 cmH20] 5 cmH20  Mean Airway Pressure:  [8.5 cmH20-10 cmH20] 10 cmH20  Follow cxrs  Abg

## 2023-11-25 NOTE — ASSESSMENT & PLAN NOTE
WBC 28 and lactic 11 on arrival to OSH  Leukocytosis and lactic improving  BCx with NGTD  On vanc and zosyn

## 2023-11-25 NOTE — PROGRESS NOTES
[facility] - Neuro Critical Care  Neurocritical Care  Progress Note    Admit Date: 11/23/2023  Service Date: 11/25/2023  Length of Stay: 2    Subjective:     Chief Complaint: Embolic stroke involving right middle cerebral artery    History of Present Illness: The patient is an 83-year-old with PMHx of  CAD and DM admitted to [facility] with large R MCA stroke and seizure.Per chart review, in emergency department at [facility]  presented with with altered mental status and focal seizure activity.  Daughter states patient was able to talk to her on the phone at 10:00 p.m. last night was at baseline.  Patient living in assisted living but still very functional.  Daughter states patien still drives on occasion.  She does not believe he takes any blood thinners.  She went to check on him is afternoon and he had fallen next to the toilet.  Patient unable to provide any history.    Noted to have tonic-clonic activity to the right upper extremity on arrival.  Patient with incomprehensible speech.  Copious vomiting on EMS arrival per paramedic.  Patient unable to contribute to history.  Last presumed normal was 10:00 p.m. last night. Out of window for thrombolytics.  Patient intubated for airway protection at outside hospital. On arrival CTH with large R MCA - stroke team consulted and plan for IR. Patient loaded with Keppra, EEG pending. Lactic acid 11.9 on arrival, pancx and initiated broad spec abx. Patient admitted to [facility] for close monitoring and higher level of care.        Hospital Course: 11/24/2023 MRI complete and reviewed, Nsgy consulted, EEG neg and dced  11/25/2023: initiate baby ASA and sq heparin, follow CT head in AM, SBP <180, initiate tube feeds        Review of Systems   Unable to obtain a complete ROS due to level of consciousness.  Objective:     Vitals:  Temp: (S) 100.2 °F (37.9 °C)  Pulse: 60  Rhythm: normal sinus rhythm  BP: (!) 192/103  MAP (mmHg): 96  Resp: 19  SpO2: 95 %  Oxygen Concentration (%):  40  Vent Mode: A/C  Set Rate: 14 BPM  Vt Set: 500 mL  PEEP/CPAP: 5 cmH20  Peak Airway Pressure: 21 cmH20  Mean Airway Pressure: 10 cmH20  Plateau Pressure: 0 cmH20    Temp  Min: 98.6 °F (37 °C)  Max: 100.2 °F (37.9 °C)  Pulse  Min: 54  Max: 98  BP  Min: 88/46  Max: 192/103  MAP (mmHg)  Min: 64  Max: 111  Resp  Min: 14  Max: 24  SpO2  Min: 94 %  Max: 100 %  Oxygen Concentration (%)  Min: 40  Max: 50    11/24 0701 - 11/25 0700  In: 479.9 [I.V.:160.6]  Out: 500 [Urine:500]            Physical Exam  GA: Alert, comfortable, no acute distress.   HEENT: No scleral icterus or JVD.   Pulmonary: Clear to auscultation A/L.   Cardiac: RRR S1 & S2 w/o rubs/murmurs/gallops.   Abdominal: Bowel sounds present x 4. No appreciable hepatosplenomegaly.  Skin: No jaundice, rashes, or visible lesions.  Neuro:  --GCS: E2 Vt1 M6  --Mental Status:  opens eyes to touch, intermit follows simple commands  --CN II-XII grossly intact.   --Pupils 2mm, PERRL.   --Corneal reflex, gag, cough intact.  --LUE  no movement  --RUE spont  --BLE withdraws to pain       Medications:  Continuousfentanyl, Last Rate: 75 mcg/hr (11/25/23 1414)    Scheduledaspirin, 81 mg, Daily  [START ON 11/26/2023] atorvastatin, 40 mg, Daily  famotidine, 20 mg, Daily  heparin (porcine), 5,000 Units, Q8H  levETIRAcetam (Keppra) IV (PEDS and ADULTS), 750 mg, Q12H  piperacillin-tazobactam (Zosyn) IV (PEDS and ADULTS) (extended infusion is not appropriate), 4.5 g, Q8H  senna-docusate 8.6-50 mg, 1 tablet, Daily  silodosin, 4 mg, Daily  vancomycin (VANCOCIN) IV (PEDS and ADULTS), 1,000 mg, Q24H    PRNacetaminophen, 650 mg, Q6H PRN  dextrose 10%, 12.5 g, PRN  dextrose 10%, 25 g, PRN  hydrALAZINE, 10 mg, Q4H PRN  labetalol, 10 mg, Q4H PRN  ondansetron, 4 mg, Q6H PRN  sodium chloride 0.9%, 10 mL, PRN  sodium chloride 0.9%, 10 mL, PRN  vancomycin - pharmacy to dose, , pharmacy to manage frequency      Today I personally reviewed pertinent medications, lines/drains/airways, imaging,  cardiology results, laboratory results, microbiology results,    Diet  Diet NPO          Assessment/Plan:     Neuro  * Embolic stroke involving right middle cerebral artery  83 y.o. male with PMHx of CAD, HF, HTN, DM who was transferred from [facility] for R MCA stroke and possible intervention on suspected R MCA occlusion. Patient initially presented to OSH with AMS and seizure activity. LKN 11/22/23 at 10pm. Out of treatment window for thrombolytics. CT at OSH with early ischemic changes in R MCA territory and hyperdense R MCA sign concerning for R MCA occlusion. Patient was intubated and sedated at OSH for airway protection.   Stroke team consulted  SBP <220  TTE/EKG/A1C/lipid panel penidg  Neuro checks q 1 hr  Thrombectomy unsuccessful   Statin   PT/OT  SLP when appropriate   CTH complete  MRI pending  A1C 6.6  SSI  Lipid profile complete  TTE complete  MRI complete and reviewed, Nsgy consulted, EEG neg and dced  Holding VTE ppx for pending Nsgy recs  11/25/2023: initiated sq heparin and baby asa  Follow CT head in AM        Hemiparesis, 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  PT/OT      Seizure-like activity  Seizure like activity noted at outside facility  Loaded w/ 2gm of Keppra   Continue maintenance Keppra 750mg BID  Seizure precautions  EEG neg and dced on 11/24    Endocrine  Diabetes mellitus  Hx of DM  A1C 6.6  PRN moderate dose SSI     Palliative Care  Endotracheally intubated  Patient intubated at outside facility for airway protection  Vent Mode: A/C  Oxygen Concentration (%):  [40-50] 40  Resp Rate Total:  [14 br/min-23 br/min] 18 br/min  Vt Set:  [500 mL] 500 mL  PEEP/CPAP:  [5 cmH20] 5 cmH20  Mean Airway Pressure:  [8.5 cmH20-10 cmH20] 10 cmH20  Follow cxrs  Abg          The patient is being Prophylaxed for:  Venous Thromboembolism with: Chemical  Stress Ulcer with: H2B  Ventilator Pneumonia with: not applicable    Activity Orders            Turn patient starting at 11/23 2000    Progressive Mobility  Protocol (mobilize patient to their highest level of functioning at least twice daily) starting at 11/23 2000    Elevate HOB starting at 11/23 1821    Diet NPO: NPO starting at 11/23 1821          Full Code    Obdulia Veras NP  Neurocritical Care  [facility] - Neuro Critical Care

## 2023-11-25 NOTE — SUBJECTIVE & OBJECTIVE
Neurologic Chief Complaint: R MCA stroke    Subjective:     Interval History: Patient is seen for follow-up neurological assessment and treatment recommendations:   NAEO, remains intubated. Follows simple commands, moving RUE/BLE voluntarily with no movement to LUE. ASA started for stroke ppx.     HPI, Past Medical, Family, and Social History remains the same as documented in the initial encounter.     Review of Systems   Unable to perform ROS: Intubated     Scheduled Meds:   aspirin  81 mg Per OG tube Daily    [START ON 11/26/2023] atorvastatin  40 mg Per OG tube Daily    famotidine  20 mg Per OG tube Daily    heparin (porcine)  5,000 Units Subcutaneous Q8H    levETIRAcetam (Keppra) IV (PEDS and ADULTS)  750 mg Intravenous Q12H    piperacillin-tazobactam (Zosyn) IV (PEDS and ADULTS) (extended infusion is not appropriate)  4.5 g Intravenous Q8H    senna-docusate 8.6-50 mg  1 tablet Per OG tube Daily    silodosin  4 mg Per OG tube Daily    vancomycin (VANCOCIN) IV (PEDS and ADULTS)  1,000 mg Intravenous Q24H     Continuous Infusions:   fentanyl       PRN Meds:acetaminophen, dextrose 10%, dextrose 10%, hydrALAZINE, labetalol, ondansetron, sodium chloride 0.9%, sodium chloride 0.9%, Pharmacy to dose Vancomycin consult **AND** vancomycin - pharmacy to dose    Objective:     Vital Signs (Most Recent):  Temp: (S) (!) 100.6 °F (38.1 °C) (11/25/23 1115)  Pulse: (!) 54 (11/25/23 1530)  Resp: 14 (11/25/23 1530)  BP: (!) 146/71 (11/25/23 1530)  SpO2: 99 % (11/25/23 1530)  BP Location: Left arm    Vital Signs Range (Last 24H):  Temp:  [98.6 °F (37 °C)-100.6 °F (38.1 °C)]   Pulse:  [54-98]   Resp:  []   BP: ()/()   SpO2:  [94 %-100 %]   BP Location: Left arm       Physical Exam  Vitals reviewed.   Constitutional:       General: He is not in acute distress.     Interventions: He is intubated.   HENT:      Head: Normocephalic and atraumatic.   Cardiovascular:      Rate and Rhythm: Normal rate.   Pulmonary:       "Effort: No respiratory distress. He is intubated.   Skin:     General: Skin is warm.   Neurological:      Motor: Weakness present.      Comments: Exam limited due to intubated/sedated          Neurological Exam:   LOC: obtunded  Attention Span: poor  Language: Intubated  Articulation: Untestable due to intubation  Orientation: Untestable due to intubation  Motor: Arm left  Plegia 0/5  Leg left  Paresis: 2/5  Arm right  Paresis: 2/5  Leg right Paresis: 2/5  Sensation: Iván-anesthesia left  Tone: Flaccid  LUE       Laboratory:  CMP:   Recent Labs   Lab 11/25/23 0049   CALCIUM 8.6*   ALBUMIN 2.8*   PROT 6.4   *   K 3.9   CO2 19*   *   BUN 22   CREATININE 1.0   ALKPHOS 75   ALT 36   AST 97*   BILITOT 0.8       CBC:   Recent Labs   Lab 11/25/23 0049   WBC 13.92*   RBC 4.69   HGB 13.2*   HCT 41.0      MCV 87   MCH 28.1   MCHC 32.2       Lipid Panel:   Recent Labs   Lab 11/23/23 1915   CHOL 94*   LDLCALC 48.4*   HDL 32*   TRIG 68       Coagulation: No results for input(s): "PT", "INR", "APTT" in the last 168 hours.  Hgb A1C:   Recent Labs   Lab 11/23/23 1915   HGBA1C 6.6*       TSH:   Recent Labs   Lab 11/23/23 1915   TSH 2.121         Diagnostic Results     Brain imaging:  MRI brain without contrast 11/24/2023  Impression:  Large right MCA territorial infarct without hemorrhagic conversion.  Currently very mild mass effect.  Loss of the right carotid flow void consistent with occlusion versus slow flow.     CT Head 11/23/23 1812  Impression:  Acute right MCA territory infarction, similar to prior.  No infarct extension or hemorrhagic conversion.  Associated hyperdense vessel sign likely involving the right M1 and a proximal M2 branch.  Changes of generalized cerebral volume loss.  Paranasal sinus disease as above.     CT Head 11/23/23 1416  FINDINGS:  Decreased attenuation in sulcal effacement right MCA distribution consistent with acute in Ca infarct.  Hyperdense right MCA sign consistent with MCA " thrombus.  No hydrocephalus.  No signs of acute hemorrhage.  Moderate underlying volume loss.  Right maxillary mucous retention cyst.  Impression:  Acute right MCA infarct.       Vessel Imaging:  IR cerebral angiogram 11/23/2023  Preliminary interpretation: occlusion of R ICA at origin, unable to cross distally to perform angioplasty and stenting. Attempted thrombectomy at origin, tici 0.  Please see Imaging report for full details.        Cardiac Evaluation:   TTE 11/24/2023    Left Ventricle: The left ventricle is normal in size. Ventricular mass is normal. Normal wall thickness. There is concentric remodeling. Regional wall motion abnormalities present - see diagram for wall motion findings. There is normal systolic function. Ejection fraction by visual approximation is 60%. There is normal diastolic function.    Right Ventricle: Normal right ventricular cavity size. Wall thickness is normal. Right ventricle wall motion  is normal. Systolic function is normal.    Aortic Valve: The aortic valve is a trileaflet valve. There is mild aortic valve sclerosis. There is annular calcification present. There is mild aortic regurgitation.    IVC/SVC: Patient is ventilated, cannot use IVC diameter to estimate right atrial pressure. PASP is at least 24mmHg.

## 2023-11-25 NOTE — PLAN OF CARE
"[facility] Care Plan    POC reviewed with Tim Rhodes and family at 0600. Pt unable to verbalize understanding due to intubation. Questions and concerns addressed. Will continue to monitor. See below and flowsheets for full assessment and VS info.     - Bath and linen change complete   - Neuro exam unchanged and VSS overnight   - Fent gtt @ 25      Is this a stroke patient? yes- Stroke booklet reviewed with patient and family, risk factors identified for patient and stroke booklet remains at bedside for ongoing education.     Neuro:  Cardinal Coma Scale  Best Eye Response: 1-->(E1) none  Best Motor Response: 5-->(M5) localizes pain  Best Verbal Response: 1-->(V1) none  Cardinal Coma Scale Score: 7  Assessment Qualifiers: patient intubated  Pupil PERRLA: yes     24hr Temp:  [98.6 °F (37 °C)-98.9 °F (37.2 °C)]     CV:   Rhythm: normal sinus rhythm  BP goals:   SBP < 220  MAP > 65    Resp:      Vent Mode: A/C  Set Rate: 14 BPM  Oxygen Concentration (%): 40  Vt Set: 500 mL  PEEP/CPAP: 5 cmH20    Plan: wean to extubate    GI/:     Diet/Nutrition Received: NPO  Last Bowel Movement: 11/23/23  Voiding Characteristics: urethral catheter (bladder)    Intake/Output Summary (Last 24 hours) at 11/25/2023 0555  Last data filed at 11/25/2023 0302  Gross per 24 hour   Intake 479.92 ml   Output 500 ml   Net -20.08 ml          Labs/Accuchecks:  Recent Labs   Lab 11/25/23  0049   WBC 13.92*   RBC 4.69   HGB 13.2*   HCT 41.0         Recent Labs   Lab 11/25/23  0049   *   K 3.9   CO2 19*   *   BUN 22   CREATININE 1.0   ALKPHOS 75   ALT 36   AST 97*   BILITOT 0.8    No results for input(s): "PROTIME", "INR", "APTT", "HEPANTIXA" in the last 168 hours.   Recent Labs   Lab 11/23/23  1339 11/23/23  1915 11/25/23  0049   CPK  --    < > 2960*   TROPONINI 0.063  --   --     < > = values in this interval not displayed.       Electrolytes: No replacement orders  Accuchecks: Q6H    Gtts:   fentanyl 25 mcg/hr (11/25/23 0042) "       LDA/Wounds:  Lines/Drains/Airways       Drain  Duration                  NG/OG Tube 11/23/23 1343 [place] sump 18 Fr. Center mouth 1 day              Airway  Duration                  Airway - Non-Surgical 11/23/23 1340 Endotracheal Tube 1 day              Peripheral Intravenous Line  Duration                  Peripheral IV - Single Lumen 11/23/23 1330 18 G Left Forearm 1 day         Peripheral IV - Single Lumen 11/23/23 1400 18 G Right Wrist 1 day                  Wounds: Yes  Wound care consulted: No

## 2023-11-25 NOTE — ASSESSMENT & PLAN NOTE
83 y.o. male with PMHx of CAD, HF, HTN, DM who was transferred from [facility] for R MCA stroke and possible intervention on suspected R MCA occlusion. Patient initially presented to OSH with AMS and seizure activity. LKN 11/22/23 at 10pm. Out of treatment window for thrombolytics. CT at OSH with early ischemic changes in R MCA territory and hyperdense R MCA sign concerning for R MCA occlusion. Patient was intubated and sedated at OSH for airway protection.   Stroke team consulted  SBP <220  TTE/EKG/A1C/lipid panel penidg  Neuro checks q 1 hr  Thrombectomy unsuccessful   Statin   PT/OT  SLP when appropriate   CTH complete  MRI pending  A1C 6.6  SSI  Lipid profile complete  TTE complete  MRI complete and reviewed, Nsgy consulted, EEG neg and dced  Holding VTE ppx for pending Nsgy recs  11/25/2023: initiated sq heparin and baby asa  Follow CT head in AM

## 2023-11-25 NOTE — SUBJECTIVE & OBJECTIVE
Review of Systems   Unable to obtain a complete ROS due to level of consciousness.  Objective:     Vitals:  Temp: (S) 100.2 °F (37.9 °C)  Pulse: 60  Rhythm: normal sinus rhythm  BP: (!) 192/103  MAP (mmHg): 96  Resp: 19  SpO2: 95 %  Oxygen Concentration (%): 40  Vent Mode: A/C  Set Rate: 14 BPM  Vt Set: 500 mL  PEEP/CPAP: 5 cmH20  Peak Airway Pressure: 21 cmH20  Mean Airway Pressure: 10 cmH20  Plateau Pressure: 0 cmH20    Temp  Min: 98.6 °F (37 °C)  Max: 100.2 °F (37.9 °C)  Pulse  Min: 54  Max: 98  BP  Min: 88/46  Max: 192/103  MAP (mmHg)  Min: 64  Max: 111  Resp  Min: 14  Max: 24  SpO2  Min: 94 %  Max: 100 %  Oxygen Concentration (%)  Min: 40  Max: 50    11/24 0701 - 11/25 0700  In: 479.9 [I.V.:160.6]  Out: 500 [Urine:500]            Physical Exam  GA: Alert, comfortable, no acute distress.   HEENT: No scleral icterus or JVD.   Pulmonary: Clear to auscultation A/L.   Cardiac: RRR S1 & S2 w/o rubs/murmurs/gallops.   Abdominal: Bowel sounds present x 4. No appreciable hepatosplenomegaly.  Skin: No jaundice, rashes, or visible lesions.  Neuro:  --GCS: E2 Vt1 M6  --Mental Status:  opens eyes to touch, intermit follows simple commands  --CN II-XII grossly intact.   --Pupils 2mm, PERRL.   --Corneal reflex, gag, cough intact.  --LUE  no movement  --RUE spont  --BLE withdraws to pain       Medications:  Continuousfentanyl, Last Rate: 75 mcg/hr (11/25/23 1414)    Scheduledaspirin, 81 mg, Daily  [START ON 11/26/2023] atorvastatin, 40 mg, Daily  famotidine, 20 mg, Daily  heparin (porcine), 5,000 Units, Q8H  levETIRAcetam (Keppra) IV (PEDS and ADULTS), 750 mg, Q12H  piperacillin-tazobactam (Zosyn) IV (PEDS and ADULTS) (extended infusion is not appropriate), 4.5 g, Q8H  senna-docusate 8.6-50 mg, 1 tablet, Daily  silodosin, 4 mg, Daily  vancomycin (VANCOCIN) IV (PEDS and ADULTS), 1,000 mg, Q24H    PRNacetaminophen, 650 mg, Q6H PRN  dextrose 10%, 12.5 g, PRN  dextrose 10%, 25 g, PRN  hydrALAZINE, 10 mg, Q4H PRN  labetalol, 10  mg, Q4H PRN  ondansetron, 4 mg, Q6H PRN  sodium chloride 0.9%, 10 mL, PRN  sodium chloride 0.9%, 10 mL, PRN  vancomycin - pharmacy to dose, , pharmacy to manage frequency      Today I personally reviewed pertinent medications, lines/drains/airways, imaging, cardiology results, laboratory results, microbiology results,    Diet  Diet NPO

## 2023-11-25 NOTE — ANESTHESIA POSTPROCEDURE EVALUATION
Anesthesia Post Evaluation    Patient: Tim Rhodes    Procedure(s) Performed: Procedure(s) (LRB):  ANGIOGRAM-CEREBRAL (N/A)    Final Anesthesia Type: general      Patient location during evaluation: ICU  Patient participation: No - Unable to Participate, Intubation  Level of consciousness: sedated  Post-procedure vital signs: reviewed and stable  Pain management: adequate  Airway patency: patent    PONV status at discharge: No PONV  Anesthetic complications: no      Cardiovascular status: blood pressure returned to baseline and hemodynamically stable  Respiratory status: intubated and ventilator  Hydration status: euvolemic  Follow-up not needed.          Vitals Value Taken Time   /60 11/25/23 0131   Temp 37.1 °C (98.7 °F) 11/24/23 2302   Pulse 75 11/25/23 0140   Resp 16 11/25/23 0140   SpO2 95 % 11/25/23 0140   Vitals shown include unvalidated device data.      No case tracking events are documented in the log.      Pain/Vilma Score: Pain Rating Prior to Med Admin: 8 (11/25/2023 12:42 AM)

## 2023-11-25 NOTE — ASSESSMENT & PLAN NOTE
Tim Rhodes is a 83 y.o. male with PMHx of CAD, HF,  DM that was transferred from [facility] with suspected R MCA occlusion for possible intervention. Initially presented to OSH with AMS and seizure activity, LKN OOW for TNK. CTH showed early ischemic changes in R MCA territory and hyperdense R MCA sign concerning for R MCA occlusion. Patient was intubated and sedated at OSH for airway protection.Repeat CT on [facility] to [facility] appears stable in comparison to prior. Patient independent at baseline per daughter. Discussed patient with neuro IR and patient taken to IR for possible thrombectomy, now s/p unsuccessful IR. MRI brain confirms large R MCA infarcts. Echo with EF 60% and apical akinesis. Stroke etiology ESUS at this time, will need 30d cardiac event monitor at discharge for further stroke workup.    NAEO, remains intubated. Follows simple commands, moving RUE/BLE voluntarily with no movement to LUE. ASA started for stroke ppx.       Antithrombotics for secondary stroke prevention: Antiplatelets: Aspirin: 81 mg daily    Statins for secondary stroke prevention: Statins: Atorvastatin- 40 mg daily    Aggressive risk factor modification: DM, CAD     Rehab efforts: The patient has been evaluated by a stroke team provider and the therapy needs have been fully considered based off the presenting complaints and exam findings. The following therapy evaluations are needed:  PT/OT/SLP when appropriate    Diagnostics ordered/pending: None     VTE prophylaxis: Heparin 5000 units SQ every 8 hours  Mechanical prophylaxis: Place SCDs    BP parameters: Infarct: Post unsucessful thrombectomy, SBP <220

## 2023-11-26 NOTE — RESPIRATORY THERAPY
Patient extubated per MD order, placed on 3 LPM nasal cannula, tolerated change well without any distress noted, MD Yuen present at bedside. Will continue to monitor patient status.

## 2023-11-26 NOTE — PROGRESS NOTES
Therapy with  Vancomycin complete and/or consult discontinued by provider. Pharmacy will sign off, please re-consult as needed.    Thanks!   Yadi Summers, PharmD  11/26/2023 8:02 AM

## 2023-11-26 NOTE — PT/OT/SLP PROGRESS
Occupational Therapy   Treatment    Name: Tim Rhodes  MRN: [id number]  Admitting Diagnosis:  Embolic stroke involving right middle cerebral artery  3 Days Post-Op    Recommendations:     Discharge Recommendations: Moderate Intensity Therapy  Discharge Equipment Recommendations:   (pending extubation)  Barriers to discharge:  None    Assessment:     Tim Rhodes is a 83 y.o. male with a medical diagnosis of Embolic stroke involving right middle cerebral artery.  He presents with performance deficits affecting function are weakness, abnormal tone, decreased ROM, impaired cognition, impaired endurance, impaired sensation, decreased coordination, decreased upper extremity function, impaired self care skills, impaired functional mobility, impaired balance, impaired cardiopulmonary response to activity, decreased lower extremity function, impaired coordination.     Rehab Prognosis:  Good; patient would benefit from acute skilled OT services to address these deficits and reach maximum level of function.       Plan:     Patient to be seen 3 x/week to address the above listed problems via self-care/home management, therapeutic exercises, therapeutic activities, neuromuscular re-education, cognitive retraining  Plan of Care Expires: 12/22/23  Plan of Care Reviewed with: patient    Subjective     Patient:  Nonverbal; intubated  Pain/Comfort:  Pain Rating 1: 0/10  Pain Rating Post-Intervention 1: 0/10    Objective:     Communicated with: Nurse prior to session.  Patient found supine with bed alarm, blood pressure cuff, pulse ox (continuous), ventilator, peripheral IV, restraints, Condom Catheter, telemetry, SCD, EEG upon OT entry to room.    General Precautions: Standard, aspiration, fall, NPO    Orthopedic Precautions:N/A  Braces: N/A  Respiratory Status: Ventilator     Occupational Performance:     Bed Mobility:    Patient completed Rolling/Turning to Left with  total assistance  Patient completed Rolling/Turning to Right  with total assistance     Functional Mobility/Transfers:  Dependent drawsheet transfers    Activities of Daily Living:  Feeding:  NPO    Grooming: total assistance while supine    [facility] 6 Click ADL: 6    Treatment & Education:  Daily orientation provided.  PROM performed left UE and AAROM right UE and bilateral LEs one set x 6 rep in all planes of motion with stretches provided at end range.  Assistance and facilitation provided for upward rotation of the scapula during shoulder flexion and abduction to promote orientation of glenoid fossa of scapula to humeral head for prevention of post-stroke hemiplegic pain.  Patient unable to follow commands.  Provided multi-sensory stimulation to prevent sensory deprivation and improve clinical outcomes.  Positioning provided for midline orientation with bilateral UEs elevated and heels lifted off mattress.   Gentle cervical rotation provided.   Family not present during the session.     Patient left supine with all lines intact, call button in reach, bed alarm on, and restraints reapplied at end of session    GOALS:   Multidisciplinary Problems       Occupational Therapy Goals          Problem: Occupational Therapy    Goal Priority Disciplines Outcome Interventions   Occupational Therapy Goal     OT, PT/OT Ongoing, Progressing    Description: Goals set 11/24 to be addressed for 14 days with expiration date, 12/8:  Patient will increase functional independence with ADLs by performing:    Patient will demonstrate rolling to the right with mod assist.  Not met   Patient will demonstrate rolling to the left with mod assist.   Not met  Patient will demonstrate supine -sit with mod  assist.   Not met  Patient will demonstrate stand pivot transfers with mod assist.   Not met  Patient will demonstrate grooming while seated with mod assist.   Not met  Patient will demonstrate upper body dressing with mod assist while seated EOB.   Not met  Patient will demonstrate lower body dressing  with mod assist while seated EOB.   Not met  Patient will demonstrate toileting with mod assist.   Not met  Patient will demonstrate bathing while seated EOB with mod assist.   Not met  Patient's family / caregiver will demonstrate independence and safety with assisting patient with self-care skills and functional mobility.     Not met  Patient's family / caregiver will demonstrate independence with providing ROM and changes in bed positioning.   Not met                             Time Tracking:     OT Date of Treatment: 11/26/23  OT Start Time: 0345  OT Stop Time: 0400  OT Total Time (min): 15 min    Billable Minutes:Neuromuscular Re-education 15    OT/YADI: OT          11/26/2023

## 2023-11-26 NOTE — PLAN OF CARE
"[facility] Care Plan    POC reviewed with Tim Rhodes and family at 0400. Pt unable to verbalized understanding. Questions and concerns addressed with son. No acute events overnight. Pt progressing toward goals. Will continue to monitor. See below and flowsheets for full assessment and VS info.     -Fentanyl gtt  -CHG bed bath and linen change  -[place] catheter placed  -CTH completed        Is this a stroke patient? yes- Stroke booklet reviewed with patient and family, risk factors identified for patient and stroke booklet remains at bedside for ongoing education.     Neuro:  [place] Coma Scale  Best Eye Response: 1-->(E1) none  Best Motor Response: 6-->(M6) obeys commands  Best Verbal Response: 1-->(V1) none  Win Coma Scale Score: 8  Assessment Qualifiers: patient intubated, patient chemically sedated or paralyzed  Pupil PERRLA: yes     24hr Temp:  [98 °F (36.7 °C)-100.6 °F (38.1 °C)]     CV:   Rhythm: atrial rhythm  BP goals:   SBP < 180  MAP > 65    Resp:      Vent Mode: A/C  Set Rate: 14 BPM  Oxygen Concentration (%): 40  Vt Set: 500 mL  PEEP/CPAP: 5 cmH20    Plan: wean to extubate    GI/:     Diet/Nutrition Received: NPO, tube feeding  Last Bowel Movement: 11/23/23  Voiding Characteristics: incontinence    Intake/Output Summary (Last 24 hours) at 11/26/2023 0416  Last data filed at 11/26/2023 0400  Gross per 24 hour   Intake 683 ml   Output 1475 ml   Net -792 ml     Unmeasured Output  Stool Occurrence: 0    Labs/Accuchecks:  Recent Labs   Lab 11/26/23  0029   WBC 11.76   RBC 4.50*   HGB 12.8*   HCT 40.1         Recent Labs   Lab 11/26/23  0029   *   K 3.5   CO2 18*   *   BUN 20   CREATININE 0.9   ALKPHOS 62   ALT 34   AST 71*   BILITOT 0.7    No results for input(s): "PROTIME", "INR", "APTT", "HEPANTIXA" in the last 168 hours.   Recent Labs   Lab 11/23/23  1339 11/23/23  1915 11/25/23  0752   CPK  --    < > 2239*   TROPONINI 0.063  --   --     < > = values in this interval not displayed. "       Electrolytes: Electrolytes replaced  Accuchecks: Q6H    Gtts:   fentanyl 97.5 mcg/hr (11/26/23 0400)       LDA/Wounds:  Lines/Drains/Airways       Drain  Duration                  NG/OG Tube 11/23/23 1343 [place] sump 18 Fr. Center mouth 2 days         Urethral Catheter 11/25/23 2352 <1 day              Airway  Duration                  Airway - Non-Surgical 11/23/23 1340 Endotracheal Tube 2 days              Peripheral Intravenous Line  Duration                  Peripheral IV - Single Lumen 11/23/23 1330 18 G Left Forearm 2 days         Peripheral IV - Single Lumen 11/23/23 1400 18 G Right Wrist 2 days         Peripheral IV - Single Lumen 11/25/23 2356 22 G Anterior;Right Forearm <1 day                  Wounds: Yes  Wound care consulted: Yes

## 2023-11-26 NOTE — NURSING
1.  Stop simva-  2.  Check INR in 1 week  3.  Start amiodarone to suppress the 'bad rhythm' (VT).  4.  Nuclear stress test.  5.  Blood tests today.  6.  Spirometry next week (lung test).     1547 Antony MABRY notified of -150, 12 Lead EKG done. Order to give one time dose of 25mcg fentanyl.

## 2023-11-26 NOTE — ASSESSMENT & PLAN NOTE
11/26/2023 Lungs under aerated but otherwise grossly clear within technical limitations.  There is better visualization of the left hemidiaphragm on today's exam without definite opacity.

## 2023-11-26 NOTE — PROGRESS NOTES
[facility] - Neuro Critical Care  Neurocritical Care  Progress Note    Admit Date: 11/23/2023  Service Date: 11/26/2023  Length of Stay: 3    Subjective:     Chief Complaint: Embolic stroke involving right middle cerebral artery    History of Present Illness: The patient is an 83-year-old with PMHx of  CAD and DM admitted to [facility] with large R MCA stroke and seizure.Per chart review, in emergency department at [facility]  presented with with altered mental status and focal seizure activity.  Daughter states patient was able to talk to her on the phone at 10:00 p.m. last night was at baseline.  Patient living in assisted living but still very functional.  Daughter states patien still drives on occasion.  She does not believe he takes any blood thinners.  She went to check on him is afternoon and he had fallen next to the toilet.  Patient unable to provide any history.    Noted to have tonic-clonic activity to the right upper extremity on arrival.  Patient with incomprehensible speech.  Copious vomiting on EMS arrival per paramedic.  Patient unable to contribute to history.  Last presumed normal was 10:00 p.m. last night. Out of window for thrombolytics.  Patient intubated for airway protection at outside hospital. On arrival CTH with large R MCA - stroke team consulted and plan for IR. Patient loaded with Keppra, EEG pending. Lactic acid 11.9 on arrival, pancx and initiated broad spec abx. Patient admitted to [facility] for close monitoring and higher level of care.        Hospital Course: 11/24/2023 MRI complete and reviewed, Nsgy consulted, EEG neg and dced  11/25/2023: initiate baby ASA and sq heparin, follow CT head in AM, SBP <180, initiate tube feeds  11/26/2023 EKG and if afib happens again will treat, Extubated 11/26 at 1400, Low dose precedex for agitation        Review of Systems   Unable to obtain a complete ROS due to level of consciousness.  Objective:     Vitals:  Temp: 99.3 °F (37.4 °C)  Pulse:  82  Rhythm: atrial rhythm  BP: (!) 152/69  MAP (mmHg): 103  Resp: (!) 30  SpO2: (!) 92 %  Oxygen Concentration (%): 30  Vent Mode: Spont  Set Rate: 14 BPM  Vt Set: 500 mL  Pressure Support: 5 cmH20  PEEP/CPAP: 5 cmH20  Peak Airway Pressure: 4.3 cmH20  Mean Airway Pressure: 6.6 cmH20  Plateau Pressure: 0 cmH20    Temp  Min: 98 °F (36.7 °C)  Max: 100.3 °F (37.9 °C)  Pulse  Min: 52  Max: 112  BP  Min: 95/50  Max: 168/82  MAP (mmHg)  Min: 68  Max: 118  Resp  Min: 14  Max: 35  SpO2  Min: 91 %  Max: 99 %  Oxygen Concentration (%)  Min: 30  Max: 40    11/25 0701 - 11/26 0700  In: 783 [I.V.:155.7]  Out: 1635 [Urine:1635]   Unmeasured Output  Stool Occurrence: 0        Physical Exam  GA: Alert, comfortable, no acute distress.   HEENT: No scleral icterus or JVD.   Pulmonary: Clear to auscultation A/L.   Cardiac: RRR S1 & S2 w/o rubs/murmurs/gallops.   Abdominal: Bowel sounds present x 4. No appreciable hepatosplenomegaly.  Skin: No jaundice, rashes, or visible lesions.  Neuro:  --GCS: E2 V1 M6  --Mental Status:  opens eyes to touch, intermit follows simple commands  --CN II-XII grossly intact.   --Pupils 2mm, PERRL.   --Corneal reflex, gag, cough intact.  --LUE  no movement  --RUE spont  --BLE withdraws to pain       Medications:  ContinuousdexmedeTOMIDine (Precedex) infusion (titrating), Last Rate: 0.2 mcg/kg/hr (11/26/23 5154)  fentanyl, Last Rate: 12.5 mcg/hr (11/26/23 1502)    Scheduledaspirin, 81 mg, Daily  atorvastatin, 40 mg, Daily  famotidine, 20 mg, BID  heparin (porcine), 5,000 Units, Q8H  levETIRAcetam (Keppra) IV (PEDS and ADULTS), 750 mg, Q12H  senna-docusate 8.6-50 mg, 1 tablet, Daily  silodosin, 4 mg, Daily    PRNacetaminophen, 650 mg, Q6H PRN  dextrose 10%, 12.5 g, PRN  dextrose 10%, 25 g, PRN  fentaNYL, 50 mcg, Once PRN  hydrALAZINE, 10 mg, Q4H PRN  labetalol, 10 mg, Q4H PRN  magnesium oxide, 800 mg, PRN  magnesium oxide, 800 mg, PRN  ondansetron, 4 mg, Q6H PRN  potassium bicarbonate, 35 mEq, PRN  potassium  bicarbonate, 50 mEq, PRN  potassium bicarbonate, 60 mEq, PRN  potassium, sodium phosphates, 2 packet, PRN  potassium, sodium phosphates, 2 packet, PRN  potassium, sodium phosphates, 2 packet, PRN  sodium chloride 0.9%, 10 mL, PRN  sodium chloride 0.9%, 10 mL, PRN      Today I personally reviewed pertinent medications, lines/drains/airways, imaging, cardiology results, laboratory results, microbiology results,    Diet  Diet NPO          Assessment/Plan:     Neuro  * Embolic stroke involving right middle cerebral artery  83 y.o. male with PMHx of CAD, HF, HTN, DM who was transferred from [facility] for R MCA stroke and possible intervention on suspected R MCA occlusion. Patient initially presented to OSH with AMS and seizure activity. LKN 11/22/23 at 10pm. Out of treatment window for thrombolytics. CT at OSH with early ischemic changes in R MCA territory and hyperdense R MCA sign concerning for R MCA occlusion. Patient was intubated and sedated at OSH for airway protection.   Stroke team consulted  SBP <220  TTE/EKG/A1C/lipid panel penidg  Neuro checks q 1 hr  Thrombectomy unsuccessful   Statin   PT/OT  SLP when appropriate   CTH complete  MRI pending  A1C 6.6  SSI  Lipid profile complete  TTE complete  MRI complete and reviewed, Nsgy consulted, EEG neg and dced  Holding VTE ppx for pending Nsgy recs  11/25/2023: initiated sq heparin and baby asa  Follow CT head in AM  11/26/2023 EKG and if afib happens again will treat, Extubated 11/26 at 1400, Low dose precedex for agitation        Hemiparesis, 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  PT/OT      Seizure-like activity  Seizure like activity noted at outside facility  Loaded w/ 2gm of Keppra   Continue maintenance Keppra 750mg BID  Seizure precautions  EEG neg and dced on 11/24    Pulmonary  Aspiration pneumonia due to vomit  11/26/2023 Lungs under aerated but otherwise grossly clear within technical limitations.  There is better visualization of the left hemidiaphragm on  today's exam without definite opacity.     Oncology  Leukocytosis  POA  Pancx   Initiated broad spectrum abx  Trend lactic acid  11/26/2023 WNL     Endocrine  Diabetes mellitus  Hx of DM  A1C 6.6  PRN moderate dose SSI     Palliative Care  Endotracheally intubated  Patient intubated at outside facility for airway protection  Extubated 11/26 at 1400, Low dose precedex for agitation          Activity Orders            Straight Cath starting at 11/25 1809    Turn patient starting at 11/23 2000    Progressive Mobility Protocol (mobilize patient to their highest level of functioning at least twice daily) starting at 11/23 2000    Elevate HOB starting at 11/23 1821    Diet NPO: NPO starting at 11/23 1821          Full Code    Antony Murrell NP  Neurocritical Care  [facility] - Neuro Critical Care

## 2023-11-26 NOTE — NURSING
[id number] Raymon MABRY notified of pt (intermittently) increase restlessness of RUE and RLE. Pt in afib with HR in 130s-140s . Fentanyl requirement increased. 12 lead EKG done. Pt bladder scanned with volume of 700cc. RN noted blood around pt meatus and on pad. Raymon MABRY notified. Order to place dubon catheter.

## 2023-11-26 NOTE — ASSESSMENT & PLAN NOTE
Patient intubated at outside facility for airway protection  Extubated 11/26 at 1400, Low dose precedex for agitation

## 2023-11-26 NOTE — ASSESSMENT & PLAN NOTE
83 y.o. male with PMHx of CAD, HF, HTN, DM who was transferred from [facility] for R MCA stroke and possible intervention on suspected R MCA occlusion. Patient initially presented to OSH with AMS and seizure activity. LKN 11/22/23 at 10pm. Out of treatment window for thrombolytics. CT at OSH with early ischemic changes in R MCA territory and hyperdense R MCA sign concerning for R MCA occlusion. Patient was intubated and sedated at OSH for airway protection.   Stroke team consulted  SBP <220  TTE/EKG/A1C/lipid panel penidg  Neuro checks q 1 hr  Thrombectomy unsuccessful   Statin   PT/OT  SLP when appropriate   CTH complete  MRI pending  A1C 6.6  SSI  Lipid profile complete  TTE complete  MRI complete and reviewed, Nsgy consulted, EEG neg and dced  Holding VTE ppx for pending Nsgy recs  11/25/2023: initiated sq heparin and baby asa  Follow CT head in AM  11/26/2023 EKG and if afib happens again will treat, Extubated 11/26 at 1400, Low dose precedex for agitation

## 2023-11-26 NOTE — SUBJECTIVE & OBJECTIVE
Review of Systems   Unable to obtain a complete ROS due to level of consciousness.  Objective:     Vitals:  Temp: 99.3 °F (37.4 °C)  Pulse: 82  Rhythm: atrial rhythm  BP: (!) 152/69  MAP (mmHg): 103  Resp: (!) 30  SpO2: (!) 92 %  Oxygen Concentration (%): 30  Vent Mode: Spont  Set Rate: 14 BPM  Vt Set: 500 mL  Pressure Support: 5 cmH20  PEEP/CPAP: 5 cmH20  Peak Airway Pressure: 4.3 cmH20  Mean Airway Pressure: 6.6 cmH20  Plateau Pressure: 0 cmH20    Temp  Min: 98 °F (36.7 °C)  Max: 100.3 °F (37.9 °C)  Pulse  Min: 52  Max: 112  BP  Min: 95/50  Max: 168/82  MAP (mmHg)  Min: 68  Max: 118  Resp  Min: 14  Max: 35  SpO2  Min: 91 %  Max: 99 %  Oxygen Concentration (%)  Min: 30  Max: 40    11/25 0701 - 11/26 0700  In: 783 [I.V.:155.7]  Out: 1635 [Urine:1635]   Unmeasured Output  Stool Occurrence: 0        Physical Exam  GA: Alert, comfortable, no acute distress.   HEENT: No scleral icterus or JVD.   Pulmonary: Clear to auscultation A/L.   Cardiac: RRR S1 & S2 w/o rubs/murmurs/gallops.   Abdominal: Bowel sounds present x 4. No appreciable hepatosplenomegaly.  Skin: No jaundice, rashes, or visible lesions.  Neuro:  --GCS: E2 V1 M6  --Mental Status:  opens eyes to touch, intermit follows simple commands  --CN II-XII grossly intact.   --Pupils 2mm, PERRL.   --Corneal reflex, gag, cough intact.  --LUE  no movement  --RUE spont  --BLE withdraws to pain       Medications:  ContinuousdexmedeTOMIDine (Precedex) infusion (titrating), Last Rate: 0.2 mcg/kg/hr (11/26/23 0674)  fentanyl, Last Rate: 12.5 mcg/hr (11/26/23 1502)    Scheduledaspirin, 81 mg, Daily  atorvastatin, 40 mg, Daily  famotidine, 20 mg, BID  heparin (porcine), 5,000 Units, Q8H  levETIRAcetam (Keppra) IV (PEDS and ADULTS), 750 mg, Q12H  senna-docusate 8.6-50 mg, 1 tablet, Daily  silodosin, 4 mg, Daily    PRNacetaminophen, 650 mg, Q6H PRN  dextrose 10%, 12.5 g, PRN  dextrose 10%, 25 g, PRN  fentaNYL, 50 mcg, Once PRN  hydrALAZINE, 10 mg, Q4H PRN  labetalol, 10 mg,  Q4H PRN  magnesium oxide, 800 mg, PRN  magnesium oxide, 800 mg, PRN  ondansetron, 4 mg, Q6H PRN  potassium bicarbonate, 35 mEq, PRN  potassium bicarbonate, 50 mEq, PRN  potassium bicarbonate, 60 mEq, PRN  potassium, sodium phosphates, 2 packet, PRN  potassium, sodium phosphates, 2 packet, PRN  potassium, sodium phosphates, 2 packet, PRN  sodium chloride 0.9%, 10 mL, PRN  sodium chloride 0.9%, 10 mL, PRN      Today I personally reviewed pertinent medications, lines/drains/airways, imaging, cardiology results, laboratory results, microbiology results,    Diet  Diet NPO

## 2023-11-27 PROBLEM — I48.0 PAROXYSMAL ATRIAL FIBRILLATION: Status: ACTIVE | Noted: 2023-01-01

## 2023-11-27 NOTE — PLAN OF CARE
"[facility] Care Plan    POC reviewed with Tim Rhodes and family at 0448. Pt unable to  verbalized understanding. Questions and concerns addressed with daughter. No acute events overnight. Pt progressing toward goals. Will continue to monitor. See below and flowsheets for full assessment and VS info.     -Dilaudid x1  -precedex gtt       Is this a stroke patient? yes- Stroke booklet reviewed with patient and family, risk factors identified for patient and stroke booklet remains at bedside for ongoing education.     Neuro:  Win Coma Scale  Best Eye Response: 1-->(E1) none  Best Motor Response: 6-->(M6) obeys commands  Best Verbal Response: 1-->(V1) none  Crum Coma Scale Score: 8  Assessment Qualifiers: patient intubated, patient chemically sedated or paralyzed  Pupil PERRLA: yes     24hr Temp:  [98.7 °F (37.1 °C)-100.2 °F (37.9 °C)]     CV:   Rhythm: atrial rhythm  BP goals:   SBP < 180  MAP > 65    Resp:      Vent Mode: Spont  Set Rate: 14 BPM  Oxygen Concentration (%): 30  Vt Set: 500 mL  PEEP/CPAP: 5 cmH20  Pressure Support: 5 cmH20    Plan: N/A    GI/:     Diet/Nutrition Received: NPO  Last Bowel Movement: 11/23/23  Voiding Characteristics: incontinence    Intake/Output Summary (Last 24 hours) at 11/27/2023 0448  Last data filed at 11/27/2023 0305  Gross per 24 hour   Intake 558.61 ml   Output 1690 ml   Net -1131.39 ml     Unmeasured Output  Stool Occurrence: 0    Labs/Accuchecks:  Recent Labs   Lab 11/27/23 0225   WBC 11.91   RBC 5.04   HGB 13.9*   HCT 44.5         Recent Labs   Lab 11/27/23 0225   *   K 4.0   CO2 17*   *   BUN 21   CREATININE 0.8   ALKPHOS 70   ALT 36   AST 70*   BILITOT 0.6    No results for input(s): "PROTIME", "INR", "APTT", "HEPANTIXA" in the last 168 hours.   Recent Labs   Lab 11/23/23  1339 11/23/23  1915 11/25/23  0752   CPK  --    < > 2239*   TROPONINI 0.063  --   --     < > = values in this interval not displayed.       Electrolytes: N/A - electrolytes " WDL  Accuchecks: Q6H    Gtts:   dexmedeTOMIDine (Precedex) infusion (titrating) 0.2 mcg/kg/hr (11/27/23 0305)       LDA/Wounds:  Lines/Drains/Airways       Drain  Duration                  Urethral Catheter 11/25/23 2352 1 day              Peripheral Intravenous Line  Duration                  Peripheral IV - Single Lumen 11/23/23 1330 18 G Left Forearm 3 days         Peripheral IV - Single Lumen 11/23/23 1400 18 G Right Wrist 3 days         Peripheral IV - Single Lumen 11/25/23 2356 22 G Anterior;Right Forearm 1 day                  Wounds: Yes  Wound care consulted: Yes

## 2023-11-27 NOTE — CONSULTS
[facility] - Neuro Critical Care  Wound Care    Patient Name:  Tim Rhodes   MRN:  [id number]  Date: 11/27/2023  Diagnosis: Embolic stroke involving right middle cerebral artery    History:     Past Medical History:   Diagnosis Date    Arthritis     Weiss's esophagus     CHF (congestive heart failure)     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/23/2023    GERD (gastroesophageal reflux disease)     Type 2 diabetes mellitus        Social History     Socioeconomic History    Marital status: Unknown     Social Determinants of Health     Housing Stability: Low Risk  (11/24/2023)    Housing Stability Vital Sign     Unable to Pay for Housing in the Last Year: No     Number of Places Lived in the Last Year: 1     Unstable Housing in the Last Year: No       Precautions:     Allergies as of 11/23/2023    (No Known Allergies)       WOC Assessment Details/Treatment     Patient seen for wound care consultation for R lower eyelid.   Reviewed chart for this encounter.   See Flow Sheet for findings.    Pt found lying in bed, OK for care at this time. On assessment, no wounds or altered skin integrity to either eye noted, no acute needs at this time. Wound care will sign off.    Discussed POC with patient and primary nurse.   See EMR for orders & patient education.    Nursing to continue care & monitoring.  Nursing to maintain pressure injury prevention interventions.     11/27/23 1312   WOCN Assessment   WOCN Total Time (mins) 15   Visit Date 11/27/23   Visit Time 1312   Consult Type New   WOCN Speciality Wound   Intervention assessed;chart review;coordination of care   Teaching on-going

## 2023-11-27 NOTE — PLAN OF CARE
Recommendations  1. If PO intake deemed, 1500 diabetic diet appropriate as tolerated.   2. If warranted, rec'd continuing current tube feeding regimen Glucerna 1.5 @ 40ml/hr, providing 1440 kcal, 79g of protein, and 729 mL FW.    3. RD to monitor and follow up per MD orders.     Goals: Meet % EEN/EPN by RD f/u date  Nutrition Goal Status: progressing towards goal  Communication of RD Recs: other (comment) (POC)

## 2023-11-27 NOTE — NURSING
1640 Antony NP called about increased agitation and restlessness. No new orders at this time. WCTM.

## 2023-11-27 NOTE — ASSESSMENT & PLAN NOTE
Tim Rhodes is a 83 y.o. male with PMHx of CAD, HF,  DM that was transferred from [facility] with suspected R MCA occlusion for possible intervention. Initially presented to OSH with AMS and seizure activity, LKN OOW for TNK. CTH showed early ischemic changes in R MCA territory and hyperdense R MCA sign concerning for R MCA occlusion. Patient was intubated and sedated at OSH for airway protection. Repeat CT on [facility] appears stable in comparison to prior. Patient independent at baseline per daughter. Discussed patient with neuro IR and patient taken to IR for possible thrombectomy, now s/p unsuccessful IR. MRI brain confirms large R MCA infarcts. Echo with EF 60% and apical akinesis. Stroke etiology likely cardio-embolic due to new onset AFib this admission.    Extubated yesterday. Tolerating without complications. Precedex stopped secondary to bradycardia. Neuro exam stable.       Antithrombotics for secondary stroke prevention: Antiplatelets: Aspirin: 81 mg daily    Statins for secondary stroke prevention: Statins: Atorvastatin- 40 mg daily    Aggressive risk factor modification: DM, CAD     Rehab efforts: The patient has been evaluated by a stroke team provider and the therapy needs have been fully considered based off the presenting complaints and exam findings. The following therapy evaluations are needed:  PT/OT/SLP when appropriate    Diagnostics ordered/pending: None     VTE prophylaxis: Heparin 5000 units SQ every 8 hours  Mechanical prophylaxis: Place SCDs    BP parameters: Infarct: Post unsucessful thrombectomy, SBP <220

## 2023-11-27 NOTE — ASSESSMENT & PLAN NOTE
Tim Rhodes is a 83 y.o. male with PMHx of CAD, HF,  DM that was transferred from [facility] with suspected R MCA occlusion for possible intervention. Initially presented to OSH with AMS and seizure activity, LKN OOW for TNK. CTH showed early ischemic changes in R MCA territory and hyperdense R MCA sign concerning for R MCA occlusion. Patient was intubated and sedated at OSH for airway protection.Repeat CT on arrival to [facility] appears stable in comparison to prior. Patient independent at baseline per daughter. Discussed patient with neuro IR and patient taken to IR for possible thrombectomy, now s/p unsuccessful IR. MRI brain confirms large R MCA infarcts. Echo with EF 60% and apical akinesis. Stroke etiology ESUS at this time, will need 30d cardiac event monitor at discharge for further stroke workup.    Patient with afib overnight, fentanyl increased. Butcher catheter placed due to retention. Extubated today at 1400, on 4L NC at the time of visit. Low dose precedex for agitation.        Antithrombotics for secondary stroke prevention: Antiplatelets: Aspirin: 81 mg daily    Statins for secondary stroke prevention: Statins: Atorvastatin- 40 mg daily    Aggressive risk factor modification: DM, CAD     Rehab efforts: The patient has been evaluated by a stroke team provider and the therapy needs have been fully considered based off the presenting complaints and exam findings. The following therapy evaluations are needed:  PT/OT/SLP when appropriate    Diagnostics ordered/pending: None     VTE prophylaxis: Heparin 5000 units SQ every 8 hours  Mechanical prophylaxis: Place SCDs    BP parameters: Infarct: Post unsucessful thrombectomy, SBP <220

## 2023-11-27 NOTE — SUBJECTIVE & OBJECTIVE
Neurologic Chief Complaint: R MCA Stroke     Subjective:     Interval History: Patient is seen for follow-up neurological assessment and treatment recommendations: Extubated yesterday. Tolerating without complications. Precedex stopped secondary to bradycardia. Neuro exam stable.     HPI, Past Medical, Family, and Social History remains the same as documented in the initial encounter.     Review of Systems   Unable to perform ROS: Patient nonverbal     Scheduled Meds:   aspirin  300 mg Rectal Daily    atorvastatin  40 mg Per OG tube Daily    heparin (porcine)  5,000 Units Subcutaneous Q8H    levETIRAcetam (Keppra) IV (PEDS and ADULTS)  750 mg Intravenous Q12H    senna-docusate 8.6-50 mg  2 tablet Per OG tube BID    silodosin  4 mg Per OG tube Daily     Continuous Infusions:   sodium chloride 0.9% 75 mL/hr at 11/27/23 1345     PRN Meds:acetaminophen, artificial tears, calcium gluconate IVPB, calcium gluconate IVPB, calcium gluconate IVPB, dextrose 10%, dextrose 10%, hydrALAZINE, labetalol, magnesium sulfate IVPB, magnesium sulfate IVPB, ondansetron, potassium chloride **AND** potassium chloride **AND** potassium chloride, sodium phosphate 15 mmol in dextrose 5 % (D5W) 250 mL IVPB, sodium phosphate 20.01 mmol in dextrose 5 % (D5W) 250 mL IVPB, sodium phosphate 30 mmol in dextrose 5 % (D5W) 250 mL IVPB    Objective:     Vital Signs (Most Recent):  Temp: 98.2 °F (36.8 °C) (11/27/23 1102)  Pulse: 87 (11/27/23 1302)  Resp: (!) 33 (11/27/23 1302)  BP: (!) 168/78 (11/27/23 1302)  SpO2: (!) 93 % (11/27/23 1302)  BP Location: Left arm    Vital Signs Range (Last 24H):  Temp:  [98.2 °F (36.8 °C)-100.2 °F (37.9 °C)]   Pulse:  []   Resp:  [17-77]   BP: ()/()   SpO2:  [87 %-94 %]   BP Location: Left arm       Physical Exam  Constitutional:       General: He is not in acute distress.     Appearance: He is ill-appearing.   HENT:      Head: Normocephalic and atraumatic.   Eyes:      Pupils: Pupils are equal, round,  "and reactive to light.   Cardiovascular:      Rate and Rhythm: Normal rate.   Pulmonary:      Effort: No respiratory distress.   Neurological:      Motor: Weakness present.      Comments: Nonverbal, lethargic  Left-sided weakness              Neurological Exam:   LOC: drowsy  Attention Span: poor  Language: Global aphasia  Orientation:  Untestable due to severe aphasia   Pupils (CN II, III): PERRL  Motor: Arm left  Paresis: 0/5  Leg left  Paresis: 2/5  Arm right  Paresis: 4/5  Leg right Paresis: 3/5    Laboratory:  CMP:   Recent Labs   Lab 11/27/23 0225   CALCIUM 9.5   ALBUMIN 2.6*   PROT 7.2   *   K 4.0   CO2 17*   *   BUN 21   CREATININE 0.8   ALKPHOS 70   ALT 36   AST 70*   BILITOT 0.6       BMP:   Recent Labs   Lab 11/27/23 0225   *   K 4.0   *   CO2 17*   BUN 21   CREATININE 0.8   CALCIUM 9.5       CBC:   Recent Labs   Lab 11/27/23 0225   WBC 11.91   RBC 5.04   HGB 13.9*   HCT 44.5      MCV 88   MCH 27.6   MCHC 31.2*       Lipid Panel:   Recent Labs   Lab 11/23/23 1915   CHOL 94*   LDLCALC 48.4*   HDL 32*   TRIG 68       Coagulation: No results for input(s): "PT", "INR", "APTT" in the last 168 hours.  Platelet Aggregation Study: No results for input(s): "PLTAGG", "PLTAGINTERP", "PLTAGREGLACO", "ADPPLTAGGREG" in the last 168 hours.  Hgb A1C:   Recent Labs   Lab 11/23/23 1915   HGBA1C 6.6*       TSH:   Recent Labs   Lab 11/23/23 1915   TSH 2.121         Diagnostic Results         Brain Imaging:  CT Head 11/26/23  Impression:  Evolving recent large right MCA territorial infarct.  No hemorrhagic conversion.  Mild midline shift minimally progressed from the prior study.    MRI brain without contrast 11/24/2023  Impression:  Large right MCA territorial infarct without hemorrhagic conversion.  Currently very mild mass effect.  Loss of the right carotid flow void consistent with occlusion versus slow flow.     CT Head 11/23/23 1812  Impression:  Acute right MCA territory infarction, " similar to prior.  No infarct extension or hemorrhagic conversion.  Associated hyperdense vessel sign likely involving the right M1 and a proximal M2 branch.  Changes of generalized cerebral volume loss.  Paranasal sinus disease as above.     CT Head 11/23/23 1416  FINDINGS:  Decreased attenuation in sulcal effacement right MCA distribution consistent with acute in Ca infarct.  Hyperdense right MCA sign consistent with MCA thrombus.  Impression:  Acute right MCA infarct.        Vessel Imaging:  IR cerebral angiogram 11/23/2023  Preliminary interpretation: occlusion of R ICA at origin, unable to cross distally to perform angioplasty and stenting. Attempted thrombectomy at origin, tici 0.  Please see Imaging report for full details.        Cardiac Evaluation:   EKG 11/26/2023  Atrial Fibrillation     TTE 11/24/2023    Left Ventricle: The left ventricle is normal in size. Ventricular mass is normal. Normal wall thickness. There is concentric remodeling. Regional wall motion abnormalities present - see diagram for wall motion findings. There is normal systolic function. Ejection fraction by visual approximation is 60%. There is normal diastolic function.    Right Ventricle: Normal right ventricular cavity size. Wall thickness is normal. Right ventricle wall motion  is normal. Systolic function is normal.    Aortic Valve: The aortic valve is a trileaflet valve. There is mild aortic valve sclerosis. There is annular calcification present. There is mild aortic regurgitation.    IVC/SVC: Patient is ventilated, cannot use IVC diameter to estimate right atrial pressure. PASP is at least 24mmHg.

## 2023-11-27 NOTE — PLAN OF CARE
"[facility] Care Plan    POC reviewed with Tim Rhodes and family at 1830. Family verbalized understanding.  Will continue to monitor. See below and flowsheets for full assessment and VS info.     - Precedex gtt started   - Fentanyl gtt off   - Neuro exam unchanged   - Extubated today to 4L Nasal Cannula   - Butcher catheter in place       Is this a stroke patient? yes- Stroke booklet reviewed with patient and family, risk factors identified for patient and stroke booklet remains at bedside for ongoing education.     Neuro:  Win Coma Scale  Best Eye Response: 1-->(E1) none  Best Motor Response: 4-->(M4) withdraws from pain  Best Verbal Response: 1-->(V1) none  Win Coma Scale Score: 6  Assessment Qualifiers: patient not sedated/intubated  Pupil PERRLA: yes     24hr Temp:  [98 °F (36.7 °C)-100.3 °F (37.9 °C)]     CV:   Rhythm: atrial rhythm  BP goals:   SBP < 180  MAP > 65    Resp:      Vent Mode: Spont  Set Rate: 14 BPM  Oxygen Concentration (%): 30  Vt Set: 500 mL  PEEP/CPAP: 5 cmH20  Pressure Support: 5 cmH20    Plan: 4L NC with humidification    GI/:     Diet/Nutrition Received: NPO  Last Bowel Movement: 11/23/23  Voiding Characteristics: incontinence    Intake/Output Summary (Last 24 hours) at 11/26/2023 1821  Last data filed at 11/26/2023 1502  Gross per 24 hour   Intake 862.07 ml   Output 1785 ml   Net -922.93 ml     Unmeasured Output  Stool Occurrence: 0    Labs/Accuchecks:  Recent Labs   Lab 11/26/23  0029 11/26/23  0423 11/26/23  1213   WBC 11.76  --   --    RBC 4.50*  --   --    HGB 12.8*  --   --    HCT 40.1   < > 37     --   --     < > = values in this interval not displayed.      Recent Labs   Lab 11/26/23  0029 11/26/23  1337   *  --    K 3.5 4.2   CO2 18*  --    *  --    BUN 20  --    CREATININE 0.9  --    ALKPHOS 62  --    ALT 34  --    AST 71*  --    BILITOT 0.7  --     No results for input(s): "PROTIME", "INR", "APTT", "HEPANTIXA" in the last 168 hours.   Recent Labs   Lab " 11/23/23  1339 11/23/23  1915 11/25/23  0752   CPK  --    < > 2239*   TROPONINI 0.063  --   --     < > = values in this interval not displayed.       Electrolytes: N/A - electrolytes WDL  Accuchecks: Q6H    Gtts:   dexmedeTOMIDine (Precedex) infusion (titrating) 0.2 mcg/kg/hr (11/26/23 1734)    fentanyl 12.5 mcg/hr (11/26/23 1502)       LDA/Wounds:  Lines/Drains/Airways       Drain  Duration                  Urethral Catheter 11/25/23 2352 <1 day              Peripheral Intravenous Line  Duration                  Peripheral IV - Single Lumen 11/23/23 1330 18 G Left Forearm 3 days         Peripheral IV - Single Lumen 11/23/23 1400 18 G Right Wrist 3 days         Peripheral IV - Single Lumen 11/25/23 2356 22 G Anterior;Right Forearm <1 day                  Wounds: Yes  Wound care consulted: Yes

## 2023-11-27 NOTE — NURSING
0800 Patient experiencing increased restlessness and agitation causing tachycardia in 170s. Antony MABRY notified. No new orders at this time.

## 2023-11-27 NOTE — SUBJECTIVE & OBJECTIVE
Review of Systems   Unable to obtain a complete ROS due to level of consciousness.  Objective:     Vitals:  Temp: 98.2 °F (36.8 °C)  Pulse: 91  Rhythm: atrial rhythm  BP: 115/65  MAP (mmHg): 84  Resp: (!) 31  SpO2: (!) 94 %    Temp  Min: 98.2 °F (36.8 °C)  Max: 100.2 °F (37.9 °C)  Pulse  Min: 49  Max: 145  BP  Min: 94/57  Max: 214/84  MAP (mmHg)  Min: 70  Max: 143  Resp  Min: 17  Max: 77  SpO2  Min: 87 %  Max: 95 %    11/26 0701 - 11/27 0700  In: 506.3 [I.V.:126.3]  Out: 1530 [Urine:1530]   Unmeasured Output  Stool Occurrence: 0        Physical Exam  GA: Alert, comfortable, no acute distress.   HEENT: No scleral icterus or JVD.   Pulmonary: Clear to auscultation A/L.   Cardiac: RRR S1 & S2 w/o rubs/murmurs/gallops.   Abdominal: Bowel sounds present x 4. No appreciable hepatosplenomegaly.  Skin: No jaundice, rashes, or visible lesions.  Neuro:  --GCS: E2 V1 M6  --Mental Status: opens eyes to touch, intermit follows simple commands  --CN II-XII grossly intact.   --Pupils 2mm, PERRL.   --Corneal reflex, gag, cough intact.  --LUE no movement  --RUE spont  --BLE withdraws to pain       Medications:  Continuoussodium chloride 0.9%, Last Rate: 75 mL/hr at 11/27/23 1502    Scheduledaspirin, 300 mg, Daily  atorvastatin, 40 mg, Daily  heparin (porcine), 5,000 Units, Q8H  levETIRAcetam (Keppra) IV (PEDS and ADULTS), 750 mg, Q12H  senna-docusate 8.6-50 mg, 2 tablet, BID  silodosin, 4 mg, Daily    PRNacetaminophen, 650 mg, Q6H PRN  artificial tears, 2 drop, QID PRN  calcium gluconate IVPB, 1 g, PRN  calcium gluconate IVPB, 2 g, PRN  calcium gluconate IVPB, 3 g, PRN  dextrose 10%, 12.5 g, PRN  dextrose 10%, 25 g, PRN  hydrALAZINE, 10 mg, Q4H PRN  labetalol, 10 mg, Q4H PRN  magnesium sulfate IVPB, 2 g, PRN  magnesium sulfate IVPB, 4 g, PRN  ondansetron, 4 mg, Q6H PRN  potassium chloride, 40 mEq, PRN   And  potassium chloride, 60 mEq, PRN   And  potassium chloride, 80 mEq, PRN  sodium phosphate 15 mmol in dextrose 5 % (D5W) 250  mL IVPB, 15 mmol, PRN  sodium phosphate 20.01 mmol in dextrose 5 % (D5W) 250 mL IVPB, 20.01 mmol, PRN  sodium phosphate 30 mmol in dextrose 5 % (D5W) 250 mL IVPB, 30 mmol, PRN      Today I personally reviewed pertinent medications, lines/drains/airways, imaging, cardiology results, laboratory results, microbiology results,    Diet  Diet NPO

## 2023-11-27 NOTE — PT/OT/SLP PROGRESS
Occupational Therapy   Re-evaluation s/p extubation/Treatment    Name: Tim Rhodes  MRN: [id number]  Admitting Diagnosis:  Embolic stroke involving right middle cerebral artery  4 Days Post-Op    Recommendations:     Discharge Recommendations: Moderate Intensity Therapy  Discharge Equipment Recommendations:  hospital bed  Barriers to discharge:  None    Assessment:     Tim Rhodes is a 83 y.o. male with a medical diagnosis of Embolic stroke involving right middle cerebral artery.  He presents with performance deficits affecting function are weakness, impaired endurance, impaired sensation, impaired self care skills, impaired balance, gait instability, impaired functional mobility, decreased upper extremity function, decreased lower extremity function, decreased coordination, impaired cognition, decreased ROM, abnormal tone, impaired coordination, impaired fine motor.     Rehab Prognosis:  Fair; patient would benefit from acute skilled OT services to address these deficits and reach maximum level of function.       Plan:     Patient to be seen 3 x/week to address the above listed problems via self-care/home management, therapeutic activities, therapeutic exercises, neuromuscular re-education, cognitive retraining  Plan of Care Expires: 11/22/23  Plan of Care Reviewed with: patient    Subjective     Patient:  Nonverbal  Pain/Comfort:  Pain Rating 1: 0/10  Pain Rating Post-Intervention 1: 0/10    Objective:     Communicated with: Nurse prior to session.  Patient found supine with bed alarm, blood pressure cuff, pulse ox (continuous), telemetry, SCD, restraints, oxygen, peripheral IV, dubon catheter upon OT entry to room.    General Precautions: Standard, aspiration, fall, NPO    Orthopedic Precautions:N/A  Braces: N/A  Respiratory Status: Nasal cannula, flow 4 L/min     Occupational Performance:     Bed Mobility:    Patient completed Rolling/Turning to Left with  total assistance  Patient completed Rolling/Turning to  Right with total assistance  Patient completed Supine to Sit with dependent  Patient completed Sit to Supine with dependent     Functional Mobility/Transfers:  Dependent drawsheet transfers    Activities of Daily Living:  Feeding:  NPO    Grooming: total assistance with hand over hand assist    [facility] 6 Click ADL: 6    Treatment & Education:  Daily orientation provided.  PROM performed left UE and AAROM right UE and bilateral LEs one set x 10 rep in all planes of motion with stretches provided at end range.  Assistance and facilitation provided for upward rotation of the scapula during shoulder flexion and abduction to promote orientation of glenoid fossa of scapula to humeral head for prevention of post-stroke hemiplegic pain. Patient followed 1/3 one step commands. commands.  Addressed cervical rotation to the right.  Moderate edema noted left UE; positioning provided in elevation.  Patient with restless movements right UE/LE throughout the session.   Positioning provided for midline orientation with bilateral UEs elevated and heels lifted off mattress.   Gentle cervical rotation provided.   Family asleep at bedside during the session.      Patient left right sidelying with all lines intact, call button in reach, and bed alarm on    GOALS:   Multidisciplinary Problems       Occupational Therapy Goals          Problem: Occupational Therapy    Goal Priority Disciplines Outcome Interventions   Occupational Therapy Goal     OT, PT/OT Ongoing, Progressing    Description: Goals set 11/27 to be addressed for 14 days with expiration date, 12/11:  Patient will increase functional independence with ADLs by performing:    Patient will demonstrate rolling to the right with mod assist.  Not met   Patient will demonstrate rolling to the left with mod assist.   Not met  Patient will demonstrate supine -sit with mod  assist.   Not met  Patient will demonstrate stand pivot transfers with mod assist.   Not met  Patient will  demonstrate grooming while seated with mod assist.   Not met  Patient will demonstrate upper body dressing with mod assist while seated EOB.   Not met  Patient will demonstrate lower body dressing with mod assist while seated EOB.   Not met  Patient will demonstrate toileting with mod assist.   Not met  Patient will demonstrate bathing while seated EOB with mod assist.   Not met  Patient's family / caregiver will demonstrate independence and safety with assisting patient with self-care skills and functional mobility.     Not met  Patient's family / caregiver will demonstrate independence with providing ROM and changes in bed positioning.   Not met                             Time Tracking:     OT Date of Treatment: 11/27/23  OT Start Time: 0400  OT Stop Time: 0428  OT Total Time (min): 28 min    Billable Minutes:Re-eval 5  Neuromuscular Re-education 23    OT/YADI: OT          11/27/2023

## 2023-11-27 NOTE — ASSESSMENT & PLAN NOTE
New onset A-Fib during evening of 11/26.  Will need further discussion for anticoagulation, currently on ASA only.

## 2023-11-27 NOTE — CONSULTS
[facility]  Adult Nutrition  Consult Note    SUMMARY     Recommendations  1. If PO intake deemed, 1500 diabetic diet appropriate as tolerated.   2. If warranted, rec'd continuing current tube feeding regimen Glucerna 1.5 @ 40ml/hr, providing 1440 kcal, 79g of protein, and 729 mL FW.    3. RD to monitor and follow up per MD orders.    Goals: Meet % EEN/EPN by RD f/u date  Nutrition Goal Status: progressing towards goal  Communication of RD Recs: other (comment) (POC)    Assessment and Plan  Nutrition Problem  Inadequate oral intake     Related to (etiology):   Inability to consume sufficient energy    Signs and Symptoms (as evidenced by):   NPO    Interventions/Recommendations (treatment strategy):  Collaboration of nutrition care w/other providers    Nutrition Diagnosis Status:   New     Reason for Assessment    Reason For Assessment: new tube feeding  Diagnosis: other (see comments) (Embolic stroke involving right middle cerebral artery)  Relevant Medical History: 2TDM, CHF, GERD, Barretts espophagus  Interdisciplinary Rounds: did not attend  General Information Comments: RD visited to assess current tube feeding. RD was unable to speak to patient at bedside because patient was sleeping. RD spoke to nurse about patient's tolerance of tube feeding. Nurse stated that patient was able to tolerate current tube feeding, however, MD is reevaluating patient's tube feeding. Nurse also reported that patient has discontinued tube feeding and any other plan of care until MD gives futher notice. Nurse also stated that patient was not experiecing any n/v/d/c. As per chart review, no significant weight changes.  Nutrition Discharge Planning: Pending clinincal course    Nutrition Risk Screen    Nutrition Risk Screen: tube feeding or parenteral nutrition    Nutrition/Diet History    Patient Reported Diet/Restrictions/Preferences: no oral intake  Spiritual, Cultural Beliefs, Mormonism Practices, Values  "that Affect Care: no    Anthropometrics    Temp: 98.2 °F (36.8 °C)  Height Method: Estimated  Height: 5' 3" (160 cm)  Height (inches): 63 in  Weight Method: Bed Scale  Weight: 77.6 kg (171 lb 1.2 oz)  Weight (lb): 171.08 lb  Ideal Body Weight (IBW), Male: 124 lb  % Ideal Body Weight, Male (lb): 137.97 %  BMI (Calculated): 30.3  BMI Grade: 30 - 34.9- obesity - grade I       Lab/Procedures/Meds    Pertinent Labs Reviewed: reviewed  Pertinent Labs Comments: sodium 147, chloride 115, glucose 140, phosphorus 2.6, albumin 2.6, AST 70,  A1C 6.6(11/23)  Pertinent Medications Reviewed: reviewed  Pertinent Medications Comments: aspirin, atorvastatin, famotidine, heparin, senna, silodosin    Estimated/Assessed Needs    Weight Used For Calorie Calculations: 56.3 kg (124 lb 1.9 oz) (IBW)  Energy Calorie Requirements (kcal): 9497-6325 kcal/d (25-30kcal x IBW)  Energy Need Method: Kcal/kg (25-30kcal x IBW)  Protein Requirements: 77-93g (1.0-1.2 g/kg (elderly + obese))  Weight Used For Protein Calculations: 77.6 kg (171 lb 1.2 oz)  Fluid Requirements (mL): 1ml/kcal of fluid or per MD     RDA Method (mL): 1407  CHO Requirement: 175-211 g    Nutrition Prescription Ordered    Current Diet Order: NPO    Evaluation of Received Nutrient/Fluid Intake    I/O: -1.032L since admit  Comments: LBM 11/23  % Intake of Estimated Energy Needs: NPO  % Meal Intake: NPO    Nutrition Risk    Level of Risk/Frequency of Follow-up: low - moderate (f/u 1-2x/week)     Monitor and Evaluation    Food and Nutrient Intake: enteral nutrition intake, parenteral nutrition intake  Food and Nutrient Adminstration: enteral and parenteral nutrition administration  Knowledge/Beliefs/Attitudes: food and nutrition knowledge/skill, beliefs and attitudes  Physical Activity and Function: nutrition-related ADLs and IADLs  Anthropometric Measurements: body mass index, weight change, weight, height/length  Biochemical Data, Medical Tests and Procedures: lipid profile, " electrolyte and renal panel, gastrointestinal profile, glucose/endocrine profile, inflammatory profile  Nutrition-Focused Physical Findings: skin, head and eyes, extremities, muscles and bones, overall appearance     Nutrition Follow-Up    RD Follow-up?: Yes  I certify that I directed the dietetic intern in service delivery and guided them using my skilled judgment. As the cosigning dietitian, I have reviewed the dietetic interns documentation and am responsible for the treatment, assessment, and plan.   Franchesca Treviño Registration Eligible, Provisional LDN

## 2023-11-27 NOTE — PLAN OF CARE
[facility]  Discharge Reassessment    Primary Care Provider: Jeannette, Primary Doctor    Expected Discharge Date: 12/5/2023    Per MD: Extubated 11/26 at 1400, Low dose precedex for agitation.  Restraints.  NPO.  Not medically ready for discharge.  Patient would likely benefit from Skilled Nursing.        dexmedeTOMIDine (Precedex) infusion (titrating) 0.6 mcg/kg/hr (11/27/23 0902)        Discharge Plan A and Plan B have been determined by review of patient's clinical status, future medical and therapeutic needs, and coverage/benefits for post-acute care in coordination with multidisciplinary team members.      Reassessment (most recent)       Discharge Reassessment - 11/27/23 1057          Discharge Reassessment    Assessment Type Discharge Planning Reassessment     Did the patient's condition or plan change since previous assessment? No     Communicated XIOMARA with patient/caregiver Date not available/Unable to determine     Discharge Plan A Skilled Nursing Facility     Discharge Plan B Home with family;Home Health     DME Needed Upon Discharge  none     Transition of Care Barriers None        Post-Acute Status    Discharge Delays Diet Not Ready for Discharge                     Filomena Nuñez RN, CCRN-K, [facility]  Neuro-Critical Care   [phone]

## 2023-11-27 NOTE — PLAN OF CARE
11/27/23 1650   Post-Acute Status   Post-Acute Authorization Placement   Post-Acute Placement Status Referrals Sent   Coverage PHN   Patient choice form signed by patient/caregiver List with quality metrics by geographic area provided   Discharge Delays (!) Diet Not Ready for Discharge   Discharge Plan   Discharge Plan A Skilled Nursing Facility   Discharge Plan B Home with family;Home Health       Met with Cheryl Michael (Daughter) - [phone]  to review discharge recommendation of SNF and is agreeable to plan    Patient/family provided list of facilities in-network with patient's payor plan. Providers that are owned, operated, or affiliated with [facility] are included on the list.     Notified that referral sent to below listed facilities from in-network list based on proximity to home/family support:   [facility]  Phone: [phone]  2.  [facility]   Phone: [phone]  3.  [facility]  Phone: [phone]  4.  [facility]   Phone: [phone]  5.  [facility]  Phone: [phone]    Patient/family instructed to identify preference.    Preferred Facility: (if more than 1, listed in order of descending preference)   No preference yet    If an additional preferred facility not listed above is identified, additional referral to be sent. If above facilities unable to accept, will send additional referrals to in-network providers.     Discharge Plan A and Plan B have been determined by review of patient's clinical status, future medical and therapeutic needs, and coverage/benefits for post-acute care in coordination with multidisciplinary team members.      Filomena Nuñez RN, CCRN-K, [facility]  Neuro-Critical Care   [phone]

## 2023-11-27 NOTE — PLAN OF CARE
"[facility] Care Plan    POC reviewed with Tim Rhodes and family at 1600. Pts daughter at bedside, verbalized understanding of the education provided. Questions and concerns addressed. Will continue to monitor. See below and flowsheets for full assessment and VS info.       - Precedex gtt dc'd  - NS @ 75  - Bath complete   - Butcher catheter in place      Is this a stroke patient? yes- Stroke booklet reviewed with patient and family, risk factors identified for patient and stroke booklet remains at bedside for ongoing education.     Neuro:  [place] Coma Scale  Best Eye Response: 1-->(E1) none  Best Motor Response: 6-->(M6) obeys commands  Best Verbal Response: 1-->(V1) none  [place] Coma Scale Score: 8  Assessment Qualifiers: patient not sedated/intubated, no eye obstruction present  Pupil PERRLA: yes     24 hr Temp:  [98.2 °F (36.8 °C)-100.2 °F (37.9 °C)]     CV:   Rhythm: atrial rhythm  BP goals:   SBP < 180  MAP > 65    Resp:      Vent Mode: Spont  Set Rate: 14 BPM  Oxygen Concentration (%): 30  Vt Set: 500 mL  PEEP/CPAP: 5 cmH20  Pressure Support: 5 cmH20    Plan: PEG in discussion; 4L NC with humidification    GI/:     Diet/Nutrition Received: NPO  Last Bowel Movement: 11/23/23  Voiding Characteristics: incontinence    Intake/Output Summary (Last 24 hours) at 11/27/2023 1610  Last data filed at 11/27/2023 1502  Gross per 24 hour   Intake 363.45 ml   Output 1450 ml   Net -1086.55 ml     Unmeasured Output  Stool Occurrence: 0    Labs/Accuchecks:  Recent Labs   Lab 11/27/23 0225   WBC 11.91   RBC 5.04   HGB 13.9*   HCT 44.5         Recent Labs   Lab 11/27/23 0225   *   K 4.0   CO2 17*   *   BUN 21   CREATININE 0.8   ALKPHOS 70   ALT 36   AST 70*   BILITOT 0.6    No results for input(s): "PROTIME", "INR", "APTT", "HEPANTIXA" in the last 168 hours.   Recent Labs   Lab 11/23/23  1339 11/23/23  1915 11/27/23 0225   CPK  --    < > 1125*   TROPONINI 0.063  --   --     < > = values in this interval not " displayed.       Electrolytes: N/A - electrolytes WDL  Accuchecks: Q6H    Gtts:   sodium chloride 0.9% 75 mL/hr at 11/27/23 1502       LDA/Wounds:  Lines/Drains/Airways       Drain  Duration                  Urethral Catheter 11/25/23 2352 1 day              Peripheral Intravenous Line  Duration                  Peripheral IV - Single Lumen 11/23/23 1330 18 G Left Forearm 4 days         Peripheral IV - Single Lumen 11/23/23 1400 18 G Right Wrist 4 days         Peripheral IV - Single Lumen 11/25/23 2356 22 G Anterior;Right Forearm 1 day                  Wounds: No  Wound care consulted: No

## 2023-11-27 NOTE — ASSESSMENT & PLAN NOTE
83 y.o. male with PMHx of CAD, HF, HTN, DM who was transferred from [facility] for R MCA stroke and possible intervention on suspected R MCA occlusion. Patient initially presented to OSH with AMS and seizure activity. LKN 11/22/23 at 10pm. Out of treatment window for thrombolytics. CT at OSH with early ischemic changes in R MCA territory and hyperdense R MCA sign concerning for R MCA occlusion. Patient was intubated and sedated at OSH for airway protection.   Stroke team consulted  SBP <220  TTE/EKG/A1C/lipid panel penidg  Neuro checks q 1 hr  Thrombectomy unsuccessful   Statin   PT/OT  SLP when appropriate   CTH complete  MRI pending  A1C 6.6  SSI  Lipid profile complete  TTE complete  MRI complete and reviewed, Nsgy consulted, EEG neg and dced  Holding VTE ppx for pending Nsgy recs  11/25/2023: initiated sq heparin and baby asa  Follow CT head in AM  11/26/2023 EKG and if afib happens again will treat, Extubated 11/26 at 1400, Low dose precedex for agitation  11/27/2023 EKG, NS 75 cc/hr, consult IR for peg

## 2023-11-27 NOTE — ASSESSMENT & PLAN NOTE
WBC 28 and lactic 11 on arrival to OSH  Leukocytosis and lactic improving  BCx with NGTD  12/27 WBC trended down to 11.91 (WNL).

## 2023-11-27 NOTE — PROGRESS NOTES
[facility] - Neuro Critical Care  Vascular Neurology  Comprehensive Stroke Center  Progress Note    Assessment/Plan:     * Embolic stroke involving right middle cerebral artery  Tim Rhodes is a 83 y.o. male with PMHx of CAD, HF,  DM that was transferred from [facility] with suspected R MCA occlusion for possible intervention. Initially presented to OSH with AMS and seizure activity, LKN OOW for TNK. CTH showed early ischemic changes in R MCA territory and hyperdense R MCA sign concerning for R MCA occlusion. Patient was intubated and sedated at OSH for airway protection.Repeat CT on arrival to [facility] appears stable in comparison to prior. Patient independent at baseline per daughter. Discussed patient with neuro IR and patient taken to IR for possible thrombectomy, now s/p unsuccessful IR. MRI brain confirms large R MCA infarcts. Echo with EF 60% and apical akinesis. Stroke etiology ESUS at this time, will need 30d cardiac event monitor at discharge for further stroke workup.    Patient with afib overnight, fentanyl increased. Butcher catheter placed due to retention. Extubated today at 1400, on 4L NC at the time of visit. Low dose precedex for agitation.        Antithrombotics for secondary stroke prevention: Antiplatelets: Aspirin: 81 mg daily    Statins for secondary stroke prevention: Statins: Atorvastatin- 40 mg daily    Aggressive risk factor modification: DM, CAD     Rehab efforts: The patient has been evaluated by a stroke team provider and the therapy needs have been fully considered based off the presenting complaints and exam findings. The following therapy evaluations are needed:  PT/OT/SLP when appropriate    Diagnostics ordered/pending: None     VTE prophylaxis: Heparin 5000 units SQ every 8 hours  Mechanical prophylaxis: Place SCDs    BP parameters: Infarct: Post unsucessful thrombectomy, SBP <220    Diabetes mellitus  Stroke risk factor  A1c 6.6  BG goal while inpatient 140-180  SSI  PRN    Hemiparesis, left  2/2 stroke  PT/OT to evaluate and treat when appropriate    Leukocytosis  WBC 28 and lactic 11 on arrival to OSH  Leukocytosis and lactic improving  BCx with NGTD  On vanc and zosyn    Endotracheally intubated  Intubated at OSH for airway protection    Seizure-like activity  Noted on arrival to OSH, reported to have RUE seizure like activity  Treated with keppra prior to transfer  EEG with severe slowing but no electrographic seizures  Seizure ppx with keppra 750 BID         11/24/2023 NAEO, remains intubated/sedated. EEG with severe slowing but no evidence of seizures. MRI brain confirms large R MCA infarcts, echo with EF 60% and apical akinesis. Infectious workup ongoing.  11/25/2023 NAEO, remains intubated. Follows simple commands, moving RUE/BLE voluntarily with no movement to LUE. ASA started for stroke ppx.   11/26/2023 Patient with afib overnight, fentanyl increased. Butcher placed due to retention. Extubated today at 1400, on 4L NC at the time of visit. Low dose precedex for agitation.      STROKE DOCUMENTATION   Acute Stroke Times   Last Known Normal Date: 11/22/23  Last Known Normal Time: 2200  Unknown Symptom Onset Date: Unknown Date  Unknown Symptom Onset Time: Unknown Time  Stroke Team Called Date: 11/23/23  Stroke Team Called Time: 1804  Stroke Team Arrival Date: 11/23/23  Stroke Team Arrival Time: 1809  CT Interpretation Time: 1813  Thrombolytic Therapy Recommended: No  Thrombectomy Recommended: Yes  Decision to Treat Time for IR: 1822    NIH Scale:  1a. Level of Consciousness: 1-->Not alert, but arousable by minor stimulation to obey, answer, or respond  1b. LOC Questions: 2-->Answers neither question correctly  1c. LOC Commands: 2-->Performs neither task correctly  2. Best Gaze: 0-->Normal  3. Visual: 0-->No visual loss  4. Facial Palsy: 0-->Normal symmetrical movements  5a. Motor Arm, Left: 4-->No movement  5b. Motor Arm, Right: 3-->No effort against gravity, limb falls  6a.  Motor Leg, Left: 3-->No effort against gravity, leg falls to bed immediately  6b. Motor Leg, Right: 3-->No effort against gravity, leg falls to bed immediately  7. Limb Ataxia: 0-->Absent  8. Sensory: 0-->Normal, no sensory loss  9. Best Language: 3-->Mute, global aphasia, no usable speech or auditory comprehension  10. Dysarthria: 2-->Severe dysarthria, patients speech is so slurred as to be unintelligible in the absence of or out of proportion to any dysphasia, or is mute/anarthric  11. Extinction and Inattention (formerly Neglect): 0-->No abnormality  Total (NIH Stroke Scale): 23       Modified [place] Score: 0  [place] Coma Scale:    ABCD2 Score:    [id number] Score:   HAS -BLED Score:   ICH Score:   Hunt & Acevedo Classification:      Hemorrhagic change of an Ischemic Stroke: Does this patient have an ischemic stroke with hemorrhagic changes? No     Neurologic Chief Complaint: R MCA Stroke     Subjective:     Interval History: Patient is seen for follow-up neurological assessment and treatment recommendations: Patient with afib overnight, fentanyl increased. Butcher placed due to retention. Extubated today at 1400, on 4L NC at the time of visit. Low dose precedex for agitation.      HPI, Past Medical, Family, and Social History remains the same as documented in the initial encounter.     Review of Systems   Unable to perform ROS: Patient nonverbal     Scheduled Meds:   aspirin  81 mg Per OG tube Daily    atorvastatin  40 mg Per OG tube Daily    famotidine  20 mg Per OG tube BID    heparin (porcine)  5,000 Units Subcutaneous Q8H    levETIRAcetam (Keppra) IV (PEDS and ADULTS)  750 mg Intravenous Q12H    senna-docusate 8.6-50 mg  1 tablet Per OG tube Daily    silodosin  4 mg Per OG tube Daily     Continuous Infusions:   dexmedeTOMIDine (Precedex) infusion (titrating) 0.2 mcg/kg/hr (11/26/23 1927)    fentanyl 12.5 mcg/hr (11/26/23 1502)     PRN Meds:acetaminophen, dextrose 10%, dextrose 10%, fentaNYL, hydrALAZINE,  labetalol, magnesium oxide, magnesium oxide, ondansetron, potassium bicarbonate, potassium bicarbonate, potassium bicarbonate, potassium, sodium phosphates, potassium, sodium phosphates, potassium, sodium phosphates, sodium chloride 0.9%, sodium chloride 0.9%    Objective:     Vital Signs (Most Recent):  Temp: 99.3 °F (37.4 °C) (11/26/23 1502)  Pulse: 86 (11/26/23 2010)  Resp: (!) 21 (11/26/23 2010)  BP: (!) 145/70 (11/26/23 1902)  SpO2: (!) 92 % (11/26/23 2010)  BP Location: Left arm    Vital Signs Range (Last 24H):  Temp:  [98 °F (36.7 °C)-100.3 °F (37.9 °C)]   Pulse:  []   Resp:  [14-35]   BP: ()/(50-82)   SpO2:  [89 %-99 %]   BP Location: Left arm       Physical Exam  Constitutional:       General: He is not in acute distress.     Appearance: He is ill-appearing.   HENT:      Head: Normocephalic and atraumatic.   Eyes:      Pupils: Pupils are equal, round, and reactive to light.   Cardiovascular:      Rate and Rhythm: Normal rate.   Pulmonary:      Effort: No respiratory distress.   Neurological:      Motor: Weakness present.      Comments: Nonverbal, lethargic  Left-sided weakness              Neurological Exam:   LOC: drowsy  Attention Span: poor  Language: Global aphasia  Orientation:  Untestable due to severe aphasia   Pupils (CN II, III): PERRL  Motor: Arm left  Paresis: 1/5  Leg left  Paresis: 2/5  Arm right  Paresis: 3/5  Leg right Paresis: 3/5    Laboratory:  CMP:   Recent Labs   Lab 11/26/23  0029 11/26/23  1337   CALCIUM 8.7  --    ALBUMIN 2.5*  --    PROT 6.5  --    *  --    K 3.5 4.2   CO2 18*  --    *  --    BUN 20  --    CREATININE 0.9  --    ALKPHOS 62  --    ALT 34  --    AST 71*  --    BILITOT 0.7  --      BMP:   Recent Labs   Lab 11/26/23  0029 11/26/23  1337   *  --    K 3.5 4.2   *  --    CO2 18*  --    BUN 20  --    CREATININE 0.9  --    CALCIUM 8.7  --      CBC:   Recent Labs   Lab 11/26/23  0029 11/26/23  0423 11/26/23  1213   WBC 11.76  --   --    RBC  "4.50*  --   --    HGB 12.8*  --   --    HCT 40.1   < > 37     --   --    MCV 89  --   --    MCH 28.4  --   --    MCHC 31.9*  --   --     < > = values in this interval not displayed.     Lipid Panel:   Recent Labs   Lab 11/23/23 1915   CHOL 94*   LDLCALC 48.4*   HDL 32*   TRIG 68     Coagulation: No results for input(s): "PT", "INR", "APTT" in the last 168 hours.  Platelet Aggregation Study: No results for input(s): "PLTAGG", "PLTAGINTERP", "PLTAGREGLACO", "ADPPLTAGGREG" in the last 168 hours.  Hgb A1C:   Recent Labs   Lab 11/23/23 1915   HGBA1C 6.6*     TSH:   Recent Labs   Lab 11/23/23 1915   TSH 2.121       Diagnostic Results     MRI brain without contrast 11/24/2023  Impression:  Large right MCA territorial infarct without hemorrhagic conversion.  Currently very mild mass effect.  Loss of the right carotid flow void consistent with occlusion versus slow flow.     CT Head 11/23/23 1812  Impression:  Acute right MCA territory infarction, similar to prior.  No infarct extension or hemorrhagic conversion.  Associated hyperdense vessel sign likely involving the right M1 and a proximal M2 branch.  Changes of generalized cerebral volume loss.  Paranasal sinus disease as above.     CT Head 11/23/23 1416  FINDINGS:  Decreased attenuation in sulcal effacement right MCA distribution consistent with acute in Ca infarct.  Hyperdense right MCA sign consistent with MCA thrombus.  No hydrocephalus.  No signs of acute hemorrhage.  Moderate underlying volume loss.  Right maxillary mucous retention cyst.  Impression:  Acute right MCA infarct.        Vessel Imaging:  IR cerebral angiogram 11/23/2023  Preliminary interpretation: occlusion of R ICA at origin, unable to cross distally to perform angioplasty and stenting. Attempted thrombectomy at origin, tici 0.  Please see Imaging report for full details.        Cardiac Evaluation:   TTE 11/24/2023    Left Ventricle: The left ventricle is normal in size. Ventricular mass is " normal. Normal wall thickness. There is concentric remodeling. Regional wall motion abnormalities present - see diagram for wall motion findings. There is normal systolic function. Ejection fraction by visual approximation is 60%. There is normal diastolic function.    Right Ventricle: Normal right ventricular cavity size. Wall thickness is normal. Right ventricle wall motion  is normal. Systolic function is normal.    Aortic Valve: The aortic valve is a trileaflet valve. There is mild aortic valve sclerosis. There is annular calcification present. There is mild aortic regurgitation.    IVC/SVC: Patient is ventilated, cannot use IVC diameter to estimate right atrial pressure. PASP is at least 24mmHg.    Monet Wilkinson [facility]  Department of Vascular Neurology   [facility] - [facility]

## 2023-11-27 NOTE — PROGRESS NOTES
[facility] - Neuro Critical Care  Vascular Neurology  Comprehensive Stroke Center  Progress Note    Assessment/Plan:     * Embolic stroke involving right middle cerebral artery  Tim Rhodes is a 83 y.o. male with PMHx of CAD, HF,  DM that was transferred from [facility] with suspected R MCA occlusion for possible intervention. Initially presented to OSH with AMS and seizure activity, LKN OOW for TNK. CTH showed early ischemic changes in R MCA territory and hyperdense R MCA sign concerning for R MCA occlusion. Patient was intubated and sedated at OSH for airway protection. Repeat CT on arrival to [facility] appears stable in comparison to prior. Patient independent at baseline per daughter. Discussed patient with neuro IR and patient taken to IR for possible thrombectomy, now s/p unsuccessful IR. MRI brain confirms large R MCA infarcts. Echo with EF 60% and apical akinesis. Stroke etiology likely cardio-embolic due to new onset AFib this admission.    Extubated yesterday. Tolerating without complications. Precedex stopped secondary to bradycardia. Neuro exam stable.       Antithrombotics for secondary stroke prevention: Antiplatelets: Aspirin: 81 mg daily    Statins for secondary stroke prevention: Statins: Atorvastatin- 40 mg daily    Aggressive risk factor modification: DM, CAD     Rehab efforts: The patient has been evaluated by a stroke team provider and the therapy needs have been fully considered based off the presenting complaints and exam findings. The following therapy evaluations are needed:  PT/OT/SLP when appropriate    Diagnostics ordered/pending: None     VTE prophylaxis: Heparin 5000 units SQ every 8 hours  Mechanical prophylaxis: Place SCDs    BP parameters: Infarct: Post unsucessful thrombectomy, SBP <220    Paroxysmal atrial fibrillation  New onset A-Fib during evening of 11/26.  Will need further discussion for anticoagulation, currently on ASA only.    Diabetes mellitus  Stroke risk factor  A1c  6.6  BG goal while inpatient 140-180  SSI PRN    Hemiparesis, left  2/2 stroke  PT/OT to evaluate and treat when appropriate    Leukocytosis  WBC 28 and lactic 11 on arrival to OSH  Leukocytosis and lactic improving  BCx with NGTD  12/27 WBC trended down to 11.91 (WNL).    Endotracheally intubated  Intubated at OSH for airway protection  Extubated 11/26    Seizure-like activity  Noted on arrival to OSH, reported to have RUE seizure like activity  Treated with keppra prior to transfer  EEG with severe slowing but no electrographic seizures  Seizure ppx with keppra 750 BID         11/24/2023 NAEO, remains intubated/sedated. EEG with severe slowing but no evidence of seizures. MRI brain confirms large R MCA infarcts, echo with EF 60% and apical akinesis. Infectious workup ongoing.  11/25/2023 NAEO, remains intubated. Follows simple commands, moving RUE/BLE voluntarily with no movement to LUE. ASA started for stroke ppx.   11/26/2023 Patient with afib overnight, fentanyl increased. Butcher placed due to retention. Extubated today at 1400, on 4L NC at the time of visit. Low dose precedex for agitation.    11/27/2023 Extubated yesterday. Tolerating without complications. Precedex stopped secondary to bradycardia. Neuro exam stable.       STROKE DOCUMENTATION   Acute Stroke Times   Last Known Normal Date: 11/22/23  Last Known Normal Time: 2200  Unknown Symptom Onset Date: Unknown Date  Unknown Symptom Onset Time: Unknown Time  Stroke Team Called Date: 11/23/23  Stroke Team Called Time: 1804  Stroke Team Arrival Date: 11/23/23  Stroke Team Arrival Time: 1809  CT Interpretation Time: 1813  Thrombolytic Therapy Recommended: No  Thrombectomy Recommended: Yes  Decision to Treat Time for IR: 1822    NIH Scale:  1a. Level of Consciousness: 1-->Not alert, but arousable by minor stimulation to obey, answer, or respond  1b. LOC Questions: 2-->Answers neither question correctly  1c. LOC Commands: 2-->Performs neither task correctly  2.  Best Gaze: 0-->Normal  3. Visual: 0-->No visual loss  4. Facial Palsy: 0-->Normal symmetrical movements  5a. Motor Arm, Left: 4-->No movement  5b. Motor Arm, Right: 2-->Some effort against gravity, limb cannot get to or maintain (if cued) 90 (or 45) degrees, drifts down to bed, but has some effort against gravity  6a. Motor Leg, Left: 3-->No effort against gravity, leg falls to bed immediately  6b. Motor Leg, Right: 3-->No effort against gravity, leg falls to bed immediately  7. Limb Ataxia: 0-->Absent  8. Sensory: 0-->Normal, no sensory loss  9. Best Language: 3-->Mute, global aphasia, no usable speech or auditory comprehension  10. Dysarthria: 2-->Severe dysarthria, patients speech is so slurred as to be unintelligible in the absence of or out of proportion to any dysphasia, or is mute/anarthric  11. Extinction and Inattention (formerly Neglect): 1-->Visual, tactile, auditory, spatial, or personal inattention or extinction to bilateral simultaneous stimulation in one of the sensory modalities  Total (NIH Stroke Scale): 23       Modified [place] Score: 0  [place] Coma Scale:8   ABCD2 Score:    [id number] Score:   HAS -BLED Score:   ICH Score:   Hunt & Acevedo Classification:      Hemorrhagic change of an Ischemic Stroke: Does this patient have an ischemic stroke with hemorrhagic changes? No     Neurologic Chief Complaint: R MCA Stroke     Subjective:     Interval History: Patient is seen for follow-up neurological assessment and treatment recommendations: Extubated yesterday. Tolerating without complications. Precedex stopped secondary to bradycardia. Neuro exam stable.     HPI, Past Medical, Family, and Social History remains the same as documented in the initial encounter.     Review of Systems   Unable to perform ROS: Patient nonverbal     Scheduled Meds:   aspirin  300 mg Rectal Daily    atorvastatin  40 mg Per OG tube Daily    heparin (porcine)  5,000 Units Subcutaneous Q8H    levETIRAcetam (Keppra) IV (PEDS and  ADULTS)  750 mg Intravenous Q12H    senna-docusate 8.6-50 mg  2 tablet Per OG tube BID    silodosin  4 mg Per OG tube Daily     Continuous Infusions:   sodium chloride 0.9% 75 mL/hr at 11/27/23 1345     PRN Meds:acetaminophen, artificial tears, calcium gluconate IVPB, calcium gluconate IVPB, calcium gluconate IVPB, dextrose 10%, dextrose 10%, hydrALAZINE, labetalol, magnesium sulfate IVPB, magnesium sulfate IVPB, ondansetron, potassium chloride **AND** potassium chloride **AND** potassium chloride, sodium phosphate 15 mmol in dextrose 5 % (D5W) 250 mL IVPB, sodium phosphate 20.01 mmol in dextrose 5 % (D5W) 250 mL IVPB, sodium phosphate 30 mmol in dextrose 5 % (D5W) 250 mL IVPB    Objective:     Vital Signs (Most Recent):  Temp: 98.2 °F (36.8 °C) (11/27/23 1102)  Pulse: 87 (11/27/23 1302)  Resp: (!) 33 (11/27/23 1302)  BP: (!) 168/78 (11/27/23 1302)  SpO2: (!) 93 % (11/27/23 1302)  BP Location: Left arm    Vital Signs Range (Last 24H):  Temp:  [98.2 °F (36.8 °C)-100.2 °F (37.9 °C)]   Pulse:  []   Resp:  [17-77]   BP: ()/()   SpO2:  [87 %-94 %]   BP Location: Left arm       Physical Exam  Constitutional:       General: He is not in acute distress.     Appearance: He is ill-appearing.   HENT:      Head: Normocephalic and atraumatic.   Eyes:      Pupils: Pupils are equal, round, and reactive to light.   Cardiovascular:      Rate and Rhythm: Normal rate.   Pulmonary:      Effort: No respiratory distress.   Neurological:      Motor: Weakness present.      Comments: Nonverbal, lethargic  Left-sided weakness              Neurological Exam:   LOC: drowsy  Attention Span: poor  Language: Global aphasia  Orientation:  Untestable due to severe aphasia   Pupils (CN II, III): PERRL  Motor: Arm left  Paresis: 0/5  Leg left  Paresis: 2/5  Arm right  Paresis: 4/5  Leg right Paresis: 3/5    Laboratory:  CMP:   Recent Labs   Lab 11/27/23  0225   CALCIUM 9.5   ALBUMIN 2.6*   PROT 7.2   *   K 4.0   CO2 17*   CL  "115*   BUN 21   CREATININE 0.8   ALKPHOS 70   ALT 36   AST 70*   BILITOT 0.6       BMP:   Recent Labs   Lab 11/27/23 0225   *   K 4.0   *   CO2 17*   BUN 21   CREATININE 0.8   CALCIUM 9.5       CBC:   Recent Labs   Lab 11/27/23 0225   WBC 11.91   RBC 5.04   HGB 13.9*   HCT 44.5      MCV 88   MCH 27.6   MCHC 31.2*       Lipid Panel:   Recent Labs   Lab 11/23/23 1915   CHOL 94*   LDLCALC 48.4*   HDL 32*   TRIG 68       Coagulation: No results for input(s): "PT", "INR", "APTT" in the last 168 hours.  Platelet Aggregation Study: No results for input(s): "PLTAGG", "PLTAGINTERP", "PLTAGREGLACO", "ADPPLTAGGREG" in the last 168 hours.  Hgb A1C:   Recent Labs   Lab 11/23/23 1915   HGBA1C 6.6*       TSH:   Recent Labs   Lab 11/23/23 1915   TSH 2.121         Diagnostic Results         Brain Imaging:  CT Head 11/26/23  Impression:  Evolving recent large right MCA territorial infarct.  No hemorrhagic conversion.  Mild midline shift minimally progressed from the prior study.    MRI brain without contrast 11/24/2023  Impression:  Large right MCA territorial infarct without hemorrhagic conversion.  Currently very mild mass effect.  Loss of the right carotid flow void consistent with occlusion versus slow flow.     CT Head 11/23/23 1812  Impression:  Acute right MCA territory infarction, similar to prior.  No infarct extension or hemorrhagic conversion.  Associated hyperdense vessel sign likely involving the right M1 and a proximal M2 branch.  Changes of generalized cerebral volume loss.  Paranasal sinus disease as above.     CT Head 11/23/23 1416  FINDINGS:  Decreased attenuation in sulcal effacement right MCA distribution consistent with acute in Ca infarct.  Hyperdense right MCA sign consistent with MCA thrombus.  Impression:  Acute right MCA infarct.        Vessel Imaging:  IR cerebral angiogram 11/23/2023  Preliminary interpretation: occlusion of R ICA at origin, unable to cross distally to perform " angioplasty and stenting. Attempted thrombectomy at origin, tici 0.  Please see Imaging report for full details.        Cardiac Evaluation:   EKG 11/26/2023  Atrial Fibrillation     TTE 11/24/2023    Left Ventricle: The left ventricle is normal in size. Ventricular mass is normal. Normal wall thickness. There is concentric remodeling. Regional wall motion abnormalities present - see diagram for wall motion findings. There is normal systolic function. Ejection fraction by visual approximation is 60%. There is normal diastolic function.    Right Ventricle: Normal right ventricular cavity size. Wall thickness is normal. Right ventricle wall motion  is normal. Systolic function is normal.    Aortic Valve: The aortic valve is a trileaflet valve. There is mild aortic valve sclerosis. There is annular calcification present. There is mild aortic regurgitation.    IVC/SVC: Patient is ventilated, cannot use IVC diameter to estimate right atrial pressure. PASP is at least 24mmHg.    Karen Ch NP  [facility]  Department of Vascular Neurology   [facility] - Neuro Critical Care

## 2023-11-27 NOTE — ASSESSMENT & PLAN NOTE
Patient intubated at outside facility for airway protection  Extubated 11/26 at 1400, Low dose precedex for agitation  Precedex dced

## 2023-11-27 NOTE — PROGRESS NOTES
[facility] - Neuro Critical Care  Neurocritical Care  Progress Note    Admit Date: 11/23/2023  Service Date: 11/27/2023  Length of Stay: 4    Subjective:     Chief Complaint: Embolic stroke involving right middle cerebral artery    History of Present Illness: The patient is an 83-year-old with PMHx of  CAD and DM admitted to [facility] with large R MCA stroke and seizure.Per chart review, in emergency department at [facility]  presented with with altered mental status and focal seizure activity.  Daughter states patient was able to talk to her on the phone at 10:00 p.m. last night was at baseline.  Patient living in assisted living but still very functional.  Daughter states patien still drives on occasion.  She does not believe he takes any blood thinners.  She went to check on him is afternoon and he had fallen next to the toilet.  Patient unable to provide any history.    Noted to have tonic-clonic activity to the right upper extremity on arrival.  Patient with incomprehensible speech.  Copious vomiting on EMS arrival per paramedic.  Patient unable to contribute to history.  Last presumed normal was 10:00 p.m. last night. Out of window for thrombolytics.  Patient intubated for airway protection at outside hospital. On arrival CTH with large R MCA - stroke team consulted and plan for IR. Patient loaded with Keppra, EEG pending. Lactic acid 11.9 on arrival, pancx and initiated broad spec abx. Patient admitted to [facility] for close monitoring and higher level of care.        Hospital Course: 11/24/2023 MRI complete and reviewed, Nsgy consulted, EEG neg and dced  11/25/2023: initiate baby ASA and sq heparin, follow CT head in AM, SBP <180, initiate tube feeds  11/26/2023 EKG and if afib happens again will treat, Extubated 11/26 at 1400, Low dose precedex for agitation  11/27/2023 EKG, NS 75 cc/hr, consult IR for peg      Review of Systems   Unable to obtain a complete ROS due to level of consciousness.  Objective:      Vitals:  Temp: 98.2 °F (36.8 °C)  Pulse: 91  Rhythm: atrial rhythm  BP: 115/65  MAP (mmHg): 84  Resp: (!) 31  SpO2: (!) 94 %    Temp  Min: 98.2 °F (36.8 °C)  Max: 100.2 °F (37.9 °C)  Pulse  Min: 49  Max: 145  BP  Min: 94/57  Max: 214/84  MAP (mmHg)  Min: 70  Max: 143  Resp  Min: 17  Max: 77  SpO2  Min: 87 %  Max: 95 %    11/26 0701 - 11/27 0700  In: 506.3 [I.V.:126.3]  Out: 1530 [Urine:1530]   Unmeasured Output  Stool Occurrence: 0        Physical Exam  GA: Alert, comfortable, no acute distress.   HEENT: No scleral icterus or JVD.   Pulmonary: Clear to auscultation A/L.   Cardiac: RRR S1 & S2 w/o rubs/murmurs/gallops.   Abdominal: Bowel sounds present x 4. No appreciable hepatosplenomegaly.  Skin: No jaundice, rashes, or visible lesions.  Neuro:  --GCS: E2 V1 M6  --Mental Status: opens eyes to touch, intermit follows simple commands  --CN II-XII grossly intact.   --Pupils 2mm, PERRL.   --Corneal reflex, gag, cough intact.  --LUE no movement  --RUE spont  --BLE withdraws to pain       Medications:  Continuoussodium chloride 0.9%, Last Rate: 75 mL/hr at 11/27/23 1502    Scheduledaspirin, 300 mg, Daily  atorvastatin, 40 mg, Daily  heparin (porcine), 5,000 Units, Q8H  levETIRAcetam (Keppra) IV (PEDS and ADULTS), 750 mg, Q12H  senna-docusate 8.6-50 mg, 2 tablet, BID  silodosin, 4 mg, Daily    PRNacetaminophen, 650 mg, Q6H PRN  artificial tears, 2 drop, QID PRN  calcium gluconate IVPB, 1 g, PRN  calcium gluconate IVPB, 2 g, PRN  calcium gluconate IVPB, 3 g, PRN  dextrose 10%, 12.5 g, PRN  dextrose 10%, 25 g, PRN  hydrALAZINE, 10 mg, Q4H PRN  labetalol, 10 mg, Q4H PRN  magnesium sulfate IVPB, 2 g, PRN  magnesium sulfate IVPB, 4 g, PRN  ondansetron, 4 mg, Q6H PRN  potassium chloride, 40 mEq, PRN   And  potassium chloride, 60 mEq, PRN   And  potassium chloride, 80 mEq, PRN  sodium phosphate 15 mmol in dextrose 5 % (D5W) 250 mL IVPB, 15 mmol, PRN  sodium phosphate 20.01 mmol in dextrose 5 % (D5W) 250 mL IVPB, 20.01 mmol,  PRN  sodium phosphate 30 mmol in dextrose 5 % (D5W) 250 mL IVPB, 30 mmol, PRN      Today I personally reviewed pertinent medications, lines/drains/airways, imaging, cardiology results, laboratory results, microbiology results,    Diet  Diet NPO          Assessment/Plan:     Neuro  * Embolic stroke involving right middle cerebral artery  83 y.o. male with PMHx of CAD, HF, HTN, DM who was transferred from [facility] for R MCA stroke and possible intervention on suspected R MCA occlusion. Patient initially presented to OSH with AMS and seizure activity. LKN 11/22/23 at 10pm. Out of treatment window for thrombolytics. CT at OSH with early ischemic changes in R MCA territory and hyperdense R MCA sign concerning for R MCA occlusion. Patient was intubated and sedated at OSH for airway protection.   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  There is better visualization of the left hemidiaphragm on today's exam without definite opacity.     Cardiac/Vascular  Paroxysmal atrial fibrillation  - Consider anticoagulation when appropriate      Renal/  Hyperkalemia  K 5.4 on admission  Repeat K WNL      Oncology  Leukocytosis  POA  Pancx   Initiated broad spectrum abx  Trend lactic acid  11/27/2023 WNL     Endocrine  Diabetes mellitus  Hx of DM  A1C 6.6  PRN moderate dose SSI     Palliative Care  Endotracheally intubated  Patient intubated at outside facility for airway protection  Extubated 11/26 at 1400, Low dose precedex for agitation  Precedex dced             Activity Orders            Straight Cath starting at 11/25 1809    Turn patient starting at 11/23 2000    Progressive Mobility Protocol (mobilize patient to their highest level of functioning at least twice daily) starting at 11/23 2000    Elevate HOB starting at 11/23 1821    Diet NPO: NPO starting at 11/23 1821          Full Code    Antony Murrell NP  Neurocritical Care  [facility] - Neuro Critical Care

## 2023-11-27 NOTE — NURSING
[id number] RN attempt to place NGT on patient but unsuccessful. Charge nurse at bedside to try again. NGT placement unsuccessful. Raymon MABRY and Socorro MABRY notified.

## 2023-11-27 NOTE — SUBJECTIVE & OBJECTIVE
Neurologic Chief Complaint: R MCA Stroke     Subjective:     Interval History: Patient is seen for follow-up neurological assessment and treatment recommendations: Patient with afib overnight, fentanyl increased. Butcher placed due to retention. Extubated today at 1400, on 4L NC at the time of visit. Low dose precedex for agitation.      HPI, Past Medical, Family, and Social History remains the same as documented in the initial encounter.     Review of Systems   Unable to perform ROS: Patient nonverbal     Scheduled Meds:   aspirin  81 mg Per OG tube Daily    atorvastatin  40 mg Per OG tube Daily    famotidine  20 mg Per OG tube BID    heparin (porcine)  5,000 Units Subcutaneous Q8H    levETIRAcetam (Keppra) IV (PEDS and ADULTS)  750 mg Intravenous Q12H    senna-docusate 8.6-50 mg  1 tablet Per OG tube Daily    silodosin  4 mg Per OG tube Daily     Continuous Infusions:   dexmedeTOMIDine (Precedex) infusion (titrating) 0.2 mcg/kg/hr (11/26/23 1927)    fentanyl 12.5 mcg/hr (11/26/23 1502)     PRN Meds:acetaminophen, dextrose 10%, dextrose 10%, fentaNYL, hydrALAZINE, labetalol, magnesium oxide, magnesium oxide, ondansetron, potassium bicarbonate, potassium bicarbonate, potassium bicarbonate, potassium, sodium phosphates, potassium, sodium phosphates, potassium, sodium phosphates, sodium chloride 0.9%, sodium chloride 0.9%    Objective:     Vital Signs (Most Recent):  Temp: 99.3 °F (37.4 °C) (11/26/23 1502)  Pulse: 86 (11/26/23 2010)  Resp: (!) 21 (11/26/23 2010)  BP: (!) 145/70 (11/26/23 1902)  SpO2: (!) 92 % (11/26/23 2010)  BP Location: Left arm    Vital Signs Range (Last 24H):  Temp:  [98 °F (36.7 °C)-100.3 °F (37.9 °C)]   Pulse:  []   Resp:  [14-35]   BP: ()/(50-82)   SpO2:  [89 %-99 %]   BP Location: Left arm       Physical Exam  Constitutional:       General: He is not in acute distress.     Appearance: He is ill-appearing.   HENT:      Head: Normocephalic and atraumatic.   Eyes:      Pupils: Pupils  "are equal, round, and reactive to light.   Cardiovascular:      Rate and Rhythm: Normal rate.   Pulmonary:      Effort: No respiratory distress.   Neurological:      Motor: Weakness present.      Comments: Nonverbal, lethargic  Left-sided weakness              Neurological Exam:   LOC: drowsy  Attention Span: poor  Language: Global aphasia  Orientation:  Untestable due to severe aphasia   Pupils (CN II, III): PERRL  Motor: Arm left  Paresis: 1/5  Leg left  Paresis: 2/5  Arm right  Paresis: 3/5  Leg right Paresis: 3/5    Laboratory:  CMP:   Recent Labs   Lab 11/26/23  0029 11/26/23  1337   CALCIUM 8.7  --    ALBUMIN 2.5*  --    PROT 6.5  --    *  --    K 3.5 4.2   CO2 18*  --    *  --    BUN 20  --    CREATININE 0.9  --    ALKPHOS 62  --    ALT 34  --    AST 71*  --    BILITOT 0.7  --      BMP:   Recent Labs   Lab 11/26/23 0029 11/26/23  1337   *  --    K 3.5 4.2   *  --    CO2 18*  --    BUN 20  --    CREATININE 0.9  --    CALCIUM 8.7  --      CBC:   Recent Labs   Lab 11/26/23 0029 11/26/23  0423 11/26/23  1213   WBC 11.76  --   --    RBC 4.50*  --   --    HGB 12.8*  --   --    HCT 40.1   < > 37     --   --    MCV 89  --   --    MCH 28.4  --   --    MCHC 31.9*  --   --     < > = values in this interval not displayed.     Lipid Panel:   Recent Labs   Lab 11/23/23 1915   CHOL 94*   LDLCALC 48.4*   HDL 32*   TRIG 68     Coagulation: No results for input(s): "PT", "INR", "APTT" in the last 168 hours.  Platelet Aggregation Study: No results for input(s): "PLTAGG", "PLTAGINTERP", "PLTAGREGLACO", "ADPPLTAGGREG" in the last 168 hours.  Hgb A1C:   Recent Labs   Lab 11/23/23 1915   HGBA1C 6.6*     TSH:   Recent Labs   Lab 11/23/23  1915   TSH 2.121       Diagnostic Results     MRI brain without contrast 11/24/2023  Impression:  Large right MCA territorial infarct without hemorrhagic conversion.  Currently very mild mass effect.  Loss of the right carotid flow void consistent with occlusion " versus slow flow.     CT Head 11/23/23 1812  Impression:  Acute right MCA territory infarction, similar to prior.  No infarct extension or hemorrhagic conversion.  Associated hyperdense vessel sign likely involving the right M1 and a proximal M2 branch.  Changes of generalized cerebral volume loss.  Paranasal sinus disease as above.     CT Head 11/23/23 1416  FINDINGS:  Decreased attenuation in sulcal effacement right MCA distribution consistent with acute in Ca infarct.  Hyperdense right MCA sign consistent with MCA thrombus.  No hydrocephalus.  No signs of acute hemorrhage.  Moderate underlying volume loss.  Right maxillary mucous retention cyst.  Impression:  Acute right MCA infarct.        Vessel Imaging:  IR cerebral angiogram 11/23/2023  Preliminary interpretation: occlusion of R ICA at origin, unable to cross distally to perform angioplasty and stenting. Attempted thrombectomy at origin, tici 0.  Please see Imaging report for full details.        Cardiac Evaluation:   TTE 11/24/2023    Left Ventricle: The left ventricle is normal in size. Ventricular mass is normal. Normal wall thickness. There is concentric remodeling. Regional wall motion abnormalities present - see diagram for wall motion findings. There is normal systolic function. Ejection fraction by visual approximation is 60%. There is normal diastolic function.    Right Ventricle: Normal right ventricular cavity size. Wall thickness is normal. Right ventricle wall motion  is normal. Systolic function is normal.    Aortic Valve: The aortic valve is a trileaflet valve. There is mild aortic valve sclerosis. There is annular calcification present. There is mild aortic regurgitation.    IVC/SVC: Patient is ventilated, cannot use IVC diameter to estimate right atrial pressure. PASP is at least 24mmHg.

## 2023-11-28 PROBLEM — Z43.1 ENCOUNTER FOR PEG (PERCUTANEOUS ENDOSCOPIC GASTROSTOMY): Status: ACTIVE | Noted: 2023-01-01

## 2023-11-28 NOTE — PLAN OF CARE
POC established and functional mobility goals were created to help pt return to PLOF. Will be reassessed as appropriate to measure pt progress.    Problem: Physical Therapy  Goal: Physical Therapy Goal  Description: Goals to be met by: 23     Patient will increase functional independence with mobility by performin. Supine to sit with MInimal Assistance  2. Sit to supine with MInimal Assistance  3. Sit to stand transfer with Moderate Assistance  4. Bed to chair transfer with Moderate Assistance using LRAD  5. Gait  x 30 feet with Moderate Assistance using LRAD as needed.   6. Lower extremity exercise program x15 reps per handout, with assistance as needed    Outcome: Ongoing, Progressing

## 2023-11-28 NOTE — PLAN OF CARE
"[facility] Care Plan    POC reviewed with Tim Rhodes and family at 0400. Pt unable to verbalized understanding. Questions and concerns addressed wit daughter. See below and flowsheets for full assessment and VS info.     -Cardizem IVP x3  -Cardizem gtt  -NS gtt  -Olanzapine  IVP x3  -CHG bed bath an linen change completed      Is this a stroke patient? yes- Stroke booklet reviewed with patient and family, risk factors identified for patient and stroke booklet remains at bedside for ongoing education.     Neuro:  Win Coma Scale  Best Eye Response: 1-->(E1) none  Best Motor Response: 6-->(M6) obeys commands  Best Verbal Response: 1-->(V1) none  Win Coma Scale Score: 8  Assessment Qualifiers: patient not sedated/intubated  Pupil PERRLA: yes     24hr Temp:  [98.2 °F (36.8 °C)-99.1 °F (37.3 °C)]     CV:   Rhythm: atrial rhythm  BP goals:   SBP < 180  MAP > 65    Resp:      Vent Mode: Spont  Set Rate: 14 BPM  Oxygen Concentration (%): 30  Vt Set: 500 mL  PEEP/CPAP: 5 cmH20  Pressure Support: 5 cmH20    Plan: N/A    GI/:     Diet/Nutrition Received: NPO  Last Bowel Movement: 11/23/23  Voiding Characteristics: incontinence    Intake/Output Summary (Last 24 hours) at 11/28/2023 0402  Last data filed at 11/28/2023 0400  Gross per 24 hour   Intake 1635.17 ml   Output 1485 ml   Net 150.17 ml     Unmeasured Output  Stool Occurrence: 0    Labs/Accuchecks:  Recent Labs   Lab 11/28/23  0150   WBC 11.86   RBC 5.80   HGB 16.0   HCT 51.5         Recent Labs   Lab 11/28/23  0150   *   K 3.7   CO2 14*   *   BUN 26*   CREATININE 0.9   ALKPHOS 67   ALT 41   AST 55*   BILITOT 0.7    No results for input(s): "PROTIME", "INR", "APTT", "HEPANTIXA" in the last 168 hours.   Recent Labs   Lab 11/27/23  0225 11/27/23  2348   CPK 1125*  --    TROPONINI  --  0.052*       Electrolytes: N/A - electrolytes WDL  Accuchecks: Q6H    Gtts:   sodium chloride 0.9% 75 mL/hr at 11/28/23 0359    amiodarone in dextrose 5%      " amiodarone in dextrose 5%      diltiaZEM (CARDIZEM) 125 mg in dextrose 5 % (D5W) 125 mL infusion 10 mg/hr (11/28/23 0400)       LDA/Wounds:  Lines/Drains/Airways       Drain  Duration                  Urethral Catheter 11/25/23 2352 2 days              Peripheral Intravenous Line  Duration                  Peripheral IV - Single Lumen 11/23/23 1400 18 G Right Wrist 4 days         Peripheral IV - Single Lumen 11/25/23 2356 22 G Anterior;Right Forearm 2 days         Peripheral IV - Single Lumen 11/28/23 0140 20 G Anterior;Proximal;Right Forearm <1 day                  Wounds: Yes  Wound care consulted: Yes

## 2023-11-28 NOTE — HPI
Mr Rhodes 84yo gentleman with CAD, GERD and NIDDM2 transferred from [place] ED after presenting with altered mental status and focal seizure activity. Pt not AAOx4. History provided by Pt's family who reported that he was last at baseline on 11/22/23 at 10pm and was able to converse on the phone at that time. Family went to visit him on 11/23/23 and discovered that he suffered a fall next to the toilet. Irregular movement of the right upper extremity concerning for seizure activity. EMS called to home and noted significant emesis on arrival. Pt with incomprehensible speech,  intubated for airway protection at [facility] ED. CTH on 11/23/23 concerning for right MCA thrombus/infarction; stroke team consulted. Pt outside window of thrombolytics and was loaded with Keppra. Lactic acid 11.9 on arrival and initiated broad spec abx. Pt transferred to [facility] on 11/23/23 for close monitoring and higher level of care. MRI Brain and CT Head repeated with no new significant findings. EEG negative. SBP maintained <180mmHg and Heparin started for DVT prophylaxis initiated. Pt extubated on 11/26/23 requiring low dose precedex for agitation. Pt febrile on 11/28/23 at 101.2. CXR negative for acute changes and acetaminophen was administered. Tachypnea noted at 36bpm in setting of AGMA (21). VBG on 11/27/23 significant for pH of 7.468 and pCO2 of 21.7. Bcx pending, not on abx therapy. GI consulted for PEG tube placement.

## 2023-11-28 NOTE — ASSESSMENT & PLAN NOTE
Pt with dysarthia and dysphagia 2/t R MCA stroke. NG tube unable to be successfully placed 2/t pt's history of esophageal stricture s/p dilation per pt's family. Minimal improvement in neurologic function. Pt would benefit in placement of PEG tube for nutrition and placement for continued rehabilitation. No known significant abdominal surgical history. Pt currently on DVT prophylaxis with heparin. ASA for CAD. Optimization for PEG tube placement:   --Pt should be afebrile for 24hrs prior to procedure unless central fever  --Hold heparin on morning of procedure   --May continue ASA therapy   --GI to discuss and provide future date of surgery

## 2023-11-28 NOTE — PLAN OF CARE
[facility] Care Plan     POC reviewed with Tim Rhodes and family.. Pt unable to verbalize understanding. Questions and concerns addressed with daughter. See below and flowsheets for full assessment and VS info.      - Cardizem gtt  - Haloperidol x1  - Versed x1  - Fentanyl x1  - Head CT completed  - CHG bed bath and linen change completed        Is this a stroke patient? yes- Stroke booklet reviewed with patient and family, risk factors identified for patient and stroke booklet remains at bedside for ongoing education.      Neuro:  Win Coma Scale  Best Eye Response: 1-->(E1) none  Best Motor Response: 6-->(M6) obeys commands  Best Verbal Response: 2-->(V2) incomprehensible speech  Win Coma Scale Score: 9  Assessment Qualifiers: patient not sedated/intubated  Pupil PERRLA: yes     Tmax during shift: 101.2 F     CV:   Rhythm: atrial rhythm  BP goals:   SBP < 180  MAP > 65  HR < 120     Resp:   4L NC     GI/:  Diet/Nutrition Received: NPO  Last Bowel Movement: 11/28/23  Voiding Characteristics: incontinence     Intake/Output Summary (Last 12 hours) at 11/28/2023 1652  Last data filed at 11/28/2023 1601      Gross per 12 hour   Intake 920 ml   Output 630 ml   Net 290 ml      Unmeasured Output  Stool Occurrence: 2     Labs/Accuchecks:      Recent Labs   Lab 11/28/23  0150   WBC 11.86   RBC 5.80   HGB 16.0   HCT 51.5             Recent Labs   Lab 11/28/23  0150   *   K 3.7   CO2 14*   *   BUN 26*   CREATININE 0.9   ALKPHOS 67   ALT 41   AST 55*   BILITOT 0.7      Latest Reference Range & Units 11/28/23 08:22   Potassium 3.5 - 5.1 mmol/L 3.7   Magnesium  1.6 - 2.6 mg/dL 2.7 (H)   (H): Data is abnormally high         Recent Labs   Lab 11/27/23  0225 11/27/23  2348   CPK 1125*  --    TROPONINI  --  0.052*       Latest Reference Range & Units 11/28/23 08:22 11/28/23 12:12   Troponin I 0.000 - 0.026 ng/mL 0.114 (H) 0.104 (H)   (H): Data is abnormally high     Electrolytes: N/A - electrolytes  WDL  Accuchecks: Q6H     Gtts:                  diltiaZEM (CARDIZEM) 125 mg in dextrose 5 % (D5W) 125 mL infusion 10 mg/hr (11/28/23 0400)         LDA/Wounds:  Lines/Drains/Airways         Drain  Duration                     Urethral Catheter 11/25/23 2352 3 days                  Peripheral Intravenous Line  Duration                     Peripheral IV - Single Lumen 11/23/23 1400 18 G Right Wrist 5 days          Peripheral IV - Single Lumen 11/25/23 2356 22 G Anterior;Right Forearm 3 days          Peripheral IV - Single Lumen 11/28/23 0140 20 G Anterior;Proximal;Right Forearm <1 day                       Wounds: Yes  Wound care consulted: Yes

## 2023-11-28 NOTE — SUBJECTIVE & OBJECTIVE
Past Medical History:   Diagnosis Date    Arthritis     Weiss's esophagus     CHF (congestive heart failure)     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/23/2023    GERD (gastroesophageal reflux disease)     Type 2 diabetes mellitus        Past Surgical History:   Procedure Laterality Date    CEREBRAL ANGIOGRAM N/A 11/23/2023    Procedure: ANGIOGRAM-CEREBRAL;  Surgeon: Valery Hyman;  Location: [facility];  Service: Anesthesiology;  Laterality: N/A;       Review of patient's allergies indicates:  No Known Allergies  Family History    None       Tobacco Use    Smoking status: Not on file    Smokeless tobacco: Not on file   Substance and Sexual Activity    Alcohol use: Not on file    Drug use: Not on file    Sexual activity: Not on file     Review of Systems   Unable to perform ROS: Mental status change     Objective:     Vital Signs (Most Recent):  Temp: 100 °F (37.8 °C) (11/28/23 1401)  Pulse: 96 (11/28/23 1401)  Resp: (Abnormal) 38 (11/28/23 1401)  BP: 136/72 (11/28/23 1401)  SpO2: (Abnormal) 94 % (11/28/23 1401) Vital Signs (24h Range):  Temp:  [98.8 °F (37.1 °C)-101.2 °F (38.4 °C)] 100 °F (37.8 °C)  Pulse:  [] 96  Resp:  [20-43] 38  SpO2:  [91 %-95 %] 94 %  BP: (115-200)/() 136/72     Weight: 77.6 kg (171 lb 1.2 oz) (11/27/23 0912)  Body mass index is 30.3 kg/m².      Intake/Output Summary (Last 24 hours) at 11/28/2023 1455  Last data filed at 11/28/2023 1301  Gross per 24 hour   Intake 2374.85 ml   Output 1175 ml   Net 1199.85 ml       Lines/Drains/Airways       Drain       Name Duration         Urethral Catheter 11/25/23 2352 2 days              Peripheral Intravenous Line       Name Duration         Peripheral IV - Single Lumen 11/23/23 1400 18 G Right Wrist 5 days         Peripheral IV - Single Lumen 11/25/23 2356 22 G Anterior;Right Forearm 2 days         Peripheral IV - Single Lumen 11/28/23 0140 20 G Anterior;Proximal;Right Forearm <1 day                     Physical  Exam  Vitals and nursing note reviewed.   Constitutional:       General: He is not in acute distress.     Appearance: He is ill-appearing.   HENT:      Head: Normocephalic and atraumatic.   Eyes:      Conjunctiva/sclera: Conjunctivae normal.   Cardiovascular:      Rate and Rhythm: Normal rate and regular rhythm.      Pulses: Normal pulses.      Heart sounds: No murmur heard.  Pulmonary:      Effort: Tachypnea, accessory muscle usage and respiratory distress present.      Breath sounds: No wheezing.   Abdominal:      General: Bowel sounds are normal. There is no distension.      Palpations: Abdomen is soft. There is no mass.      Tenderness: There is no abdominal tenderness.   Musculoskeletal:         General: No swelling.      Right lower leg: No edema.      Left lower leg: No edema.   Skin:     General: Skin is warm and dry.   Neurological:      Mental Status: He is disoriented.      GCS: GCS eye subscore is 1. GCS verbal subscore is 2. GCS motor subscore is 5.      Motor: Weakness present.      Comments: Nonverbal, lethargic, with general weakness          Significant Labs:  All pertinent lab results from the last 24 hours have been reviewed.    Significant Imaging:  Imaging results within the past 24 hours have been reviewed.

## 2023-11-28 NOTE — PROGRESS NOTES
[facility]  Vascular Neurology  Comprehensive Stroke Center  Progress Note    Assessment/Plan:     * Embolic stroke involving right middle cerebral artery  Tim Rhodes is a 83 y.o. male with PMHx of CAD, HF,  DM that was transferred from [facility] with suspected R MCA occlusion for possible intervention. Initially presented to OSH with AMS and seizure activity, LKN OOW for TNK. CTH showed early ischemic changes in R MCA territory and hyperdense R MCA sign concerning for R MCA occlusion. Patient was intubated and sedated at OSH for airway protection. Repeat CT on arrival to C appears stable in comparison to prior. Patient independent at baseline per daughter. Discussed patient with neuro IR and patient taken to IR for possible thrombectomy, now s/p unsuccessful IR. MRI brain confirms large R MCA infarcts. Echo with EF 60% and apical akinesis. Stroke etiology likely cardio-embolic due to new onset AFib this admission.    Patient with increased agitation requiring 3x doses of zyprexa and 1x dose of haldol for restlessness. In midst of agitation, patient required cardizem gtt to be initiated. Patient has had difficulty tolerating NGT placement after extubation. Gastroenterology was consulted for possible PEG placement. Max temp today 100.9. Blood cultures are in progress. Consider obtaining UA. Follow-up CTH with large infarct, relatively unchanged.       Antithrombotics for secondary stroke prevention:  suppository    Statins for secondary stroke prevention: Statins: Atorvastatin- 40 mg daily    Aggressive risk factor modification: DM, CAD     Rehab efforts: The patient has been evaluated by a stroke team provider and the therapy needs have been fully considered based off the presenting complaints and exam findings. The following therapy evaluations are needed:  PT/OT/SLP when appropriate    Diagnostics ordered/pending: None     VTE prophylaxis: Heparin 5000 units SQ every 8  hours  Mechanical prophylaxis: Place SCDs    BP parameters: Infarct: Post unsucessful thrombectomy, SBP <220    Paroxysmal atrial fibrillation  New onset A-Fib during evening of 11/26.  Will need further discussion for anticoagulation, currently on ASA only.    Diabetes mellitus  Stroke risk factor  A1c 6.6  BG goal while inpatient 140-180  SSI PRN    Hemiparesis, left  2/2 stroke  PT/OT to evaluate and treat when appropriate    Leukocytosis  WBC 28 and lactic 11 on arrival to OSH  Leukocytosis and lactic improving  BCx with NGTD  12/27 WBC trended down to 11.91 (WNL).    Endotracheally intubated  Intubated at OSH for airway protection  Extubated 11/26    Seizure-like activity  Noted on arrival to OSH, reported to have RUE seizure like activity  Treated with keppra prior to transfer  EEG with severe slowing but no electrographic seizures  Seizure ppx with keppra 750 BID         11/24/2023 NAEO, remains intubated/sedated. EEG with severe slowing but no evidence of seizures. MRI brain confirms large R MCA infarcts, echo with EF 60% and apical akinesis. Infectious workup ongoing.  11/25/2023 NAEO, remains intubated. Follows simple commands, moving RUE/BLE voluntarily with no movement to LUE. ASA started for stroke ppx.   11/26/2023 Patient with afib overnight, fentanyl increased. Butcher placed due to retention. Extubated today at 1400, on 4L NC at the time of visit. Low dose precedex for agitation.    11/27/2023 Extubated yesterday. Tolerating without complications. Precedex stopped secondary to bradycardia. Neuro exam stable.   11/28/2023 Patient with increased agitation requiring 3x doses of zyprexa and 1x dose of haldol for restlessness. In midst of agitation, patient required cardizem gtt to be initiated. Patient has had difficulty tolerating NGT placement after extubation. Gastroenterology was consulted for possible PEG placement. Max temp today 100.9. Blood cultures are in progress. Consider obtaining UA.  Follow-up CTH with large infarct, relatively unchanged.         STROKE DOCUMENTATION   Acute Stroke Times   Last Known Normal Date: 11/22/23  Last Known Normal Time: 2200  Unknown Symptom Onset Date: Unknown Date  Unknown Symptom Onset Time: Unknown Time  Stroke Team Called Date: 11/23/23  Stroke Team Called Time: 1804  Stroke Team Arrival Date: 11/23/23  Stroke Team Arrival Time: 1809  CT Interpretation Time: 1813  Thrombolytic Therapy Recommended: No  Thrombectomy Recommended: Yes  Decision to Treat Time for IR: 1822    NIH Scale:  1a. Level of Consciousness: 1-->Not alert, but arousable by minor stimulation to obey, answer, or respond  1b. LOC Questions: 2-->Answers neither question correctly  1c. LOC Commands: 0-->Performs both tasks correctly  2. Best Gaze: 0-->Normal  3. Visual: 0-->No visual loss  4. Facial Palsy: 1-->Minor paralysis (flattened nasolabial fold, asymmetry on smiling)  5a. Motor Arm, Left: 4-->No movement  5b. Motor Arm, Right: 1-->Drift, limb holds 90 (or 45) degrees, but drifts down before full 10 secs, does not hit bed or other support  6a. Motor Leg, Left: 2-->Some effort against gravity, leg falls to bed by 5 secs, but has some effort against gravity  6b. Motor Leg, Right: 2-->Some effort against gravity, leg falls to bed by 5 secs, but has some effort against gravity  7. Limb Ataxia: 0-->Absent  8. Sensory: 0-->Normal, no sensory loss  9. Best Language: 3-->Mute, global aphasia, no usable speech or auditory comprehension  10. Dysarthria: 2-->Severe dysarthria, patients speech is so slurred as to be unintelligible in the absence of or out of proportion to any dysphasia, or is mute/anarthric  11. Extinction and Inattention (formerly Neglect): 1-->Visual, tactile, auditory, spatial, or personal inattention or extinction to bilateral simultaneous stimulation in one of the sensory modalities  Total (NIH Stroke Scale): 19       Modified Patricia Score: 0  Win Coma Scale:    ABCD2 Score:     [id number] Score:   HAS -BLED Score:   ICH Score:   Hunt & Acevedo Classification:      Hemorrhagic change of an Ischemic Stroke: Does this patient have an ischemic stroke with hemorrhagic changes? No     Neurologic Chief Complaint: R MCA Stroke     Subjective:     Interval History: Patient is seen for follow-up neurological assessment and treatment recommendations: Patient with increased agitation requiring 3x doses of zyprexa and 1x dose of haldol for restlessness. In midst of agitation, patient required cardizem gtt to be initiated. Patient has had difficulty tolerating NGT placement after extubation. Gastroenterology was consulted for possible PEG placement. Max temp today 100.9. Blood cultures are in progress. Consider obtaining UA. Follow-up CTH with large infarct, relatively unchanged.     HPI, Past Medical, Family, and Social History remains the same as documented in the initial encounter.     Review of Systems   Unable to perform ROS: Patient nonverbal     Scheduled Meds:   aspirin  300 mg Rectal Daily    atorvastatin  40 mg Per OG tube Daily    heparin (porcine)  5,000 Units Subcutaneous Q8H    levETIRAcetam (Keppra) IV (PEDS and ADULTS)  750 mg Intravenous Q12H    senna-docusate 8.6-50 mg  2 tablet Per OG tube BID    silodosin  4 mg Per OG tube Daily     Continuous Infusions:   diltiaZEM (CARDIZEM) 125 mg in dextrose 5 % (D5W) 125 mL infusion 10 mg/hr (11/28/23 1501)     PRN Meds:acetaminophen, artificial tears, calcium gluconate IVPB, calcium gluconate IVPB, calcium gluconate IVPB, dextrose 10%, dextrose 10%, dextrose 10%, dextrose 10%, glucagon (human recombinant), hydrALAZINE, insulin aspart U-100, labetalol, magnesium sulfate IVPB, magnesium sulfate IVPB, ondansetron, potassium chloride **AND** potassium chloride **AND** potassium chloride, sodium phosphate 15 mmol in dextrose 5 % (D5W) 250 mL IVPB, sodium phosphate 20.01 mmol in dextrose 5 % (D5W) 250 mL IVPB, sodium phosphate 30 mmol in dextrose 5  % (D5W) 250 mL IVPB    Objective:     Vital Signs (Most Recent):  Temp: (!) 100.7 °F (38.2 °C) (ice packs applied, fans in place) (11/28/23 1601)  Pulse: 80 (11/28/23 1643)  Resp: (!) 42 (11/28/23 1704)  BP: (!) 140/65 (11/28/23 1601)  SpO2: 95 % (11/28/23 1601)  BP Location: Left arm    Vital Signs Range (Last 24H):  Temp:  [98.8 °F (37.1 °C)-101.2 °F (38.4 °C)]   Pulse:  []   Resp:  [20-43]   BP: (128-200)/()   SpO2:  [91 %-95 %]   BP Location: Left arm       Physical Exam  Constitutional:       General: He is not in acute distress.     Appearance: He is ill-appearing.   HENT:      Head: Normocephalic and atraumatic.      Mouth/Throat:      Mouth: Mucous membranes are moist.   Eyes:      Pupils: Pupils are equal, round, and reactive to light.   Cardiovascular:      Rate and Rhythm: Normal rate.   Pulmonary:      Effort: No respiratory distress.   Neurological:      Motor: Weakness present.      Comments: Nonverbal, lethargic  Left-sided weakness              Neurological Exam:   LOC: drowsy  Attention Span: poor  Language: Global aphasia  Orientation:  Untestable due to severe aphasia   Pupils (CN II, III): PERRL  Motor: Arm left  Paresis: 0/5  Leg left  Paresis: 2/5  Arm right  Paresis: 4/5  Leg right Paresis: 3/5    Laboratory:  CMP:   Recent Labs   Lab 11/28/23  0150 11/28/23  0822   CALCIUM 9.5  --    ALBUMIN 2.9*  --    PROT 7.9  --    *  --    K 3.7 3.7   CO2 14*  --    *  --    BUN 26*  --    CREATININE 0.9  --    ALKPHOS 67  --    ALT 41  --    AST 55*  --    BILITOT 0.7  --        BMP:   Recent Labs   Lab 11/28/23  0150 11/28/23  0822   *  --    K 3.7 3.7   *  --    CO2 14*  --    BUN 26*  --    CREATININE 0.9  --    CALCIUM 9.5  --        CBC:   Recent Labs   Lab 11/28/23  0150   WBC 11.86   RBC 5.80   HGB 16.0   HCT 51.5      MCV 89   MCH 27.6   MCHC 31.1*       Lipid Panel:   Recent Labs   Lab 11/23/23  1915   CHOL 94*   LDLCALC 48.4*   HDL 32*   TRIG 68  "      Coagulation: No results for input(s): "PT", "INR", "APTT" in the last 168 hours.  Platelet Aggregation Study: No results for input(s): "PLTAGG", "PLTAGINTERP", "PLTAGREGLACO", "ADPPLTAGGREG" in the last 168 hours.  Hgb A1C:   Recent Labs   Lab 11/23/23 1915   HGBA1C 6.6*       TSH:   Recent Labs   Lab 11/23/23 1915   TSH 2.121         Diagnostic Results         Brain Imaging:  CT Head 11/28/23  Impression:  Evolving large right MCA territory recent infarction  Hyperdensity along the right MCA as well as the vasculature along the right cerebral sulci which may represent sluggish slow flow versus intravascular thrombus similar to prior allowing for differences in technique. Allowing for this there is no definite hemorrhagic conversion.    CT Head 11/26/23  Impression:  Evolving recent large right MCA territorial infarct.  No hemorrhagic conversion.  Mild midline shift minimally progressed from the prior study.    MRI brain without contrast 11/24/2023  Impression:  Large right MCA territorial infarct without hemorrhagic conversion.  Currently very mild mass effect.  Loss of the right carotid flow void consistent with occlusion versus slow flow.     CT Head 11/23/23 1812  Impression:  Acute right MCA territory infarction, similar to prior.  No infarct extension or hemorrhagic conversion.  Associated hyperdense vessel sign likely involving the right M1 and a proximal M2 branch.  Changes of generalized cerebral volume loss.  Paranasal sinus disease as above.     CT Head 11/23/23 1416  FINDINGS:  Decreased attenuation in sulcal effacement right MCA distribution consistent with acute in Ca infarct.  Hyperdense right MCA sign consistent with MCA thrombus.  Impression:  Acute right MCA infarct.        Vessel Imaging:  IR cerebral angiogram 11/23/2023  Preliminary interpretation: occlusion of R ICA at origin, unable to cross distally to perform angioplasty and stenting. Attempted thrombectomy at origin, tici 0.  Please " see Imaging report for full details.        Cardiac Evaluation:   EKG 11/26/2023  Atrial Fibrillation     TTE 11/24/2023    Left Ventricle: The left ventricle is normal in size. Ventricular mass is normal. Normal wall thickness. There is concentric remodeling. Regional wall motion abnormalities present - see diagram for wall motion findings. There is normal systolic function. Ejection fraction by visual approximation is 60%. There is normal diastolic function.    Right Ventricle: Normal right ventricular cavity size. Wall thickness is normal. Right ventricle wall motion  is normal. Systolic function is normal.    Aortic Valve: The aortic valve is a trileaflet valve. There is mild aortic valve sclerosis. There is annular calcification present. There is mild aortic regurgitation.    IVC/SVC: Patient is ventilated, cannot use IVC diameter to estimate right atrial pressure. PASP is at least 24mmHg.    Karen Ch NP  [facility]  Department of Vascular Neurology   [facility] - [facility]

## 2023-11-28 NOTE — NURSING
Discussed continued tachypnea and labored breathing with ROLANDO Mariee NP. Plan of care per provider is to continue to treat agitation.

## 2023-11-28 NOTE — ASSESSMENT & PLAN NOTE
83 y.o. male with PMHx of CAD, HF, HTN, DM who was transferred from [facility] for R MCA stroke and possible intervention on suspected R MCA occlusion. Patient initially presented to OSH with AMS and seizure activity. LKN 11/22/23 at 10pm. Out of treatment window for thrombolytics. CT at OSH with early ischemic changes in R MCA territory and hyperdense R MCA sign concerning for R MCA occlusion. Patient was intubated and sedated at OSH for airway protection.   Stroke team consulted  SBP <220  TTE/EKG/A1C/lipid panel penidg  Neuro checks q 1 hr  Thrombectomy unsuccessful   Statin   PT/OT  SLP when appropriate   CTH complete  MRI pending  A1C 6.6  SSI  Lipid profile complete  TTE complete  MRI complete and reviewed, Nsgy consulted, EEG neg and dced  Holding VTE ppx for pending Nsgy recs  11/25/2023: initiated sq heparin and baby asa  Follow CT head in AM  11/26/2023 EKG and if afib happens again will treat, Extubated 11/26 at 1400, Low dose precedex for agitation  11/27/2023 EKG, NS 75 cc/hr, consult GI for peg

## 2023-11-28 NOTE — PROGRESS NOTES
[facility] - Neuro Critical Care  Neurocritical Care  Progress Note    Admit Date: 11/23/2023  Service Date: 11/28/2023  Length of Stay: 5    Subjective:     Chief Complaint: Embolic stroke involving right middle cerebral artery    History of Present Illness: The patient is an 83-year-old with PMHx of  CAD and DM admitted to [facility] with large R MCA stroke and seizure.Per chart review, in emergency department at [facility]  presented with with altered mental status and focal seizure activity.  Daughter states patient was able to talk to her on the phone at 10:00 p.m. last night was at baseline.  Patient living in assisted living but still very functional.  Daughter states patien still drives on occasion.  She does not believe he takes any blood thinners.  She went to check on him is afternoon and he had fallen next to the toilet.  Patient unable to provide any history.    Noted to have tonic-clonic activity to the right upper extremity on arrival.  Patient with incomprehensible speech.  Copious vomiting on EMS arrival per paramedic.  Patient unable to contribute to history.  Last presumed normal was 10:00 p.m. last night. Out of window for thrombolytics.  Patient intubated for airway protection at outside hospital. On arrival CTH with large R MCA - stroke team consulted and plan for IR. Patient loaded with Keppra, EEG pending. Lactic acid 11.9 on arrival, pancx and initiated broad spec abx. Patient admitted to [facility] for close monitoring and higher level of care.        Hospital Course: 11/24/2023 MRI complete and reviewed, Nsgy consulted, EEG neg and dced  11/25/2023: initiate baby ASA and sq heparin, follow CT head in AM, SBP <180, initiate tube feeds  11/26/2023 EKG and if afib happens again will treat, Extubated 11/26 at 1400, Low dose precedex for agitation  11/27/2023 EKG, NS 75 cc/hr, consult IR for peg  11/28/23: consult GI for peg      Interval History:  unable to obtain enteral access, Gi consulted for PEG  placed ( see note), CTH stable,     Review of Systems   Reason unable to perform ROS: decrease LOC.      Unable to obtain a complete ROS due to level of consciousness.  Objective:     Vitals:  Temp: (!) 100.7 °F (38.2 °C) (ice packs applied, fans in place)  Pulse: 80  Rhythm: atrial rhythm  BP: (!) 140/65  MAP (mmHg): 93  Resp: (!) 42  SpO2: 95 %    Temp  Min: 98.8 °F (37.1 °C)  Max: 101.2 °F (38.4 °C)  Pulse  Min: 80  Max: 169  BP  Min: 128/71  Max: 200/88  MAP (mmHg)  Min: 87  Max: 128  Resp  Min: 20  Max: 43  SpO2  Min: 91 %  Max: 95 %    11/27 0701 - 11/28 0700  In: 1837.1 [I.V.:1337.1]  Out: 1485 [Urine:1485]   Unmeasured Output  Stool Occurrence: 1        Physical Exam  Vitals and nursing note reviewed.   Constitutional:       Appearance: He is ill-appearing.   HENT:      Nose: Nose normal.      Mouth/Throat:      Mouth: Mucous membranes are moist.      Pharynx: Oropharynx is clear.   Eyes:      Pupils: Pupils are equal, round, and reactive to light.   Cardiovascular:      Rate and Rhythm: Normal rate. Rhythm irregular.      Pulses: Normal pulses.      Heart sounds: Normal heart sounds.   Pulmonary:      Effort: Pulmonary effort is normal.      Breath sounds: Normal breath sounds.   Abdominal:      General: Bowel sounds are normal.      Palpations: Abdomen is soft.   Musculoskeletal:         General: Normal range of motion.   Skin:     General: Skin is warm and dry.      Capillary Refill: Capillary refill takes 2 to 3 seconds.   Neurological:      Comments: --GCS: E2 V1 M6  --Mental Status: opens eyes to touch, intermit follows simple commands  --CN II-XII grossly intact.   --Pupils 2mm, PERRL.   --Corneal reflex, gag, cough intact.  --LUE no movement  --RUE spont  --BLE withdraws to pain         Unable to test orientation, language, memory, judgment, insight, fund of knowledge, hearing, shoulder shrug, tongue protrusion, coordination, gait due to level of consciousness.       Medications:  ContinuousdiltiaZEM  (CARDIZEM) 125 mg in dextrose 5 % (D5W) 125 mL infusion, Last Rate: 10 mg/hr (11/28/23 1501)    Scheduledaspirin, 300 mg, Daily  atorvastatin, 40 mg, Daily  heparin (porcine), 5,000 Units, Q8H  levETIRAcetam (Keppra) IV (PEDS and ADULTS), 750 mg, Q12H  senna-docusate 8.6-50 mg, 2 tablet, BID  silodosin, 4 mg, Daily    PRNacetaminophen, 650 mg, Q6H PRN  artificial tears, 2 drop, QID PRN  calcium gluconate IVPB, 1 g, PRN  calcium gluconate IVPB, 2 g, PRN  calcium gluconate IVPB, 3 g, PRN  dextrose 10%, 12.5 g, PRN  dextrose 10%, 12.5 g, PRN  dextrose 10%, 25 g, PRN  dextrose 10%, 25 g, PRN  glucagon (human recombinant), 1 mg, PRN  hydrALAZINE, 10 mg, Q4H PRN  insulin aspart U-100, 0-10 Units, Q6H PRN  labetalol, 10 mg, Q4H PRN  magnesium sulfate IVPB, 2 g, PRN  magnesium sulfate IVPB, 4 g, PRN  ondansetron, 4 mg, Q6H PRN  potassium chloride, 40 mEq, PRN   And  potassium chloride, 60 mEq, PRN   And  potassium chloride, 80 mEq, PRN  sodium phosphate 15 mmol in dextrose 5 % (D5W) 250 mL IVPB, 15 mmol, PRN  sodium phosphate 20.01 mmol in dextrose 5 % (D5W) 250 mL IVPB, 20.01 mmol, PRN  sodium phosphate 30 mmol in dextrose 5 % (D5W) 250 mL IVPB, 30 mmol, PRN      Today I personally reviewed pertinent medications, lines/drains/airways, imaging, cardiology results, laboratory results, microbiology results, notably:    Diet  Diet NPO  Diet NPO        Assessment/Plan:     Neuro  * Embolic stroke involving right middle cerebral artery  83 y.o. male with PMHx of CAD, HF, HTN, DM who was transferred from [facility] for R MCA stroke and possible intervention on suspected R MCA occlusion. Patient initially presented to OSH with AMS and seizure activity. KELLYN 11/22/23 at 10pm. Out of treatment window for thrombolytics. CT at OSH with early ischemic changes in R MCA territory and hyperdense R MCA sign concerning for R MCA occlusion. Patient was intubated and sedated at OSH for airway protection.   Stroke team consulted  SBP  <220  TTE/EKG/A1C/lipid panel penidg  Neuro checks q 1 hr  Thrombectomy unsuccessful   Statin   PT/OT  SLP when appropriate   CTH complete  MRI pending  A1C 6.6  SSI  Lipid profile complete  TTE complete  MRI complete and reviewed, Nsgy consulted, EEG neg and dced  Holding VTE ppx for pending Nsgy recs  11/25/2023: initiated sq heparin and baby asa  Follow CT head in AM  11/26/2023 EKG and if afib happens again will treat, Extubated 11/26 at 1400, Low dose precedex for agitation  11/27/2023 EKG, NS 75 cc/hr, consult GI for peg        Hemiparesis, 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  PT/OT      Seizure-like activity  Seizure like activity noted at outside facility  Loaded w/ 2gm of Keppra   Continue maintenance Keppra 750mg BID  Seizure precautions  EEG neg and dced on 11/24    Cardiac/Vascular  Paroxysmal atrial fibrillation  - Consider anticoagulation when appropriate   - Diltiazem drip     Renal/  Hyperkalemia  K 5.4 on admission  Repeat K WNL      Oncology  Leukocytosis  POA  Pancx   Initiated broad spectrum abx  Trend lactic acid  11/28/2023 WNL     Endocrine  Diabetes mellitus  Hx of DM  A1C 6.6  PRN moderate dose SSI     GI  Encounter for PEG (percutaneous endoscopic gastrostomy)  - GI consult     Palliative Care  Endotracheally intubated  Patient intubated at outside facility for airway protection  Extubated 11/26 at 1400, Low dose precedex for agitation  Precedex dced           The patient is being Prophylaxed for:  Venous Thromboembolism with: Mechanical or Chemical  Stress Ulcer with: Not Applicable   Ventilator Pneumonia with: chlorhexidine oral care    Activity Orders            Turn patient starting at 11/28 0715    Straight Cath starting at 11/25 1809    Progressive Mobility Protocol (mobilize patient to their highest level of functioning at least twice daily) starting at 11/23 2000    Elevate HOB starting at 11/23 1821    Diet NPO: NPO starting at 11/23 1821          Full Code  Critical care time 40  lupe Mariee, NP  Neurocritical Care  [facility] - Neuro Critical Care

## 2023-11-28 NOTE — CONSULTS
[facility] - Neuro Critical Care  Gastroenterology  Consult Note    Patient Name: Tim Rhodes  MRN: [id number]  Admission Date: 11/23/2023  Hospital Length of Stay: 5 days  Code Status: Full Code   Attending Provider: Natanael Moya MD   Consulting Provider: Franko Perez MD  Primary Care Physician: Jeannette, Primary Doctor  Principal Problem:Embolic stroke involving right middle cerebral artery    Inpatient consult to Gastroenterology  Consult performed by: Franko Perez MD  Consult ordered by: Isadora Mariee NP  Reason for consult: PEG tube placement        Subjective:     HPI:  Mr Rhodes 84yo gentleman with CAD, GERD and NIDDM2 transferred from [place] ED after presenting with altered mental status and focal seizure activity. Pt not AAOx4. History provided by Pt's family who reported that he was last at baseline on 11/22/23 at 10pm and was able to converse on the phone at that time. Family went to visit him on 11/23/23 and discovered that he suffered a fall next to the toilet. Irregular movement of the right upper extremity concerning for seizure activity. EMS called to home and noted significant emesis on arrival. Pt with incomprehensible speech,  intubated for airway protection at [facility] ED. CTH on 11/23/23 concerning for right MCA thrombus/infarction; stroke team consulted. Pt outside window of thrombolytics and was loaded with Keppra. Lactic acid 11.9 on arrival and initiated broad spec abx. Pt transferred to [facility] on 11/23/23 for close monitoring and higher level of care. MRI Brain and CT Head repeated with no new significant findings. EEG negative. SBP maintained <180mmHg and Heparin started for DVT prophylaxis initiated. Pt extubated on 11/26/23 requiring low dose precedex for agitation. Pt febrile on 11/28/23 at 101.2. CXR negative for acute changes and acetaminophen was administered. Tachypnea noted at 36bpm in setting of AGMA (21). VBG on 11/27/23 significant for pH of 7.468 and pCO2 of 21.7. Bcx pending, not  on abx therapy. GI consulted for PEG tube placement.    Past Medical History:   Diagnosis Date    Arthritis     Weiss's esophagus     CHF (congestive heart failure)     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/23/2023    GERD (gastroesophageal reflux disease)     Type 2 diabetes mellitus        Past Surgical History:   Procedure Laterality Date    CEREBRAL ANGIOGRAM N/A 11/23/2023    Procedure: ANGIOGRAM-CEREBRAL;  Surgeon: Valery Hyman;  Location: [facility];  Service: Anesthesiology;  Laterality: N/A;       Review of patient's allergies indicates:  No Known Allergies  Family History    None       Tobacco Use    Smoking status: Not on file    Smokeless tobacco: Not on file   Substance and Sexual Activity    Alcohol use: Not on file    Drug use: Not on file    Sexual activity: Not on file     Review of Systems   Unable to perform ROS: Mental status change     Objective:     Vital Signs (Most Recent):  Temp: 100 °F (37.8 °C) (11/28/23 1401)  Pulse: 96 (11/28/23 1401)  Resp: (Abnormal) 38 (11/28/23 1401)  BP: 136/72 (11/28/23 1401)  SpO2: (Abnormal) 94 % (11/28/23 1401) Vital Signs (24h Range):  Temp:  [98.8 °F (37.1 °C)-101.2 °F (38.4 °C)] 100 °F (37.8 °C)  Pulse:  [] 96  Resp:  [20-43] 38  SpO2:  [91 %-95 %] 94 %  BP: (115-200)/() 136/72     Weight: 77.6 kg (171 lb 1.2 oz) (11/27/23 0912)  Body mass index is 30.3 kg/m².      Intake/Output Summary (Last 24 hours) at 11/28/2023 1455  Last data filed at 11/28/2023 1301  Gross per 24 hour   Intake 2374.85 ml   Output 1175 ml   Net 1199.85 ml       Lines/Drains/Airways       Drain       Name Duration         Urethral Catheter 11/25/23 2352 2 days              Peripheral Intravenous Line       Name Duration         Peripheral IV - Single Lumen 11/23/23 1400 18 G Right Wrist 5 days         Peripheral IV - Single Lumen 11/25/23 2356 22 G Anterior;Right Forearm 2 days         Peripheral IV - Single Lumen 11/28/23 0140 20 G  Anterior;Proximal;Right Forearm <1 day                     Physical Exam  Vitals and nursing note reviewed.   Constitutional:       General: He is not in acute distress.     Appearance: He is ill-appearing.   HENT:      Head: Normocephalic and atraumatic.   Eyes:      Conjunctiva/sclera: Conjunctivae normal.   Cardiovascular:      Rate and Rhythm: Normal rate and regular rhythm.      Pulses: Normal pulses.      Heart sounds: No murmur heard.  Pulmonary:      Effort: Tachypnea, accessory muscle usage and respiratory distress present.      Breath sounds: No wheezing.   Abdominal:      General: Bowel sounds are normal. There is no distension.      Palpations: Abdomen is soft. There is no mass.      Tenderness: There is no abdominal tenderness.   Musculoskeletal:         General: No swelling.      Right lower leg: No edema.      Left lower leg: No edema.   Skin:     General: Skin is warm and dry.   Neurological:      Mental Status: He is disoriented.      GCS: GCS eye subscore is 1. GCS verbal subscore is 2. GCS motor subscore is 5.      Motor: Weakness present.      Comments: Nonverbal, lethargic, with general weakness          Significant Labs:  All pertinent lab results from the last 24 hours have been reviewed.    Significant Imaging:  Imaging results within the past 24 hours have been reviewed.  Assessment/Plan:     GI  Encounter for PEG (percutaneous endoscopic gastrostomy)  Pt with dysarthia and dysphagia 2/t R MCA stroke. NG tube unable to be successfully placed 2/t pt's history of esophageal stricture s/p dilation per pt's family. Minimal improvement in neurologic function. Pt would benefit in placement of PEG tube for nutrition and placement for continued rehabilitation. No known significant abdominal surgical history. Pt currently on DVT prophylaxis with heparin. ASA for CAD. Optimization for PEG tube placement:   --Pt should be afebrile for 24hrs prior to procedure unless central fever  --Hold heparin on  morning of procedure   --May continue ASA therapy   --GI to discuss and provide future date of surgery        Thank you for your consult. I will follow-up with patient. Please contact us if you have any additional questions.    Franko Perez MD  Gastroenterology  [facility]

## 2023-11-28 NOTE — SUBJECTIVE & OBJECTIVE
Interval History:  unable to obtain enteral access, Gi consulted for PEG placed ( see note), CTH stable,     Review of Systems   Reason unable to perform ROS: decrease LOC.      Unable to obtain a complete ROS due to level of consciousness.  Objective:     Vitals:  Temp: (!) 100.7 °F (38.2 °C) (ice packs applied, fans in place)  Pulse: 80  Rhythm: atrial rhythm  BP: (!) 140/65  MAP (mmHg): 93  Resp: (!) 42  SpO2: 95 %    Temp  Min: 98.8 °F (37.1 °C)  Max: 101.2 °F (38.4 °C)  Pulse  Min: 80  Max: 169  BP  Min: 128/71  Max: 200/88  MAP (mmHg)  Min: 87  Max: 128  Resp  Min: 20  Max: 43  SpO2  Min: 91 %  Max: 95 %    11/27 0701 - 11/28 0700  In: 1837.1 [I.V.:1337.1]  Out: 1485 [Urine:1485]   Unmeasured Output  Stool Occurrence: 1        Physical Exam  Vitals and nursing note reviewed.   Constitutional:       Appearance: He is ill-appearing.   HENT:      Nose: Nose normal.      Mouth/Throat:      Mouth: Mucous membranes are moist.      Pharynx: Oropharynx is clear.   Eyes:      Pupils: Pupils are equal, round, and reactive to light.   Cardiovascular:      Rate and Rhythm: Normal rate. Rhythm irregular.      Pulses: Normal pulses.      Heart sounds: Normal heart sounds.   Pulmonary:      Effort: Pulmonary effort is normal.      Breath sounds: Normal breath sounds.   Abdominal:      General: Bowel sounds are normal.      Palpations: Abdomen is soft.   Musculoskeletal:         General: Normal range of motion.   Skin:     General: Skin is warm and dry.      Capillary Refill: Capillary refill takes 2 to 3 seconds.   Neurological:      Comments: --GCS: E2 V1 M6  --Mental Status: opens eyes to touch, intermit follows simple commands  --CN II-XII grossly intact.   --Pupils 2mm, PERRL.   --Corneal reflex, gag, cough intact.  --LUE no movement  --RUE spont  --BLE withdraws to pain         Unable to test orientation, language, memory, judgment, insight, fund of knowledge, hearing, shoulder shrug, tongue protrusion, coordination,  gait due to level of consciousness.       Medications:  ContinuousdiltiaZEM (CARDIZEM) 125 mg in dextrose 5 % (D5W) 125 mL infusion, Last Rate: 10 mg/hr (11/28/23 1501)    Scheduledaspirin, 300 mg, Daily  atorvastatin, 40 mg, Daily  heparin (porcine), 5,000 Units, Q8H  levETIRAcetam (Keppra) IV (PEDS and ADULTS), 750 mg, Q12H  senna-docusate 8.6-50 mg, 2 tablet, BID  silodosin, 4 mg, Daily    PRNacetaminophen, 650 mg, Q6H PRN  artificial tears, 2 drop, QID PRN  calcium gluconate IVPB, 1 g, PRN  calcium gluconate IVPB, 2 g, PRN  calcium gluconate IVPB, 3 g, PRN  dextrose 10%, 12.5 g, PRN  dextrose 10%, 12.5 g, PRN  dextrose 10%, 25 g, PRN  dextrose 10%, 25 g, PRN  glucagon (human recombinant), 1 mg, PRN  hydrALAZINE, 10 mg, Q4H PRN  insulin aspart U-100, 0-10 Units, Q6H PRN  labetalol, 10 mg, Q4H PRN  magnesium sulfate IVPB, 2 g, PRN  magnesium sulfate IVPB, 4 g, PRN  ondansetron, 4 mg, Q6H PRN  potassium chloride, 40 mEq, PRN   And  potassium chloride, 60 mEq, PRN   And  potassium chloride, 80 mEq, PRN  sodium phosphate 15 mmol in dextrose 5 % (D5W) 250 mL IVPB, 15 mmol, PRN  sodium phosphate 20.01 mmol in dextrose 5 % (D5W) 250 mL IVPB, 20.01 mmol, PRN  sodium phosphate 30 mmol in dextrose 5 % (D5W) 250 mL IVPB, 30 mmol, PRN      Today I personally reviewed pertinent medications, lines/drains/airways, imaging, cardiology results, laboratory results, microbiology results, notably:    Diet  Diet NPO  Diet NPO

## 2023-11-28 NOTE — NURSING
"[id number] Pt has increased restlessness with  HR in 130-160s. RN undone restraints and remove constricting items such as SCDs and wedge/pillows. RN was unsuccessful at decreasing pt restlessness. Matthieu PETERSON order for zyprexa. Medication given without resolution. Another dose of zyprexa given through the night without resolution of symptoms. Order for  Cardizem  5mg IVP given x3 to decrease HR. Pt HR still sustained in 130s-150s after each IVP. Pharmacy tubed up cardizem gtt. Medication titrated per order and max dose was reach over night. Matthieu PETERSON notified. Pt able to follow commands with thumbs up for "Yes" questions and thumbs down for "No". Pt also able to wiggle right toe to command. Pt denied feeling  pain with thumbs down. Pt provided a thumbs up for feeling anxious/scared. Pt reassured and reoriented to hospital room, staff, and plan of care. Matthieu PETERSON at bedside. Order for another dose of zyprexa and   2 g of Mg.   "

## 2023-11-28 NOTE — NURSING
ROLANDO Mariee att bedside attempting NGT insertion. Attempt unsuccessful. NP also notified of small amount of bright red blood oozing from rectum immediately following suppository insertion. Also discussed pt's continued tachypnea and agitation. Awaiting new orders.

## 2023-11-28 NOTE — NURSING
Pt taken to and returned from CT scan without complication. EChristianne Mariee, NP notified of pt's continued restlessness and labored breathing. Breathing appears to be increasingly labored with abdominal muscle use. RR 38-42 breaths/minute. SpO2 95% on 4L NC. NP states she will review pt's CT scan and reassess plan of care/place new orders. Additionally, pt had another bowel movement - no rectal bleeding noted at this time.

## 2023-11-28 NOTE — ASSESSMENT & PLAN NOTE
Tim Rhodes is a 83 y.o. male with PMHx of CAD, HF,  DM that was transferred from [facility] with suspected R MCA occlusion for possible intervention. Initially presented to OSH with AMS and seizure activity, LKN OOW for TNK. CTH showed early ischemic changes in R MCA territory and hyperdense R MCA sign concerning for R MCA occlusion. Patient was intubated and sedated at OSH for airway protection. Repeat CT on arrival to [facility] appears stable in comparison to prior. Patient independent at baseline per daughter. Discussed patient with neuro IR and patient taken to IR for possible thrombectomy, now s/p unsuccessful IR. MRI brain confirms large R MCA infarcts. Echo with EF 60% and apical akinesis. Stroke etiology likely cardio-embolic due to new onset AFib this admission.    Patient with increased agitation requiring 3x doses of zyprexa and 1x dose of haldol for restlessness. In midst of agitation, patient required cardizem gtt to be initiated. Patient has had difficulty tolerating NGT placement after extubation. Gastroenterology was consulted for possible PEG placement. Max temp today 100.9. Blood cultures are in progress. Consider obtaining UA. Follow-up CTH with large infarct, relatively unchanged.       Antithrombotics for secondary stroke prevention:  suppository    Statins for secondary stroke prevention: Statins: Atorvastatin- 40 mg daily    Aggressive risk factor modification: DM, CAD     Rehab efforts: The patient has been evaluated by a stroke team provider and the therapy needs have been fully considered based off the presenting complaints and exam findings. The following therapy evaluations are needed:  PT/OT/SLP when appropriate    Diagnostics ordered/pending: None     VTE prophylaxis: Heparin 5000 units SQ every 8 hours  Mechanical prophylaxis: Place SCDs    BP parameters: Infarct: Post unsucessful thrombectomy, SBP <220

## 2023-11-28 NOTE — NURSING
Dr. Moya and ROLANDO Mariee, NP at bedside to assess pt's respiratory status. Awaiting new orders for IV push fentanyl.

## 2023-11-28 NOTE — PT/OT/SLP EVAL
Physical Therapy  Evaluation and Treatment    Patient Name:  Tim Rhodes   MRN:  [id number]    Recent Surgery: Procedure(s) (LRB):  ANGIOGRAM-CEREBRAL (N/A) 5 Days Post-Op    Recommendations:     Discharge Recommendations:   Moderate Intensity Therapy   Discharge Equipment Recommendations: hospital bed   Barriers to discharge: Increased level of assist    Highest Level of Mobility: Supine to sit   Assistance Required: Total(A)X2 persons    Assessment:     Tim Rhodes is a 83 y.o. male admitted with a medical diagnosis of Embolic stroke involving right middle cerebral artery. He presents with the following impairments/functional limitations:  weakness, impaired endurance, decreased upper extremity function, impaired self care skills, impaired functional mobility, impaired balance, impaired cardiopulmonary response to activity, decreased lower extremity function, gait instability, impaired coordination    Pt met with HOB elevated and agreeable to PT session. Per chart review, pt usually ambulates without an AD, but at times needs balance assist from his daughter. Currently, pt requires total(A)x2 persons for supine to sit. Pt is limited by impaired cardiopulmonary response to activity and LSW. After sitting EOB for 2 mins, pt's HR shital to 145 sustained. Due to this, activity was terminated and pt was returned to supine for safety. Pt followed less than 25% of simple commands today and is restless on exam. He is functioning below baseline at this time.    Pt would benefit from continued skilled acute PT 3x/wk to address above listed functional deficits, provide patient/caregiver education, reduce fall risk, and maximize (I) and safety with functional mobility.     Rehab Prognosis: Good; patient would benefit from acute skilled PT services to address these deficits and reach maximum level of function.      Plan:     During this hospitalization, patient to be seen 3 x/week to address the identified rehab impairments  "via gait training, therapeutic activities, therapeutic exercises, neuromuscular re-education and progress toward the following goals:    Plan of Care Expires:  12/28/23    This plan of care has been discussed with the patient/caregiver, who was included in its development and is in agreement with the identified goals and treatment plan.     Subjective     Communicated with RN prior to session.  Patient agreeable to participate.     Chief Complaint: R MCA CVA   Patient/Family Comments/goals: None stated    Pain/Comfort:  Pain Rating 1: 0/10  Pain Rating Post-Intervention 1: 0/10    Patients cultural, spiritual, Alevism conflicts given the current situation: no    Patient's living environment is as follows:  Living Environment: Pt lives with daughter in [facility] with 6 [address], however pt also has ramp access.   Prior Level of Function: independent with mobility and ADLs, however at times requires assist from his daughter for balance  DME used: none  DME owned (not currently used): none  Upon discharge, patient will have assistance from: Unknown    Objective:     Patient found HOB elevated with bed alarm, blood pressure cuff, pulse ox (continuous), telemetry, SCD, oxygen, peripheral IV, dubon catheter  upon PT entry to room.    General Precautions: Standard, aspiration, fall   Orthopedic Precautions:N/A   Braces: N/A   BP (!) 142/65 (BP Location: Left arm, Patient Position: Lying)   Pulse 96   Temp (!) 100.9 °F (38.3 °C) (Axillary)   Resp (!) 38   Ht 5' 3" (1.6 m)   Wt 77.6 kg (171 lb 1.2 oz)   SpO2 (!) 94%   BMI 30.30 kg/m²   Oxygen Device: nasal cannula 4L      Exams:    Cognition:  Patient is oriented to Person  Follows one-step commands less than 25% of time  Insight to deficits/safety awareness: impaired    Edema: None present    Tone:  None present    Postural examination/scapula alignment: Rounded shoulder and Head forward    Lower Extremity Range of Motion:  Right Lower Extremity: WNL  Left Lower Extremity: " WNL    Lower Extremity Strength  NEREIDA formally due to impaired command following  Full AROM against gravity observed on R  Quad activation noted on L 1/5      Sensation:   Light touch sensation: Unable to formally assess due to impaired cognition     Functional Mobility:    Bed Mobility:  Supine to Sit: Total Assistance and 2 persons  on L side of bed  Sit to Supine: Total Assistance and 2 persons  Scooting anteriorly to EOB to plant feet on floor: Total Assistance and 2 persons    Transfers:   Sit to Stand Transfer: Activity did not occur due to tachycardia at EOB    Balance:  Static Sit:   Total Assist at EOB x 2 minutes  Posterior lean    Therapeutic Activities/Exercises     Patient assisted with functional mobility as noted above  Discussed at length benefits of PT as well as d/c recommendations.   Patient instructed to reposition in bed at least every 2 hours to reduce the risk of skin breakdown during hospital stay.  Patient educated on the importance of early mobility, OOB to prevent functional decline during hospital stay  Patient was instructed to utilize staff assistance for mobility/transfers.  Patient is appropriate to transfer with dependence to medichair via drawsheet and RN/PCT assist  Patient educated on PT POC and role of PT in acute care  White board updated to include patient's safest level of mobility with staff assistance, RN also updated    AM-PAC 6 CLICK MOBILITY  Turning over in bed (including adjusting bedclothes, sheets and blankets)?: 1  Sitting down on and standing up from a chair with arms (e.g., wheelchair, bedside commode, etc.): 1  Moving from lying on back to sitting on the side of the bed?: 1  Moving to and from a bed to a chair (including a wheelchair)?: 1  Need to walk in hospital room?: 1  Climbing 3-5 steps with a railing?: 1  Basic Mobility Total Score: 6      Patient left HOB elevated with all lines intact, call button in reach, bed alarm on, and RN  notified.      History/Goals:     PAST MEDICAL HISTORY:  Past Medical History:   Diagnosis Date    Arthritis     Weiss's esophagus     CHF (congestive heart failure)     Embolic stroke involving right middle cerebral artery 2023    GERD (gastroesophageal reflux disease)     Type 2 diabetes mellitus        Past Surgical History:   Procedure Laterality Date    CEREBRAL ANGIOGRAM N/A 2023    Procedure: ANGIOGRAM-CEREBRAL;  Surgeon: Valery Hyman;  Location: [facility];  Service: Anesthesiology;  Laterality: N/A;       GOALS:   Multidisciplinary Problems       Physical Therapy Goals          Problem: Physical Therapy    Goal Priority Disciplines Outcome Goal Variances Interventions   Physical Therapy Goal     PT, PT/OT Ongoing, Progressing     Description: Goals to be met by: 23     Patient will increase functional independence with mobility by performin. Supine to sit with MInimal Assistance  2. Sit to supine with MInimal Assistance  3. Sit to stand transfer with Moderate Assistance  4. Bed to chair transfer with Moderate Assistance using LRAD  5. Gait  x 30 feet with Moderate Assistance using LRAD as needed.   6. Lower extremity exercise program x15 reps per handout, with assistance as needed                         Time Tracking:     PT Received On: 23  PT Start Time: 908     PT Stop Time: 926  PT Total Time (min): 18 min     Billable Minutes: Evaluation 10 and Therapeutic Activity 8      Juanita Rodriguez, PT  2023  Pager# [phone]

## 2023-11-28 NOTE — SUBJECTIVE & OBJECTIVE
Neurologic Chief Complaint: R MCA Stroke     Subjective:     Interval History: Patient is seen for follow-up neurological assessment and treatment recommendations: Patient with increased agitation requiring 3x doses of zyprexa and 1x dose of haldol for restlessness. In midst of agitation, patient required cardizem gtt to be initiated. Patient has had difficulty tolerating NGT placement after extubation. Gastroenterology was consulted for possible PEG placement. Max temp today 100.9. Blood cultures are in progress. Consider obtaining UA. Follow-up CTH with large infarct, relatively unchanged.     HPI, Past Medical, Family, and Social History remains the same as documented in the initial encounter.     Review of Systems   Unable to perform ROS: Patient nonverbal     Scheduled Meds:   aspirin  300 mg Rectal Daily    atorvastatin  40 mg Per OG tube Daily    heparin (porcine)  5,000 Units Subcutaneous Q8H    levETIRAcetam (Keppra) IV (PEDS and ADULTS)  750 mg Intravenous Q12H    senna-docusate 8.6-50 mg  2 tablet Per OG tube BID    silodosin  4 mg Per OG tube Daily     Continuous Infusions:   diltiaZEM (CARDIZEM) 125 mg in dextrose 5 % (D5W) 125 mL infusion 10 mg/hr (11/28/23 1501)     PRN Meds:acetaminophen, artificial tears, calcium gluconate IVPB, calcium gluconate IVPB, calcium gluconate IVPB, dextrose 10%, dextrose 10%, dextrose 10%, dextrose 10%, glucagon (human recombinant), hydrALAZINE, insulin aspart U-100, labetalol, magnesium sulfate IVPB, magnesium sulfate IVPB, ondansetron, potassium chloride **AND** potassium chloride **AND** potassium chloride, sodium phosphate 15 mmol in dextrose 5 % (D5W) 250 mL IVPB, sodium phosphate 20.01 mmol in dextrose 5 % (D5W) 250 mL IVPB, sodium phosphate 30 mmol in dextrose 5 % (D5W) 250 mL IVPB    Objective:     Vital Signs (Most Recent):  Temp: (!) 100.7 °F (38.2 °C) (ice packs applied, fans in place) (11/28/23 1601)  Pulse: 80 (11/28/23 1643)  Resp: (!) 42 (11/28/23  "1704)  BP: (!) 140/65 (11/28/23 1601)  SpO2: 95 % (11/28/23 1601)  BP Location: Left arm    Vital Signs Range (Last 24H):  Temp:  [98.8 °F (37.1 °C)-101.2 °F (38.4 °C)]   Pulse:  []   Resp:  [20-43]   BP: (128-200)/()   SpO2:  [91 %-95 %]   BP Location: Left arm       Physical Exam  Constitutional:       General: He is not in acute distress.     Appearance: He is ill-appearing.   HENT:      Head: Normocephalic and atraumatic.      Mouth/Throat:      Mouth: Mucous membranes are moist.   Eyes:      Pupils: Pupils are equal, round, and reactive to light.   Cardiovascular:      Rate and Rhythm: Normal rate.   Pulmonary:      Effort: No respiratory distress.   Neurological:      Motor: Weakness present.      Comments: Nonverbal, lethargic  Left-sided weakness              Neurological Exam:   LOC: drowsy  Attention Span: poor  Language: Global aphasia  Orientation:  Untestable due to severe aphasia   Pupils (CN II, III): PERRL  Motor: Arm left  Paresis: 0/5  Leg left  Paresis: 2/5  Arm right  Paresis: 4/5  Leg right Paresis: 3/5    Laboratory:  CMP:   Recent Labs   Lab 11/28/23 0150 11/28/23 0822   CALCIUM 9.5  --    ALBUMIN 2.9*  --    PROT 7.9  --    *  --    K 3.7 3.7   CO2 14*  --    *  --    BUN 26*  --    CREATININE 0.9  --    ALKPHOS 67  --    ALT 41  --    AST 55*  --    BILITOT 0.7  --        BMP:   Recent Labs   Lab 11/28/23 0150 11/28/23 0822   *  --    K 3.7 3.7   *  --    CO2 14*  --    BUN 26*  --    CREATININE 0.9  --    CALCIUM 9.5  --        CBC:   Recent Labs   Lab 11/28/23 0150   WBC 11.86   RBC 5.80   HGB 16.0   HCT 51.5      MCV 89   MCH 27.6   MCHC 31.1*       Lipid Panel:   Recent Labs   Lab 11/23/23  1915   CHOL 94*   LDLCALC 48.4*   HDL 32*   TRIG 68       Coagulation: No results for input(s): "PT", "INR", "APTT" in the last 168 hours.  Platelet Aggregation Study: No results for input(s): "PLTAGG", "PLTAGINTERP", "PLTAGREGLACO", "ADPPLTAGGREG" in " the last 168 hours.  Hgb A1C:   Recent Labs   Lab 11/23/23 1915   HGBA1C 6.6*       TSH:   Recent Labs   Lab 11/23/23 1915   TSH 2.121         Diagnostic Results         Brain Imaging:  CT Head 11/28/23  Impression:  Evolving large right MCA territory recent infarction  Hyperdensity along the right MCA as well as the vasculature along the right cerebral sulci which may represent sluggish slow flow versus intravascular thrombus similar to prior allowing for differences in technique. Allowing for this there is no definite hemorrhagic conversion.    CT Head 11/26/23  Impression:  Evolving recent large right MCA territorial infarct.  No hemorrhagic conversion.  Mild midline shift minimally progressed from the prior study.    MRI brain without contrast 11/24/2023  Impression:  Large right MCA territorial infarct without hemorrhagic conversion.  Currently very mild mass effect.  Loss of the right carotid flow void consistent with occlusion versus slow flow.     CT Head 11/23/23 1812  Impression:  Acute right MCA territory infarction, similar to prior.  No infarct extension or hemorrhagic conversion.  Associated hyperdense vessel sign likely involving the right M1 and a proximal M2 branch.  Changes of generalized cerebral volume loss.  Paranasal sinus disease as above.     CT Head 11/23/23 1416  FINDINGS:  Decreased attenuation in sulcal effacement right MCA distribution consistent with acute in Ca infarct.  Hyperdense right MCA sign consistent with MCA thrombus.  Impression:  Acute right MCA infarct.        Vessel Imaging:  IR cerebral angiogram 11/23/2023  Preliminary interpretation: occlusion of R ICA at origin, unable to cross distally to perform angioplasty and stenting. Attempted thrombectomy at origin, tici 0.  Please see Imaging report for full details.        Cardiac Evaluation:   EKG 11/26/2023  Atrial Fibrillation     TTE 11/24/2023    Left Ventricle: The left ventricle is normal in size. Ventricular mass is  normal. Normal wall thickness. There is concentric remodeling. Regional wall motion abnormalities present - see diagram for wall motion findings. There is normal systolic function. Ejection fraction by visual approximation is 60%. There is normal diastolic function.    Right Ventricle: Normal right ventricular cavity size. Wall thickness is normal. Right ventricle wall motion  is normal. Systolic function is normal.    Aortic Valve: The aortic valve is a trileaflet valve. There is mild aortic valve sclerosis. There is annular calcification present. There is mild aortic regurgitation.    IVC/SVC: Patient is ventilated, cannot use IVC diameter to estimate right atrial pressure. PASP is at least 24mmHg.

## 2023-11-28 NOTE — NURSING
Dr. Moya and [facility] team at the bedside to round. MD aware of labored breathing with RR in the 30s-40s. SpO2 94% on 4 L NC. Bilateral breath sounds clear. Also discussed recent elevated BG and need for insulin orders, as well as most recent Na = 152. Team informed of RN neurological assessment (see flowsheets), fever, and bleeding from the meatus. See new orders.    Per Dr. Moya, plan of care for today is as follows:   - Na goal < 160  - HR goal < 120  - SBP goal < 180  - Pt to be taken to CT of the head  - NP to attempt placing NGT and if unsuccessful consult GI for PEG tube.   - Leave dubon catheter indwelling for the next couple of days d/t bleeding meatus and urinary retention

## 2023-11-29 NOTE — PT/OT/SLP PROGRESS
Occupational Therapy   Treatment    Name: Tim Rhodes  MRN: [id number]  Admitting Diagnosis:  Embolic stroke involving right middle cerebral artery  6 Days Post-Op    Recommendations:     Discharge Recommendations: Moderate Intensity Therapy  Discharge Equipment Recommendations:  hospital bed  Barriers to discharge:  None    Assessment:     Tim Rhodes is a 83 y.o. male with a medical diagnosis of Embolic stroke involving right middle cerebral artery.  He presents with performance deficits affecting function are weakness, impaired functional mobility, decreased lower extremity function, decreased upper extremity function, impaired self care skills, decreased coordination, impaired cognition, impaired endurance.     Rehab Prognosis:  Fair; patient would benefit from acute skilled OT services to address these deficits and reach maximum level of function.       Plan:     Patient to be seen 3 x/week to address the above listed problems via self-care/home management, therapeutic activities, therapeutic exercises, neuromuscular re-education  Plan of Care Expires: 12/22/23  Plan of Care Reviewed with: patient    Subjective     Patient:  Nonverbal  Pain/Comfort:  Pain Rating 1: 0/10  Pain Rating Post-Intervention 1: 0/10    Objective:     Communicated with: Nurse prior to session.  Patient found supine with bed alarm, blood pressure cuff, pulse ox (continuous), telemetry, SCD, oxygen, peripheral IV, Condom Catheter, restraints (cooling blanket) upon OT entry to room.    General Precautions: Standard, aspiration, fall    Orthopedic Precautions:N/A  Braces: N/A  Respiratory Status:  aerosol mask; 10 liters, 40%     Occupational Performance:     Bed Mobility:    Patient completed Rolling/Turning to Left with  dependent  Patient completed Rolling/Turning to Right with total assistance   Supine-sit:  Deferred, fever overnight and on cooling blanket     Functional Mobility/Transfers:  Dependent drawsheet transfers    Activities  of Daily Living:  Grooming: dependence while supine  Toileting: dependence bed level    [facility] 6 Click ADL: 6    Treatment & Education:  Daily orientation provided.  PROM performed left UE and AAROM right UE and bilateral LEs one set x 10 rep in all planes of motion with stretches provided at end range.  Assistance and facilitation provided for upward rotation of the scapula during shoulder flexion and abduction to promote orientation of glenoid fossa of scapula to humeral head for prevention of post-stroke hemiplegic pain.  Addressed cervical rotation to the right.  Patient with resistant to R UE movements.   Positioning provided for midline orientation with bilateral UEs elevated and heels lifted off mattress.   Gentle cervical rotation provided.   Family asleep at bedside during the session.       Patient left supine with all lines intact, call button in reach, and bed alarm on    GOALS:   Multidisciplinary Problems       Occupational Therapy Goals          Problem: Occupational Therapy    Goal Priority Disciplines Outcome Interventions   Occupational Therapy Goal     OT, PT/OT Ongoing, Progressing    Description: Goals set 11/27 to be addressed for 14 days with expiration date, 12/11:  Patient will increase functional independence with ADLs by performing:    Patient will demonstrate rolling to the right with mod assist.  Not met   Patient will demonstrate rolling to the left with mod assist.   Not met  Patient will demonstrate supine -sit with mod  assist.   Not met  Patient will demonstrate stand pivot transfers with mod assist.   Not met  Patient will demonstrate grooming while seated with mod assist.   Not met  Patient will demonstrate upper body dressing with mod assist while seated EOB.   Not met  Patient will demonstrate lower body dressing with mod assist while seated EOB.   Not met  Patient will demonstrate toileting with mod assist.   Not met  Patient will demonstrate bathing while seated EOB with mod  assist.   Not met  Patient's family / caregiver will demonstrate independence and safety with assisting patient with self-care skills and functional mobility.     Not met  Patient's family / caregiver will demonstrate independence with providing ROM and changes in bed positioning.   Not met                             Time Tracking:     OT Date of Treatment: 11/29/23  OT Start Time: 0420  OT Stop Time: 0444  OT Total Time (min): 24 min    Billable Minutes:Neuromuscular Re-education 24    OT/YADI: OT          11/29/2023

## 2023-11-29 NOTE — PLAN OF CARE
COLT faxed to the [facility]    Filomena Nuñez RN, CCRN-K, [facility]  Neuro-Critical Care   [phone]

## 2023-11-29 NOTE — PROGRESS NOTES
[facility] - Neuro Critical Care  Neurocritical Care  Progress Note    Admit Date: 11/23/2023  Service Date: 11/29/2023  Length of Stay: 6    Subjective:     Chief Complaint: Embolic stroke involving right middle cerebral artery    History of Present Illness: The patient is an 83-year-old with PMHx of  CAD and DM admitted to [facility] with large R MCA stroke and seizure.Per chart review, in emergency department at [facility]  presented with with altered mental status and focal seizure activity.  Daughter states patient was able to talk to her on the phone at 10:00 p.m. last night was at baseline.  Patient living in assisted living but still very functional.  Daughter states patien still drives on occasion.  She does not believe he takes any blood thinners.  She went to check on him is afternoon and he had fallen next to the toilet.  Patient unable to provide any history.    Noted to have tonic-clonic activity to the right upper extremity on arrival.  Patient with incomprehensible speech.  Copious vomiting on EMS arrival per paramedic.  Patient unable to contribute to history.  Last presumed normal was 10:00 p.m. last night. Out of window for thrombolytics.  Patient intubated for airway protection at outside hospital. On arrival [facility] with large R MCA - stroke team consulted and plan for IR. Patient loaded with Keppra, EEG pending. Lactic acid 11.9 on arrival, pancx and initiated broad spec abx. Patient admitted to [facility] for close monitoring and higher level of care.        Hospital Course: 11/24/2023 MRI complete and reviewed, Nsgy consulted, EEG neg and dced  11/25/2023: initiate baby ASA and sq heparin, follow CT head in AM, SBP <180, initiate tube feeds  11/26/2023 EKG and if afib happens again will treat, Extubated 11/26 at 1400, Low dose precedex for agitation  11/27/2023 EKG, NS 75 cc/hr, consult IR for peg  11/28/23: consult GI for peg  11/29/23: CXR in am      Interval History:  unable to obtain enteral access,  Gi consulted for PEG placed ( see note), small pneumothorax per cxr, placed on 100% NRB, repeat CXR stable.   Review of Systems   Reason unable to perform ROS: decrease LOC.      Unable to obtain a complete ROS due to level of consciousness.  Objective:     Vitals:  Temp: 100.3 °F (37.9 °C)  Pulse: 92  Rhythm: normal sinus rhythm  BP: (!) 140/77  MAP (mmHg): 101  Resp: (!) 24  SpO2: 98 %  Oxygen Concentration (%): 15    Temp  Min: 99.4 °F (37.4 °C)  Max: 101.4 °F (38.6 °C)  Pulse  Min: 66  Max: 123  BP  Min: 109/59  Max: 161/73  MAP (mmHg)  Min: 78  Max: 110  Resp  Min: 24  Max: 94  SpO2  Min: 93 %  Max: 100 %  Oxygen Concentration (%)  Min: 15  Max: 40    11/28 0701 - 11/29 0700  In: 976.9 [I.V.:580.9]  Out: 1465 [Urine:1465]   Unmeasured Output  Stool Occurrence: 1        Physical Exam  Vitals and nursing note reviewed.   Constitutional:       Appearance: He is ill-appearing.   HENT:      Nose: Nose normal.      Mouth/Throat:      Mouth: Mucous membranes are moist.      Pharynx: Oropharynx is clear.   Eyes:      Pupils: Pupils are equal, round, and reactive to light.   Cardiovascular:      Rate and Rhythm: Normal rate. Rhythm irregular.      Pulses: Normal pulses.      Heart sounds: Normal heart sounds.   Pulmonary:      Effort: Pulmonary effort is normal.      Breath sounds: Normal breath sounds.   Abdominal:      General: Bowel sounds are normal.      Palpations: Abdomen is soft.   Musculoskeletal:         General: Normal range of motion.   Skin:     General: Skin is warm and dry.      Capillary Refill: Capillary refill takes 2 to 3 seconds.   Neurological:      Comments: --GCS: E2 V1 M6  --Mental Status: opens eyes to touch, intermit follows simple commands  --CN II-XII grossly intact.   --Pupils 2mm, PERRL.   --Corneal reflex, gag, cough intact.  --LUE no movement  --RUE spont  --BLE withdraws to pain         Unable to test orientation, language, memory, judgment, insight, fund of knowledge, hearing, shoulder  shrug, tongue protrusion, coordination, gait due to level of consciousness.       Medications:  ContinuousdiltiaZEM (CARDIZEM) 125 mg in dextrose 5 % (D5W) 125 mL infusion, Last Rate: 7.5 mg/hr (11/29/23 0836)    Scheduledaspirin, 300 mg, Daily  atorvastatin, 40 mg, Daily  cefTRIAXone (ROCEPHIN) IVPB, 1 g, Q24H  heparin (porcine), 5,000 Units, Q8H  levETIRAcetam (Keppra) IV (PEDS and ADULTS), 750 mg, Q12H  silodosin, 4 mg, Daily    PRNacetaminophen, 650 mg, Q6H PRN  albuterol-ipratropium, 3 mL, Q4H PRN  artificial tears, 2 drop, QID PRN  calcium gluconate IVPB, 1 g, PRN  calcium gluconate IVPB, 2 g, PRN  calcium gluconate IVPB, 3 g, PRN  dextrose 10%, 12.5 g, PRN  dextrose 10%, 25 g, PRN  glucagon (human recombinant), 1 mg, PRN  hydrALAZINE, 10 mg, Q4H PRN  insulin aspart U-100, 0-10 Units, Q6H PRN  labetalol, 10 mg, Q4H PRN  magnesium sulfate IVPB, 2 g, PRN  magnesium sulfate IVPB, 4 g, PRN  ondansetron, 4 mg, Q6H PRN  potassium chloride, 40 mEq, PRN   And  potassium chloride, 60 mEq, PRN   And  potassium chloride, 80 mEq, PRN  sodium phosphate 15 mmol in dextrose 5 % (D5W) 250 mL IVPB, 15 mmol, PRN  sodium phosphate 20.01 mmol in dextrose 5 % (D5W) 250 mL IVPB, 20.01 mmol, PRN  sodium phosphate 30 mmol in dextrose 5 % (D5W) 250 mL IVPB, 30 mmol, PRN      Today I personally reviewed pertinent medications, lines/drains/airways, imaging, cardiology results, laboratory results, microbiology results, notably:    Diet  Diet NPO  Diet NPO        Assessment/Plan:     Neuro  * Embolic stroke involving right middle cerebral artery  83 y.o. male with PMHx of CAD, HF, HTN, DM who was transferred from [facility] for R MCA stroke and possible intervention on suspected R MCA occlusion. Patient initially presented to OSH with AMS and seizure activity. LKN 11/22/23 at 10pm. Out of treatment window for thrombolytics. CT at OSH with early ischemic changes in R MCA territory and hyperdense R MCA sign concerning for R MCA  occlusion. Patient was intubated and sedated at OSH for airway protection.   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  PT/OT      Seizure-like activity  Seizure like activity noted at outside facility  Loaded w/ 2gm of Keppra   Continue maintenance Keppra 750mg BID  Seizure precautions  EEG neg and dced on 11/24    Cardiac/Vascular  Paroxysmal atrial fibrillation  - Consider anticoagulation when appropriate   - Diltiazem drip     Oncology  Leukocytosis  POA  Pancx   Initiated broad spectrum abx  Trend lactic acid  11/29/2023 WNL     Endocrine  Diabetes mellitus  Hx of DM  A1C 6.6  PRN moderate dose SSI     GI  Encounter for PEG (percutaneous endoscopic gastrostomy)  - GI consult     Palliative Care  Endotracheally intubated  Patient intubated at outside facility for airway protection  Extubated 11/26 at 1400, Low dose precedex for agitation  Precedex dced           The patient is being Prophylaxed for:  Venous Thromboembolism with: Mechanical or Chemical  Stress Ulcer with: Not Applicable   Ventilator Pneumonia with: not applicable    Activity Orders            Turn patient starting at 11/28 0715    Straight Cath starting at 11/25 1809    Progressive Mobility Protocol (mobilize patient to their highest level of functioning at least twice daily) starting at 11/23 2000    Elevate HOB starting at 11/23 1821    Diet NPO: NPO starting at 11/23 1821          Full  Code  Critical care time 40 mins  Isadora Mariee NP  Neurocritical Care  [facility] - Neuro Critical Care

## 2023-11-29 NOTE — PLAN OF CARE
[facility] Care Plan    POC reviewed with Tim Rhodes and family at 0300. Pt verbalized understanding. Questions and concerns addressed. No acute events overnight. Pt progressing toward goals. Will continue to monitor. See below and flowsheets for full assessment and VS info.     -Dilt gtt titrated to maintain HR <120  -Pt remained tachypneic with accessory muscle use throughout shift, pt NT suctioned PRN   -1x dose 25mg benadryl ordered IVP  -PRN tylenol given x1 for elevated temp  -Pt placed on cooling blanket  -Frequent oral care provided  -UO downtrending, WBC uptrending.. NOLAN Harrison notified.     Is this a stroke patient? yes- Stroke booklet reviewed with patient and family, risk factors identified for patient and stroke booklet remains at bedside for ongoing education.     Neuro:  [place] Coma Scale  Best Eye Response: 3-->(E3) to speech  Best Motor Response: 6-->(M6) obeys commands  Best Verbal Response: 2-->(V2) incomprehensible speech  [place] Coma Scale Score: 11  Assessment Qualifiers: patient not sedated/intubated  Pupil PERRLA: yes     24hr Temp:  [99.4 °F (37.4 °C)-101.4 °F (38.6 °C)]     CV:   Rhythm: normal sinus rhythm  BP goals:   SBP < 180  MAP > 65    Resp:      Vent Mode: Spont  Set Rate: 14 BPM  Oxygen Concentration (%): 40  Vt Set: 500 mL  PEEP/CPAP: 5 cmH20  Pressure Support: 5 cmH20    Plan: N/A    GI/:     Diet/Nutrition Received: NPO  Last Bowel Movement: 11/28/23  Voiding Characteristics: urethral catheter (bladder)    Intake/Output Summary (Last 24 hours) at 11/29/2023 0357  Last data filed at 11/29/2023 0300  Gross per 24 hour   Intake 1273.79 ml   Output 1375 ml   Net -101.21 ml     Unmeasured Output  Stool Occurrence: 1    Labs/Accuchecks:  Recent Labs   Lab 11/29/23  0055   WBC 16.77*   RBC 5.13   HGB 14.3   HCT 45.7         Recent Labs   Lab 11/29/23  0055   *   K 4.0   CO2 20*   *   BUN 35*   CREATININE 1.3   ALKPHOS 65   ALT 36   AST 34   BILITOT 0.7    No  "results for input(s): "PROTIME", "INR", "APTT", "HEPANTIXA" in the last 168 hours.   Recent Labs   Lab 11/27/23  0225 11/27/23  2348 11/28/23  2246   CPK 1125*  --   --    TROPONINI  --    < > 0.127*    < > = values in this interval not displayed.       Electrolytes: N/A - electrolytes WDL  Accuchecks: Q6H    Gtts:   diltiaZEM (CARDIZEM) 125 mg in dextrose 5 % (D5W) 125 mL infusion 2.5 mg/hr (11/29/23 0300)       LDA/Wounds:  Lines/Drains/Airways       Drain  Duration                  Urethral Catheter 11/25/23 2352 3 days              Peripheral Intravenous Line  Duration                  Peripheral IV - Single Lumen 11/28/23 0140 20 G Anterior;Proximal;Right Forearm 1 day         Peripheral IV - Single Lumen 11/29/23 0041 18 G;1 3/4 in Anterior;Right Upper Arm <1 day                  Wounds: No  Wound care consulted: No   "

## 2023-11-29 NOTE — PLAN OF CARE
Clinical updates sent via Careport to the following facilities.   Patient to have peg placed per MD      [facility]  Phone: [phone]  2.  [facility]   Phone: [phone]  3.  [facility]  Phone: [phone]  4.  [facility]   Phone: [phone]  5.  [facility]  Phone: [phone]     Filomena Nuñez RN, CCRN-K, [facility]  Neuro-Critical Care   [phone]

## 2023-11-29 NOTE — NURSING
ROLANDO Mariee, NP notified of continued labored breathing after receiving 1 time fentanyl dose (see MAR). RR in the 40s, abdominal muscles in use. SpO2 95% on 4L NC. No new orders at this time.

## 2023-11-29 NOTE — SUBJECTIVE & OBJECTIVE
Interval History:  unable to obtain enteral access, Gi consulted for PEG placed ( see note), small pneumothorax per cxr, placed on 100% NRB, repeat CXR stable.   Review of Systems   Reason unable to perform ROS: decrease LOC.      Unable to obtain a complete ROS due to level of consciousness.  Objective:     Vitals:  Temp: 100.3 °F (37.9 °C)  Pulse: 92  Rhythm: normal sinus rhythm  BP: (!) 140/77  MAP (mmHg): 101  Resp: (!) 24  SpO2: 98 %  Oxygen Concentration (%): 15    Temp  Min: 99.4 °F (37.4 °C)  Max: 101.4 °F (38.6 °C)  Pulse  Min: 66  Max: 123  BP  Min: 109/59  Max: 161/73  MAP (mmHg)  Min: 78  Max: 110  Resp  Min: 24  Max: 94  SpO2  Min: 93 %  Max: 100 %  Oxygen Concentration (%)  Min: 15  Max: 40    11/28 0701 - 11/29 0700  In: 976.9 [I.V.:580.9]  Out: 1465 [Urine:1465]   Unmeasured Output  Stool Occurrence: 1        Physical Exam  Vitals and nursing note reviewed.   Constitutional:       Appearance: He is ill-appearing.   HENT:      Nose: Nose normal.      Mouth/Throat:      Mouth: Mucous membranes are moist.      Pharynx: Oropharynx is clear.   Eyes:      Pupils: Pupils are equal, round, and reactive to light.   Cardiovascular:      Rate and Rhythm: Normal rate. Rhythm irregular.      Pulses: Normal pulses.      Heart sounds: Normal heart sounds.   Pulmonary:      Effort: Pulmonary effort is normal.      Breath sounds: Normal breath sounds.   Abdominal:      General: Bowel sounds are normal.      Palpations: Abdomen is soft.   Musculoskeletal:         General: Normal range of motion.   Skin:     General: Skin is warm and dry.      Capillary Refill: Capillary refill takes 2 to 3 seconds.   Neurological:      Comments: --GCS: E2 V1 M6  --Mental Status: opens eyes to touch, intermit follows simple commands  --CN II-XII grossly intact.   --Pupils 2mm, PERRL.   --Corneal reflex, gag, cough intact.  --LUE no movement  --RUE spont  --BLE withdraws to pain         Unable to test orientation, language, memory,  judgment, insight, fund of knowledge, hearing, shoulder shrug, tongue protrusion, coordination, gait due to level of consciousness.       Medications:  ContinuousdiltiaZEM (CARDIZEM) 125 mg in dextrose 5 % (D5W) 125 mL infusion, Last Rate: 7.5 mg/hr (11/29/23 0836)    Scheduledaspirin, 300 mg, Daily  atorvastatin, 40 mg, Daily  cefTRIAXone (ROCEPHIN) IVPB, 1 g, Q24H  heparin (porcine), 5,000 Units, Q8H  levETIRAcetam (Keppra) IV (PEDS and ADULTS), 750 mg, Q12H  silodosin, 4 mg, Daily    PRNacetaminophen, 650 mg, Q6H PRN  albuterol-ipratropium, 3 mL, Q4H PRN  artificial tears, 2 drop, QID PRN  calcium gluconate IVPB, 1 g, PRN  calcium gluconate IVPB, 2 g, PRN  calcium gluconate IVPB, 3 g, PRN  dextrose 10%, 12.5 g, PRN  dextrose 10%, 25 g, PRN  glucagon (human recombinant), 1 mg, PRN  hydrALAZINE, 10 mg, Q4H PRN  insulin aspart U-100, 0-10 Units, Q6H PRN  labetalol, 10 mg, Q4H PRN  magnesium sulfate IVPB, 2 g, PRN  magnesium sulfate IVPB, 4 g, PRN  ondansetron, 4 mg, Q6H PRN  potassium chloride, 40 mEq, PRN   And  potassium chloride, 60 mEq, PRN   And  potassium chloride, 80 mEq, PRN  sodium phosphate 15 mmol in dextrose 5 % (D5W) 250 mL IVPB, 15 mmol, PRN  sodium phosphate 20.01 mmol in dextrose 5 % (D5W) 250 mL IVPB, 20.01 mmol, PRN  sodium phosphate 30 mmol in dextrose 5 % (D5W) 250 mL IVPB, 30 mmol, PRN      Today I personally reviewed pertinent medications, lines/drains/airways, imaging, cardiology results, laboratory results, microbiology results, notably:    Diet  Diet NPO  Diet NPO

## 2023-11-29 NOTE — PLAN OF CARE
"[facility] Care Plan    POC reviewed with Tim Rhodes and family at 1400. Pt verbalized understanding. Questions and concerns addressed. No acute events today. Pt progressing toward goals. Will continue to monitor. See below and flowsheets for full assessment and VS info.             Is this a stroke patient? yes- Stroke booklet reviewed with family, risk factors identified for patient and stroke booklet remains at bedside for ongoing education.     Neuro:  [place] Coma Scale  Best Eye Response: 3-->(E3) to speech  Best Motor Response: 6-->(M6) obeys commands  Best Verbal Response: 2-->(V2) incomprehensible speech  Win Coma Scale Score: 11  Assessment Qualifiers: patient not sedated/intubated  Pupil PERRLA: yes     24 hr Temp:  [99.4 °F (37.4 °C)-101.4 °F (38.6 °C)]     CV:   Rhythm: normal sinus rhythm  BP goals:   SBP < 180      Resp:      Vent Mode: Spont  Set Rate: 14 BPM  Oxygen Concentration (%): 15  Vt Set: 500 mL  PEEP/CPAP: 5 cmH20  Pressure Support: 5 cmH20    Plan:  nonrebreather for pneumothorax    GI/:     Diet/Nutrition Received: NPO  Last Bowel Movement: 11/28/23  Voiding Characteristics: urethral catheter (bladder)    Intake/Output Summary (Last 24 hours) at 11/29/2023 1709  Last data filed at 11/29/2023 1126  Gross per 24 hour   Intake 46.65 ml   Output 1135 ml   Net -1088.35 ml     Unmeasured Output  Stool Occurrence: 1    Labs/Accuchecks:  Recent Labs   Lab 11/29/23  0055   WBC 16.77*   RBC 5.13   HGB 14.3   HCT 45.7         Recent Labs   Lab 11/29/23  0055   *   K 4.0   CO2 20*   *   BUN 35*   CREATININE 1.3   ALKPHOS 65   ALT 36   AST 34   BILITOT 0.7    No results for input(s): "PROTIME", "INR", "APTT", "HEPANTIXA" in the last 168 hours.   Recent Labs   Lab 11/27/23  0225 11/27/23  2348 11/29/23  0440   CPK 1125*  --   --    TROPONINI  --    < > 0.121*    < > = values in this interval not displayed.       Electrolytes: N/A - electrolytes WDL  Accuchecks: Q6H    Gtts:   " diltiaZEM (CARDIZEM) 125 mg in dextrose 5 % (D5W) 125 mL infusion 7.5 mg/hr (11/29/23 0836)       LDA/Wounds:  Lines/Drains/Airways       Airway  Duration                  Nasopharyngeal Airway <1 day              Peripheral Intravenous Line  Duration                  Peripheral IV - Single Lumen 11/28/23 0140 20 G Anterior;Proximal;Right Forearm 1 day         Midline Catheter Insertion/Assessment  - Single Lumen 11/29/23 1255 Left basilic vein (medial side of arm) 20g x 10cm <1 day         Peripheral IV - Single Lumen 11/29/23 0041 18 G;1 3/4 in Anterior;Right Upper Arm <1 day                  Wounds: Yes  Wound care consulted: Yes

## 2023-11-29 NOTE — SUBJECTIVE & OBJECTIVE
Neurologic Chief Complaint: R MCA stroke    Subjective:     Interval History: Patient is seen for follow-up neurological assessment and treatment recommendations: Patient with decreased urine output, increasing WBC, tachypnea, w/accessory muscle use overnight.  UA + for UTI.  CXR w/R pneumothorax on xray stable.  GI consulted by primary team for PEG.    HPI, Past Medical, Family, and Social History remains the same as documented in the initial encounter.     Review of Systems   Unable to perform ROS: Mental status change   HENT:  Positive for trouble swallowing.    Respiratory:  Positive for shortness of breath.    Neurological:  Positive for speech difficulty and weakness.     Scheduled Meds:   aspirin  300 mg Rectal Daily    atorvastatin  40 mg Per OG tube Daily    cefTRIAXone (ROCEPHIN) IVPB  1 g Intravenous Q24H    heparin (porcine)  5,000 Units Subcutaneous Q8H    levETIRAcetam (Keppra) IV (PEDS and ADULTS)  750 mg Intravenous Q12H    silodosin  4 mg Per OG tube Daily     Continuous Infusions:   diltiaZEM (CARDIZEM) 125 mg in dextrose 5 % (D5W) 125 mL infusion 7.5 mg/hr (11/29/23 0836)     PRN Meds:acetaminophen, albuterol-ipratropium, artificial tears, calcium gluconate IVPB, calcium gluconate IVPB, calcium gluconate IVPB, dextrose 10%, dextrose 10%, glucagon (human recombinant), hydrALAZINE, insulin aspart U-100, labetalol, magnesium sulfate IVPB, magnesium sulfate IVPB, ondansetron, potassium chloride **AND** potassium chloride **AND** potassium chloride, sodium phosphate 15 mmol in dextrose 5 % (D5W) 250 mL IVPB, sodium phosphate 20.01 mmol in dextrose 5 % (D5W) 250 mL IVPB, sodium phosphate 30 mmol in dextrose 5 % (D5W) 250 mL IVPB    Objective:     Vital Signs (Most Recent):  Temp: 100.3 °F (37.9 °C) (11/29/23 1200)  Pulse: 92 (11/29/23 1200)  Resp: (!) 24 (11/29/23 1200)  BP: (!) 140/77 (11/29/23 1200)  SpO2: 98 % (11/29/23 1200)  BP Location: Left arm    Vital Signs Range (Last 24H):  Temp:  [99.4 °F  (37.4 °C)-101.4 °F (38.6 °C)]   Pulse:  []   Resp:  [24-94]   BP: (109-161)/(57-82)   SpO2:  [93 %-100 %]   BP Location: Left arm       Physical Exam  Vitals and nursing note reviewed.   Constitutional:       Appearance: He is ill-appearing.   HENT:      Mouth/Throat:      Mouth: Mucous membranes are moist.   Cardiovascular:      Rate and Rhythm: Normal rate.   Pulmonary:      Effort: Tachypnea present. No respiratory distress.   Abdominal:      General: There is no distension.   Skin:     General: Skin is warm and dry.   Neurological:      Mental Status: He is lethargic and disoriented.      Motor: Weakness present.              Neurological Exam:   LOC: drowsy  Attention Span: poor  Language: Global aphasia  Articulation: Dysarthria  Orientation: Untestable due to severe aphasia     Laboratory:  CMP:   Recent Labs   Lab 11/29/23  0055   CALCIUM 9.2   ALBUMIN 2.6*   PROT 7.0   *   K 4.0   CO2 20*   *   BUN 35*   CREATININE 1.3   ALKPHOS 65   ALT 36   AST 34   BILITOT 0.7     CBC:   Recent Labs   Lab 11/29/23  0055   WBC 16.77*   RBC 5.13   HGB 14.3   HCT 45.7      MCV 89   MCH 27.9   MCHC 31.3*     Lipid Panel:   Recent Labs   Lab 11/23/23  1915   CHOL 94*   LDLCALC 48.4*   HDL 32*   TRIG 68       Diagnostic Results     Brain Imaging:  CT Head 11/28/23  Impression:  Evolving large right MCA territory recent infarction  Hyperdensity along the right MCA as well as the vasculature along the right cerebral sulci which may represent sluggish slow flow versus intravascular thrombus similar to prior allowing for differences in technique. Allowing for this there is no definite hemorrhagic conversion.     CT Head 11/26/23  Impression:  Evolving recent large right MCA territorial infarct.  No hemorrhagic conversion.  Mild midline shift minimally progressed from the prior study.     MRI brain without contrast 11/24/2023  Impression:  Large right MCA territorial infarct without hemorrhagic conversion.   Currently very mild mass effect.  Loss of the right carotid flow void consistent with occlusion versus slow flow.     CT Head 11/23/23 1812  Impression:  Acute right MCA territory infarction, similar to prior.  No infarct extension or hemorrhagic conversion.  Associated hyperdense vessel sign likely involving the right M1 and a proximal M2 branch.  Changes of generalized cerebral volume loss.  Paranasal sinus disease as above.     CT Head 11/23/23 1416  FINDINGS:  Decreased attenuation in sulcal effacement right MCA distribution consistent with acute in Ca infarct.  Hyperdense right MCA sign consistent with MCA thrombus.  Impression:  Acute right MCA infarct.        Vessel Imaging:  IR cerebral angiogram 11/23/2023  Preliminary interpretation: occlusion of R ICA at origin, unable to cross distally to perform angioplasty and stenting. Attempted thrombectomy at origin, tici 0.  Please see Imaging report for full details.        Cardiac Evaluation:   EKG 11/26/2023  Atrial Fibrillation      TTE 11/24/2023    Left Ventricle: The left ventricle is normal in size. Ventricular mass is normal. Normal wall thickness. There is concentric remodeling. Regional wall motion abnormalities present - see diagram for wall motion findings. There is normal systolic function. Ejection fraction by visual approximation is 60%. There is normal diastolic function.    Right Ventricle: Normal right ventricular cavity size. Wall thickness is normal. Right ventricle wall motion  is normal. Systolic function is normal.    Aortic Valve: The aortic valve is a trileaflet valve. There is mild aortic valve sclerosis. There is annular calcification present. There is mild aortic regurgitation.    IVC/SVC: Patient is ventilated, cannot use IVC diameter to estimate right atrial pressure. PASP is at least 24mmHg.

## 2023-11-29 NOTE — ASSESSMENT & PLAN NOTE
83 y.o. male with PMHx of CAD, HF, HTN, DM who was transferred from [facility] for R MCA stroke and possible intervention on suspected R MCA occlusion. Patient initially presented to OSH with AMS and seizure activity. LKN 11/22/23 at 10pm. Out of treatment window for thrombolytics. CT at OSH with early ischemic changes in R MCA territory and hyperdense R MCA sign concerning for R MCA occlusion. Patient was intubated and sedated at OSH for airway protection.   Stroke team consulted  SBP <220  TTE/EKG/A1C/lipid panel penidg  Neuro checks q 1 hr  Thrombectomy unsuccessful   Statin   PT/OT  SLP when appropriate   CTH complete  MRI pending  A1C 6.6  SSI  Lipid profile complete  TTE complete  MRI complete and reviewed, Nsgy consulted, EEG neg and dced  Holding VTE ppx for pending Nsgy recs  11/25/2023: initiated sq heparin and baby asa  Follow CT head in AM  11/26/2023 EKG and if afib happens again will treat, Extubated 11/26 at 1400, Low dose precedex for agitation  11/27/2023 EKG, NS 75 cc/hr, consult GI for peg

## 2023-11-29 NOTE — PLAN OF CARE
Goals remain appropriate.  Problem: Occupational Therapy  Goal: Occupational Therapy Goal  Description: Goals set 11/27 to be addressed for 14 days with expiration date, 12/11:  Patient will increase functional independence with ADLs by performing:    Patient will demonstrate rolling to the right with mod assist.  Not met   Patient will demonstrate rolling to the left with mod assist.   Not met  Patient will demonstrate supine -sit with mod  assist.   Not met  Patient will demonstrate stand pivot transfers with mod assist.   Not met  Patient will demonstrate grooming while seated with mod assist.   Not met  Patient will demonstrate upper body dressing with mod assist while seated EOB.   Not met  Patient will demonstrate lower body dressing with mod assist while seated EOB.   Not met  Patient will demonstrate toileting with mod assist.   Not met  Patient will demonstrate bathing while seated EOB with mod assist.   Not met  Patient's family / caregiver will demonstrate independence and safety with assisting patient with self-care skills and functional mobility.     Not met  Patient's family / caregiver will demonstrate independence with providing ROM and changes in bed positioning.   Not met        Outcome: Ongoing, Progressing

## 2023-11-29 NOTE — PROCEDURES
RAPID RESPONSE VASCULAR ACCESS NOTE       Single lumen 20G, 10CM midline placed in the left basilic vein. Needle advanced into the vessel under real time ultrasound guidance.    Max dwell date: 12/28/2023   Lot number: [id number]

## 2023-11-30 NOTE — PLAN OF CARE
[facility]  Discharge Reassessment    Primary Care Provider: Jeannette, Primary Doctor    Expected Discharge Date: 12/5/2023    Per MD:   Interval History: Awaiting PEG tube placement, scheduled for tomorrow. Has bilateral lower eyelid edema and erythema. Ophthalmology consulted to evaluate bilateral eyelids.  Patient not medically ready for discharge.     Discharge Plan A and Plan B have been determined by review of patient's clinical status, future medical and therapeutic needs, and coverage/benefits for post-acute care in coordination with multidisciplinary team members.      Reassessment (most recent)       Discharge Reassessment - 11/30/23 1543          Discharge Reassessment    Assessment Type Discharge Planning Reassessment     Did the patient's condition or plan change since previous assessment? No     Communicated XIOMARA with patient/caregiver Date not available/Unable to determine     Discharge Plan A Skilled Nursing Facility     Discharge Plan B Home with family;Home Health     DME Needed Upon Discharge  none     Transition of Care Barriers None     Why the patient remains in the hospital Requires continued medical care        Post-Acute Status    Discharge Delays Diet Not Ready for Discharge                 Filomena Nuñez RN, CCRN-K, [facility]  Neuro-Critical Care   [phone]

## 2023-11-30 NOTE — PROGRESS NOTES
[facility]  Vascular Neurology  Comprehensive Stroke Center  Progress Note    Assessment/Plan:     * Embolic stroke involving right middle cerebral artery  Tim Rhodes is a 83 y.o. male with PMHx of CAD, HF,  DM that was transferred from [facility] with suspected R MCA occlusion for possible intervention. Initially presented to OSH with AMS and seizure activity, LKN OOW for TNK. CTH showed early ischemic changes in R MCA territory and hyperdense R MCA sign concerning for R MCA occlusion. Patient was intubated and sedated at OSH for airway protection. Repeat CT on arrival to [facility] appears stable in comparison to prior. Patient independent at baseline per daughter. Discussed patient with neuro IR and patient taken to IR for possible thrombectomy, now s/p unsuccessful IR. MRI brain confirms large R MCA infarcts. Echo with EF 60% and apical akinesis. Stroke etiology likely cardio-embolic due to new onset AFib this admission.    Patient with decreased urine output, increasing WBC, tachypnea, w/accessory muscle use overnight.  UA + for UTI.  CXR w/R pneumothorax on xray stable.  GI consulted by primary team for PEG.    Antithrombotics for secondary stroke prevention:  suppository    Statins for secondary stroke prevention: Statins: Atorvastatin- 40 mg daily    Aggressive risk factor modification: DM, CAD     Rehab efforts: The patient has been evaluated by a stroke team provider and the therapy needs have been fully considered based off the presenting complaints and exam findings. The following therapy evaluations are needed:  PT/OT/SLP when appropriate -Current recs for Moderate Intensity Therapy, GI consulted for PEG    Diagnostics ordered/pending: None     VTE prophylaxis: Heparin 5000 units SQ every 8 hours  Mechanical prophylaxis: Place SCDs    BP parameters: Infarct: Post unsucessful thrombectomy, SBP <220    Paroxysmal atrial fibrillation  New onset A-Fib during evening of  11/26.  Will need further discussion for anticoagulation, currently on ASA only.    Diabetes mellitus  Stroke risk factor  A1c 6.6  BG goal while inpatient 140-180  SSI PRN  24° glucose 176-189    Hemiparesis, left  2/2 stroke  PT/OT current rec for Moderate Intensity therapy.    Leukocytosis  WBC 28 and lactic 11 on arrival to OSH  Leukocytosis and lactic improving  BCx with NGTD  12/27 WBC trended down to 11.91 (WNL).  UA obtained on 11/28 + for leukocytes, did not reflex to culture.    Endotracheally intubated  Intubated at OSH for airway protection  Extubated 11/26  Tolerating well.  24° SpO2 91-95% on RA.    Seizure-like activity  Noted on arrival to OSH, reported to have RUE seizure like activity  Treated with keppra prior to transfer  EEG with severe slowing but no electrographic seizures  Seizure ppx with keppra 750 BID  No reported new seizure like activity.         11/24/2023 NAEO, remains intubated/sedated. EEG with severe slowing but no evidence of seizures. MRI brain confirms large R MCA infarcts, echo with EF 60% and apical akinesis. Infectious workup ongoing.  11/25/2023 NAEO, remains intubated. Follows simple commands, moving RUE/BLE voluntarily with no movement to LUE. ASA started for stroke ppx.   11/26/2023 Patient with afib overnight, fentanyl increased. Butcher placed due to retention. Extubated today at 1400, on 4L NC at the time of visit. Low dose precedex for agitation.    11/27/2023 Extubated yesterday. Tolerating without complications. Precedex stopped secondary to bradycardia. Neuro exam stable.   11/28/2023 Patient with increased agitation requiring 3x doses of zyprexa and 1x dose of haldol for restlessness. In midst of agitation, patient required cardizem gtt to be initiated. Patient has had difficulty tolerating NGT placement after extubation. Gastroenterology was consulted for possible PEG placement. Max temp today 100.9. Blood cultures are in progress. Consider obtaining UA. Follow-up  CTH with large infarct, relatively unchanged.   11/29/2023 Patient with decreased urine output, increasing WBC, tachypnea, w/accessory muscle use overnight.  UA + for UTI.  CXR w/R pneumothorax on xray stable.  GI consulted by primary team for PEG.      STROKE DOCUMENTATION   Acute Stroke Times   Last Known Normal Date: 11/22/23  Last Known Normal Time: 2200  Unknown Symptom Onset Date: Unknown Date  Unknown Symptom Onset Time: Unknown Time  Stroke Team Called Date: 11/23/23  Stroke Team Called Time: 1804  Stroke Team Arrival Date: 11/23/23  Stroke Team Arrival Time: 1809  CT Interpretation Time: 1813  Thrombolytic Therapy Recommended: No  Thrombectomy Recommended: Yes  Decision to Treat Time for IR: 1822    NIH Scale:  1a. Level of Consciousness: 1-->Not alert, but arousable by minor stimulation to obey, answer, or respond  1b. LOC Questions: 2-->Answers neither question correctly  1c. LOC Commands: 0-->Performs both tasks correctly  2. Best Gaze: 0-->Normal  3. Visual: 0-->No visual loss  4. Facial Palsy: 1-->Minor paralysis (flattened nasolabial fold, asymmetry on smiling)  5a. Motor Arm, Left: 4-->No movement  5b. Motor Arm, Right: 1-->Drift, limb holds 90 (or 45) degrees, but drifts down before full 10 secs, does not hit bed or other support  6a. Motor Leg, Left: 3-->No effort against gravity, leg falls to bed immediately  6b. Motor Leg, Right: 2-->Some effort against gravity, leg falls to bed by 5 secs, but has some effort against gravity  7. Limb Ataxia: 0-->Absent  8. Sensory: 0-->Normal, no sensory loss  9. Best Language: 3-->Mute, global aphasia, no usable speech or auditory comprehension  10. Dysarthria: 2-->Severe dysarthria, patients speech is so slurred as to be unintelligible in the absence of or out of proportion to any dysphasia, or is mute/anarthric  11. Extinction and Inattention (formerly Neglect): 1-->Visual, tactile, auditory, spatial, or personal inattention or extinction to bilateral  simultaneous stimulation in one of the sensory modalities  Total (NIH Stroke Scale): 20       Modified [place] Score: 0  [place] Coma Scale:11   ABCD2 Score:    [id number] Score:   HAS -BLED Score:   ICH Score:   Hunt & Acevedo Classification:      Hemorrhagic change of an Ischemic Stroke: Does this patient have an ischemic stroke with hemorrhagic changes? No     Neurologic Chief Complaint: R MCA stroke    Subjective:     Interval History: Patient is seen for follow-up neurological assessment and treatment recommendations: Patient with decreased urine output, increasing WBC, tachypnea, w/accessory muscle use overnight.  UA + for UTI.  CXR w/R pneumothorax on xray stable.  GI consulted by primary team for PEG.    HPI, Past Medical, Family, and Social History remains the same as documented in the initial encounter.     Review of Systems   Unable to perform ROS: Mental status change   HENT:  Positive for trouble swallowing.    Respiratory:  Positive for shortness of breath.    Neurological:  Positive for speech difficulty and weakness.     Scheduled Meds:   aspirin  300 mg Rectal Daily    atorvastatin  40 mg Per OG tube Daily    cefTRIAXone (ROCEPHIN) IVPB  1 g Intravenous Q24H    heparin (porcine)  5,000 Units Subcutaneous Q8H    levETIRAcetam (Keppra) IV (PEDS and ADULTS)  750 mg Intravenous Q12H    silodosin  4 mg Per OG tube Daily     Continuous Infusions:   diltiaZEM (CARDIZEM) 125 mg in dextrose 5 % (D5W) 125 mL infusion 7.5 mg/hr (11/29/23 0836)     PRN Meds:acetaminophen, albuterol-ipratropium, artificial tears, calcium gluconate IVPB, calcium gluconate IVPB, calcium gluconate IVPB, dextrose 10%, dextrose 10%, glucagon (human recombinant), hydrALAZINE, insulin aspart U-100, labetalol, magnesium sulfate IVPB, magnesium sulfate IVPB, ondansetron, potassium chloride **AND** potassium chloride **AND** potassium chloride, sodium phosphate 15 mmol in dextrose 5 % (D5W) 250 mL IVPB, sodium phosphate 20.01 mmol in  dextrose 5 % (D5W) 250 mL IVPB, sodium phosphate 30 mmol in dextrose 5 % (D5W) 250 mL IVPB    Objective:     Vital Signs (Most Recent):  Temp: 100.3 °F (37.9 °C) (11/29/23 1200)  Pulse: 92 (11/29/23 1200)  Resp: (!) 24 (11/29/23 1200)  BP: (!) 140/77 (11/29/23 1200)  SpO2: 98 % (11/29/23 1200)  BP Location: Left arm    Vital Signs Range (Last 24H):  Temp:  [99.4 °F (37.4 °C)-101.4 °F (38.6 °C)]   Pulse:  []   Resp:  [24-94]   BP: (109-161)/(57-82)   SpO2:  [93 %-100 %]   BP Location: Left arm       Physical Exam  Vitals and nursing note reviewed.   Constitutional:       Appearance: He is ill-appearing.   HENT:      Mouth/Throat:      Mouth: Mucous membranes are moist.   Cardiovascular:      Rate and Rhythm: Normal rate.   Pulmonary:      Effort: Tachypnea present. No respiratory distress.   Abdominal:      General: There is no distension.   Skin:     General: Skin is warm and dry.   Neurological:      Mental Status: He is lethargic and disoriented.      Motor: Weakness present.              Neurological Exam:   LOC: drowsy  Attention Span: poor  Language: Global aphasia  Articulation: Dysarthria  Orientation: Untestable due to severe aphasia     Laboratory:  CMP:   Recent Labs   Lab 11/29/23  0055   CALCIUM 9.2   ALBUMIN 2.6*   PROT 7.0   *   K 4.0   CO2 20*   *   BUN 35*   CREATININE 1.3   ALKPHOS 65   ALT 36   AST 34   BILITOT 0.7     CBC:   Recent Labs   Lab 11/29/23  0055   WBC 16.77*   RBC 5.13   HGB 14.3   HCT 45.7      MCV 89   MCH 27.9   MCHC 31.3*     Lipid Panel:   Recent Labs   Lab 11/23/23  1915   CHOL 94*   LDLCALC 48.4*   HDL 32*   TRIG 68       Diagnostic Results     Brain Imaging:  CT Head 11/28/23  Impression:  Evolving large right MCA territory recent infarction  Hyperdensity along the right MCA as well as the vasculature along the right cerebral sulci which may represent sluggish slow flow versus intravascular thrombus similar to prior allowing for differences in  technique. Allowing for this there is no definite hemorrhagic conversion.     CT Head 11/26/23  Impression:  Evolving recent large right MCA territorial infarct.  No hemorrhagic conversion.  Mild midline shift minimally progressed from the prior study.     MRI brain without contrast 11/24/2023  Impression:  Large right MCA territorial infarct without hemorrhagic conversion.  Currently very mild mass effect.  Loss of the right carotid flow void consistent with occlusion versus slow flow.     CT Head 11/23/23 1812  Impression:  Acute right MCA territory infarction, similar to prior.  No infarct extension or hemorrhagic conversion.  Associated hyperdense vessel sign likely involving the right M1 and a proximal M2 branch.  Changes of generalized cerebral volume loss.  Paranasal sinus disease as above.     CT Head 11/23/23 1416  FINDINGS:  Decreased attenuation in sulcal effacement right MCA distribution consistent with acute in Ca infarct.  Hyperdense right MCA sign consistent with MCA thrombus.  Impression:  Acute right MCA infarct.        Vessel Imaging:  IR cerebral angiogram 11/23/2023  Preliminary interpretation: occlusion of R ICA at origin, unable to cross distally to perform angioplasty and stenting. Attempted thrombectomy at origin, tici 0.  Please see Imaging report for full details.        Cardiac Evaluation:   EKG 11/26/2023  Atrial Fibrillation      TTE 11/24/2023    Left Ventricle: The left ventricle is normal in size. Ventricular mass is normal. Normal wall thickness. There is concentric remodeling. Regional wall motion abnormalities present - see diagram for wall motion findings. There is normal systolic function. Ejection fraction by visual approximation is 60%. There is normal diastolic function.    Right Ventricle: Normal right ventricular cavity size. Wall thickness is normal. Right ventricle wall motion  is normal. Systolic function is normal.    Aortic Valve: The aortic valve is a trileaflet valve.  There is mild aortic valve sclerosis. There is annular calcification present. There is mild aortic regurgitation.    IVC/SVC: Patient is ventilated, cannot use IVC diameter to estimate right atrial pressure. PASP is at least 24mmHg.    Whitney Lee DNP, NP  [facility]  Department of Vascular Neurology   [facility] - Neuro Critical Care

## 2023-11-30 NOTE — SUBJECTIVE & OBJECTIVE
Interval History:  unable to obtain enteral access, GI consulted for PEG placed ( see note), small pneumothorax per cxr( stable)    Review of Systems   Reason unable to perform ROS: decrease LOC.      Unable to obtain a complete ROS due to level of consciousness.  Objective:     Vitals:  Temp: 99.3 °F (37.4 °C)  Pulse: 104  Rhythm: normal sinus rhythm, sinus tachycardia  BP: (!) 156/70  MAP (mmHg): 101  Resp: (!) 29  SpO2: 99 %    Temp  Min: 99.1 °F (37.3 °C)  Max: 100.3 °F (37.9 °C)  Pulse  Min: 85  Max: 128  BP  Min: 126/70  Max: 178/70  MAP (mmHg)  Min: 89  Max: 110  Resp  Min: 22  Max: 55  SpO2  Min: 96 %  Max: 100 %    11/29 0701 - 11/30 0700  In: 1253.6 [I.V.:161.4]  Out: 1300 [Urine:1300]   Unmeasured Output  Stool Occurrence: 1        Physical Exam  Vitals and nursing note reviewed.   Constitutional:       Appearance: He is ill-appearing.   HENT:      Nose: Nose normal.      Mouth/Throat:      Mouth: Mucous membranes are moist.      Pharynx: Oropharynx is clear.   Eyes:      Pupils: Pupils are equal, round, and reactive to light.   Cardiovascular:      Rate and Rhythm: Normal rate. Rhythm irregular.      Pulses: Normal pulses.      Heart sounds: Normal heart sounds.   Pulmonary:      Effort: Pulmonary effort is normal.      Breath sounds: Normal breath sounds.   Abdominal:      General: Bowel sounds are normal.      Palpations: Abdomen is soft.   Musculoskeletal:         General: Normal range of motion.   Skin:     General: Skin is warm and dry.      Capillary Refill: Capillary refill takes 2 to 3 seconds.   Neurological:      Comments: --GCS: E2 V1 M6  --Mental Status: opens eyes to touch, intermit follows simple commands  --CN II-XII grossly intact.   --Pupils 2mm, PERRL.   --Corneal reflex, gag, cough intact.  --LUE no movement  --RUE spont  --BLE withdraws to pain         Unable to test orientation, language, memory, judgment, insight, fund of knowledge, hearing, shoulder shrug, tongue protrusion,  coordination, gait due to level of consciousness.       Medications:  ContinuousdiltiaZEM (CARDIZEM) 125 mg in dextrose 5 % (D5W) 125 mL infusion, Last Rate: 5 mg/hr (11/30/23 1105)  lactated ringers, Last Rate: 75 mL/hr at 11/30/23 1129    Scheduledaspirin, 300 mg, Daily  atorvastatin, 40 mg, Daily  cefTRIAXone (ROCEPHIN) IVPB, 1 g, Q24H  heparin (porcine), 5,000 Units, Q8H  levETIRAcetam (Keppra) IV (PEDS and ADULTS), 750 mg, Q12H  silodosin, 4 mg, Daily    PRNacetaminophen, 650 mg, Q6H PRN  albuterol-ipratropium, 3 mL, Q4H PRN  artificial tears, 2 drop, QID PRN  calcium gluconate IVPB, 1 g, PRN  calcium gluconate IVPB, 2 g, PRN  calcium gluconate IVPB, 3 g, PRN  dextrose 10%, 12.5 g, PRN  dextrose 10%, 25 g, PRN  glucagon (human recombinant), 1 mg, PRN  hydrALAZINE, 10 mg, Q4H PRN  insulin aspart U-100, 0-10 Units, Q6H PRN  labetalol, 10 mg, Q4H PRN  magnesium sulfate IVPB, 2 g, PRN  magnesium sulfate IVPB, 4 g, PRN  ondansetron, 4 mg, Q6H PRN  potassium chloride, 40 mEq, PRN   And  potassium chloride, 60 mEq, PRN   And  potassium chloride, 80 mEq, PRN  sodium phosphate 15 mmol in dextrose 5 % (D5W) 250 mL IVPB, 15 mmol, PRN  sodium phosphate 20.01 mmol in dextrose 5 % (D5W) 250 mL IVPB, 20.01 mmol, PRN  sodium phosphate 30 mmol in dextrose 5 % (D5W) 250 mL IVPB, 30 mmol, PRN      Today I personally reviewed pertinent medications, lines/drains/airways, imaging, cardiology results, laboratory results, microbiology results, notably:    Diet  Diet NPO  Diet NPO

## 2023-11-30 NOTE — PLAN OF CARE
"[facility] Care Plan    POC reviewed with Tim Rhodes and family at 0300. Pt unable to verbalize understanding. Questions and concerns addressed with daughter @ bedside. No acute events overnight. Pt progressing toward goals. Will continue to monitor. See below and flowsheets for full assessment and VS info.     -Dilt gtt tirrated to maintain HR <120  -Potassium replaced  -750cc bolus D5 given per order for critical Na   -Bilateral UE + LE ultrasounds performed   -Pt straight cath x2  -Pt on cooling blanket throughout night      Is this a stroke patient? yes- Stroke booklet reviewed with patient and family, risk factors identified for patient and stroke booklet remains at bedside for ongoing education.     Neuro:  [place] Coma Scale  Best Eye Response: 3-->(E3) to speech  Best Motor Response: 6-->(M6) obeys commands  Best Verbal Response: 2-->(V2) incomprehensible speech  Win Coma Scale Score: 11  Assessment Qualifiers: patient not sedated/intubated  Pupil PERRLA: yes     24hr Temp:  [99.6 °F (37.6 °C)-100.3 °F (37.9 °C)]     CV:   Rhythm: normal sinus rhythm  BP goals:   SBP < 180  MAP > 65    Resp:      Vent Mode: Spont  Set Rate: 14 BPM  Oxygen Concentration (%): 15  Vt Set: 500 mL  PEEP/CPAP: 5 cmH20  Pressure Support: 5 cmH20    Plan: N/A    GI/:     Diet/Nutrition Received: NPO  Last Bowel Movement: 11/29/23  Voiding Characteristics: due to void    Intake/Output Summary (Last 24 hours) at 11/30/2023 0627  Last data filed at 11/30/2023 0600  Gross per 24 hour   Intake 1253.61 ml   Output 1300 ml   Net -46.39 ml     Unmeasured Output  Stool Occurrence: 1    Labs/Accuchecks:  Recent Labs   Lab 11/30/23 0146   WBC 13.63*   RBC 5.20   HGB 14.4   HCT 47.3         Recent Labs   Lab 11/30/23 0146 11/30/23  0529   * 156*   K 3.8  --    CO2 22*  --    *  --    BUN 38*  --    CREATININE 1.1  --    ALKPHOS 65  --    ALT 37  --    AST 30  --    BILITOT 0.6  --     No results for input(s): "PROTIME", " ""INR", "APTT", "HEPANTIXA" in the last 168 hours.   Recent Labs   Lab 11/27/23  0225 11/27/23  2348 11/29/23  0440   CPK 1125*  --   --    TROPONINI  --    < > 0.121*    < > = values in this interval not displayed.       Electrolytes: N/A - electrolytes WDL  Accuchecks: Q6H    Gtts:   diltiaZEM (CARDIZEM) 125 mg in dextrose 5 % (D5W) 125 mL infusion 2.5 mg/hr (11/30/23 0600)       LDA/Wounds:  Lines/Drains/Airways       Peripheral Intravenous Line  Duration                  Peripheral IV - Single Lumen 11/28/23 0140 20 G Anterior;Proximal;Right Forearm 2 days         Peripheral IV - Single Lumen 11/29/23 0041 18 G;1 3/4 in Anterior;Right Upper Arm 1 day         Midline Catheter Insertion/Assessment  - Single Lumen 11/29/23 1255 Left basilic vein (medial side of arm) 20g x 10cm <1 day                  Wounds: No  Wound care consulted: No    "

## 2023-11-30 NOTE — PROGRESS NOTES
[facility] - Neuro Critical Care  Neurocritical Care  Progress Note    Admit Date: 11/23/2023  Service Date: 11/30/2023  Length of Stay: 7    Subjective:     Chief Complaint: Embolic stroke involving right middle cerebral artery    History of Present Illness: The patient is an 83-year-old with PMHx of  CAD and DM admitted to [facility] with large R MCA stroke and seizure.Per chart review, in emergency department at [facility]  presented with with altered mental status and focal seizure activity.  Daughter states patient was able to talk to her on the phone at 10:00 p.m. last night was at baseline.  Patient living in assisted living but still very functional.  Daughter states patien still drives on occasion.  She does not believe he takes any blood thinners.  She went to check on him is afternoon and he had fallen next to the toilet.  Patient unable to provide any history.    Noted to have tonic-clonic activity to the right upper extremity on arrival.  Patient with incomprehensible speech.  Copious vomiting on EMS arrival per paramedic.  Patient unable to contribute to history.  Last presumed normal was 10:00 p.m. last night. Out of window for thrombolytics.  Patient intubated for airway protection at outside hospital. On arrival [facility] with large R MCA - stroke team consulted and plan for IR. Patient loaded with Keppra, EEG pending. Lactic acid 11.9 on arrival, pancx and initiated broad spec abx. Patient admitted to [facility] for close monitoring and higher level of care.        Hospital Course: 11/24/2023 MRI complete and reviewed, Nsgy consulted, EEG neg and dced  11/25/2023: initiate baby ASA and sq heparin, follow CT head in AM, SBP <180, initiate tube feeds  11/26/2023 EKG and if afib happens again will treat, Extubated 11/26 at 1400, Low dose precedex for agitation  11/27/2023 EKG, NS 75 cc/hr, consult IR for peg  11/28/23: consult GI for peg  11/29/23: CXR in am  11/30/23: awaiting PEG tube    Interval History:  unable  to obtain enteral access, GI consulted for PEG placed ( see note), small pneumothorax per cxr( stable)    Review of Systems   Reason unable to perform ROS: decrease LOC.      Unable to obtain a complete ROS due to level of consciousness.  Objective:     Vitals:  Temp: 99.3 °F (37.4 °C)  Pulse: 104  Rhythm: normal sinus rhythm, sinus tachycardia  BP: (!) 156/70  MAP (mmHg): 101  Resp: (!) 29  SpO2: 99 %    Temp  Min: 99.1 °F (37.3 °C)  Max: 100.3 °F (37.9 °C)  Pulse  Min: 85  Max: 128  BP  Min: 126/70  Max: 178/70  MAP (mmHg)  Min: 89  Max: 110  Resp  Min: 22  Max: 55  SpO2  Min: 96 %  Max: 100 %    11/29 0701 - 11/30 0700  In: 1253.6 [I.V.:161.4]  Out: 1300 [Urine:1300]   Unmeasured Output  Stool Occurrence: 1        Physical Exam  Vitals and nursing note reviewed.   Constitutional:       Appearance: He is ill-appearing.   HENT:      Nose: Nose normal.      Mouth/Throat:      Mouth: Mucous membranes are moist.      Pharynx: Oropharynx is clear.   Eyes:      Pupils: Pupils are equal, round, and reactive to light.   Cardiovascular:      Rate and Rhythm: Normal rate. Rhythm irregular.      Pulses: Normal pulses.      Heart sounds: Normal heart sounds.   Pulmonary:      Effort: Pulmonary effort is normal.      Breath sounds: Normal breath sounds.   Abdominal:      General: Bowel sounds are normal.      Palpations: Abdomen is soft.   Musculoskeletal:         General: Normal range of motion.   Skin:     General: Skin is warm and dry.      Capillary Refill: Capillary refill takes 2 to 3 seconds.   Neurological:      Comments: --GCS: E2 V1 M6  --Mental Status: opens eyes to touch, intermit follows simple commands  --CN II-XII grossly intact.   --Pupils 2mm, PERRL.   --Corneal reflex, gag, cough intact.  --LUE no movement  --RUE spont  --BLE withdraws to pain         Unable to test orientation, language, memory, judgment, insight, fund of knowledge, hearing, shoulder shrug, tongue protrusion, coordination, gait due to level  of consciousness.       Medications:  ContinuousdiltiaZEM (CARDIZEM) 125 mg in dextrose 5 % (D5W) 125 mL infusion, Last Rate: 5 mg/hr (11/30/23 1105)  lactated ringers, Last Rate: 75 mL/hr at 11/30/23 1129    Scheduledaspirin, 300 mg, Daily  atorvastatin, 40 mg, Daily  cefTRIAXone (ROCEPHIN) IVPB, 1 g, Q24H  heparin (porcine), 5,000 Units, Q8H  levETIRAcetam (Keppra) IV (PEDS and ADULTS), 750 mg, Q12H  silodosin, 4 mg, Daily    PRNacetaminophen, 650 mg, Q6H PRN  albuterol-ipratropium, 3 mL, Q4H PRN  artificial tears, 2 drop, QID PRN  calcium gluconate IVPB, 1 g, PRN  calcium gluconate IVPB, 2 g, PRN  calcium gluconate IVPB, 3 g, PRN  dextrose 10%, 12.5 g, PRN  dextrose 10%, 25 g, PRN  glucagon (human recombinant), 1 mg, PRN  hydrALAZINE, 10 mg, Q4H PRN  insulin aspart U-100, 0-10 Units, Q6H PRN  labetalol, 10 mg, Q4H PRN  magnesium sulfate IVPB, 2 g, PRN  magnesium sulfate IVPB, 4 g, PRN  ondansetron, 4 mg, Q6H PRN  potassium chloride, 40 mEq, PRN   And  potassium chloride, 60 mEq, PRN   And  potassium chloride, 80 mEq, PRN  sodium phosphate 15 mmol in dextrose 5 % (D5W) 250 mL IVPB, 15 mmol, PRN  sodium phosphate 20.01 mmol in dextrose 5 % (D5W) 250 mL IVPB, 20.01 mmol, PRN  sodium phosphate 30 mmol in dextrose 5 % (D5W) 250 mL IVPB, 30 mmol, PRN      Today I personally reviewed pertinent medications, lines/drains/airways, imaging, cardiology results, laboratory results, microbiology results, notably:    Diet  Diet NPO  Diet NPO        Assessment/Plan:     Neuro  * Embolic stroke involving right middle cerebral artery  83 y.o. male with PMHx of CAD, HF, HTN, DM who was transferred from [facility] for R MCA stroke and possible intervention on suspected R MCA occlusion. Patient initially presented to OSH with AMS and seizure activity. LKN 11/22/23 at 10pm. Out of treatment window for thrombolytics. CT at OSH with early ischemic changes in R MCA territory and hyperdense R MCA sign concerning for R MCA  occlusion. Patient was intubated and sedated at OSH for airway protection.   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  PT/OT      Seizure-like activity  Seizure like activity noted at outside facility  Loaded w/ 2gm of Keppra   Continue maintenance Keppra 750mg BID  Seizure precautions  EEG neg and dced on 11/24    Cardiac/Vascular  Paroxysmal atrial fibrillation  - Consider anticoagulation when appropriate   - Diltiazem drip     Oncology  Leukocytosis  POA  Pancx   Initiated broad spectrum abx  Trend lactic acid  11/30/2023 WNL     Endocrine  Diabetes mellitus  Hx of DM  A1C 6.6  PRN moderate dose SSI     GI  Encounter for PEG (percutaneous endoscopic gastrostomy)  - GI consult           The patient is being Prophylaxed for:  Venous Thromboembolism with: Mechanical or Chemical  Stress Ulcer with: Not Applicable   Ventilator Pneumonia with: not applicable    Activity Orders            Turn patient starting at 11/28 0715    Straight Cath starting at 11/25 1809    Progressive Mobility Protocol (mobilize patient to their highest level of functioning at least twice daily) starting at 11/23 2000    Elevate HOB starting at 11/23 1821    Diet NPO: NPO starting at 11/23 1821          Full Code  Level 3  Isadora Mariee NP  Neurocritical Care  [facility] - Neuro Critical Care

## 2023-11-30 NOTE — SUBJECTIVE & OBJECTIVE
Subjective:     Interval History: Followed up with patient for possible PEG placement. Patient needs to be afebrile for 24 hours prior to procedure. Ex=722.3. Cultures negative from 11/23. Repeat cultures pending from 11/28. WBC downtrending. Receiving tube feeds overnight. Residuals noted to be high.     Review of Systems   Unable to perform ROS: Mental status change     Objective:     Vital Signs (Most Recent):  Temp: 99.1 °F (37.3 °C) (11/30/23 0705)  Pulse: 92 (11/30/23 0925)  Resp: (!) 27 (11/30/23 0925)  BP: (!) 162/68 (11/30/23 0705)  SpO2: 100 % (11/30/23 0925) Vital Signs (24h Range):  Temp:  [99.1 °F (37.3 °C)-100.3 °F (37.9 °C)] 99.1 °F (37.3 °C)  Pulse:  [] 92  Resp:  [22-55] 27  SpO2:  [96 %-100 %] 100 %  BP: (109-162)/(58-86) 162/68     Weight: 77.6 kg (171 lb 1.2 oz) (11/27/23 0912)  Body mass index is 30.3 kg/m².      Intake/Output Summary (Last 24 hours) at 11/30/2023 0943  Last data filed at 11/30/2023 0705  Gross per 24 hour   Intake 1313.18 ml   Output 1250 ml   Net 63.18 ml       Lines/Drains/Airways       Peripheral Intravenous Line  Duration                  Peripheral IV - Single Lumen 11/28/23 0140 20 G Anterior;Proximal;Right Forearm 2 days         Peripheral IV - Single Lumen 11/29/23 0041 18 G;1 3/4 in Anterior;Right Upper Arm 1 day         Midline Catheter Insertion/Assessment  - Single Lumen 11/29/23 1255 Left basilic vein (medial side of arm) 20g x 10cm <1 day                     Physical Exam  Vitals reviewed.   Constitutional:       Appearance: He is not ill-appearing.   HENT:      Nose:      Comments: NGT in place     Mouth/Throat:      Mouth: Mucous membranes are moist.      Pharynx: Oropharynx is clear.   Pulmonary:      Breath sounds: No rales.   Abdominal:      General: Bowel sounds are normal. There is no distension.      Palpations: Abdomen is soft. There is no mass.      Comments: High tube feed residuals overnight (>300ml).   Skin:     General: Skin is warm and dry.     "  Capillary Refill: Capillary refill takes less than 2 seconds.      Coloration: Skin is not jaundiced or pale.          Significant Labs:  CBC:   Recent Labs   Lab 11/29/23  0055 11/30/23 0146   WBC 16.77* 13.63*   HGB 14.3 14.4   HCT 45.7 47.3    246     CMP:   Recent Labs   Lab 11/30/23  0146 11/30/23  0529   *  --    CALCIUM 9.5  --    ALBUMIN 2.7*  --    PROT 7.4  --    * 156*   K 3.8  --    CO2 22*  --    *  --    BUN 38*  --    CREATININE 1.1  --    ALKPHOS 65  --    ALT 37  --    AST 30  --    BILITOT 0.6  --      Coagulation: No results for input(s): "PT", "INR", "APTT" in the last 48 hours.      Significant Imaging:  Imaging results within the past 24 hours have been reviewed.  "

## 2023-11-30 NOTE — ASSESSMENT & PLAN NOTE
Pt with dysarthia and dysphagia 2/t R MCA stroke. NG tube unable to be successfully placed 2/t pt's history of esophageal stricture s/p dilation per pt's family. Minimal improvement in neurologic function. Pt would benefit in placement of PEG tube for nutrition and placement for continued rehabilitation. No known significant abdominal surgical history. Pt currently on DVT prophylaxis with heparin. ASA for CAD. Optimization for PEG tube placement:   --Will tentatively plan for PEG placement on 12/1 pending patient remains afebrile with no concerns for active infection.  --Will need to be NPO after midnight  --Pt should be afebrile for 24hrs prior to procedure unless central fever  --Hold heparin on morning of procedure   --May continue ASA therapy

## 2023-11-30 NOTE — ANESTHESIA PREPROCEDURE EVALUATION
[facility]  Anesthesia Pre-Operative Evaluation         Patient Name: Tim Rhodes  YOB: 1940  MRN: [id number]    SUBJECTIVE:     Pre-operative evaluation for Procedure(s) (LRB):  INSERTION, PEG TUBE (N/A)     11/30/2023    Tim Rhodes is a 83 y.o. male w/ a significant PMHx of CAD and DM admitted to [facility] with large R MCA stroke and seizure. Intubated for airway protection on arrival. Now extubated. Unable to swallow. Now presenting for PEG tube placement.    Patient now presents for the above procedure(s).      LDA:        Peripheral IV - Single Lumen 11/28/23 0140 20 G Anterior;Proximal;Right Forearm (Active)   Site Assessment Clean;Dry;Intact;No redness;No swelling 11/30/23 1505   Extremity Assessment Distal to IV No abnormal discoloration;No swelling;No warmth;No redness 11/30/23 1505   Line Status Flushed;Saline locked 11/30/23 1505   Dressing Status Clean;Dry;Intact 11/30/23 1505   Dressing Intervention Integrity maintained 11/30/23 1505   Dressing Change Due 12/02/23 11/30/23 1505   Site Change Due 12/02/23 11/30/23 0705   Reason Not Rotated Not due 11/30/23 1505   Number of days: 2            Peripheral IV - Single Lumen 11/29/23 0041 18 G;1 3/4 in Anterior;Right Upper Arm (Active)   Site Assessment Clean;Dry;Intact;No redness;No swelling 11/30/23 1505   Extremity Assessment Distal to IV No abnormal discoloration;No swelling;No redness;No warmth 11/30/23 1505   Line Status Flushed 11/30/23 1505   Dressing Status Clean;Dry;Intact 11/30/23 1505   Dressing Intervention Integrity maintained 11/30/23 1505   Dressing Change Due 12/03/23 11/30/23 1505   Site Change Due 12/03/23 11/30/23 0705   Reason Not Rotated Not due 11/30/23 1505   Number of days: 1            Midline Catheter Insertion/Assessment  - Single Lumen 11/29/23 1255 Left basilic vein (medial side of arm) 20g x 10cm (Active)   $ Midline Charges (Upon insertion) Bedside Insertion Performed Pt > 3 Years Old;Midline  Catheter (Supply);Ultrasound Guide for Vascular Access 11/29/23 1257   Site Assessment Clean;Dry;Intact;No redness;No swelling 11/30/23 1505   IV Device Securement catheter securement device 11/30/23 1505   Line Status Saline locked;Flushed 11/30/23 1505   Dressing Type Central line dressing;CHG impregnated dressing/sponge 11/30/23 1505   Dressing Status Clean;Dry;Intact 11/30/23 1505   Dressing Intervention Integrity maintained 11/30/23 1505   Dressing Change Due 12/06/23 11/30/23 1505   Site Change Due 12/28/23 11/30/23 0705   Reason Not Rotated Not due 11/30/23 1505   Number of days: 1       Prev airway: None documented.    Drips:    diltiaZEM (CARDIZEM) 125 mg in dextrose 5 % (D5W) 125 mL infusion 5 mg/hr (11/30/23 1605)    lactated ringers 75 mL/hr at 11/30/23 1605       Patient Active Problem List   Diagnosis    Embolic stroke involving right middle cerebral artery    Seizure-like activity    Endotracheally intubated    Leukocytosis    Hemiparesis, left    Hyperkalemia    Diabetes mellitus    Aspiration pneumonia due to vomit    Paroxysmal atrial fibrillation    Encounter for PEG (percutaneous endoscopic gastrostomy)       Review of patient's allergies indicates:  No Known Allergies    Current Inpatient Medications:   aspirin  300 mg Rectal Daily    atorvastatin  40 mg Per OG tube Daily    cefTRIAXone (ROCEPHIN) IVPB  1 g Intravenous Q24H    erythromycin   Both Eyes QID    heparin (porcine)  5,000 Units Subcutaneous Q8H    levETIRAcetam (Keppra) IV (PEDS and ADULTS)  750 mg Intravenous Q12H    polymyxin B sulf-trimethoprim  1 drop Both Eyes QID    silodosin  4 mg Per OG tube Daily       No current facility-administered medications on file prior to encounter.     Current Outpatient Medications on File Prior to Encounter   Medication Sig Dispense Refill    amLODIPine (NORVASC) 10 MG tablet Take 10 mg by mouth once daily.      atorvastatin (LIPITOR) 20 MG tablet Take 20 mg by mouth once daily.      cloNIDine  (CATAPRES) 0.1 MG tablet Take 0.1 mg by mouth 2 (two) times daily.      glipiZIDE (GLUCOTROL) 10 MG tablet Take 10 mg by mouth 2 (two) times daily.      JARDIANCE 25 mg tablet Take 25 mg by mouth once daily.      metoprolol succinate (TOPROL-XL) 100 MG 24 hr tablet Take 100 mg by mouth once daily.      omeprazole (PRILOSEC) 20 MG capsule Take 20 mg by mouth 2 (two) times daily.      oxybutynin (DITROPAN) 5 MG Tab Take 5 mg by mouth once daily.      RYBELSUS 14 mg tablet Take 14 mg by mouth once daily.      tamsulosin (FLOMAX) 0.4 mg Cap Take 0.4 mg by mouth once daily.         Past Surgical History:   Procedure Laterality Date    CEREBRAL ANGIOGRAM N/A 11/23/2023    Procedure: ANGIOGRAM-CEREBRAL;  Surgeon: Valery Hyman;  Location: [facility];  Service: Anesthesiology;  Laterality: N/A;       Social History     Socioeconomic History    Marital status: Unknown     Social Determinants of Health     Housing Stability: Low Risk  (11/24/2023)    Housing Stability Vital Sign     Unable to Pay for Housing in the Last Year: No     Number of Places Lived in the Last Year: 1     Unstable Housing in the Last Year: No       OBJECTIVE:     Vital Signs Range (Last 24H):  Temp:  [37.3 °C (99.1 °F)-37.8 °C (100 °F)]   Pulse:  []   Resp:  [20-55]   BP: (130-178)/(61-86)   SpO2:  [96 %-100 %]       Significant Labs:  Lab Results   Component Value Date    WBC 13.63 (H) 11/30/2023    HGB 14.4 11/30/2023    HCT 47.3 11/30/2023     11/30/2023    CHOL 94 (L) 11/23/2023    TRIG 68 11/23/2023    HDL 32 (L) 11/23/2023    ALT 37 11/30/2023    AST 30 11/30/2023     (H) 11/30/2023    K 3.8 11/30/2023     (H) 11/30/2023    CREATININE 1.1 11/30/2023    BUN 38 (H) 11/30/2023    CO2 22 (L) 11/30/2023    TSH 2.121 11/23/2023    HGBA1C 6.6 (H) 11/23/2023       Diagnostic Studies: No relevant studies.    EKG:   Results for orders placed or performed during the hospital encounter of 11/23/23   EKG 12-lead    Collection Time:  11/27/23 12:30 PM    Narrative    Test Reason : R94.31,    Vent. Rate : 041 BPM     Atrial Rate : 267 BPM     P-R Int : 000 ms          QRS Dur : 070 ms      QT Int : 404 ms       P-R-T Axes : 000 033 041 degrees     QTc Int : 333 ms    Atrial fibrillation with slow ventricular response  Abnormal ECG  When compared with ECG of 27-NOV-2023 12:29,  Atrial fibrillation has replaced Junctional rhythm possibly although prior  may have been AF  Vent. rate has decreased BY  21 BPM  QT has shortened  Confirmed by Davis FORREST MD ([id number]) on 11/27/2023 4:14:44 PM  Also confirmed by Hadley Alonso MD ([id number])  on 11/27/2023 4:15:07 PM    Referred By: [email or web address]   SELF           Confirmed By:Hadley Alonso MD       2D ECHO:  TTE:  Results for orders placed or performed during the hospital encounter of 11/23/23   Echo   Result Value Ref Range    BSA 1.86 m2    LVOT stroke volume 69.81 cm3    LVIDd 4.2 3.5 - 6.0 cm    LV Systolic Volume 23.88 mL    LV Systolic Volume Index 13.2 mL/m2    LVIDs 2.57 2.1 - 4.0 cm    LV Diastolic Volume 51.78 mL    LV Diastolic Volume Index 28.61 mL/m2    IVS 1.2 (A) 0.6 - 1.1 cm    LVOT diameter 2.03 cm    LVOT area 3.2 cm2    FS 39 28 - 44 %    Left Ventricle Relative Wall Thickness 0.48 cm    Posterior Wall 1.0 0.6 - 1.1 cm    LV mass 157.06 g    LV Mass Index 87 g/m2    MV Peak E Selene 0.69 m/s    TDI LATERAL 0.08 m/s    TDI SEPTAL 0.06 m/s    E/E' ratio 9.86 m/s    MV Peak A Selene 0.69 m/s    TR Max Selene 2.30 m/s    E/A ratio 1.00     IVRT 83.73 msec    E wave deceleration time 222.89 msec    LV SEPTAL E/E' RATIO 11.50 m/s    LA Volume Index 25.0 mL/m2    LV LATERAL E/E' RATIO 8.63 m/s    LA volume 45.24 cm3    LVOT peak selene 0.88 m/s    RVDD 3.42 cm    TAPSE 1.98 cm    LA size 2.50 cm    Left Atrium Minor Axis 5.13 cm    Left Atrium Major Axis 5.53 cm    LA volume (mod) 54.58 cm3    LA WIDTH 4.00 cm    LA Volume Index (Mod) 30.2 mL/m2    RA Major Axis 4.60 cm    RA Width 3.47 cm    AV mean gradient 4 mmHg     AV peak gradient 7 mmHg    Ao peak selene 1.34 m/s    Ao VTI 31.75 cm    LVOT peak VTI 21.58 cm    AV valve area 2.20 cm²    AV Velocity Ratio 0.66     AV index (prosthetic) 0.68     RACHELLE by Velocity Ratio 2.12 cm²    MV stenosis pressure 1/2 time 64.64 ms    MV valve area p 1/2 method 3.40 cm2    Triscuspid Valve Regurgitation Peak Gradient 21 mmHg    Sinus 3.50 cm    STJ 2.73 cm    Ascending aorta 3.00 cm    Mean e' 0.07 m/s    ZLVIDS -1.47     ZLVIDD -1.76     EF 60 %    Narrative      Left Ventricle: The left ventricle is normal in size. Ventricular mass   is normal. Normal wall thickness. There is concentric remodeling. Regional   wall motion abnormalities present - see diagram for wall motion findings.   There is normal systolic function. Ejection fraction by visual   approximation is 60%. There is normal diastolic function.    Right Ventricle: Normal right ventricular cavity size. Wall thickness   is normal. Right ventricle wall motion  is normal. Systolic function is   normal.    Aortic Valve: The aortic valve is a trileaflet valve. There is mild   aortic valve sclerosis. There is annular calcification present. There is   mild aortic regurgitation.    IVC/SVC: Patient is ventilated, cannot use IVC diameter to estimate   right atrial pressure. PASP is at least 24mmHg.         TATO:  No results found for this or any previous visit.    ASSESSMENT/PLAN:         Pre-op Assessment    I have reviewed the Patient Summary Reports.     I have reviewed the Nursing Notes. I have reviewed the NPO Status.   I have reviewed the Medications.     Review of Systems  Anesthesia Hx:   Neg history of prior surgery.          Denies Family Hx of Anesthesia complications.    Denies Personal Hx of Anesthesia complications.                    Hematology/Oncology:  Hematology Normal   Oncology Normal                                   EENT/Dental:  EENT/Dental Normal           Cardiovascular:     Hypertension       CHF               Congestive Heart Failure (CHF)                  Atrial Fibrillation     Pulmonary:  Pulmonary Normal                       Renal/:  Renal/ Normal                 Hepatic/GI:     GERD      Gerd          Musculoskeletal:  Musculoskeletal Normal                Neurological:   CVA                       CVA - Cerebrovasular Accident                 Endocrine:  Diabetes, type 2    Diabetes                      Psych:  Psychiatric Normal                    Physical Exam  General: Cooperative and Alert    Airway:  Mallampati: unable to assess   TM Distance: Normal  Tongue: Normal    Dental:  Periodontal disease        Anesthesia Plan  Type of Anesthesia, risks & benefits discussed:    Anesthesia Type: Gen ETT  Intra-op Monitoring Plan: Standard ASA Monitors and Art Line  Post Op Pain Control Plan: multimodal analgesia and IV/PO Opioids PRN  Induction:  IV  Airway Plan: Direct, Post-Induction  Informed Consent: Informed consent signed with the Patient representative and all parties understand the risks and agree with anesthesia plan.  All questions answered.   ASA Score: 4  Day of Surgery Review of History & Physical: H&P Update referred to the surgeon/provider.    Ready For Surgery From Anesthesia Perspective.     .

## 2023-11-30 NOTE — PT/OT/SLP PROGRESS
Physical Therapy Treatment    Patient Name:  Tim Rhodes   MRN:  [id number]    Recent Surgery: Procedure(s) (LRB):  ANGIOGRAM-CEREBRAL (N/A) 7 Days Post-Op    Recommendations:     Discharge Recommendations:   Moderate Intensity Therapy   Discharge Equipment Recommendations: hospital bed   Barriers to discharge: Increased level of assist    Highest Level of Mobility: Supine to sit   Assistance Required: Total(A)X2 persons    Assessment:     Tim Rhodes is a 83 y.o. male admitted with a medical diagnosis of Embolic stroke involving right middle cerebral artery.    Pt met with HOB elevated, daughter present and agreeable to PT treatment. Today's PT treatment focus was on neuromuscular re-education to improve function. Pt participated well and followed 75% of simple commands appropriately. He performed modified sit-ups at EOB to improve core strength for sitting balance. VSS throughout session.    Pt is progressing towards acute PT goals appropriately and continues to benefit from acute PT sessions.    Rehab Prognosis: Good; patient would benefit from acute skilled PT services to address these deficits and reach maximum level of function.      Plan:     During this hospitalization, patient to be seen 3 x/week to address the identified rehab impairments via gait training, therapeutic activities, therapeutic exercises, neuromuscular re-education and progress toward the following goals:    Plan of Care Expires:  12/28/23    This plan of care has been discussed with the patient/caregiver, who was included in its development and is in agreement with the identified goals and treatment plan.     Subjective     Communicated with RN prior to session.  Patient agreeable to participate.     Pain/Comfort:  Pain Rating 1: 0/10  Pain Rating Post-Intervention 1: 0/10    Chief Complaint: R MCA CVA   Patient/Family Comments/goals: None stated      Objective:     Patient found HOB elevated with bed alarm, blood pressure cuff, pulse ox  (continuous), telemetry, SCD, peripheral IV, Condom Catheter (cooling blanket)  upon PT entry to room.    General Precautions: Standard, aspiration, fall   Orthopedic Precautions:N/A   Braces: N/A         Exams:    Cognition:  Patient is oriented to Person, Place, Time, Situation  Follows one-step commands 75% of time  Insight to deficits/safety awareness: impaired    Functional Mobility:    Bed Mobility:  Supine to Sit: Total Assistance and 2 persons  on L side of bed  Sit to Supine: Total Assistance and 2 persons  Scooting anteriorly to EOB to plant feet on floor: Total Assistance    Transfers:   Sit to Stand Transfer: Activity did not occur due to poor sitting balance at EOB    Balance:  Static Sit:   Total Assist, brief moments of min(A) with max verbal and tactile cues for core mm activation at EOB x ~15 minutes  Posterior L lean      Therapeutic Activities/Exercises     Patient assisted with functional mobility as noted above  Therapy tech utilized during this treatment due to patient complexity and physical assistance required to ensure safe mobilization   Pt performed modified sit-ups 2x5 at EOB with R UE assist. Pt required a rest break with deep breathing between sets  R UE reaching to a target 2x5 reps. Pt unable to cross midline to the L despite max cues  Patient educated on the importance of early mobility to prevent functional decline during hospital stay  Patient was instructed to utilize staff assistance for mobility/transfers.  [facility] and [facility]CT assist  Patient educated on PT POC and role of PT in acute care  White board updated regarding patient's safest level of mobility with staff assistance, RN also updated.     AM-PAC 6 CLICK MOBILITY  Turning over in bed (including adjusting bedclothes, sheets and blankets)?: 1  Sitting down on and standing up from a chair with arms (e.g., wheelchair, bedside commode, etc.): 1  Moving from  lying on back to sitting on the side of the bed?: 1  Moving to and from a bed to a chair (including a wheelchair)?: 1  Need to walk in hospital room?: 1  Climbing 3-5 steps with a railing?: 1  Basic Mobility Total Score: 6     Patient left HOB elevated with all lines intact, call button in reach, bed alarm on, RN notified, and daughter present.        History/Goals:     PAST MEDICAL HISTORY:  Past Medical History:   Diagnosis Date    Arthritis     Weiss's esophagus     CHF (congestive heart failure)     Embolic stroke involving right middle cerebral artery 2023    GERD (gastroesophageal reflux disease)     Type 2 diabetes mellitus        Past Surgical History:   Procedure Laterality Date    CEREBRAL ANGIOGRAM N/A 2023    Procedure: ANGIOGRAM-CEREBRAL;  Surgeon: Valery Hyman;  Location: [facility];  Service: Anesthesiology;  Laterality: N/A;       GOALS:   Multidisciplinary Problems       Physical Therapy Goals          Problem: Physical Therapy    Goal Priority Disciplines Outcome Goal Variances Interventions   Physical Therapy Goal     PT, PT/OT Ongoing, Progressing     Description: Goals to be met by: 23     Patient will increase functional independence with mobility by performin. Supine to sit with MInimal Assistance  2. Sit to supine with MInimal Assistance  3. Sit to stand transfer with Moderate Assistance  4. Bed to chair transfer with Moderate Assistance using LRAD  5. Gait  x 30 feet with Moderate Assistance using LRAD as needed.   6. Lower extremity exercise program x15 reps per handout, with assistance as needed                         Time Tracking:     PT Received On: 23  PT Start Time: 1343     PT Stop Time: 1408  PT Total Time (min): 25 min     Billable Minutes: Therapeutic Activity 15 and Neuromuscular Re-education 10      Juanita Rodriguez, PT  2023  Pager# [phone]

## 2023-11-30 NOTE — ASSESSMENT & PLAN NOTE
WBC 28 and lactic 11 on arrival to OSH  Leukocytosis and lactic improving  BCx with NGTD  12/27 WBC trended down to 11.91 (WNL).  UA obtained on 11/28 + for leukocytes, did not reflex to culture.

## 2023-11-30 NOTE — ASSESSMENT & PLAN NOTE
Tim Rhodes is a 83 y.o. male with PMHx of CAD, HF,  DM that was transferred from [facility] with suspected R MCA occlusion for possible intervention. Initially presented to OSH with AMS and seizure activity, LKN OOW for TNK. CTH showed early ischemic changes in R MCA territory and hyperdense R MCA sign concerning for R MCA occlusion. Patient was intubated and sedated at OSH for airway protection. Repeat CT on arrival to [facility] appears stable in comparison to prior. Patient independent at baseline per daughter. Discussed patient with neuro IR and patient taken to IR for possible thrombectomy, now s/p unsuccessful IR. MRI brain confirms large R MCA infarcts. Echo with EF 60% and apical akinesis. Stroke etiology likely cardio-embolic due to new onset AFib this admission.    Patient with decreased urine output, increasing WBC, tachypnea, w/accessory muscle use overnight.  UA + for UTI.  CXR w/R pneumothorax on xray stable.  GI consulted by primary team for PEG.    Antithrombotics for secondary stroke prevention:  suppository    Statins for secondary stroke prevention: Statins: Atorvastatin- 40 mg daily    Aggressive risk factor modification: DM, CAD     Rehab efforts: The patient has been evaluated by a stroke team provider and the therapy needs have been fully considered based off the presenting complaints and exam findings. The following therapy evaluations are needed:  PT/OT/SLP when appropriate -Current recs for Moderate Intensity Therapy, GI consulted for PEG    Diagnostics ordered/pending: None     VTE prophylaxis: Heparin 5000 units SQ every 8 hours  Mechanical prophylaxis: Place SCDs    BP parameters: Infarct: Post unsucessful thrombectomy, SBP <220

## 2023-11-30 NOTE — ASSESSMENT & PLAN NOTE
Noted on arrival to OSH, reported to have RUE seizure like activity  Treated with keppra prior to transfer  EEG with severe slowing but no electrographic seizures  Seizure ppx with keppra 750 BID  No reported new seizure like activity.

## 2023-11-30 NOTE — PLAN OF CARE
"[facility] Care Plan    POC reviewed with Tim Rhodes and family at 1400. Pt verbalized understanding. Questions and concerns addressed. No acute events today. Pt progressing toward goals. Will continue to monitor. See below and flowsheets for full assessment and VS info.             Is this a stroke patient? yes- Stroke booklet reviewed with patient and family, risk factors identified for patient and stroke booklet remains at bedside for ongoing education.     Neuro:  Warren Coma Scale  Best Eye Response: 3-->(E3) to speech  Best Motor Response: 6-->(M6) obeys commands  Best Verbal Response: 2-->(V2) incomprehensible speech  Win Coma Scale Score: 11  Assessment Qualifiers: patient not sedated/intubated  Pupil PERRLA: yes     24 hr Temp:  [99.1 °F (37.3 °C)-100 °F (37.8 °C)]     CV:   Rhythm: normal sinus rhythm, sinus tachycardia  BP goals:   SBP < 180  MAP > 65    Resp:      Vent Mode: Spont  Set Rate: 14 BPM  Oxygen Concentration (%): 15  Vt Set: 500 mL  PEEP/CPAP: 5 cmH20  Pressure Support: 5 cmH20    Plan: N/A    GI/:     Diet/Nutrition Received: NPO  Last Bowel Movement: 11/29/23  Voiding Characteristics: due to void    Intake/Output Summary (Last 24 hours) at 11/30/2023 1628  Last data filed at 11/30/2023 1605  Gross per 24 hour   Intake 1893.18 ml   Output 1000 ml   Net 893.18 ml     Unmeasured Output  Stool Occurrence: 1    Labs/Accuchecks:  Recent Labs   Lab 11/30/23  0146   WBC 13.63*   RBC 5.20   HGB 14.4   HCT 47.3         Recent Labs   Lab 11/30/23  0146 11/30/23  0529   * 156*   K 3.8  --    CO2 22*  --    *  --    BUN 38*  --    CREATININE 1.1  --    ALKPHOS 65  --    ALT 37  --    AST 30  --    BILITOT 0.6  --     No results for input(s): "PROTIME", "INR", "APTT", "HEPANTIXA" in the last 168 hours.   Recent Labs   Lab 11/27/23  0225 11/27/23  2348 11/29/23  0440   CPK 1125*  --   --    TROPONINI  --    < > 0.121*    < > = values in this interval not displayed. "       Electrolytes: N/A - electrolytes WDL  Accuchecks: Q6H    Gtts:   diltiaZEM (CARDIZEM) 125 mg in dextrose 5 % (D5W) 125 mL infusion 5 mg/hr (11/30/23 1605)    lactated ringers 75 mL/hr at 11/30/23 1605       LDA/Wounds:  Lines/Drains/Airways       Peripheral Intravenous Line  Duration                  Peripheral IV - Single Lumen 11/28/23 0140 20 G Anterior;Proximal;Right Forearm 2 days         Midline Catheter Insertion/Assessment  - Single Lumen 11/29/23 1255 Left basilic vein (medial side of arm) 20g x 10cm 1 day         Peripheral IV - Single Lumen 11/29/23 0041 18 G;1 3/4 in Anterior;Right Upper Arm 1 day                  Wounds: No  Wound care consulted: No

## 2023-11-30 NOTE — ASSESSMENT & PLAN NOTE
83 y.o. male with PMHx of CAD, HF, HTN, DM who was transferred from [facility] for R MCA stroke and possible intervention on suspected R MCA occlusion. Patient initially presented to OSH with AMS and seizure activity. LKN 11/22/23 at 10pm. Out of treatment window for thrombolytics. CT at OSH with early ischemic changes in R MCA territory and hyperdense R MCA sign concerning for R MCA occlusion. Patient was intubated and sedated at OSH for airway protection.   Stroke team consulted  SBP <220  TTE/EKG/A1C/lipid panel penidg  Neuro checks q 1 hr  Thrombectomy unsuccessful   Statin   PT/OT  SLP when appropriate   CTH complete  MRI pending  A1C 6.6  SSI  Lipid profile complete  TTE complete  MRI complete and reviewed, Nsgy consulted, EEG neg and dced  Holding VTE ppx for pending Nsgy recs  11/25/2023: initiated sq heparin and baby asa  Follow CT head in AM  11/26/2023 EKG and if afib happens again will treat, Extubated 11/26 at 1400, Low dose precedex for agitation  11/27/2023 EKG, NS 75 cc/hr, consult GI for peg

## 2023-12-01 NOTE — PLAN OF CARE
"[facility] Care Plan    POC reviewed with Tim Rhdoes and family at 0300. Pt verbalized understanding. Questions and concerns addressed. No acute events overnight. Pt progressing toward goals. Will continue to monitor. See below and flowsheets for full assessment and VS info.           Is this a stroke patient? yes- Stroke booklet reviewed with family, risk factors identified for patient and stroke booklet remains at bedside for ongoing education.     Neuro:  Win Coma Scale  Best Eye Response: 3-->(E3) to speech  Best Motor Response: 6-->(M6) obeys commands  Best Verbal Response: 2-->(V2) incomprehensible speech  Win Coma Scale Score: 11  Assessment Qualifiers: patient not sedated/intubated  Pupil PERRLA: yes     24hr Temp:  [99.1 °F (37.3 °C)-99.3 °F (37.4 °C)]     CV:   Rhythm: normal sinus rhythm  BP goals:   SBP < 180  MAP > 65    Resp:      Vent Mode: Spont  Set Rate: 14 BPM  Oxygen Concentration (%): 15  Vt Set: 500 mL  PEEP/CPAP: 5 cmH20  Pressure Support: 5 cmH20    Plan: N/A    GI/:     Diet/Nutrition Received: NPO  Last Bowel Movement: 12/01/23  Voiding Characteristics: due to void    Intake/Output Summary (Last 24 hours) at 12/1/2023 0540  Last data filed at 12/1/2023 0535  Gross per 24 hour   Intake 1514.54 ml   Output 1600 ml   Net -85.46 ml     Unmeasured Output  Stool Occurrence: 1    Labs/Accuchecks:  Recent Labs   Lab 12/01/23  0058   WBC 11.87   RBC 4.78   HGB 13.4*   HCT 43.9         Recent Labs   Lab 12/01/23  0058   *   K 4.0   CO2 20*   *   BUN 33*   CREATININE 0.9   ALKPHOS 60   ALT 30   AST 27   BILITOT 0.6    No results for input(s): "PROTIME", "INR", "APTT", "HEPANTIXA" in the last 168 hours.   Recent Labs   Lab 11/27/23  0225 11/27/23  2348 11/29/23  0440   CPK 1125*  --   --    TROPONINI  --    < > 0.121*    < > = values in this interval not displayed.       Electrolytes: N/A - electrolytes WDL  Accuchecks: Q6H    Gtts:   diltiaZEM (CARDIZEM) 125 mg in dextrose 5 " % (D5W) 125 mL infusion 5 mg/hr (12/01/23 0535)       LDA/Wounds:  Lines/Drains/Airways       Peripheral Intravenous Line  Duration                  Peripheral IV - Single Lumen 11/28/23 0140 20 G Anterior;Proximal;Right Forearm 3 days         Peripheral IV - Single Lumen 11/29/23 0041 18 G;1 3/4 in Anterior;Right Upper Arm 2 days         Midline Catheter Insertion/Assessment  - Single Lumen 11/29/23 1255 Left basilic vein (medial side of arm) 20g x 10cm 1 day                  Wounds: No  Wound care consulted: No

## 2023-12-01 NOTE — TREATMENT PLAN
GI Treatment Plan    20 F PEG tube placed without any immediate complications.   External bumper at 3.5 cm zulema.    Please follow the post-PEG recommendations including:   - Today: After 4 hours, may use PEG today for only medications and water  - Tomorrow: may use PEG tomorrow for feedings  - Flushes: flush PEG daily with 60 ml water  - Please consult nutrition for tube feed goals and recommendations.    Care Instructions  - Antibiotic ointment to site, clean site with soap and water daily and dry thoroughly and clean site daily with half-strength hydrogen peroxide for three days, then soap and water daily.  - Dressing: change dressing on top of bumper daily    Please hold all heparin products for 24 hours.     Aurea Winslow DO  Gastroenterology Fellow

## 2023-12-01 NOTE — PROVATION PATIENT INSTRUCTIONS
Discharge Summary/Instructions after an Endoscopic Procedure  Patient Name: Tim Rhodes  Patient MRN: [id number]  Patient YOB: 1940  Friday, December 1, 2023  Tanner Villarreal MD  Dear patient,  As a result of recent federal legislation (The Federal Cures Act), you may   receive lab or pathology results from your procedure in your [facility]   account before your physician is able to contact you. Your physician or   their representative will relay the results to you with their   recommendations at their soonest availability.  Thank you,  RESTRICTIONS:  During your procedure today, you received medications for sedation.  These   medications may affect your judgment, balance and coordination.  Therefore,   for 24 hours, you have the following restrictions:   - DO NOT drive a car, operate machinery, make legal/financial decisions,   sign important papers or drink alcohol.    ACTIVITY:  Today: no heavy lifting, straining or running due to procedural   sedation/anesthesia.  The following day: return to full activity including work.  DIET:  Eat and drink normally unless instructed otherwise.     TREATMENT FOR COMMON SIDE EFFECTS:  - Mild abdominal pain, nausea, belching, bloating or excessive gas:  rest,   eat lightly and use a heating pad.  - Sore Throat: treat with throat lozenges and/or gargle with warm salt   water.  - Because air was used during the procedure, expelling large amounts of air   from your rectum or belching is normal.  - If a bowel prep was taken, you may not have a bowel movement for 1-3 days.    This is normal.  SYMPTOMS TO WATCH FOR AND REPORT TO YOUR PHYSICIAN:  1. Abdominal pain or bloating, other than gas cramps.  2. Chest pain.  3. Back pain.  4. Signs of infection such as: chills or fever occurring within 24 hours   after the procedure.  5. Rectal bleeding, which would show as bright red, maroon, or black stools.   (A tablespoon of blood from the rectum is not serious, especially  if   hemorrhoids are present.)  6. Vomiting.  7. Weakness or dizziness.  GO DIRECTLY TO THE NEAREST EMERGENCY ROOM IF YOU HAVE ANY OF THE FOLLOWING:      Difficulty breathing              Chills and/or fever over 101 F   Persistent vomiting and/or vomiting blood   Severe abdominal pain   Severe chest pain   Black, tarry stools   Bleeding- more than one tablespoon   Any other symptom or condition that you feel may need urgent attention  Your doctor recommends these additional instructions:  If any biopsies were taken, your doctors clinic will contact you in 1 to 2   weeks with any results.  - Return patient to ICU for ongoing care.   - Please follow the post-PEG recommendations including: external bolster 1   cm from abdominal wall, dry dressing only, may use PEG today for meds and   water and may use PEG tomorrow for feedings.   For questions, problems or results please call your physician - Tanner Villarreal MD at Work:  [phone].  [facility], EMERGENCY ROOM PHONE NUMBER: [phone]  IF A COMPLICATION OR EMERGENCY SITUATION ARISES AND YOU ARE UNABLE TO REACH   YOUR PHYSICIAN - GO DIRECTLY TO THE EMERGENCY ROOM.  Tanner Villarreal MD  12/1/2023 1:12:29 PM  This report has been verified and signed electronically.  Dear patient,  As a result of recent federal legislation (The Federal Cures Act), you may   receive lab or pathology results from your procedure in your [facility]   account before your physician is able to contact you. Your physician or   their representative will relay the results to you with their   recommendations at their soonest availability.  Thank you,  PROVATION

## 2023-12-01 NOTE — PLAN OF CARE
Emeli received a V/M from Jammie at [facility] [phone].  Per Jammie, they are very interested in accepting the patient for SNF and would like updates.  EMELI phoned the pateint's daughter-  Cheryl Michael (Daughter) - [phone], to determine SNF preference.  No answer.  Msg left on V/M for daughter to call CM back.  Per [facility] team, the patient had a peg placed today and will likely be ready early next week.    Updates including Peg Tube note sent to [facility] via [employer].       Filomena Nuñez RN, CCRN-K, [facility]  Neuro-Critical Care   [phone]

## 2023-12-01 NOTE — PLAN OF CARE
"[facility] Care Plan    POC reviewed with Tim Rhodes and family at 1400. Pt verbalized understanding. Questions and concerns addressed. No acute events today. Pt progressing toward goals. Will continue to monitor. See below and flowsheets for full assessment and VS info.             Is this a stroke patient? yes- Stroke booklet reviewed with patient and family, risk factors identified for patient and stroke booklet remains at bedside for ongoing education.     Neuro:  [place] Coma Scale  Best Eye Response: 3-->(E3) to speech  Best Motor Response: 6-->(M6) obeys commands  Best Verbal Response: 2-->(V2) incomprehensible speech  Win Coma Scale Score: 11  Assessment Qualifiers: patient not sedated/intubated  Pupil PERRLA: yes     24 hr Temp:  [98.6 °F (37 °C)-99.4 °F (37.4 °C)]     CV:   Rhythm: normal sinus rhythm, sinus tachycardia  BP goals:   SBP < 180  MAP > 65    Resp:      Vent Mode: Spont  Set Rate: 14 BPM  Oxygen Concentration (%): 15  Vt Set: 500 mL  PEEP/CPAP: 5 cmH20  Pressure Support: 5 cmH20    Plan: N/A    GI/:     Diet/Nutrition Received: NPO  Last Bowel Movement: 12/01/23  Voiding Characteristics: due to void    Intake/Output Summary (Last 24 hours) at 12/1/2023 1619  Last data filed at 12/1/2023 1605  Gross per 24 hour   Intake 1621.14 ml   Output 1100 ml   Net 521.14 ml     Unmeasured Output  Stool Occurrence: 1    Labs/Accuchecks:  Recent Labs   Lab 12/01/23  0058   WBC 11.87   RBC 4.78   HGB 13.4*   HCT 43.9         Recent Labs   Lab 12/01/23  0058   *   K 4.0   CO2 20*   *   BUN 33*   CREATININE 0.9   ALKPHOS 60   ALT 30   AST 27   BILITOT 0.6    No results for input(s): "PROTIME", "INR", "APTT", "HEPANTIXA" in the last 168 hours.   Recent Labs   Lab 11/27/23  0225 11/27/23  2348 11/29/23  0440   CPK 1125*  --   --    TROPONINI  --    < > 0.121*    < > = values in this interval not displayed.       Electrolytes: N/A - electrolytes WDL  Accuchecks: Q6H    Gtts:   diltiaZEM " (CARDIZEM) 125 mg in dextrose 5 % (D5W) 125 mL infusion Stopped (12/01/23 1139)    lactated ringers 75 mL/hr at 12/01/23 1605       LDA/Wounds:  Lines/Drains/Airways       Peripheral Intravenous Line  Duration                  Peripheral IV - Single Lumen 11/28/23 0140 20 G Anterior;Proximal;Right Forearm 3 days         Midline Catheter Insertion/Assessment  - Single Lumen 11/29/23 1255 Left basilic vein (medial side of arm) 20g x 10cm 2 days         Peripheral IV - Single Lumen 11/29/23 0041 18 G;1 3/4 in Anterior;Right Upper Arm 2 days                  Wounds: No  Wound care consulted: No

## 2023-12-01 NOTE — PT/OT/SLP PROGRESS
Occupational Therapy   Treatment    Name: Tim Rhodes  MRN: [id number]  Admitting Diagnosis:  Embolic stroke involving right middle cerebral artery  8 Days Post-Op    Recommendations:     Discharge Recommendations: Moderate Intensity Therapy (pending improvement)  Discharge Equipment Recommendations:  hospital bed  Barriers to discharge:  None    Assessment:     Tim Rhodes is a 83 y.o. male with a medical diagnosis of Embolic stroke involving right middle cerebral artery.  He presents with performance deficits affecting function are weakness, abnormal tone, decreased ROM, impaired cognition, impaired endurance, impaired sensation, decreased coordination, decreased upper extremity function, impaired self care skills, impaired functional mobility, decreased lower extremity function, impaired balance, impaired cardiopulmonary response to activity.     Rehab Prognosis:  Fair; patient would benefit from acute skilled OT services to address these deficits and reach maximum level of function.       Plan:     Patient to be seen 3 x/week to address the above listed problems via sensory integration, cognitive retraining, neuromuscular re-education, therapeutic exercises, self-care/home management, therapeutic activities  Plan of Care Expires: 12/22/23  Plan of Care Reviewed with: patient, daughter    Subjective     Patient:  Nonverbal  Pain/Comfort:  Pain Rating 1: 0/10  Pain Rating Post-Intervention 1: 0/10    Objective:     Communicated with: Nurse prior to session.  Patient found supine with bed alarm, blood pressure cuff, pulse ox (continuous), telemetry, SCD, peripheral IV, Condom Catheter (cooling blanket) upon OT entry to room.    General Precautions: Standard, aspiration, fall, NPO    Orthopedic Precautions:N/A  Braces: N/A  Respiratory Status: Room air     Occupational Performance:     Bed Mobility:    Patient completed Rolling/Turning to Left with  total assistance  Patient completed Rolling/Turning to Right with  total assistance     Functional Mobility/Transfers:  Dependent drawsheet transfers    Activities of Daily Living:  Feeding:  NPO    Grooming: total assistance while supine  Bathing:  dependence, bed level    [facility] 6 Click ADL: 6    Treatment & Education:  Daily orientation provided.  PROM performed left UE and AAROM right UE and bilateral LEs one set x 10 rep in all planes of motion with stretches provided at end range.  Assistance and facilitation provided for upward rotation of the scapula during shoulder flexion and abduction to promote orientation of glenoid fossa of scapula to humeral head for prevention of post-stroke hemiplegic pain. Patient with resistant to R UE movements.   Positioning provided for midline orientation with bilateral UEs elevated and heels lifted off mattress.   Gentle cervical rotation provided.       Patient left supine with all lines intact, call button in reach, and bed alarm on    GOALS:   Multidisciplinary Problems       Occupational Therapy Goals          Problem: Occupational Therapy    Goal Priority Disciplines Outcome Interventions   Occupational Therapy Goal     OT, PT/OT Ongoing, Progressing    Description: Goals set 11/27 to be addressed for 14 days with expiration date, 12/11:  Patient will increase functional independence with ADLs by performing:    Patient will demonstrate rolling to the right with mod assist.  Not met   Patient will demonstrate rolling to the left with mod assist.   Not met  Patient will demonstrate supine -sit with mod  assist.   Not met  Patient will demonstrate stand pivot transfers with mod assist.   Not met  Patient will demonstrate grooming while seated with mod assist.   Not met  Patient will demonstrate upper body dressing with mod assist while seated EOB.   Not met  Patient will demonstrate lower body dressing with mod assist while seated EOB.   Not met  Patient will demonstrate toileting with mod assist.   Not met  Patient will demonstrate bathing  while seated EOB with mod assist.   Not met  Patient's family / caregiver will demonstrate independence and safety with assisting patient with self-care skills and functional mobility.     Not met  Patient's family / caregiver will demonstrate independence with providing ROM and changes in bed positioning.   Not met                             Time Tracking:     OT Date of Treatment: 12/01/23  OT Start Time: 0400  OT Stop Time: 0424  OT Total Time (min): 24 min    Billable Minutes:Neuromuscular Re-education 24    OT/YADI: OT          12/1/2023

## 2023-12-01 NOTE — TRANSFER OF CARE
"Anesthesia Transfer of Care Note    Patient: Tim Morvant    Procedure(s) Performed: Procedure(s) (LRB):  INSERTION, PEG TUBE (N/A)    Patient location: ICU    Anesthesia Type: general    Transport from OR: Transported from OR on 2-3 L/min O2 by NC with adequate spontaneous ventilation. Continuous ECG monitoring in transport. Continuous SpO2 monitoring in transport    Post pain: adequate analgesia    Post assessment: no apparent anesthetic complications and tolerated procedure well    Post vital signs: stable    Level of consciousness: sedated and responds to stimulation    Nausea/Vomiting: no nausea/vomiting    Complications: none    Transfer of care protocol was followedComments: Patient remained in ICU room for procedure. ICU nurse given report at bedside. Patient remains connected to ICU monitors      Last vitals: Visit Vitals  /59 (BP Location: Left arm, Patient Position: Lying)   Pulse 70   Temp 37 °C (98.6 °F) (Core Rectal)   Resp 15   Ht 5' 3" (1.6 m)   Wt 77.6 kg (171 lb 1.2 oz)   SpO2 100%   BMI 30.30 kg/m²     "

## 2023-12-01 NOTE — H&P
Short Stay Endoscopy History and Physical    PCP - No, Primary Doctor    Procedure - EGD with PEG  Sedation: GA  ASA - per anesthesia  Mallampati - per anesthesia  History of Anesthesia problems - no  Family history Anesthesia problems -  no     HPI:  This is a 83 y.o. male here for evaluation of : Neurogenic dysphagia    Reflux - no  Dysphagia - yes  Abdominal pain - no  Diarrhea - no    ROS:  Constitutional: No fevers, chills, No weight loss  ENT: No allergies  CV: No chest pain  Pulm: No cough, No shortness of breath  Ophtho: No vision changes  GI: see HPI  Medical History:  has a past medical history of Arthritis, Weiss's esophagus, CHF (congestive heart failure), Embolic stroke involving right middle cerebral artery (11/23/2023), GERD (gastroesophageal reflux disease), and Type 2 diabetes mellitus.    Surgical History:  has a past surgical history that includes Cerebral angiogram (N/A, 11/23/2023).    Family History: family history is not on file.. Otherwise no colon cancer, inflammatory bowel disease, or GI malignancies.    Social History:      Review of patient's allergies indicates:  No Known Allergies    Medications:   Medications Prior to Admission   Medication Sig Dispense Refill Last Dose    amLODIPine (NORVASC) 10 MG tablet Take 10 mg by mouth once daily.       atorvastatin (LIPITOR) 20 MG tablet Take 20 mg by mouth once daily.       cloNIDine (CATAPRES) 0.1 MG tablet Take 0.1 mg by mouth 2 (two) times daily.       glipiZIDE (GLUCOTROL) 10 MG tablet Take 10 mg by mouth 2 (two) times daily.       JARDIANCE 25 mg tablet Take 25 mg by mouth once daily.       metoprolol succinate (TOPROL-XL) 100 MG 24 hr tablet Take 100 mg by mouth once daily.       omeprazole (PRILOSEC) 20 MG capsule Take 20 mg by mouth 2 (two) times daily.       oxybutynin (DITROPAN) 5 MG Tab Take 5 mg by mouth once daily.       RYBELSUS 14 mg tablet Take 14 mg by mouth once daily.       tamsulosin (FLOMAX) 0.4 mg Cap Take 0.4 mg by  mouth once daily.          Objective Findings:    Vital Signs: Per nursing notes.    Physical Exam:  General Appearance: Well appearing in no acute distress  Head:   Normocephalic, without obvious abnormality  Eyes:    No scleral icterus  Airway: Open  Neck: No restriction in mobility  Lungs: CTA bilaterally in anterior and posterior fields, no wheezes, no crackles.  Heart:  Regular rate and rhythm, S1, S2 normal, no murmurs heard  Abdomen: Soft, non tender, non distended      Labs:  Lab Results   Component Value Date    WBC 11.87 12/01/2023    HGB 13.4 (L) 12/01/2023    HCT 43.9 12/01/2023     12/01/2023    CHOL 94 (L) 11/23/2023    TRIG 68 11/23/2023    HDL 32 (L) 11/23/2023    ALT 30 12/01/2023    AST 27 12/01/2023     (HH) 12/01/2023    K 4.0 12/01/2023     (HH) 12/01/2023    CREATININE 0.9 12/01/2023    BUN 33 (H) 12/01/2023    CO2 20 (L) 12/01/2023    TSH 2.121 11/23/2023    HGBA1C 6.6 (H) 11/23/2023         I have explained the risks and benefits of endoscopy procedures to the patient including but not limited to bleeding, perforation, infection, and death.    Thank you so much for allowing me to participate in the care of Tim Villarreal MD

## 2023-12-02 PROBLEM — Z97.8 ENDOTRACHEALLY INTUBATED: Status: RESOLVED | Noted: 2023-01-01 | Resolved: 2023-01-01

## 2023-12-02 PROBLEM — D72.829 LEUKOCYTOSIS: Status: RESOLVED | Noted: 2023-01-01 | Resolved: 2023-01-01

## 2023-12-02 NOTE — TREATMENT PLAN
GI Treatment Plan    PEG site examined. Bumper at 3.5 zulema at skin level. Bumper rotates 360 degrees with ease. Abdomen is soft and non-tender.     - May start using PEG for feeding.   - Be advised to elevate head end of bed to 30 degrees or more while using PEG.   - Please consult nutrition for tube feed goals and recommendations.    Care Instructions  - Flushes: flush PEG daily with 60 ml water  - Antibiotic ointment to site, clean site with soap and water daily and dry thoroughly and clean site daily with half-strength hydrogen peroxide for three days, then soap and water daily.  - Dressing: change dressing on top of bumper daily    Thank you for involving us in the care of Tim Rhodes. We are signing-off. Please call with any additional questions, concerns or changes in the patient's clinical status.    Aurea Winslow DO  Gastroenterology Fellow

## 2023-12-02 NOTE — PLAN OF CARE
"[facility] Care Plan    POC reviewed with Tim Rhodes and family at 1400. Pt verbalized understanding. Questions and concerns addressed. No acute events today. Pt progressing toward goals. Will continue to monitor. See below and flowsheets for full assessment and VS info.     -TF started and currently infusing at 20ml/hr (40ml/hr goal)  - linen changed/ bed bath completed        Is this a stroke patient? yes- Stroke booklet reviewed with patient and family, risk factors identified for patient and stroke booklet remains at bedside for ongoing education.     Neuro:  Win Coma Scale  Best Eye Response: 3-->(E3) to speech  Best Motor Response: 6-->(M6) obeys commands  Best Verbal Response: 2-->(V2) incomprehensible speech  Win Coma Scale Score: 11  Assessment Qualifiers: patient not sedated/intubated  Pupil PERRLA: yes     24 hr Temp:  [98.7 °F (37.1 °C)-99.4 °F (37.4 °C)]     CV:   Rhythm: atrial rhythm, normal sinus rhythm  BP goals:   SBP < 180  MAP > 65    Resp:      Vent Mode: Spont  Set Rate: 14 BPM  Oxygen Concentration (%): 15  Vt Set: 500 mL  PEEP/CPAP: 5 cmH20  Pressure Support: 5 cmH20    Plan: N/A    GI/:     Diet/Nutrition Received: NPO  Last Bowel Movement: 12/01/23  Voiding Characteristics: incontinence    Intake/Output Summary (Last 24 hours) at 12/2/2023 1611  Last data filed at 12/2/2023 1603  Gross per 24 hour   Intake 1647.5 ml   Output 1120 ml   Net 527.5 ml     Unmeasured Output  Stool Occurrence: 1    Labs/Accuchecks:  Recent Labs   Lab 12/02/23 0038   WBC 9.72   RBC 4.43*   HGB 12.5*   HCT 41.1         Recent Labs   Lab 12/02/23 0038 12/02/23  0850   * 157*   K 4.0  --    CO2 19*  --    *  --    BUN 38*  --    CREATININE 1.0  --    ALKPHOS 61  --    ALT 28  --    AST 27  --    BILITOT 0.6  --     No results for input(s): "PROTIME", "INR", "APTT", "HEPANTIXA" in the last 168 hours.   Recent Labs   Lab 11/27/23  0225 11/27/23  2348 11/29/23  5520   NAKUL 1125*  --   --  "   TROPONINI  --    < > 0.121*    < > = values in this interval not displayed.       Electrolytes: N/A - electrolytes WDL  Accuchecks: Q6H    Gtts:      LDA/Wounds:  Lines/Drains/Airways       Drain  Duration                  Gastrostomy/Enterostomy 12/01/23 1145 Percutaneous endoscopic gastrostomy (PEG) LUQ feeding 1 day              Peripheral Intravenous Line  Duration                  Midline Catheter Insertion/Assessment  - Single Lumen 11/29/23 1255 Left basilic vein (medial side of arm) 20g x 10cm 3 days         Peripheral IV - Single Lumen 11/29/23 0041 18 G;1 3/4 in Anterior;Right Upper Arm 3 days                  Wounds: No  Wound care consulted: No

## 2023-12-02 NOTE — ASSESSMENT & PLAN NOTE
Tim Rhodes is a 83 y.o. male with PMHx of CAD, HF,  DM that was transferred from [facility] with suspected R MCA occlusion for possible intervention. Initially presented to OSH with AMS and seizure activity, LKN OOW for TNK. CTH showed early ischemic changes in R MCA territory and hyperdense R MCA sign concerning for R MCA occlusion. Patient was intubated and sedated at OSH for airway protection. Repeat CT on arrival to [facility] appears stable in comparison to prior. Patient independent at baseline per daughter. Discussed patient with neuro IR and patient taken to IR for possible thrombectomy, now s/p unsuccessful IR. MRI brain confirms large R MCA infarcts. Echo with EF 60% and apical akinesis. Stroke etiology likely cardio-embolic due to new onset AFib this admission.    NAEO, neuro exam unchanged. Patient now s/p PEG with GI. On rocephin for UTI, consider urine culture for sensitivity. Continue ICU care.    Antithrombotics for secondary stroke prevention: ASA 81mg daily, will need to further AC risk/benefit discussion    Statins for secondary stroke prevention: Statins: Atorvastatin- 40 mg daily    Aggressive risk factor modification: DM, CAD     Rehab efforts: The patient has been evaluated by a stroke team provider and the therapy needs have been fully considered based off the presenting complaints and exam findings. The following therapy evaluations are needed:  PT/OT/SLP when appropriate -Current recs for Moderate Intensity Therapy, GI consulted for PEG    Diagnostics ordered/pending: None     VTE prophylaxis: Heparin 5000 units SQ every 8 hours  Mechanical prophylaxis: Place SCDs    BP parameters:  SBP goal <180

## 2023-12-02 NOTE — SUBJECTIVE & OBJECTIVE
Review of Systems  Unable to obtain a complete ROS due to level of consciousness.  Objective:     Vitals:  Temp: 99.4 °F (37.4 °C)  Pulse: 70  Rhythm: normal sinus rhythm  BP: (!) 142/63  MAP (mmHg): 90  Resp: (!) 23  SpO2: 100 %    Temp  Min: 98.7 °F (37.1 °C)  Max: 99.4 °F (37.4 °C)  Pulse  Min: 59  Max: 107  BP  Min: 103/55  Max: 178/77  MAP (mmHg)  Min: 74  Max: 111  Resp  Min: 18  Max: 30  SpO2  Min: 95 %  Max: 100 %    12/01 0701 - 12/02 0700  In: 2014.2 [I.V.:315.2]  Out: 650 [Urine:650]   Unmeasured Output  Stool Occurrence: 1        Physical Exam  GA: comfortable, no acute distress.   HEENT: No scleral icterus or JVD.   Pulmonary: Clear to auscultation A/L.   Cardiac: RRR S1 & S2 w/o rubs/murmurs/gallops.   Abdominal: Bowel sounds present x 4. No appreciable hepatosplenomegaly.  Skin: No jaundice, rashes, or visible lesions.  Neuro:  --GCS: E3 V2 M6  --Mental Status:   opens eyes, nonverbal   --CN II-XII grossly intact.   --Pupils 2mm, PERRL.   --Corneal reflex, gag, cough intact.  --LUE withdraws  --RUE spont  --LLE spont  --RLE spont       Medications:  Continuous Scheduledaspirin, 81 mg, Daily  atorvastatin, 40 mg, Daily  cefTRIAXone (ROCEPHIN) IVPB, 1 g, Q24H  diltiaZEM, 30 mg, Q6H  erythromycin, , QID  levETIRAcetam (Keppra) IV (PEDS and ADULTS), 750 mg, Q12H  polymyxin B sulf-trimethoprim, 1 drop, QID  silodosin, 4 mg, Daily    PRNacetaminophen, 650 mg, Q6H PRN  albuterol-ipratropium, 3 mL, Q4H PRN  artificial tears, 2 drop, QID PRN  calcium gluconate IVPB, 1 g, PRN  calcium gluconate IVPB, 2 g, PRN  calcium gluconate IVPB, 3 g, PRN  dextrose 10%, 12.5 g, PRN  dextrose 10%, 25 g, PRN  glucagon (human recombinant), 1 mg, PRN  hydrALAZINE, 10 mg, Q4H PRN  insulin aspart U-100, 0-10 Units, Q6H PRN  labetalol, 10 mg, Q4H PRN  magnesium sulfate IVPB, 2 g, PRN  magnesium sulfate IVPB, 4 g, PRN  ondansetron, 4 mg, Q6H PRN  potassium chloride, 40 mEq, PRN   And  potassium chloride, 60 mEq, PRN    And  potassium chloride, 80 mEq, PRN  sodium phosphate 15 mmol in dextrose 5 % (D5W) 250 mL IVPB, 15 mmol, PRN  sodium phosphate 20.01 mmol in dextrose 5 % (D5W) 250 mL IVPB, 20.01 mmol, PRN  sodium phosphate 30 mmol in dextrose 5 % (D5W) 250 mL IVPB, 30 mmol, PRN      Today I personally reviewed pertinent medications, lines/drains/airways, imaging, cardiology results, laboratory results, microbiology results,    Diet  Diet NPO  Diet NPO

## 2023-12-02 NOTE — SUBJECTIVE & OBJECTIVE
Neurologic Chief Complaint: R MCA stroke    Subjective:     Interval History: Patient is seen for follow-up neurological assessment and treatment recommendations:   NAEO, neuro exam unchanged. Patient now s/p PEG with GI. On rocephin for UTI, consider urine culture for sensitivity. Continue ICU care.    HPI, Past Medical, Family, and Social History remains the same as documented in the initial encounter.     Review of Systems   Unable to perform ROS: Mental status change   HENT:  Positive for trouble swallowing.    Respiratory:  Positive for shortness of breath.    Neurological:  Positive for speech difficulty and weakness.     Scheduled Meds:   aspirin  81 mg Per G Tube Daily    atorvastatin  40 mg Per G Tube Daily    cefTRIAXone (ROCEPHIN) IVPB  1 g Intravenous Q24H    diltiaZEM  30 mg Per G Tube Q6H    erythromycin   Both Eyes QID    levETIRAcetam (Keppra) IV (PEDS and ADULTS)  750 mg Intravenous Q12H    polymyxin B sulf-trimethoprim  1 drop Both Eyes QID    silodosin  4 mg Per OG tube Daily     Continuous Infusions:      PRN Meds:acetaminophen, albuterol-ipratropium, artificial tears, calcium gluconate IVPB, calcium gluconate IVPB, calcium gluconate IVPB, dextrose 10%, dextrose 10%, glucagon (human recombinant), hydrALAZINE, insulin aspart U-100, labetalol, magnesium sulfate IVPB, magnesium sulfate IVPB, ondansetron, potassium chloride **AND** potassium chloride **AND** potassium chloride, sodium phosphate 15 mmol in dextrose 5 % (D5W) 250 mL IVPB, sodium phosphate 20.01 mmol in dextrose 5 % (D5W) 250 mL IVPB, sodium phosphate 30 mmol in dextrose 5 % (D5W) 250 mL IVPB    Objective:     Vital Signs (Most Recent):  Temp: 99.3 °F (37.4 °C) (12/02/23 1104)  Pulse: 84 (12/02/23 1104)  Resp: (!) 23 (12/02/23 1104)  BP: (!) 147/56 (12/02/23 1104)  SpO2: 100 % (12/02/23 1104)  BP Location: Left arm    Vital Signs Range (Last 24H):  Temp:  [98.7 °F (37.1 °C)-99.4 °F (37.4 °C)]   Pulse:  []   Resp:  [18-30]   BP:  "(103-178)/(55-81)   SpO2:  [95 %-100 %]   BP Location: Left arm       Physical Exam  Vitals and nursing note reviewed.   Constitutional:       General: He is not in acute distress.     Appearance: He is ill-appearing.   HENT:      Mouth/Throat:      Mouth: Mucous membranes are moist.   Cardiovascular:      Rate and Rhythm: Normal rate.   Pulmonary:      Effort: Tachypnea present. No respiratory distress.   Abdominal:      General: There is no distension.   Skin:     General: Skin is warm and dry.   Neurological:      Mental Status: He is alert. He is disoriented.      Cranial Nerves: Facial asymmetry present.      Sensory: No sensory deficit.      Motor: Weakness present.              Neurological Exam:   LOC: drowsy  Attention Span: poor  Language: Global aphasia  Articulation: Dysarthria  Orientation: Untestable due to severe aphasia     Laboratory:  CMP:   Recent Labs   Lab 12/02/23 0038 12/02/23  0850   CALCIUM 8.8  --    ALBUMIN 2.3*  --    PROT 6.5  --    * 157*   K 4.0  --    CO2 19*  --    *  --    BUN 38*  --    CREATININE 1.0  --    ALKPHOS 61  --    ALT 28  --    AST 27  --    BILITOT 0.6  --        CBC:   Recent Labs   Lab 12/02/23 0038   WBC 9.72   RBC 4.43*   HGB 12.5*   HCT 41.1      MCV 93   MCH 28.2   MCHC 30.4*       Lipid Panel:   No results for input(s): "CHOL", "LDLCALC", "HDL", "TRIG" in the last 168 hours.      Diagnostic Results     Brain Imaging:  CT Head 11/28/23  Impression:  Evolving large right MCA territory recent infarction  Hyperdensity along the right MCA as well as the vasculature along the right cerebral sulci which may represent sluggish slow flow versus intravascular thrombus similar to prior allowing for differences in technique. Allowing for this there is no definite hemorrhagic conversion.     CT Head 11/26/23  Impression:  Evolving recent large right MCA territorial infarct.  No hemorrhagic conversion.  Mild midline shift minimally progressed from the " prior study.     MRI brain without contrast 11/24/2023  Impression:  Large right MCA territorial infarct without hemorrhagic conversion.  Currently very mild mass effect.  Loss of the right carotid flow void consistent with occlusion versus slow flow.     CT Head 11/23/23 1812  Impression:  Acute right MCA territory infarction, similar to prior.  No infarct extension or hemorrhagic conversion.  Associated hyperdense vessel sign likely involving the right M1 and a proximal M2 branch.  Changes of generalized cerebral volume loss.  Paranasal sinus disease as above.     CT Head 11/23/23 1416  FINDINGS:  Decreased attenuation in sulcal effacement right MCA distribution consistent with acute in Ca infarct.  Hyperdense right MCA sign consistent with MCA thrombus.  Impression:  Acute right MCA infarct.        Vessel Imaging:  IR cerebral angiogram 11/23/2023  Preliminary interpretation: occlusion of R ICA at origin, unable to cross distally to perform angioplasty and stenting. Attempted thrombectomy at origin, tici 0.  Please see Imaging report for full details.        Cardiac Evaluation:   EKG 11/26/2023  Atrial Fibrillation      TTE 11/24/2023    Left Ventricle: The left ventricle is normal in size. Ventricular mass is normal. Normal wall thickness. There is concentric remodeling. Regional wall motion abnormalities present - see diagram for wall motion findings. There is normal systolic function. Ejection fraction by visual approximation is 60%. There is normal diastolic function.    Right Ventricle: Normal right ventricular cavity size. Wall thickness is normal. Right ventricle wall motion  is normal. Systolic function is normal.    Aortic Valve: The aortic valve is a trileaflet valve. There is mild aortic valve sclerosis. There is annular calcification present. There is mild aortic regurgitation.    IVC/SVC: Patient is ventilated, cannot use IVC diameter to estimate right atrial pressure. PASP is at least 24mmHg.

## 2023-12-02 NOTE — PROGRESS NOTES
[facility] Neuro Critical [facility]  Vascular Neurology  Comprehensive Stroke Center  Progress Note    Assessment/Plan:     * Embolic stroke involving right middle cerebral artery  Tim Rhodes is a 83 y.o. male with PMHx of CAD, HF,  DM that was transferred from [facility] with suspected R MCA occlusion for possible intervention. Initially presented to OSH with AMS and seizure activity, LKN OOW for TNK. CTH showed early ischemic changes in R MCA territory and hyperdense R MCA sign concerning for R MCA occlusion. Patient was intubated and sedated at OSH for airway protection. Repeat CT on arrival to [facility] appears stable in comparison to prior. Patient independent at baseline per daughter. Discussed patient with neuro IR and patient taken to IR for possible thrombectomy, now s/p unsuccessful IR. MRI brain confirms large R MCA infarcts. Echo with EF 60% and apical akinesis. Stroke etiology likely cardio-embolic due to new onset AFib this admission.    NAEO, neuro exam unchanged. Patient now s/p PEG with GI. On rocephin for UTI, consider urine culture for sensitivity. Continue ICU care.    Antithrombotics for secondary stroke prevention: ASA 81mg daily, will need to further AC risk/benefit discussion    Statins for secondary stroke prevention: Statins: Atorvastatin- 40 mg daily    Aggressive risk factor modification: DM, CAD     Rehab efforts: The patient has been evaluated by a stroke team provider and the therapy needs have been fully considered based off the presenting complaints and exam findings. The following therapy evaluations are needed:  PT/OT/SLP when appropriate -Current recs for Moderate Intensity Therapy, GI consulted for PEG    Diagnostics ordered/pending: None     VTE prophylaxis: Heparin 5000 units SQ every 8 hours  Mechanical prophylaxis: Place SCDs    BP parameters:  SBP goal <180    Paroxysmal atrial fibrillation  New onset A-Fib during evening of 11/26.  Will need further discussion for  anticoagulation, currently on ASA only.    Diabetes mellitus  Stroke risk factor  A1c 6.6  BG goal while inpatient 140-180  SSI PRN  24hr glucose range 171-212    Hemiparesis, left  2/2 stroke  PT/OT current rec for Moderate Intensity therapy.    Seizure-like activity  Noted on arrival to OSH, reported to have RUE seizure like activity  Treated with keppra prior to transfer  EEG with severe slowing but no electrographic seizures  Seizure ppx with keppra 750 BID  No reported new seizure like activity.         11/24/2023 NAEO, remains intubated/sedated. EEG with severe slowing but no evidence of seizures. MRI brain confirms large R MCA infarcts, echo with EF 60% and apical akinesis. Infectious workup ongoing.  11/25/2023 NAEO, remains intubated. Follows simple commands, moving RUE/BLE voluntarily with no movement to LUE. ASA started for stroke ppx.   11/26/2023 Patient with afib overnight, fentanyl increased. Butcher placed due to retention. Extubated today at 1400, on 4L NC at the time of visit. Low dose precedex for agitation.    11/27/2023 Extubated yesterday. Tolerating without complications. Precedex stopped secondary to bradycardia. Neuro exam stable.   11/28/2023 Patient with increased agitation requiring 3x doses of zyprexa and 1x dose of haldol for restlessness. In midst of agitation, patient required cardizem gtt to be initiated. Patient has had difficulty tolerating NGT placement after extubation. Gastroenterology was consulted for possible PEG placement. Max temp today 100.9. Blood cultures are in progress. Consider obtaining UA. Follow-up CTH with large infarct, relatively unchanged.   11/29/2023 Patient with decreased urine output, increasing WBC, tachypnea, w/accessory muscle use overnight.  UA + for UTI.  CXR w/R pneumothorax on xray stable.  GI consulted by primary team for PEG.  12/02/2023 NAEO, neuro exam unchanged. Patient now s/p PEG with GI. On rocephin for UTI, consider urine culture for  sensitivity. Continue ICU care.      STROKE DOCUMENTATION   Acute Stroke Times   Last Known Normal Date: 11/22/23  Last Known Normal Time: 2200  Unknown Symptom Onset Date: Unknown Date  Unknown Symptom Onset Time: Unknown Time  Stroke Team Called Date: 11/23/23  Stroke Team Called Time: 1804  Stroke Team Arrival Date: 11/23/23  Stroke Team Arrival Time: 1809  CT Interpretation Time: 1813  Thrombolytic Therapy Recommended: No  Thrombectomy Recommended: Yes  Decision to Treat Time for IR: 1822    NIH Scale:  1a. Level of Consciousness: 1-->Not alert, but arousable by minor stimulation to obey, answer, or respond  1b. LOC Questions: 2-->Answers neither question correctly  1c. LOC Commands: 2-->Performs neither task correctly  2. Best Gaze: 0-->Normal  3. Visual: 0-->No visual loss  4. Facial Palsy: 1-->Minor paralysis (flattened nasolabial fold, asymmetry on smiling)  5a. Motor Arm, Left: 4-->No movement  5b. Motor Arm, Right: 1-->Drift, limb holds 90 (or 45) degrees, but drifts down before full 10 secs, does not hit bed or other support  6a. Motor Leg, Left: 3-->No effort against gravity, leg falls to bed immediately  6b. Motor Leg, Right: 2-->Some effort against gravity, leg falls to bed by 5 secs, but has some effort against gravity  7. Limb Ataxia: 0-->Absent  8. Sensory: 0-->Normal, no sensory loss  9. Best Language: 3-->Mute, global aphasia, no usable speech or auditory comprehension  10. Dysarthria: 2-->Severe dysarthria, patients speech is so slurred as to be unintelligible in the absence of or out of proportion to any dysphasia, or is mute/anarthric  11. Extinction and Inattention (formerly Neglect): 1-->Visual, tactile, auditory, spatial, or personal inattention or extinction to bilateral simultaneous stimulation in one of the sensory modalities  Total (NIH Stroke Scale): 22       Modified Patricia Score: 0  [place] Coma Scale:    ABCD2 Score:    [id number] Score:   HAS -BLED Score:   ICH Score:   Hunt &  Acevedo Classification:      Hemorrhagic change of an Ischemic Stroke: Does this patient have an ischemic stroke with hemorrhagic changes? No     Neurologic Chief Complaint: R MCA stroke    Subjective:     Interval History: Patient is seen for follow-up neurological assessment and treatment recommendations:   NAEO, neuro exam unchanged. Patient now s/p PEG with GI. On rocephin for UTI, consider urine culture for sensitivity. Continue ICU care.    HPI, Past Medical, Family, and Social History remains the same as documented in the initial encounter.     Review of Systems   Unable to perform ROS: Mental status change   HENT:  Positive for trouble swallowing.    Respiratory:  Positive for shortness of breath.    Neurological:  Positive for speech difficulty and weakness.     Scheduled Meds:   aspirin  81 mg Per G Tube Daily    atorvastatin  40 mg Per G Tube Daily    cefTRIAXone (ROCEPHIN) IVPB  1 g Intravenous Q24H    diltiaZEM  30 mg Per G Tube Q6H    erythromycin   Both Eyes QID    levETIRAcetam (Keppra) IV (PEDS and ADULTS)  750 mg Intravenous Q12H    polymyxin B sulf-trimethoprim  1 drop Both Eyes QID    silodosin  4 mg Per OG tube Daily     Continuous Infusions:      PRN Meds:acetaminophen, albuterol-ipratropium, artificial tears, calcium gluconate IVPB, calcium gluconate IVPB, calcium gluconate IVPB, dextrose 10%, dextrose 10%, glucagon (human recombinant), hydrALAZINE, insulin aspart U-100, labetalol, magnesium sulfate IVPB, magnesium sulfate IVPB, ondansetron, potassium chloride **AND** potassium chloride **AND** potassium chloride, sodium phosphate 15 mmol in dextrose 5 % (D5W) 250 mL IVPB, sodium phosphate 20.01 mmol in dextrose 5 % (D5W) 250 mL IVPB, sodium phosphate 30 mmol in dextrose 5 % (D5W) 250 mL IVPB    Objective:     Vital Signs (Most Recent):  Temp: 99.3 °F (37.4 °C) (12/02/23 1104)  Pulse: 84 (12/02/23 1104)  Resp: (!) 23 (12/02/23 1104)  BP: (!) 147/56 (12/02/23 1104)  SpO2: 100 % (12/02/23 1104)  BP  "Location: Left arm    Vital Signs Range (Last 24H):  Temp:  [98.7 °F (37.1 °C)-99.4 °F (37.4 °C)]   Pulse:  []   Resp:  [18-30]   BP: (103-178)/(55-81)   SpO2:  [95 %-100 %]   BP Location: Left arm       Physical Exam  Vitals and nursing note reviewed.   Constitutional:       General: He is not in acute distress.     Appearance: He is ill-appearing.   HENT:      Mouth/Throat:      Mouth: Mucous membranes are moist.   Cardiovascular:      Rate and Rhythm: Normal rate.   Pulmonary:      Effort: Tachypnea present. No respiratory distress.   Abdominal:      General: There is no distension.   Skin:     General: Skin is warm and dry.   Neurological:      Mental Status: He is alert. He is disoriented.      Cranial Nerves: Facial asymmetry present.      Sensory: No sensory deficit.      Motor: Weakness present.              Neurological Exam:   LOC: drowsy  Attention Span: poor  Language: Global aphasia  Articulation: Dysarthria  Orientation: Untestable due to severe aphasia     Laboratory:  CMP:   Recent Labs   Lab 12/02/23  0038 12/02/23  0850   CALCIUM 8.8  --    ALBUMIN 2.3*  --    PROT 6.5  --    * 157*   K 4.0  --    CO2 19*  --    *  --    BUN 38*  --    CREATININE 1.0  --    ALKPHOS 61  --    ALT 28  --    AST 27  --    BILITOT 0.6  --        CBC:   Recent Labs   Lab 12/02/23  0038   WBC 9.72   RBC 4.43*   HGB 12.5*   HCT 41.1      MCV 93   MCH 28.2   MCHC 30.4*       Lipid Panel:   No results for input(s): "CHOL", "LDLCALC", "HDL", "TRIG" in the last 168 hours.      Diagnostic Results     Brain Imaging:  CT Head 11/28/23  Impression:  Evolving large right MCA territory recent infarction  Hyperdensity along the right MCA as well as the vasculature along the right cerebral sulci which may represent sluggish slow flow versus intravascular thrombus similar to prior allowing for differences in technique. Allowing for this there is no definite hemorrhagic conversion.     CT Head " 11/26/23  Impression:  Evolving recent large right MCA territorial infarct.  No hemorrhagic conversion.  Mild midline shift minimally progressed from the prior study.     MRI brain without contrast 11/24/2023  Impression:  Large right MCA territorial infarct without hemorrhagic conversion.  Currently very mild mass effect.  Loss of the right carotid flow void consistent with occlusion versus slow flow.     CT Head 11/23/23 1812  Impression:  Acute right MCA territory infarction, similar to prior.  No infarct extension or hemorrhagic conversion.  Associated hyperdense vessel sign likely involving the right M1 and a proximal M2 branch.  Changes of generalized cerebral volume loss.  Paranasal sinus disease as above.     CT Head 11/23/23 1416  FINDINGS:  Decreased attenuation in sulcal effacement right MCA distribution consistent with acute in Ca infarct.  Hyperdense right MCA sign consistent with MCA thrombus.  Impression:  Acute right MCA infarct.        Vessel Imaging:  IR cerebral angiogram 11/23/2023  Preliminary interpretation: occlusion of R ICA at origin, unable to cross distally to perform angioplasty and stenting. Attempted thrombectomy at origin, tici 0.  Please see Imaging report for full details.        Cardiac Evaluation:   EKG 11/26/2023  Atrial Fibrillation      TTE 11/24/2023    Left Ventricle: The left ventricle is normal in size. Ventricular mass is normal. Normal wall thickness. There is concentric remodeling. Regional wall motion abnormalities present - see diagram for wall motion findings. There is normal systolic function. Ejection fraction by visual approximation is 60%. There is normal diastolic function.    Right Ventricle: Normal right ventricular cavity size. Wall thickness is normal. Right ventricle wall motion  is normal. Systolic function is normal.    Aortic Valve: The aortic valve is a trileaflet valve. There is mild aortic valve sclerosis. There is annular calcification present. There  is mild aortic regurgitation.    IVC/SVC: Patient is ventilated, cannot use IVC diameter to estimate right atrial pressure. PASP is at least 24mmHg.    Yadi Cordon PA-C  [facility]  Department of Vascular Neurology   [facility] - Neuro Critical [facility]

## 2023-12-02 NOTE — PROGRESS NOTES
[facility] - Neuro Critical Care  Neurocritical Care  Progress Note    Admit Date: 11/23/2023  Service Date: 12/02/2023  Length of Stay: 9    Subjective:     Chief Complaint: Embolic stroke involving right middle cerebral artery    History of Present Illness: The patient is an 83-year-old with PMHx of  CAD and DM admitted to [facility] with large R MCA stroke and seizure.Per chart review, in emergency department at [facility]  presented with with altered mental status and focal seizure activity.  Daughter states patient was able to talk to her on the phone at 10:00 p.m. last night was at baseline.  Patient living in assisted living but still very functional.  Daughter states patien still drives on occasion.  She does not believe he takes any blood thinners.  She went to check on him is afternoon and he had fallen next to the toilet.  Patient unable to provide any history.    Noted to have tonic-clonic activity to the right upper extremity on arrival.  Patient with incomprehensible speech.  Copious vomiting on EMS arrival per paramedic.  Patient unable to contribute to history.  Last presumed normal was 10:00 p.m. last night. Out of window for thrombolytics.  Patient intubated for airway protection at outside hospital. On arrival CTH with large R MCA - stroke team consulted and plan for IR. Patient loaded with Keppra, EEG pending. Lactic acid 11.9 on arrival, pancx and initiated broad spec abx. Patient admitted to [facility] for close monitoring and higher level of care.        Hospital Course: 11/24/2023 MRI complete and reviewed, Nsgy consulted, EEG neg and dced  11/25/2023: initiate baby ASA and sq heparin, follow CT head in AM, SBP <180, initiate tube feeds  11/26/2023 EKG and if afib happens again will treat, Extubated 11/26 at 1400, Low dose precedex for agitation  11/27/2023 EKG, NS 75 cc/hr, consult IR for peg  11/28/23: consult GI for peg  11/29/23: CXR in am  11/30/23: awaiting PEG tube  12/1/23:PEG  today  12/2/2023: start tube feeds, decrease enteral water flushed to 200 q 6 hr, stepdown to stroke team         Review of Systems  Unable to obtain a complete ROS due to level of consciousness.  Objective:     Vitals:  Temp: 99.4 °F (37.4 °C)  Pulse: 70  Rhythm: normal sinus rhythm  BP: (!) 142/63  MAP (mmHg): 90  Resp: (!) 23  SpO2: 100 %    Temp  Min: 98.7 °F (37.1 °C)  Max: 99.4 °F (37.4 °C)  Pulse  Min: 59  Max: 107  BP  Min: 103/55  Max: 178/77  MAP (mmHg)  Min: 74  Max: 111  Resp  Min: 18  Max: 30  SpO2  Min: 95 %  Max: 100 %    12/01 0701 - 12/02 0700  In: 2014.2 [I.V.:315.2]  Out: 650 [Urine:650]   Unmeasured Output  Stool Occurrence: 1        Physical Exam  GA: comfortable, no acute distress.   HEENT: No scleral icterus or JVD.   Pulmonary: Clear to auscultation A/L.   Cardiac: RRR S1 & S2 w/o rubs/murmurs/gallops.   Abdominal: Bowel sounds present x 4. No appreciable hepatosplenomegaly.  Skin: No jaundice, rashes, or visible lesions.  Neuro:  --GCS: E3 V2 M6  --Mental Status:   opens eyes, nonverbal   --CN II-XII grossly intact.   --Pupils 2mm, PERRL.   --Corneal reflex, gag, cough intact.  --LUE withdraws  --RUE spont  --LLE spont  --RLE spont       Medications:  Continuous Scheduledaspirin, 81 mg, Daily  atorvastatin, 40 mg, Daily  cefTRIAXone (ROCEPHIN) IVPB, 1 g, Q24H  diltiaZEM, 30 mg, Q6H  erythromycin, , QID  levETIRAcetam (Keppra) IV (PEDS and ADULTS), 750 mg, Q12H  polymyxin B sulf-trimethoprim, 1 drop, QID  silodosin, 4 mg, Daily    PRNacetaminophen, 650 mg, Q6H PRN  albuterol-ipratropium, 3 mL, Q4H PRN  artificial tears, 2 drop, QID PRN  calcium gluconate IVPB, 1 g, PRN  calcium gluconate IVPB, 2 g, PRN  calcium gluconate IVPB, 3 g, PRN  dextrose 10%, 12.5 g, PRN  dextrose 10%, 25 g, PRN  glucagon (human recombinant), 1 mg, PRN  hydrALAZINE, 10 mg, Q4H PRN  insulin aspart U-100, 0-10 Units, Q6H PRN  labetalol, 10 mg, Q4H PRN  magnesium sulfate IVPB, 2 g, PRN  magnesium sulfate IVPB, 4 g,  PRN  ondansetron, 4 mg, Q6H PRN  potassium chloride, 40 mEq, PRN   And  potassium chloride, 60 mEq, PRN   And  potassium chloride, 80 mEq, PRN  sodium phosphate 15 mmol in dextrose 5 % (D5W) 250 mL IVPB, 15 mmol, PRN  sodium phosphate 20.01 mmol in dextrose 5 % (D5W) 250 mL IVPB, 20.01 mmol, PRN  sodium phosphate 30 mmol in dextrose 5 % (D5W) 250 mL IVPB, 30 mmol, PRN      Today I personally reviewed pertinent medications, lines/drains/airways, imaging, cardiology results, laboratory results, microbiology results,    Diet  Diet NPO  Diet NPO        Assessment/Plan:     Neuro  * Embolic stroke involving right middle cerebral artery  83 y.o. male with PMHx of CAD, HF, HTN, DM who was transferred from [facility] for R MCA stroke and possible intervention on suspected R MCA occlusion. Patient initially presented to OSH with AMS and seizure activity. LKN 11/22/23 at 10pm. Out of treatment window for thrombolytics. CT at OSH with early ischemic changes in R MCA territory and hyperdense R MCA sign concerning for R MCA occlusion. Patient was intubated and sedated at OSH for airway protection.   Stroke team consulted  SBP <220  TTE/EKG/A1C/lipid panel penidg  Neuro checks q 1 hr  Thrombectomy unsuccessful   Statin   PT/OT  SLP when appropriate   CTH complete  MRI pending  A1C 6.6  SSI  Lipid profile complete  TTE complete  MRI complete and reviewed, Nsgy consulted, EEG neg and dced  Holding VTE ppx for pending Nsgy recs  11/25/2023: initiated sq heparin and baby asa  Follow CT head in AM  11/26/2023 EKG and if afib happens again will treat, Extubated 11/26 at 1400, Low dose precedex for agitation  11/27/2023 EKG, NS 75 cc/hr, consult GI for peg  12/2/2023: s/p PEG POD#1- start TF today   Stepdown to stroke team         Hemiparesis, 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  PT/OT      Seizure-like activity  Seizure like activity noted at outside facility  Loaded w/ 2gm of Keppra   Continue maintenance Keppra 750mg BID  Seizure  precautions  EEG neg and dced on 11/24    Cardiac/Vascular  Paroxysmal atrial fibrillation  - Consider anticoagulation when appropriate   - Diltiazem     Endocrine  Diabetes mellitus  Hx of DM  A1C 6.6  PRN moderate dose SSI     GI  Encounter for PEG (percutaneous endoscopic gastrostomy)  - s/p PEG   Start TF today           The patient is being Prophylaxed for:  Venous Thromboembolism with: Mechanical  Stress Ulcer with: None  Ventilator Pneumonia with: not applicable    Activity Orders            Turn patient starting at 11/28 0715    Straight Cath starting at 11/25 1809    Progressive Mobility Protocol (mobilize patient to their highest level of functioning at least twice daily) starting at 11/23 2000    Elevate HOB starting at 11/23 1821    Diet NPO: NPO starting at 11/23 1821          Full Code    Obdulia Veras NP  Neurocritical Care  [facility] - Neuro Critical Care

## 2023-12-02 NOTE — PLAN OF CARE
"[facility] Care Plan    POC reviewed with Tim Rhodes and family at 0300. Pt unable to verbalize understanding. Questions and concerns addressed. No acute events overnight. Pt progressing toward goals. Will continue to monitor. See below and flowsheets for full assessment and VS info.           Is this a stroke patient? yes- Stroke booklet reviewed with patient and family, risk factors identified for patient and stroke booklet remains at bedside for ongoing education.     Neuro:  [place] Coma Scale  Best Eye Response: 3-->(E3) to speech  Best Motor Response: 6-->(M6) obeys commands  Best Verbal Response: 2-->(V2) incomprehensible speech  Martelle Coma Scale Score: 11  Assessment Qualifiers: patient not sedated/intubated  Pupil PERRLA: yes     24hr Temp:  [98.6 °F (37 °C)-99.4 °F (37.4 °C)]     CV:   Rhythm: normal sinus rhythm  BP goals:   SBP < 160  MAP > 65    Resp:      Vent Mode: Spont  Set Rate: 14 BPM  Oxygen Concentration (%): 15  Vt Set: 500 mL  PEEP/CPAP: 5 cmH20  Pressure Support: 5 cmH20    Plan: N/A    GI/:     Diet/Nutrition Received: NPO  Last Bowel Movement: 12/01/23  Voiding Characteristics: due to void    Intake/Output Summary (Last 24 hours) at 12/2/2023 0404  Last data filed at 12/2/2023 0001  Gross per 24 hour   Intake 1625.28 ml   Output 1200 ml   Net 425.28 ml     Unmeasured Output  Stool Occurrence: 1    Labs/Accuchecks:  Recent Labs   Lab 12/02/23  0038   WBC 9.72   RBC 4.43*   HGB 12.5*   HCT 41.1         Recent Labs   Lab 12/02/23  0038   *   K 4.0   CO2 19*   *   BUN 38*   CREATININE 1.0   ALKPHOS 61   ALT 28   AST 27   BILITOT 0.6    No results for input(s): "PROTIME", "INR", "APTT", "HEPANTIXA" in the last 168 hours.   Recent Labs   Lab 11/27/23  0225 11/27/23  2348 11/29/23  0440   CPK 1125*  --   --    TROPONINI  --    < > 0.121*    < > = values in this interval not displayed.       Electrolytes: Contraindicated  Accuchecks: " Q6H    Gtts:      LDA/Wounds:  Lines/Drains/Airways       Drain  Duration                  Gastrostomy/Enterostomy 12/01/23 1145 Percutaneous endoscopic gastrostomy (PEG) LUQ feeding <1 day              Peripheral Intravenous Line  Duration                  Peripheral IV - Single Lumen 11/28/23 0140 20 G Anterior;Proximal;Right Forearm 4 days         Peripheral IV - Single Lumen 11/29/23 0041 18 G;1 3/4 in Anterior;Right Upper Arm 3 days         Midline Catheter Insertion/Assessment  - Single Lumen 11/29/23 1255 Left basilic vein (medial side of arm) 20g x 10cm 2 days                  Wounds: Yes  Wound care consulted: No

## 2023-12-02 NOTE — ASSESSMENT & PLAN NOTE
Due to R MCA stroke  PT/OT     Tremfya Counseling: I discussed with the patient the risks of guselkumab including but not limited to immunosuppression, serious infections, and drug reactions.  The patient understands that monitoring is required including a PPD at baseline and must alert us or the primary physician if symptoms of infection or other concerning signs are noted.

## 2023-12-02 NOTE — ASSESSMENT & PLAN NOTE
83 y.o. male with PMHx of CAD, HF, HTN, DM who was transferred from [facility] for R MCA stroke and possible intervention on suspected R MCA occlusion. Patient initially presented to OSH with AMS and seizure activity. LKN 11/22/23 at 10pm. Out of treatment window for thrombolytics. CT at OSH with early ischemic changes in R MCA territory and hyperdense R MCA sign concerning for R MCA occlusion. Patient was intubated and sedated at OSH for airway protection.   Stroke team consulted  SBP <220  TTE/EKG/A1C/lipid panel penidg  Neuro checks q 1 hr  Thrombectomy unsuccessful   Statin   PT/OT  SLP when appropriate   CTH complete  MRI pending  A1C 6.6  SSI  Lipid profile complete  TTE complete  MRI complete and reviewed, Nsgy consulted, EEG neg and dced  Holding VTE ppx for pending Nsgy recs  11/25/2023: initiated sq heparin and baby asa  Follow CT head in AM  11/26/2023 EKG and if afib happens again will treat, Extubated 11/26 at 1400, Low dose precedex for agitation  11/27/2023 EKG, NS 75 cc/hr, consult GI for peg  12/2/2023: s/p PEG POD#1- start TF today   Stepdown to stroke team

## 2023-12-03 PROBLEM — G47.33 OBSTRUCTIVE SLEEP APNEA, ADULT: Status: ACTIVE | Noted: 2023-01-01

## 2023-12-03 NOTE — NURSING TRANSFER
Nursing Transfer Note      12/3/2023   3:09 PM    Nurse giving handoff:ILIR Block    Nurse receiving handoff:ILIR Kaur    Reason patient is being transferred: Step-down    Transfer To: 959    Transfer via bed    Transfer with cardiac monitoring    Transported by RN    Transfer Vital Signs:  Blood Pressure:112/56  Heart Rate:88  O2:95  Temperature:99.4  Respirations:22    Telemetry: Rate Normal sinus  Order for Tele Monitor? Yes    Additional Lines:     4eyes on Skin: yes    Medicines sent: Yes (eye drops, insulin)    Any special needs or follow-up needed: No    Patient belongings transferred with patient: Yes    Chart send with patient: Yes    Notified: son, in room with patient     Patient reassessed at: 12/3/23; 1502 (date, time)  1  Upon arrival to floor: cardiac monitor applied, patient oriented to room, call bell in reach, and bed in lowest position

## 2023-12-03 NOTE — ASSESSMENT & PLAN NOTE
Tim Rhodes is a 83 y.o. male with PMHx of CAD, HF,  DM that was transferred from [facility] with suspected R MCA occlusion for possible intervention. Initially presented to OSH with AMS and seizure activity, LKN OOW for TNK. CTH showed early ischemic changes in R MCA territory and hyperdense R MCA sign concerning for R MCA occlusion. Patient was intubated and sedated at OSH for airway protection. Repeat CT on arrival to [facility] appears stable in comparison to prior. Patient independent at baseline per daughter. Discussed patient with neuro IR and patient taken to IR for possible thrombectomy, now s/p unsuccessful IR. MRI brain confirms large R MCA infarcts. Echo with EF 60% and apical akinesis. Stroke etiology likely cardio-embolic due to new onset AFib this admission.    NAEON. Neuro exam stable. Patient tolerating tube feeds through peg. Patient stepped down to NPU.    Antithrombotics for secondary stroke prevention: ASA 81mg daily, will need to further AC risk/benefit discussion    Statins for secondary stroke prevention: Statins: Atorvastatin- 40 mg daily    Aggressive risk factor modification: DM, CAD     Rehab efforts: The patient has been evaluated by a stroke team provider and the therapy needs have been fully considered based off the presenting complaints and exam findings. The following therapy evaluations are needed:  PT/OT/SLP when appropriate  - Current recs for Moderate Intensity Therapy    Diagnostics ordered/pending: None     VTE prophylaxis: Heparin 5000 units SQ every 8 hours  Mechanical prophylaxis: Place SCDs    BP parameters:  SBP goal <180

## 2023-12-03 NOTE — SUBJECTIVE & OBJECTIVE
Neurologic Chief Complaint: R MCA stroke    Subjective:     Interval History: Patient is seen for follow-up neurological assessment and treatment recommendations: NATALIEEON. Neuro exam stable. Patient tolerating tube feeds through peg. Patient stepped down to NPU.     HPI, Past Medical, Family, and Social History remains the same as documented in the initial encounter.     Review of Systems   Unable to perform ROS: Acuity of condition   HENT:  Positive for trouble swallowing.    Respiratory:  Positive for shortness of breath.    Neurological:  Positive for speech difficulty and weakness.     Scheduled Meds:   aspirin  81 mg Per G Tube Daily    atorvastatin  40 mg Per G Tube Daily    cefTRIAXone (ROCEPHIN) IVPB  1 g Intravenous Q24H    diltiaZEM  30 mg Per G Tube Q6H    erythromycin   Both Eyes QID    insulin detemir U-100  3 Units Subcutaneous BID    levETIRAcetam (Keppra) IV (PEDS and ADULTS)  750 mg Intravenous Q12H    polyethylene glycol  17 g Per G Tube Daily    polymyxin B sulf-trimethoprim  1 drop Both Eyes QID    silodosin  4 mg Per OG tube Daily     Continuous Infusions:   dextrose 10 % in water (D10W)         PRN Meds:acetaminophen, albuterol-ipratropium, artificial tears, calcium gluconate IVPB, calcium gluconate IVPB, calcium gluconate IVPB, dextrose 10 % in water (D10W), dextrose 10%, dextrose 10%, dextrose 10%, dextrose 10%, dextrose 10%, dextrose 10%, glucagon (human recombinant), hydrALAZINE, insulin aspart U-100, labetalol, magnesium sulfate IVPB, magnesium sulfate IVPB, ondansetron, potassium chloride **AND** potassium chloride **AND** potassium chloride, sodium phosphate 15 mmol in dextrose 5 % (D5W) 250 mL IVPB, sodium phosphate 20.01 mmol in dextrose 5 % (D5W) 250 mL IVPB, sodium phosphate 30 mmol in dextrose 5 % (D5W) 250 mL IVPB    Objective:     Vital Signs (Most Recent):  Temp: 99.1 °F (37.3 °C) (12/03/23 1600)  Pulse: 94 (12/03/23 1600)  Resp: 18 (12/03/23 1600)  BP: 109/64 (12/03/23  "1600)  SpO2: 98 % (12/03/23 1600)  BP Location: Left arm    Vital Signs Range (Last 24H):  Temp:  [98.5 °F (36.9 °C)-99.4 °F (37.4 °C)]   Pulse:  []   Resp:  [16-34]   BP: (109-170)/(56-77)   SpO2:  [95 %-100 %]   BP Location: Left arm       Physical Exam  Vitals and nursing note reviewed.   Constitutional:       General: He is not in acute distress.     Appearance: He is ill-appearing.   HENT:      Mouth/Throat:      Mouth: Mucous membranes are moist.   Cardiovascular:      Rate and Rhythm: Normal rate.   Pulmonary:      Effort: Tachypnea present. No respiratory distress.   Abdominal:      General: There is no distension.   Skin:     General: Skin is warm and dry.   Neurological:      Mental Status: He is alert. He is disoriented.      Cranial Nerves: Facial asymmetry present.      Sensory: No sensory deficit.      Motor: Weakness present.              Neurological Exam:   LOC: drowsy  Attention Span: poor  Language: Global aphasia  Articulation: Dysarthria  Orientation: Untestable due to severe aphasia     Laboratory:  CMP:   Recent Labs   Lab 12/03/23  0034   CALCIUM 8.7   ALBUMIN 2.4*   PROT 6.7   *  155*   K 3.7   CO2 21*   *   BUN 38*   CREATININE 1.0   ALKPHOS 73   ALT 30   AST 31   BILITOT 0.8       CBC:   Recent Labs   Lab 12/03/23  0307   WBC 11.66   RBC 4.90   HGB 13.7*   HCT 44.1   PLT 59*   MCV 90   MCH 28.0   MCHC 31.1*       Lipid Panel:   No results for input(s): "CHOL", "LDLCALC", "HDL", "TRIG" in the last 168 hours.      Diagnostic Results     Brain Imaging:  CT Head 11/28/23  Impression:  Evolving large right MCA territory recent infarction  Hyperdensity along the right MCA as well as the vasculature along the right cerebral sulci which may represent sluggish slow flow versus intravascular thrombus similar to prior allowing for differences in technique. Allowing for this there is no definite hemorrhagic conversion.     CT Head 11/26/23  Impression:  Evolving recent large right " MCA territorial infarct.  No hemorrhagic conversion.  Mild midline shift minimally progressed from the prior study.     MRI brain without contrast 11/24/2023  Impression:  Large right MCA territorial infarct without hemorrhagic conversion.  Currently very mild mass effect.  Loss of the right carotid flow void consistent with occlusion versus slow flow.     CT Head 11/23/23 1812  Impression:  Acute right MCA territory infarction, similar to prior.  No infarct extension or hemorrhagic conversion.  Associated hyperdense vessel sign likely involving the right M1 and a proximal M2 branch.  Changes of generalized cerebral volume loss.  Paranasal sinus disease as above.     CT Head 11/23/23 1416  FINDINGS:  Decreased attenuation in sulcal effacement right MCA distribution consistent with acute in Ca infarct.  Hyperdense right MCA sign consistent with MCA thrombus.  Impression:  Acute right MCA infarct.        Vessel Imaging:  IR cerebral angiogram 11/23/2023  Preliminary interpretation: occlusion of R ICA at origin, unable to cross distally to perform angioplasty and stenting. Attempted thrombectomy at origin, tici 0.  Please see Imaging report for full details.        Cardiac Evaluation:   EKG 11/26/2023  Atrial Fibrillation      TTE 11/24/2023    Left Ventricle: The left ventricle is normal in size. Ventricular mass is normal. Normal wall thickness. There is concentric remodeling. Regional wall motion abnormalities present - see diagram for wall motion findings. There is normal systolic function. Ejection fraction by visual approximation is 60%. There is normal diastolic function.    Right Ventricle: Normal right ventricular cavity size. Wall thickness is normal. Right ventricle wall motion  is normal. Systolic function is normal.    Aortic Valve: The aortic valve is a trileaflet valve. There is mild aortic valve sclerosis. There is annular calcification present. There is mild aortic regurgitation.    IVC/SVC: Patient is  ventilated, cannot use IVC diameter to estimate right atrial pressure. PASP is at least 24mmHg.

## 2023-12-03 NOTE — PROGRESS NOTES
[facility] - Neurosurgery ([facility])  Vascular Neurology  Comprehensive Stroke Center  Progress Note    Assessment/Plan:     * Embolic stroke involving right middle cerebral artery  Tim Rhodes is a 83 y.o. male with PMHx of CAD, HF,  DM that was transferred from [facility] with suspected R MCA occlusion for possible intervention. Initially presented to OSH with AMS and seizure activity, LKN OOW for TNK. CTH showed early ischemic changes in R MCA territory and hyperdense R MCA sign concerning for R MCA occlusion. Patient was intubated and sedated at OSH for airway protection. Repeat CT on arrival to [facility] appears stable in comparison to prior. Patient independent at baseline per daughter. Discussed patient with neuro IR and patient taken to IR for possible thrombectomy, now s/p unsuccessful IR. MRI brain confirms large R MCA infarcts. Echo with EF 60% and apical akinesis. Stroke etiology likely cardio-embolic due to new onset AFib this admission.    NAEON. Neuro exam stable. Patient tolerating tube feeds through peg. Patient stepped down to NPU.    Antithrombotics for secondary stroke prevention: ASA 81mg daily, will need to further AC risk/benefit discussion    Statins for secondary stroke prevention: Statins: Atorvastatin- 40 mg daily    Aggressive risk factor modification: DM, CAD     Rehab efforts: The patient has been evaluated by a stroke team provider and the therapy needs have been fully considered based off the presenting complaints and exam findings. The following therapy evaluations are needed:  PT/OT/SLP when appropriate  - Current recs for Moderate Intensity Therapy    Diagnostics ordered/pending: None     VTE prophylaxis: Heparin 5000 units SQ every 8 hours  Mechanical prophylaxis: Place SCDs    BP parameters:  SBP goal <180    Obstructive sleep apnea, adult  Patient with intermitted moments of observed sleep apnea.   CPAP HS ordered.    Paroxysmal atrial fibrillation  New onset A-Fib during  evening of 11/26.  Will need further discussion for anticoagulation, currently on ASA only.    Diabetes mellitus  Stroke risk factor  A1c 6.6  BG goal while inpatient 140-180  SSI PRN  24hr glucose range 171-212    Hemiparesis, left  2/2 stroke  PT/OT current rec for Moderate Intensity therapy.    Seizure-like activity  Noted on arrival to OSH, reported to have RUE seizure like activity  Treated with keppra prior to transfer  EEG with severe slowing but no electrographic seizures  Seizure ppx with keppra 750 BID  No reported new seizure like activity.         11/24/2023 NAEO, remains intubated/sedated. EEG with severe slowing but no evidence of seizures. MRI brain confirms large R MCA infarcts, echo with EF 60% and apical akinesis. Infectious workup ongoing.  11/25/2023 NAEO, remains intubated. Follows simple commands, moving RUE/BLE voluntarily with no movement to LUE. ASA started for stroke ppx.   11/26/2023 Patient with afib overnight, fentanyl increased. Butcher placed due to retention. Extubated today at 1400, on 4L NC at the time of visit. Low dose precedex for agitation.    11/27/2023 Extubated yesterday. Tolerating without complications. Precedex stopped secondary to bradycardia. Neuro exam stable.   11/28/2023 Patient with increased agitation requiring 3x doses of zyprexa and 1x dose of haldol for restlessness. In midst of agitation, patient required cardizem gtt to be initiated. Patient has had difficulty tolerating NGT placement after extubation. Gastroenterology was consulted for possible PEG placement. Max temp today 100.9. Blood cultures are in progress. Consider obtaining UA. Follow-up CTH with large infarct, relatively unchanged.   11/29/2023 Patient with decreased urine output, increasing WBC, tachypnea, w/accessory muscle use overnight.  UA + for UTI.  CXR w/R pneumothorax on xray stable.  GI consulted by primary team for PEG.  12/02/2023 NAEO, neuro exam unchanged. Patient now s/p PEG with GI. On  rocephin for UTI, consider urine culture for sensitivity. Continue ICU care.  12/03/2023 NAEON. Neuro exam stable. Patient tolerating tube feeds through peg. Patient stepped down to NPU.         STROKE DOCUMENTATION   Acute Stroke Times   Last Known Normal Date: 11/22/23  Last Known Normal Time: 2200  Unknown Symptom Onset Date: Unknown Date  Unknown Symptom Onset Time: Unknown Time  Stroke Team Called Date: 11/23/23  Stroke Team Called Time: 1804  Stroke Team Arrival Date: 11/23/23  Stroke Team Arrival Time: 1809  CT Interpretation Time: 1813  Thrombolytic Therapy Recommended: No  Thrombectomy Recommended: Yes  Decision to Treat Time for IR: 1822    NIH Scale:  1a. Level of Consciousness: 1-->Not alert, but arousable by minor stimulation to obey, answer, or respond  1b. LOC Questions: 2-->Answers neither question correctly  1c. LOC Commands: 0-->Performs both tasks correctly  2. Best Gaze: 0-->Normal  3. Visual: 0-->No visual loss  4. Facial Palsy: 1-->Minor paralysis (flattened nasolabial fold, asymmetry on smiling)  5a. Motor Arm, Left: 4-->No movement  5b. Motor Arm, Right: 1-->Drift, limb holds 90 (or 45) degrees, but drifts down before full 10 secs, does not hit bed or other support  6a. Motor Leg, Left: 3-->No effort against gravity, leg falls to bed immediately  6b. Motor Leg, Right: 2-->Some effort against gravity, leg falls to bed by 5 secs, but has some effort against gravity  7. Limb Ataxia: 0-->Absent  8. Sensory: 0-->Normal, no sensory loss  9. Best Language: 3-->Mute, global aphasia, no usable speech or auditory comprehension  10. Dysarthria: 2-->Severe dysarthria, patients speech is so slurred as to be unintelligible in the absence of or out of proportion to any dysphasia, or is mute/anarthric  11. Extinction and Inattention (formerly Neglect): 1-->Visual, tactile, auditory, spatial, or personal inattention or extinction to bilateral simultaneous stimulation in one of the sensory modalities  Total  (NIH Stroke Scale): 20       Modified Pipestone Score: 0  Win Coma Scale:10   ABCD2 Score:    [id number] Score:   HAS -BLED Score:   ICH Score:   Hunt & Acevedo Classification:      Hemorrhagic change of an Ischemic Stroke: Does this patient have an ischemic stroke with hemorrhagic changes? No     Neurologic Chief Complaint: R MCA stroke    Subjective:     Interval History: Patient is seen for follow-up neurological assessment and treatment recommendations: NAEON. Neuro exam stable. Patient tolerating tube feeds through peg. Patient stepped down to NPU.     HPI, Past Medical, Family, and Social History remains the same as documented in the initial encounter.     Review of Systems   Unable to perform ROS: Acuity of condition   HENT:  Positive for trouble swallowing.    Respiratory:  Positive for shortness of breath.    Neurological:  Positive for speech difficulty and weakness.     Scheduled Meds:   aspirin  81 mg Per G Tube Daily    atorvastatin  40 mg Per G Tube Daily    cefTRIAXone (ROCEPHIN) IVPB  1 g Intravenous Q24H    diltiaZEM  30 mg Per G Tube Q6H    erythromycin   Both Eyes QID    insulin detemir U-100  3 Units Subcutaneous BID    levETIRAcetam (Keppra) IV (PEDS and ADULTS)  750 mg Intravenous Q12H    polyethylene glycol  17 g Per G Tube Daily    polymyxin B sulf-trimethoprim  1 drop Both Eyes QID    silodosin  4 mg Per OG tube Daily     Continuous Infusions:   dextrose 10 % in water (D10W)         PRN Meds:acetaminophen, albuterol-ipratropium, artificial tears, calcium gluconate IVPB, calcium gluconate IVPB, calcium gluconate IVPB, dextrose 10 % in water (D10W), dextrose 10%, dextrose 10%, dextrose 10%, dextrose 10%, dextrose 10%, dextrose 10%, glucagon (human recombinant), hydrALAZINE, insulin aspart U-100, labetalol, magnesium sulfate IVPB, magnesium sulfate IVPB, ondansetron, potassium chloride **AND** potassium chloride **AND** potassium chloride, sodium phosphate 15 mmol in dextrose 5 % (D5W) 250 mL  "IVPB, sodium phosphate 20.01 mmol in dextrose 5 % (D5W) 250 mL IVPB, sodium phosphate 30 mmol in dextrose 5 % (D5W) 250 mL IVPB    Objective:     Vital Signs (Most Recent):  Temp: 99.1 °F (37.3 °C) (12/03/23 1600)  Pulse: 94 (12/03/23 1600)  Resp: 18 (12/03/23 1600)  BP: 109/64 (12/03/23 1600)  SpO2: 98 % (12/03/23 1600)  BP Location: Left arm    Vital Signs Range (Last 24H):  Temp:  [98.5 °F (36.9 °C)-99.4 °F (37.4 °C)]   Pulse:  []   Resp:  [16-34]   BP: (109-170)/(56-77)   SpO2:  [95 %-100 %]   BP Location: Left arm       Physical Exam  Vitals and nursing note reviewed.   Constitutional:       General: He is not in acute distress.     Appearance: He is ill-appearing.   HENT:      Mouth/Throat:      Mouth: Mucous membranes are moist.   Cardiovascular:      Rate and Rhythm: Normal rate.   Pulmonary:      Effort: Tachypnea present. No respiratory distress.   Abdominal:      General: There is no distension.   Skin:     General: Skin is warm and dry.   Neurological:      Mental Status: He is alert. He is disoriented.      Cranial Nerves: Facial asymmetry present.      Sensory: No sensory deficit.      Motor: Weakness present.              Neurological Exam:   LOC: drowsy  Attention Span: poor  Language: Global aphasia  Articulation: Dysarthria  Orientation: Untestable due to severe aphasia     Laboratory:  CMP:   Recent Labs   Lab 12/03/23  0034   CALCIUM 8.7   ALBUMIN 2.4*   PROT 6.7   *  155*   K 3.7   CO2 21*   *   BUN 38*   CREATININE 1.0   ALKPHOS 73   ALT 30   AST 31   BILITOT 0.8       CBC:   Recent Labs   Lab 12/03/23  0307   WBC 11.66   RBC 4.90   HGB 13.7*   HCT 44.1   PLT 59*   MCV 90   MCH 28.0   MCHC 31.1*       Lipid Panel:   No results for input(s): "CHOL", "LDLCALC", "HDL", "TRIG" in the last 168 hours.      Diagnostic Results     Brain Imaging:  CT Head 11/28/23  Impression:  Evolving large right MCA territory recent infarction  Hyperdensity along the right MCA as well as the " vasculature along the right cerebral sulci which may represent sluggish slow flow versus intravascular thrombus similar to prior allowing for differences in technique. Allowing for this there is no definite hemorrhagic conversion.     CT Head 11/26/23  Impression:  Evolving recent large right MCA territorial infarct.  No hemorrhagic conversion.  Mild midline shift minimally progressed from the prior study.     MRI brain without contrast 11/24/2023  Impression:  Large right MCA territorial infarct without hemorrhagic conversion.  Currently very mild mass effect.  Loss of the right carotid flow void consistent with occlusion versus slow flow.     CT Head 11/23/23 1812  Impression:  Acute right MCA territory infarction, similar to prior.  No infarct extension or hemorrhagic conversion.  Associated hyperdense vessel sign likely involving the right M1 and a proximal M2 branch.  Changes of generalized cerebral volume loss.  Paranasal sinus disease as above.     CT Head 11/23/23 1416  FINDINGS:  Decreased attenuation in sulcal effacement right MCA distribution consistent with acute in Ca infarct.  Hyperdense right MCA sign consistent with MCA thrombus.  Impression:  Acute right MCA infarct.        Vessel Imaging:  IR cerebral angiogram 11/23/2023  Preliminary interpretation: occlusion of R ICA at origin, unable to cross distally to perform angioplasty and stenting. Attempted thrombectomy at origin, tici 0.  Please see Imaging report for full details.        Cardiac Evaluation:   EKG 11/26/2023  Atrial Fibrillation      TTE 11/24/2023    Left Ventricle: The left ventricle is normal in size. Ventricular mass is normal. Normal wall thickness. There is concentric remodeling. Regional wall motion abnormalities present - see diagram for wall motion findings. There is normal systolic function. Ejection fraction by visual approximation is 60%. There is normal diastolic function.    Right Ventricle: Normal right ventricular cavity  size. Wall thickness is normal. Right ventricle wall motion  is normal. Systolic function is normal.    Aortic Valve: The aortic valve is a trileaflet valve. There is mild aortic valve sclerosis. There is annular calcification present. There is mild aortic regurgitation.    IVC/SVC: Patient is ventilated, cannot use IVC diameter to estimate right atrial pressure. PASP is at least 24mmHg.    Karen Ch NP  [facility]  Department of Vascular Neurology   [facility] Neurosurgery [facility])

## 2023-12-03 NOTE — PT/OT/SLP EVAL
"Speech Language Pathology Evaluation  Bedside Swallow    Patient Name:  Tim Rhodes   MRN:  [id number]  Admitting Diagnosis: Embolic stroke involving right middle cerebral artery    Recommendations:                 General Recommendations:  Dysphagia therapy, Speech language evaluation, and Cognitive-linguistic evaluation  Diet recommendations:  NPO, NPO   Aspiration Precautions: Frequent oral care and Strict aspiration precautions   General Precautions: Standard, aspiration, fall, NPO  Communication strategies:  provide increased time to answer and go to room if call light pushed    Assessment:     Tim Rhodes is a 83 y.o. male with an SLP diagnosis of Dysphagia and Dysarthria.      History:     Past Medical History:   Diagnosis Date    Arthritis     Weiss's esophagus     CHF (congestive heart failure)     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/23/2023    GERD (gastroesophageal reflux disease)     Type 2 diabetes mellitus        Past Surgical History:   Procedure Laterality Date    CEREBRAL ANGIOGRAM N/A 11/23/2023    Procedure: ANGIOGRAM-CEREBRAL;  Surgeon: Valery Hyman;  [facility];  Service: Anesthesiology;  Laterality: N/A;    INSERTION, PEG TUBE N/A 12/1/2023    Procedure: INSERTION, PEG TUBE;  Surgeon: Tanner Villarreal MD;  Location: [address]);  Service: Endoscopy;  Laterality: N/A;       Social History: Patient lives in an assisted living.  Reg diet/thin liquids at baseline.    Prior Intubation HX:  11/23-11/26    Modified Barium Swallow: none reported    Chest X-Rays: 11/29:   "Persistent small right apical pneumothorax, decreased in volume since the most recent prior chest radiograph of 11/29/2023 at 12:24.  No significant detrimental interval change in the appearance of the chest since that time is appreciated."    Subjective     "Alright"     Pain/Comfort:  Pain Rating 1: 0/10  Pain Rating Post-Intervention 1: 0/10    Respiratory Status: Room air    Objective: "     Oral Musculature Evaluation  Oral Musculature: general weakness  Secretion Management: adequate  Mucosal Quality: dry, sticky  Mandibular Strength and Mobility: impaired  Oral Labial Strength and Mobility: impaired retraction, impaired seal, impaired pursing, impaired coordination  Lingual Strength and Mobility: impaired strength, impaired depression, impaired protrusion, impaired anterior elevation, impaired left lateral movement, impaired right lateral movement  Volitional Cough: decreased strength  Volitional Swallow: not elicited  Voice Prior to PO Intake: low intensity, dysarthria evident    Bedside Swallow Eval:   Consistencies Assessed:  Thin liquids crushed ice chip x2      Oral Phase:   Anterior loss  Decreased closure around utensil  Minimal active oral manipulation  Minimal approximation of lips    Pharyngeal Phase:   decreased hyolaryngeal excursion to palpation  delayed swallow initation  multiple spontaneous swallows    Compensatory Strategies  None    Treatment: Education provided re: role of SLP, cont npo, s/s aspiration, and POC.  Son verbalized understanding and agreement.       Goals:   Multidisciplinary Problems       SLP Goals          Problem: SLP    Goal Priority Disciplines Outcome   SLP Goal     SLP Ongoing, Progressing   Description: Speech Language Pathology Goals  Goals expected to be met by 12/10  1. Pt will participate in ongoing assessment of swallow.   2. Pt will complete speech, language, cognitive evaluation to determine need for tx.                                  Plan:     Patient to be seen:  4 x/week   Plan of Care expires:  01/02/24  Plan of Care reviewed with:  patient, son   SLP Follow-Up:  Yes       Discharge recommendations:  Moderate Intensity Therapy       Time Tracking:     SLP Treatment Date:   12/03/23  Speech Start Time:  0912  Speech Stop Time:  0923     Speech Total Time (min):  11 min    Billable Minutes: Eval Swallow and Oral Function 11    12/03/2023

## 2023-12-03 NOTE — NURSING
Nurses Note -- 4 Eyes      12/3/2023   3:13 PM      Skin assessed during: Transfer      [x] No Altered Skin Integrity Present    [x]Prevention Measures Documented      [] Yes- Altered Skin Integrity Present or Discovered   [] LDA Added if Not in Epic (Describe Wound)   [] New Altered Skin Integrity was Present on Admit and Documented in LDA   [] Wound Image Taken    Wound Care Consulted? No    Attending Nurse:  Natasha Avalos RN/Staff Member:  ILIR Block

## 2023-12-03 NOTE — PLAN OF CARE
Problem: SLP  Goal: SLP Goal  Description: Speech Language Pathology Goals  Goals expected to be met by 12/10  1. Pt will participate in ongoing assessment of swallow.   2. Pt will complete speech, language, cognitive evaluation to determine need for tx.     Outcome: Ongoing, Progressing    Bedside swallow assessment completed.  Rec cont npo with strict aspiration precautions at this time.  SLP to continue to follow.

## 2023-12-03 NOTE — NURSING
Patient received in room via bed.Responds only to pain.Bed is in low position, HOB elevated and bed  alarm on.Son remains at bedside.Will continue to monitor.

## 2023-12-03 NOTE — PLAN OF CARE
"[facility] Care Plan    POC reviewed with Tim Rhodes and patients son at 0400. Questions and concerns addressed. No acute events overnight. Will continue to monitor. See below and flowsheets for full assessment and VS info.     - Bath complete  - Bladder scan and straight cath x2  - TF @ 40 which is goal   - Neuro exam unchanged and VSS overnight      Is this a stroke patient? yes- Stroke booklet reviewed with patient and family, risk factors identified for patient and stroke booklet remains at bedside for ongoing education.     Neuro:  [place] Coma Scale  Best Eye Response: 3-->(E3) to speech  Best Motor Response: 6-->(M6) obeys commands  Best Verbal Response: 2-->(V2) incomprehensible speech  [place] Coma Scale Score: 11  Assessment Qualifiers: patient not sedated/intubated  Pupil PERRLA: yes     24hr Temp:  [98.5 °F (36.9 °C)-99.4 °F (37.4 °C)]     CV:   Rhythm: normal sinus rhythm  BP goals:   SBP < 180  MAP > 65    Resp:      Vent Mode: Spont  Set Rate: 14 BPM  Oxygen Concentration (%): 15  Vt Set: 500 mL  PEEP/CPAP: 5 cmH20  Pressure Support: 5 cmH20    Plan: N/A    GI/:     Diet/Nutrition Received: tube feeding, NPO  Last Bowel Movement: 12/01/23  Voiding Characteristics: incontinence    Intake/Output Summary (Last 24 hours) at 12/3/2023 0355  Last data filed at 12/3/2023 0302  Gross per 24 hour   Intake 1577.8 ml   Output 1420 ml   Net 157.8 ml     Unmeasured Output  Stool Occurrence: 1    Labs/Accuchecks:  Recent Labs   Lab 12/02/23  0038 12/03/23  0307   WBC 9.72 11.66   RBC 4.43* 4.90   HGB 12.5* 13.7*   HCT 41.1 44.1     --       Recent Labs   Lab 12/03/23  0034   *  155*   K 3.7   CO2 21*   *   BUN 38*   CREATININE 1.0   ALKPHOS 73   ALT 30   AST 31   BILITOT 0.8    No results for input(s): "PROTIME", "INR", "APTT", "HEPANTIXA" in the last 168 hours.   Recent Labs   Lab 11/27/23  0225 11/27/23  2348 11/29/23  0440   CPK 1125*  --   --    TROPONINI  --    < > 0.121*    < > = values in " this interval not displayed.       Electrolytes: N/A - electrolytes WDL  Accuchecks: Q4H    Gtts:   dextrose 10 % in water (D10W)         LDA/Wounds:  Lines/Drains/Airways       Drain  Duration                  Gastrostomy/Enterostomy 12/01/23 1145 Percutaneous endoscopic gastrostomy (PEG) LUQ feeding 1 day              Peripheral Intravenous Line  Duration                  Peripheral IV - Single Lumen 11/29/23 0041 18 G;1 3/4 in Anterior;Right Upper Arm 4 days         Midline Catheter Insertion/Assessment  - Single Lumen 11/29/23 1255 Left basilic vein (medial side of arm) 20g x 10cm 3 days                  Wounds: Yes  Wound care consulted: No

## 2023-12-03 NOTE — PT/OT/SLP PROGRESS
Occupational Therapy   Treatment    Name: Tim Rhodes  MRN: [id number]  Admitting Diagnosis:  Embolic stroke involving right middle cerebral artery  2 Days Post-Op    Recommendations:     Discharge Recommendations: Moderate Intensity Therapy  Discharge Equipment Recommendations:  hospital bed, wheelchair  Barriers to discharge:  None    Assessment:     Tim Rhodes is a 83 y.o. male with a medical diagnosis of Embolic stroke involving right middle cerebral artery.  He presents with performance deficits affecting function are impaired endurance, impaired self care skills, impaired functional mobility, decreased lower extremity function, decreased upper extremity function, decreased coordination, impaired cognition, abnormal tone, decreased ROM, impaired coordination, impaired fine motor.     Rehab Prognosis:  Fair; patient would benefit from acute skilled OT services to address these deficits and reach maximum level of function.       Plan:     Patient to be seen 3 x/week to address the above listed problems via self-care/home management, therapeutic exercises, therapeutic activities, neuromuscular re-education, cognitive retraining  Plan of Care Expires: 12/22/23  Plan of Care Reviewed with: patient    Subjective   Patient:  Unintelligible verbal attempts  Pain/Comfort:  Pain Rating 1: 0/10  Pain Rating Post-Intervention 1: 0/10    Objective:     Communicated with: Nurse prior to session.  Patient found supine with bed alarm, blood pressure cuff, pulse ox (continuous), telemetry, SCD, peripheral IV, PEG Tube upon OT entry to room.    General Precautions: Standard, aspiration, fall, NPO    Orthopedic Precautions:N/A  Braces: N/A  Respiratory Status: Room air     Occupational Performance:     Bed Mobility:    Patient completed Rolling/Turning to Left with  total assistance  Patient completed Rolling/Turning to Right with total assistance  Patient completed Supine to Sit with dependent  Patient completed Sit to Supine  with dependent     Functional Mobility/Transfers:  Dependent drawsheet transfers    Activities of Daily Living:  Feeding:  NPO    Grooming: dependence; applying deodorent     [facility] 6 Click ADL: 6    Treatment & Education:  Daily orientation provided.  PROM performed left UE and AAROM right UE and bilateral LEs one set x 10 rep in all planes of motion with stretches provided at end range.  Assistance and facilitation provided for upward rotation of the scapula during shoulder flexion and abduction to promote orientation of glenoid fossa of scapula to humeral head for prevention of post-stroke hemiplegic pain. Patient following 3/3 simple one step commands.  Positioning provided for midline orientation with bilateral UEs elevated and heels lifted off mattress.   Gentle cervical rotation provided.        Patient left supine with all lines intact, call button in reach, and bed alarm on    GOALS:   Multidisciplinary Problems       Occupational Therapy Goals          Problem: Occupational Therapy    Goal Priority Disciplines Outcome Interventions   Occupational Therapy Goal     OT, PT/OT Ongoing, Progressing    Description: Goals set 11/27 to be addressed for 14 days with expiration date, 12/11:  Patient will increase functional independence with ADLs by performing:    Patient will demonstrate rolling to the right with mod assist.  Not met   Patient will demonstrate rolling to the left with mod assist.   Not met  Patient will demonstrate supine -sit with mod  assist.   Not met  Patient will demonstrate stand pivot transfers with mod assist.   Not met  Patient will demonstrate grooming while seated with mod assist.   Not met  Patient will demonstrate upper body dressing with mod assist while seated EOB.   Not met  Patient will demonstrate lower body dressing with mod assist while seated EOB.   Not met  Patient will demonstrate toileting with mod assist.   Not met  Patient will demonstrate bathing while seated EOB with mod  assist.   Not met  Patient's family / caregiver will demonstrate independence and safety with assisting patient with self-care skills and functional mobility.     Not met  Patient's family / caregiver will demonstrate independence with providing ROM and changes in bed positioning.   Not met                             Time Tracking:     OT Date of Treatment: 12/03/23  OT Start Time: 0440  OT Stop Time: 0505  OT Total Time (min): 25 min    Billable Minutes:Self Care/Home Management 10  Neuromuscular Re-education 15    OT/YADI: OT          12/3/2023

## 2023-12-03 NOTE — ASSESSMENT & PLAN NOTE
83 y.o. male with PMHx of CAD, HF, HTN, DM who was transferred from [facility] for R MCA stroke and possible intervention on suspected R MCA occlusion. Patient initially presented to OSH with AMS and seizure activity. LKN 11/22/23 at 10pm. Out of treatment window for thrombolytics. CT at OSH with early ischemic changes in R MCA territory and hyperdense R MCA sign concerning for R MCA occlusion. Patient was intubated and sedated at OSH for airway protection.   Stroke team consulted  SBP <220  TTE/EKG/A1C/lipid panel penidg  Neuro checks q 1 hr  Thrombectomy unsuccessful   Statin   PT/OT  SLP when appropriate   CTH complete  MRI pending  A1C 6.6  SSI  Lipid profile complete  TTE complete  MRI complete and reviewed, Nsgy consulted, EEG neg and dced  Holding VTE ppx for pending Nsgy recs  11/25/2023: initiated sq heparin and baby asa  Follow CT head in AM  11/26/2023 EKG and if afib happens again will treat, Extubated 11/26 at 1400, Low dose precedex for agitation  11/27/2023 EKG, NS 75 cc/hr, consult GI for peg  12/3/2023: s/p PEG POD#1- start TF today   Stepdown to stroke team

## 2023-12-03 NOTE — SUBJECTIVE & OBJECTIVE
Review of Systems  Unable to obtain a complete ROS due to level of consciousness.  Objective:     Vitals:  Temp: 99.4 °F (37.4 °C)  Pulse: 102  Rhythm: normal sinus rhythm  BP: (!) 150/63  MAP (mmHg): 91  Resp: (!) 24  SpO2: 99 %    Temp  Min: 98.5 °F (36.9 °C)  Max: 99.4 °F (37.4 °C)  Pulse  Min: 70  Max: 102  BP  Min: 113/57  Max: 170/77  MAP (mmHg)  Min: 79  Max: 110  Resp  Min: 16  Max: 34  SpO2  Min: 95 %  Max: 100 %    12/02 0701 - 12/03 0700  In: 1497.8   Out: 1720 [Urine:1720]   Unmeasured Output  Stool Occurrence: 1        Physical Exam  GA:comfortable, no acute distress.   HEENT: No scleral icterus or JVD.   Pulmonary: Clear to auscultation A/L.   Cardiac: RRR S1 & S2 w/o rubs/murmurs/gallops.   Abdominal: Bowel sounds present x 4. No appreciable hepatosplenomegaly.  Skin: No jaundice, rashes, or visible lesions.  Neuro:  --GCS: E3 V2 M6  --Mental Status:  doesn't open, doesn't follow commands,   --CN II-XII grossly intact.   --Pupils 2mm, PERRL.   --Corneal reflex, gag, cough intact.  --LUE withdraws  --RUE spont  --BLE spont       Medications:  Continuousdextrose 10 % in water (D10W)    Scheduledaspirin, 81 mg, Daily  atorvastatin, 40 mg, Daily  cefTRIAXone (ROCEPHIN) IVPB, 1 g, Q24H  diltiaZEM, 30 mg, Q6H  erythromycin, , QID  insulin detemir U-100, 3 Units, BID  levETIRAcetam (Keppra) IV (PEDS and ADULTS), 750 mg, Q12H  polyethylene glycol, 17 g, Daily  polymyxin B sulf-trimethoprim, 1 drop, QID  silodosin, 4 mg, Daily    PRNacetaminophen, 650 mg, Q6H PRN  albuterol-ipratropium, 3 mL, Q4H PRN  artificial tears, 2 drop, QID PRN  calcium gluconate IVPB, 1 g, PRN  calcium gluconate IVPB, 2 g, PRN  calcium gluconate IVPB, 3 g, PRN  dextrose 10 % in water (D10W), , Continuous PRN  dextrose 10%, 12.5 g, PRN  dextrose 10%, 12.5 g, PRN  dextrose 10%, 12.5 g, PRN  dextrose 10%, 25 g, PRN  dextrose 10%, 25 g, PRN  dextrose 10%, 25 g, PRN  glucagon (human recombinant), 1 mg, PRN  hydrALAZINE, 10 mg, Q4H  PRN  insulin aspart U-100, 0-15 Units, Q4H PRN  labetalol, 10 mg, Q4H PRN  magnesium sulfate IVPB, 2 g, PRN  magnesium sulfate IVPB, 4 g, PRN  ondansetron, 4 mg, Q6H PRN  potassium chloride, 40 mEq, PRN   And  potassium chloride, 60 mEq, PRN   And  potassium chloride, 80 mEq, PRN  sodium phosphate 15 mmol in dextrose 5 % (D5W) 250 mL IVPB, 15 mmol, PRN  sodium phosphate 20.01 mmol in dextrose 5 % (D5W) 250 mL IVPB, 20.01 mmol, PRN  sodium phosphate 30 mmol in dextrose 5 % (D5W) 250 mL IVPB, 30 mmol, PRN      Today I personally reviewed pertinent medications, lines/drains/airways, imaging, cardiology results, laboratory results, microbiology results,     Diet  Diet NPO  Diet NPO

## 2023-12-03 NOTE — PROGRESS NOTES
[facility] - Neuro Critical Care  Neurocritical Care  Progress Note    Admit Date: 11/23/2023  Service Date: 12/03/2023  Length of Stay: 10    Subjective:     Chief Complaint: Embolic stroke involving right middle cerebral artery    History of Present Illness: The patient is an 83-year-old with PMHx of  CAD and DM admitted to [facility] with large R MCA stroke and seizure.Per chart review, in emergency department at [facility]  presented with with altered mental status and focal seizure activity.  Daughter states patient was able to talk to her on the phone at 10:00 p.m. last night was at baseline.  Patient living in assisted living but still very functional.  Daughter states patien still drives on occasion.  She does not believe he takes any blood thinners.  She went to check on him is afternoon and he had fallen next to the toilet.  Patient unable to provide any history.    Noted to have tonic-clonic activity to the right upper extremity on arrival.  Patient with incomprehensible speech.  Copious vomiting on EMS arrival per paramedic.  Patient unable to contribute to history.  Last presumed normal was 10:00 p.m. last night. Out of window for thrombolytics.  Patient intubated for airway protection at outside hospital. On arrival [facility] with large R MCA - stroke team consulted and plan for IR. Patient loaded with Keppra, EEG pending. Lactic acid 11.9 on arrival, pancx and initiated broad spec abx. Patient admitted to [facility] for close monitoring and higher level of care.        Hospital Course: 11/24/2023 MRI complete and reviewed, Nsgy consulted, EEG neg and dced  11/25/2023: initiate baby ASA and sq heparin, follow CT head in AM, SBP <180, initiate tube feeds  11/26/2023 EKG and if afib happens again will treat, Extubated 11/26 at 1400, Low dose precedex for agitation  11/27/2023 EKG, NS 75 cc/hr, consult IR for peg  11/28/23: consult GI for peg  11/29/23: CXR in am  11/30/23: awaiting PEG tube  12/1/23:PEG  today  12/2/2023: start tube feeds, decrease enteral water flushed to 200 q 6 hr, stepdown to stroke team   12/3/2023: pending stepdown to stroke team, decrease enteral water flushes to 100 q6h, add Detemir and increase PRN SSI         Review of Systems  Unable to obtain a complete ROS due to level of consciousness.  Objective:     Vitals:  Temp: 99.4 °F (37.4 °C)  Pulse: 102  Rhythm: normal sinus rhythm  BP: (!) 150/63  MAP (mmHg): 91  Resp: (!) 24  SpO2: 99 %    Temp  Min: 98.5 °F (36.9 °C)  Max: 99.4 °F (37.4 °C)  Pulse  Min: 70  Max: 102  BP  Min: 113/57  Max: 170/77  MAP (mmHg)  Min: 79  Max: 110  Resp  Min: 16  Max: 34  SpO2  Min: 95 %  Max: 100 %    12/02 0701 - 12/03 0700  In: 1497.8   Out: 1720 [Urine:1720]   Unmeasured Output  Stool Occurrence: 1        Physical Exam  GA:comfortable, no acute distress.   HEENT: No scleral icterus or JVD.   Pulmonary: Clear to auscultation A/L.   Cardiac: RRR S1 & S2 w/o rubs/murmurs/gallops.   Abdominal: Bowel sounds present x 4. No appreciable hepatosplenomegaly.  Skin: No jaundice, rashes, or visible lesions.  Neuro:  --GCS: E3 V2 M6  --Mental Status:  doesn't open, doesn't follow commands,   --CN II-XII grossly intact.   --Pupils 2mm, PERRL.   --Corneal reflex, gag, cough intact.  --LUE withdraws  --RUE spont  --BLE spont       Medications:  Continuousdextrose 10 % in water (D10W)    Scheduledaspirin, 81 mg, Daily  atorvastatin, 40 mg, Daily  cefTRIAXone (ROCEPHIN) IVPB, 1 g, Q24H  diltiaZEM, 30 mg, Q6H  erythromycin, , QID  insulin detemir U-100, 3 Units, BID  levETIRAcetam (Keppra) IV (PEDS and ADULTS), 750 mg, Q12H  polyethylene glycol, 17 g, Daily  polymyxin B sulf-trimethoprim, 1 drop, QID  silodosin, 4 mg, Daily    PRNacetaminophen, 650 mg, Q6H PRN  albuterol-ipratropium, 3 mL, Q4H PRN  artificial tears, 2 drop, QID PRN  calcium gluconate IVPB, 1 g, PRN  calcium gluconate IVPB, 2 g, PRN  calcium gluconate IVPB, 3 g, PRN  dextrose 10 % in water (D10W), , Continuous  PRN  dextrose 10%, 12.5 g, PRN  dextrose 10%, 12.5 g, PRN  dextrose 10%, 12.5 g, PRN  dextrose 10%, 25 g, PRN  dextrose 10%, 25 g, PRN  dextrose 10%, 25 g, PRN  glucagon (human recombinant), 1 mg, PRN  hydrALAZINE, 10 mg, Q4H PRN  insulin aspart U-100, 0-15 Units, Q4H PRN  labetalol, 10 mg, Q4H PRN  magnesium sulfate IVPB, 2 g, PRN  magnesium sulfate IVPB, 4 g, PRN  ondansetron, 4 mg, Q6H PRN  potassium chloride, 40 mEq, PRN   And  potassium chloride, 60 mEq, PRN   And  potassium chloride, 80 mEq, PRN  sodium phosphate 15 mmol in dextrose 5 % (D5W) 250 mL IVPB, 15 mmol, PRN  sodium phosphate 20.01 mmol in dextrose 5 % (D5W) 250 mL IVPB, 20.01 mmol, PRN  sodium phosphate 30 mmol in dextrose 5 % (D5W) 250 mL IVPB, 30 mmol, PRN      Today I personally reviewed pertinent medications, lines/drains/airways, imaging, cardiology results, laboratory results, microbiology results,     Diet  Diet NPO  Diet NPO        Assessment/Plan:     Neuro  * Embolic stroke involving right middle cerebral artery  83 y.o. male with PMHx of CAD, HF, HTN, DM who was transferred from [facility] for R MCA stroke and possible intervention on suspected R MCA occlusion. Patient initially presented to OSH with AMS and seizure activity. LKN 11/22/23 at 10pm. Out of treatment window for thrombolytics. CT at OSH with early ischemic changes in R MCA territory and hyperdense R MCA sign concerning for R MCA occlusion. Patient was intubated and sedated at OSH for airway protection.   Stroke team consulted  SBP <220  TTE/EKG/A1C/lipid panel penidg  Neuro checks q 1 hr  Thrombectomy unsuccessful   Statin   PT/OT  SLP when appropriate   CTH complete  MRI pending  A1C 6.6  SSI  Lipid profile complete  TTE complete  MRI complete and reviewed, Nsgy consulted, EEG neg and dced  Holding VTE ppx for pending Nsgy recs  11/25/2023: initiated sq heparin and baby asa  Follow CT head in AM  11/26/2023 EKG and if afib happens again will treat, Extubated 11/26  at 1400, Low dose precedex for agitation  11/27/2023 EKG, NS 75 cc/hr, consult GI for peg  12/3/2023: s/p PEG POD#1- start TF today   Stepdown to stroke team         Hemiparesis, 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  PT/OT      Seizure-like activity  Seizure like activity noted at outside facility  Loaded w/ 2gm of Keppra   Continue maintenance Keppra 750mg BID  Seizure precautions  EEG neg and dced on 11/24    Cardiac/Vascular  Paroxysmal atrial fibrillation  - Consider anticoagulation when appropriate   - Diltiazem     GI  Encounter for PEG (percutaneous endoscopic gastrostomy)  - s/p PEG   Continue TF          The patient is being Prophylaxed for:  Venous Thromboembolism with: None  Stress Ulcer with: None  Ventilator Pneumonia with: not applicable    Activity Orders            Turn patient starting at 11/28 0715    Straight Cath starting at 11/25 1809    Progressive Mobility Protocol (mobilize patient to their highest level of functioning at least twice daily) starting at 11/23 2000    Elevate HOB starting at 11/23 1821    Diet NPO: NPO starting at 11/23 1821          Full Code    Obdulia Veras NP  Neurocritical Care  [facility] - Neuro Critical Care

## 2023-12-04 PROBLEM — A41.9 SEPTIC SHOCK: Status: ACTIVE | Noted: 2023-01-01

## 2023-12-04 PROBLEM — R65.21 SEPTIC SHOCK: Status: ACTIVE | Noted: 2023-01-01

## 2023-12-04 NOTE — SIGNIFICANT EVENT
Called to bedside for rapid response of a patient previously in [facility] now on NPU. Pt was found by nurse in resp distress with extended periods of apnea. Rapid response was called. He was placed on BIPAP and levo gtt and transferred to [facility] for a higher level of care. Dr Moya at bedside on arrival to [facility] discussed pt condition change and plan of care.       Resp distress    - BIPAP may require intubation if resp status does not imoprove  - ABG  - CXR  -  lactic, cbc, cmp       Lactic Acidosis  - Lactate >5  - trend  - IVF    Leukocytosis   - WBC >21  - Pan cx   - Broad spec antibiotics       Sepsis   - Antibiotics  - Vasopressor support   - fluid resuscitation   - pan cx    Encephalopathy   - likely multifactorial  - sepsis metabolic acidosis, stroke     Critical condition in that Patient has a condition that poses threat to life and bodily function:   Resp distress, lactic acidosis, sepsis, encephalopathy      62  minutes of Critical care time was spent personally by me on the following activities: development of treatment plan with patient or surrogate and bedside caregivers, discussions with consultants, evaluation of patient's response to treatment, examination of patient, ordering and performing treatments and interventions, ordering and review of laboratory studies, ordering and review of radiographic studies, pulse oximetry, antibiotic titration if applicable, vasopressor titration if applicable, re-evaluation of patient's condition. This critical care time did not overlap with that of any other provider or involve time for any procedures. There is high probability for acute neurological change leading to clinical and possibly life-threatening deterioration requiring highest level of physician preparedness for urgent intervention.

## 2023-12-04 NOTE — PROGRESS NOTES
[facility] - Neuro Critical Care  Neurocritical Care  Progress Note    Admit Date: 11/23/2023  Service Date: 12/04/2023  Length of Stay: 11    Subjective:     Chief Complaint: Embolic stroke involving right middle cerebral artery    History of Present Illness: The patient is an 83-year-old with PMHx of  CAD and DM admitted to [facility] with large R MCA stroke and seizure.Per chart review, in emergency department at [facility]  presented with with altered mental status and focal seizure activity.  Daughter states patient was able to talk to her on the phone at 10:00 p.m. last night was at baseline.  Patient living in assisted living but still very functional.  Daughter states patien still drives on occasion.  She does not believe he takes any blood thinners.  She went to check on him is afternoon and he had fallen next to the toilet.  Patient unable to provide any history.    Noted to have tonic-clonic activity to the right upper extremity on arrival.  Patient with incomprehensible speech.  Copious vomiting on EMS arrival per paramedic.  Patient unable to contribute to history.  Last presumed normal was 10:00 p.m. last night. Out of window for thrombolytics.  Patient intubated for airway protection at outside hospital. On arrival [facility] with large R MCA - stroke team consulted and plan for IR. Patient loaded with Keppra, EEG pending. Lactic acid 11.9 on arrival, pancx and initiated broad spec abx. Patient admitted to [facility] for close monitoring and higher level of care.        Hospital Course: 11/24/2023 MRI complete and reviewed, Nsgy consulted, EEG neg and dced  11/25/2023: initiate baby ASA and sq heparin, follow CT head in AM, SBP <180, initiate tube feeds  11/26/2023 EKG and if afib happens again will treat, Extubated 11/26 at 1400, Low dose precedex for agitation  11/27/2023 EKG, NS 75 cc/hr, consult IR for peg  11/28/23: consult GI for peg  11/29/23: CXR in am  11/30/23: awaiting PEG tube  12/1/23:PEG  today  12/2/2023: start tube feeds, decrease enteral water flushed to 200 q 6 hr, stepdown to stroke team   12/3/2023: pending stepdown to stroke team, decrease enteral water flushes to 100 q6h, add Detemir and increase PRN SSI   12/04/2023 stepped up with shock and respiratory failure requiring 3 pressors        Objective:     Vitals:  Temp: 100.2 °F (37.9 °C)  Pulse: 81  Rhythm: normal sinus rhythm  BP: (!) 187/72  MAP (mmHg): 104  Resp: (!) 29  SpO2: 100 %  Oxygen Concentration (%): 40  Vent Mode: A/C  Set Rate: 18 BPM  Vt Set: 440 mL  PEEP/CPAP: 5 cmH20  Peak Airway Pressure: 19 cmH20  Mean Airway Pressure: 12 cmH20  Plateau Pressure: 0 cmH20    Temp  Min: 98.1 °F (36.7 °C)  Max: 102.2 °F (39 °C)  Pulse  Min: 53  Max: 171  BP  Min: 42/27  Max: 211/86  MAP (mmHg)  Min: 32  Max: 133  Resp  Min: 8  Max: 51  SpO2  Min: 80 %  Max: 100 %  Oxygen Concentration (%)  Min: 40  Max: 100    12/03 0701 - 12/04 0700  In: 3685 [I.V.:1248.1]  Out: 1170 [Urine:1170]   Unmeasured Output  Stool Occurrence: 1  Pad Count: 1        Physical Exam  Vital signs, lab studies, and imaging reviewed by me  comatose, cranial II-XII intact, L weak  Extremities cool with poor cap refill, no jvd  Pocus w depressed ef and inappropriately without tachycardia  Breathing comfortably, breath sounds clear  Belly soft, nontender, no hepatosplenomegaly, peg site c/d/I   Bruisng and oozing from ladonna site  Butcher in place with yellow urine       Medications:  Continuousinsulin regular 1 units/mL infusion orderable (DKA), Last Rate: 4 Units/hr (12/04/23 1400)  NORepinephrine bitartrate-D5W, Last Rate: 0.24 mcg/kg/min (12/04/23 1400)  phenylephrine, Last Rate: 5 mcg/kg/min (12/04/23 1400)  vasopressin, Last Rate: 0.04 Units/min (12/04/23 1406)    Scheduledatorvastatin, 40 mg, Daily  erythromycin, , QID  famotidine, 20 mg, Daily  fludrocortisone, 100 mcg, BID  hydrocortisone sodium succinate, 100 mg, Q8H  insulin detemir U-100, 3 Units, BID  lactated  ringers, 1,000 mL, Once  levETIRAcetam (Keppra) IV (PEDS and ADULTS), 750 mg, Q12H  micafungin (MYCAMINE) IVPB, 100 mg, Q24H  piperacillin-tazobactam (Zosyn) IV (PEDS and ADULTS) (extended infusion is not appropriate), 4.5 g, Q8H  polyethylene glycol, 17 g, Daily  polymyxin B sulf-trimethoprim, 1 drop, QID  silodosin, 4 mg, Daily    PRNacetaminophen, 650 mg, Q4H PRN  albuterol-ipratropium, 3 mL, Q4H PRN  artificial tears, 2 drop, QID PRN  dextrose 10%, 12.5 g, PRN  dextrose 10%, 25 g, PRN  fentaNYL, 25 mcg, Q1H PRN  hydrALAZINE, 10 mg, Q4H PRN  labetalol, 10 mg, Q4H PRN  ondansetron, 4 mg, Q6H PRN  vancomycin - pharmacy to dose, , pharmacy to manage frequency      Assessment/Plan:     Neuro  * Embolic stroke involving right middle cerebral artery  R MCA syndrome  Afib on EKG during admission   Thrombectomy unsuccessful   12/3 s/p peg    -holding asa in setting of thrombocytopenia and shock      ID  Septic shock  Mixed (septic, hypovolemic, cardiogenic from chronotropic incompetence from amio?)  Febrile  Leukocytosis  Mosf (live, hypotension, afib rvr, hyperglycemia, acute encephalopathy, thrombocytopenia/coagulopathy, acute respiratory failure, nagma w lactic acidosis not clearing on repeat this am)    Cultures pending  Yeast in resp culture (usually colonization but pt was intubated less than 12 hrs ago)  Cxr clear    -broad spec abx, add micafungin empirically  -map>65, on three pressors, add hydrocort/fludrocort, fluid responsive cont aggressive fluid resuscitation, trial bicarb bolus, monitor on flotrac  -consider ct c/a/p if no improvement (eval peg site, no signs of peritonitis currently)  -hold amio and avn blockade for now  -minimize sedation  -repeat tte  -euglycemia on insulin gtt, eunatrmia, euthermia  -plts>20k unless active bleeding, hold asa for now    d/w family, high risk for mortality          The patient is being Prophylaxed for:  Venous Thromboembolism with: None  Stress Ulcer with: H2B  Ventilator  Pneumonia with: chlorhexidine oral care    Activity Orders            Turn patient starting at 11/28 0715    Straight Cath starting at 11/25 1809    Progressive Mobility Protocol (mobilize patient to their highest level of functioning at least twice daily) starting at 11/23 2000    Elevate HOB starting at 11/23 1821    Diet NPO: NPO starting at 11/23 1821          Full Code    Critical condition in that Patient has a condition that poses threat to life and bodily function: as above     105 minutes of Critical care time was spent personally by me on the following activities: development of treatment plan with patient or surrogate and bedside caregivers, discussions with consultants, evaluation of patient's response to treatment, examination of patient, ordering and performing treatments and interventions, ordering and review of laboratory studies, ordering and review of radiographic studies, pulse oximetry, antibiotic titration if applicable, vasopressor titration if applicable, re-evaluation of patient's condition. This critical care time did not overlap with that of any other provider or involve time for any procedures. There is high probability for acute neurological change leading to clinical and possibly life-threatening deterioration requiring highest level of physician preparedness for urgent intervention.      Shaun Ibarra MD  Neurocritical Care  [facility] - Neuro Critical Care

## 2023-12-04 NOTE — PLAN OF CARE
Recommendations    1. Modify EN: Novasource Renal @ 30 mL/hr to provide 1440 kcal, 65 g PRO and 516 mL fluid as renal labs improve patient may return to   Glucerna 1.5 @ 40ml/hr, providing 1440 kcal, 79g of protein, and 729 mL FW.  - Monitor for s/s of intolerance such as residuals >500ml, n/v/d, or abdominal distension.   3. ADAT as tolerated, texture per SLP.   4. RD to monitor and follow up per MD orders.    Goals: Meet % EEN/EPN by RD f/u date  Nutrition Goal Status: progressing towards goal  Communication of RD Recs: other (comment) (POC)

## 2023-12-04 NOTE — PT/OT/SLP DISCHARGE
Occupational Therapy Discharge Summary    Tim Rhodes  MRN: [id number]   Principal Problem: Embolic stroke involving right middle cerebral artery      Patient Discharged from acute Occupational Therapy on 12/4.  Assessment:      Patient appropriate for care in another setting.    Objective:     GOALS:   Multidisciplinary Problems       Occupational Therapy Goals          Problem: Occupational Therapy    Goal Priority Disciplines Outcome Interventions   Occupational Therapy Goal     OT, PT/OT Ongoing, Progressing    Description: Goals set 11/27 to be addressed for 14 days with expiration date, 12/11:  Patient will increase functional independence with ADLs by performing:    Patient will demonstrate rolling to the right with mod assist.  Not met   Patient will demonstrate rolling to the left with mod assist.   Not met  Patient will demonstrate supine -sit with mod  assist.   Not met  Patient will demonstrate stand pivot transfers with mod assist.   Not met  Patient will demonstrate grooming while seated with mod assist.   Not met  Patient will demonstrate upper body dressing with mod assist while seated EOB.   Not met  Patient will demonstrate lower body dressing with mod assist while seated EOB.   Not met  Patient will demonstrate toileting with mod assist.   Not met  Patient will demonstrate bathing while seated EOB with mod assist.   Not met  Patient's family / caregiver will demonstrate independence and safety with assisting patient with self-care skills and functional mobility.     Not met  Patient's family / caregiver will demonstrate independence with providing ROM and changes in bed positioning.   Not met                             Reasons for Discontinuation of Therapy Services  Transfer to alternate level of care.      Plan:     Patient Discharged to:  D/C from OT 2* decline in status; transferred from room 959 to ICU and required re-intubation.  Await new orders for OT reassessment.    12/4/2023

## 2023-12-04 NOTE — PLAN OF CARE
[facility] Care Plan    POC reviewed with Tim Rhodes and family at 1600. Pt's family verbalized understanding. Questions and concerns addressed. No acute events today. Pt progressing toward goals. Will continue to monitor. See below and flowsheets for full assessment and VS info.   -levo, vaso, and elinor gtts continued  -right fem arterial line placed  -insulin gtt started  -PRN fent given for vent dyssynchrony  -precedex off  -2 500cc LR boluses given  -kathleen track started  -pt having frequent PVCs, team notified  -calcium replaced  -tylenol given and ice applied to tx fever  -levo is 8mg concentration and elinor on 100mg concentration        Is this a stroke patient? yes- Stroke booklet reviewed with patient and family, risk factors identified for patient and stroke booklet remains at bedside for ongoing education.     Neuro:  [place] Coma Scale  Best Eye Response: 1-->(E1) none  Best Motor Response: 4-->(M4) withdraws from pain  Best Verbal Response: 1-->(V1) none  [place] Coma Scale Score: 6  Assessment Qualifiers: patient chemically sedated or paralyzed, patient intubated  Pupil PERRLA: yes     24 hr Temp:  [98.1 °F (36.7 °C)-102.2 °F (39 °C)]     CV:   Rhythm: normal sinus rhythm  BP goals:   SBP < 160  MAP > 65    Resp:      Vent Mode: A/C  Set Rate: 18 BPM  Oxygen Concentration (%): 40  Vt Set: 440 mL  PEEP/CPAP: 5 cmH20  Pressure Support: 5 cmH20    Plan: wean to extubate    GI/:     Diet/Nutrition Received: NPO  Last Bowel Movement: 12/01/23  Voiding Characteristics: urethral catheter (bladder)    Intake/Output Summary (Last 24 hours) at 12/4/2023 1628  Last data filed at 12/4/2023 1600  Gross per 24 hour   Intake 6552.47 ml   Output 1645 ml   Net 4907.47 ml     Unmeasured Output  Stool Occurrence: 1  Pad Count: 1    Labs/Accuchecks:  Recent Labs   Lab 12/04/23 0428 12/04/23  1226   WBC 18.33*  --    RBC 3.62*  --    HGB 10.2*  --    HCT 34.6* 24*   PLT 26*  --       Recent Labs   Lab 12/04/23 0428  "12/04/23  0745   *  --    K 4.3 3.4*   CO2 19*  --    *  --    BUN 60*  --    CREATININE 2.1*  --    ALKPHOS 73  --    ALT 51*  --    AST 65*  --    BILITOT 0.6  --     No results for input(s): "PROTIME", "INR", "APTT", "HEPANTIXA" in the last 168 hours.   Recent Labs   Lab 11/29/23  0440   TROPONINI 0.121*       Electrolytes: N/A - electrolytes WDL  Accuchecks: Q1H    Gtts:   insulin regular 1 units/mL infusion orderable (DKA) 2 Units/hr (12/04/23 1500)    NORepinephrine bitartrate-D5W      phenylephrine 5 mcg/kg/min (12/04/23 1500)    vasopressin 0.04 Units/min (12/04/23 1550)       LDA/Wounds:  Lines/Drains/Airways       Central Venous Catheter Line  Duration             Percutaneous Central Line Insertion/Assessment - Triple Lumen  12/04/23 0246 Femoral Vein Left <1 day              Drain  Duration                  Gastrostomy/Enterostomy 12/01/23 1145 Percutaneous endoscopic gastrostomy (PEG) LUQ feeding 3 days         Urethral Catheter 12/04/23 0633 Non-latex <1 day              Airway  Duration                  Airway - Non-Surgical 12/04/23 0130 Endotracheal Tube <1 day              Arterial Line  Duration             Arterial Line 12/04/23 1210 Right Femoral <1 day              Peripheral Intravenous Line  Duration                  Midline Catheter Insertion/Assessment  - Single Lumen 11/29/23 1255 Left basilic vein (medial side of arm) 20g x 10cm 5 days         Peripheral IV - Single Lumen 11/29/23 0041 18 G;1 3/4 in Anterior;Right Upper Arm 5 days         Peripheral IV - Single Lumen 12/04/23 0245 20 G Anterior;Right Forearm <1 day                  Wounds: Yes  Wound care consulted: Yes    "

## 2023-12-04 NOTE — NURSING
Patient arrived to [facility] as a Rapid response from    Report received from: NPU RN    Type of stroke/diagnosis:  Respiratory distress     Current symptoms: Pt arrived obtunded on Bipap, pt tachypneic with RR 40's and abdominal muscle usen noted, pt digits and toes noted to be mottled. Withdrawing to pain on the RUE/ RLE no movement to L Side    Skin Assessment done: Y  Wounds noted: L ear   *If wounds noted, was Wound Care consulted? Y/N  *If wounds noted, LDA placed? Y/N  Skin Assessment Verified by:  Leticia SAWYER  & Godwin Ann Completed? No  FAIL    Patient Belongings on Admit: (be specific) none     NCC notified: NICHOLAS Mitchell

## 2023-12-04 NOTE — PLAN OF CARE
Problem: Adult Inpatient Plan of Care  Goal: Plan of Care Review  Outcome: Ongoing, Progressing  Goal: Patient-Specific Goal (Individualized)  Outcome: Ongoing, Progressing  Goal: Absence of Hospital-Acquired Illness or Injury  Outcome: Ongoing, Progressing  Goal: Optimal Comfort and Wellbeing  Outcome: Ongoing, Progressing  Goal: Readiness for Transition of Care  Outcome: Ongoing, ProgressingPOC and meds reviewed, all needs addressed.

## 2023-12-04 NOTE — PT/OT/SLP DISCHARGE
Physical Therapy Discharge Summary    Name: Tim Rhodes  MRN: [id number]   Principal Problem: Embolic stroke involving right middle cerebral artery     Patient Discharged from acute Physical Therapy on 23.  Please refer to prior PT noted date on 23 for functional status.     Assessment:     Pt t/f to ICU, will require new therapy orders when medically appropriate for re-eval     Objective:     GOALS:   Multidisciplinary Problems       Physical Therapy Goals          Problem: Physical Therapy    Goal Priority Disciplines Outcome Goal Variances Interventions   Physical Therapy Goal     PT, PT/OT Ongoing, Progressing     Description: Goals to be met by: 23     Patient will increase functional independence with mobility by performin. Supine to sit with MInimal Assistance  2. Sit to supine with MInimal Assistance  3. Sit to stand transfer with Moderate Assistance  4. Bed to chair transfer with Moderate Assistance using LRAD  5. Gait  x 30 feet with Moderate Assistance using LRAD as needed.   6. Lower extremity exercise program x15 reps per handout, with assistance as needed                         Reasons for Discontinuation of Therapy Services  T/f to [facility]       Plan:     Patient Discharged to:  [facility] .      2023

## 2023-12-04 NOTE — NURSING
Nurses Note -- 4 Eyes      12/3/2023   7:11 PM      Skin assessed during: Q Shift Change      [x] No Altered Skin Integrity Present    []Prevention Measures Documented      [] Yes- Altered Skin Integrity Present or Discovered   [] LDA Added if Not in Epic (Describe Wound)   [] New Altered Skin Integrity was Present on Admit and Documented in LDA   [] Wound Image Taken    Wound Care Consulted? No    Attending Nurse:  Natasha Avalos RN/Staff Member:  Pamela HAZEL

## 2023-12-04 NOTE — PROCEDURES
"Tim Rhodes is a 83 y.o. male patient.    Temp: 100.2 °F (37.9 °C) (12/04/23 1455)  Pulse: 81 (12/04/23 1455)  Resp: (!) 29 (12/04/23 1455)  BP: (!) 187/72 (12/04/23 1430)  SpO2: 100 % (12/04/23 1455)  Weight: 77.6 kg (171 lb 1.2 oz) (11/27/23 0912)  Height: 5' 3" (160 cm) (12/04/23 1159)       Arterial Line    Date/Time: 12/4/2023 11:00 AM  Location procedure was performed: [facility] NEURO CRITICAL CARE    Performed by: Shaun Ibarra MD  Authorized by: Shaun Ibarra MD  Consent Done: Emergent Situation  Indications: multiple ABGs and hemodynamic monitoring  Location: right femoral    Patient sedated: no  Needle gauge: 22  Seldinger technique: Seldinger technique used  Number of attempts: 1  Post-procedure: line sutured and dressing applied  Post-procedure CMS: unchanged          12/4/2023    "

## 2023-12-04 NOTE — PROCEDURES
"Tim Rhodes is a 83 y.o. male patient.    Temp: (!) 101.5 °F (38.6 °C) (12/04/23 0200)  Pulse: 107 (12/04/23 0200)  Resp: 18 (12/04/23 0200)  BP: (!) 144/63 (12/04/23 0200)  SpO2: 97 % (12/04/23 0200)  Weight: 77.6 kg (171 lb 1.2 oz) (11/27/23 0912)  Height: 5' 3" (160 cm) (11/27/23 0912)       Central Line    Date/Time: 12/4/2023 2:30 AM    Performed by: Emmett Mitchell NP  Authorized by: Emmett Mitchell NP    Location procedure was performed:  [facility] NEURO CRITICAL CARE  Consent Done ?:  Yes  Time out complete?: Verified correct patient, procedure, equipment, staff, and site/side    Indications:  Med administration  Preparation:  Skin prepped with ChloraPrep  Skin prep agent dried: Skin prep agent completely dried prior to procedure    Sterile barriers: All five maximal sterile barriers used - gloves, gown, cap, mask and large sterile sheet    Hand hygiene: Hand hygiene performed immediately prior to central venous catheter insertion    Location:  Left femoral  Site selection rationale:  Urgent placemnt  Catheter type:  Triple lumen  Catheter size:  7 Fr  Inserted Catheter Length (cm):  19  Ultrasound guidance: Yes    Vessel Caliber:  Medium   patent  Comprressibility:  Normal  Needle advanced into vessel with real time ultrasound guidance.    Guidewire confirmed in vessel.    Steril sheath on probe.    Sterile gel used.  Manometry: Yes    Number of attempts:  1  Securement:  Line sutured, chlorhexidine patch and sterile dressing applied  Complications: No    Estimated blood loss (mL):  15  Specimens: No    Implants: No    Adverse Events:  None      12/4/2023    "

## 2023-12-04 NOTE — PLAN OF CARE
[facility] - Neuro Critical Care  Discharge Reassessment    Primary Care Provider: Jeannette, Primary Doctor    Expected Discharge Date: 12/5/2023    Patient transferred to [facility] from [facility] S/p Core Call.  Patient intubated today per MD.  Patient not medically ready for discharge.     [facility] for SNF.  Patient S/p peg tube placement 12/01/2023    Discharge Plan A and Plan B have been determined by review of patient's clinical status, future medical and therapeutic needs, and coverage/benefits for post-acute care in coordination with multidisciplinary team members.      Reassessment (most recent)       Discharge Reassessment - 12/04/23 0931          Discharge Reassessment    Assessment Type Discharge Planning Reassessment     Did the patient's condition or plan change since previous assessment? Yes     Communicated XIOMARA with patient/caregiver Date not available/Unable to determine     Discharge Plan A Skilled Nursing Facility     Discharge Plan B Other   tbd    DME Needed Upon Discharge  none     Transition of Care Barriers None     Why the patient remains in the hospital Requires continued medical care                   Filomena Nuñez RN, CCRN-K, [facility]  Neuro-Critical Care   [phone]

## 2023-12-04 NOTE — CODE/ RAPID DOCUMENTATION
RAPID RESPONSE NURSE NOTE        Admit Date: 2023  LOS: 11  Code Status: Full Code   Date of Consult: 2023  : 1940  Age: 83 y.o.  Weight:   Wt Readings from Last 1 Encounters:   23 77.6 kg (171 lb 1.2 oz)     Sex: male  Race: White   Bed: [address] [place] [address]   MRN: [id number]  Time Rapid Response Team page Received: called by bedside RN at 0. Rapid paged by RRN at  after RRN arrival to bedside  Time Rapid Response Team at Bedside:   Time Rapid Response Team left Bedside:   Was the patient discharged from an ICU this admission? Yes   Was the patient discharged from a PACU within last 24 hours? No   Did the patient receive conscious sedation/general anesthesia in last 24 hours? No  Was the patient in the ED within the past 24 hours? No  Was the patient on NIPPV within the past 24 hours? Yes   Did this progress into an ARC or CPA: No  Attending Physician: Natanael Moya MD  Primary Service: Neurology       SITUATION    Notified by bedside RN via phone call.  Reason for alert: acute respiratory distress  Called to evaluate the patient for Respiratory    BACKGROUND     Why is the patient in the hospital?: Embolic stroke involving right middle cerebral artery    Patient has a past medical history of Arthritis, Weiss's esophagus, CHF (congestive heart failure), Embolic stroke involving right middle cerebral artery, GERD (gastroesophageal reflux disease), and Type 2 diabetes mellitus.    Last Vitals:  Temp: 99 °F (37.2 °C) (2300)  Pulse: 122 (2300)  Resp: 42 (2300)  BP: 133/95 (2300)  SpO2: 100 % (2300)    24 Hours Vitals Range:  Temp:  [98.1 °F (36.7 °C)-99.4 °F (37.4 °C)]   Pulse:  []   Resp:  [17-42]   BP: (109-170)/(54-95)   SpO2:  [95 %-100 %]     Labs:  Recent Labs     23  0038 23  0307 23  2248 23  2326   WBC 9.72 11.66  --  21.98*   HGB 12.5* 13.7*  --  12.5*   HCT 41.1 44.1 31* 41.5    59*  --  40*       Recent Labs      12/01/23  0058 12/01/23  1554 12/02/23  0038 12/02/23  0850 12/03/23  0034 12/03/23  1927 12/03/23  2326   *   < > 160*   < > 156*  155* 157* 155*   K 4.0  --  4.0  --  3.7  --  4.2   *  --  126*  --  123*  --  122*   CO2 20*  --  19*  --  21*  --  18*   BUN 33*  --  38*  --  38*  --  50*   CREATININE 0.9  --  1.0  --  1.0  --  1.4   *  --  176*  --  203*  --  312*   PHOS 3.4  --  5.1*  --  3.0  3.0  --   --    MG 2.3  --  2.4  --  2.4  --   --     < > = values in this interval not displayed.        Recent Labs     12/03/23  2248   PH 7.589*   PCO2 22.7*   PO2 406*   HCO3 21.7*   POCSATURATED 100   BE 0        ASSESSMENT    Physical Exam  Constitutional:       General: He is in acute distress.      Appearance: He is ill-appearing.   Cardiovascular:      Rate and Rhythm: Tachycardia present. Rhythm irregular.      Pulses: Decreased pulses.   Pulmonary:      Effort: Tachypnea, accessory muscle usage and respiratory distress present.      Breath sounds: Decreased air movement present.      Comments: Intermittent bradypnea/apneic periods  Skin:     General: Skin is moist.      Capillary Refill: Capillary refill takes more than 3 seconds.      Coloration: Skin is cyanotic, mottled and pale.      Findings: Bruising present.   Neurological:      Mental Status: He is unresponsive.       Upon arrival to bedside patient unresponsive, RT in process of placing BiPAP. Pulse oximetry on two different devices showing 81% and 75% respectively. Initially tachypneic in the 40s, then RR acutely dropped to less than 10/min. Extremities cold and bluish purple, with cyanotic mottling. Bluish discoloration noted around mouth.   Peripheral pulses very weak, unable to obtain NIBP  Tube feeds running to PEG at 40cc/hr, turned off    INTERVENTIONS    The patient was seen for Respiratory problem. Staff concerns included tachypnea, new onset of difficulty breathing, oxygen saturation < 90% despite supplemental oxygen,  increased WOB, and increased oxygen requirements. The following interventions were performed: Bipap, POCT arterial blood gas , and consult critical care.    Upon RRN arrival to room patient in acute distress. Full rapid response activated by RRN.  FiO2 on BiPAP increased to 100%. Tube feeds turned off.  CCM called urgently to bedside  YOUNG Lee NP provider with vascular neurology also to bedside, will contact [facility] provider as patient was just stepped down from their unit    D/t weak peripheral pulse, inability to obtain NIBP, peripheral levophed 4mg/250cc initiated to DA midline at 0.06mcg/kg/min as per verbal order    [facility] to accept patient  VS obtained just prior to transport,  /83 (96) on 0.06mcg/kg/min, RR 46. . Still unable to obtain SpO2, ABG in process    RECOMMENDATIONS    We recommend: Transfer to [facility] for higher level of care, vasopressor support, possible intubation    PROVIDER ESCALATION    Orders received and case discussed with  Whitney Lee NP with vascular neurology, Tatiana Dunlap NP with [facility], Emmett Mitchell NP with [facility]    Primary team arrival time: 2235    Disposition: Tx in ICU bed 9065.  Patient transferred to [facility] with RRN, RT, NPU RN on continuous cardiac, NIBP monitoring on 0.06mcg/kg/min infusing to PIV. On BiPAP 100% FiO2. Bedside handoff given to Alvarado [facility] RN    FOLLOW UP    NPU charge RN, Caity, bedside RN Pamela  updated on plan of care. Instructed to call the Rapid Response NurseMELANIE RN at [phone] for additional questions or concerns.

## 2023-12-04 NOTE — PT/OT/SLP PROGRESS
Speech Language Pathology  Discharge    Tim Rhodes  MRN: [id number]    Patient not seen today secondary to pt intubated at this time. Please re-consult when medically appropriate. No further ST warranted at this time.   12/4/2023

## 2023-12-04 NOTE — PROGRESS NOTES
Pharmacokinetic Initial Assessment: IV Vancomycin    Assessment/Plan:    Initiate intravenous vancomycin with loading dose of 1500 mg once with subsequent doses when random concentrations are less than 20 mcg/mL.    Scr increased from 1.0 to 1.4 within 23 hrs, pulse dosing for now until renal function is stable  Desired empiric serum trough concentration is 15 to 20 mcg/mL  Draw vancomycin random level on 12/05/23 at 00:30.  Pharmacy will continue to follow and monitor vancomycin.      Please contact pharmacy at extension [phone] with any questions regarding this assessment.     Thank you for the consult,   Ford Leija       Patient brief summary:  Tim Rhodes is a 83 y.o. male initiated on antimicrobial therapy with IV Vancomycin for treatment of suspected bacteremia    Drug Allergies:   Review of patient's allergies indicates:  No Known Allergies    Actual Body Weight:   77.6 kg    Renal Function:   Estimated Creatinine Clearance: 38.2 mL/min (based on SCr of 1.4 mg/dL).,     Dialysis Method (if applicable):  N/A    CBC (last 72 hours):  Recent Labs   Lab Result Units 12/01/23  0058 12/02/23  0038 12/03/23  0307 12/03/23  2326   WBC K/uL 11.87 9.72 11.66 21.98*   Hemoglobin g/dL 13.4* 12.5* 13.7* 12.5*   Hematocrit % 43.9 41.1 44.1 41.5   Platelets K/uL 226 166 59* 40*   Gran % % 78.4* 87.9* 85.3* 79.1*   Lymph % % 11.1* 6.6* 7.2* 12.1*   Mono % % 7.7 4.5 5.5 6.0   Eosinophil % % 1.5 0.0 0.6 0.3   Basophil % % 0.5 0.3 0.3 0.5   Differential Method  Automated Automated Automated Automated       Metabolic Panel (last 72 hours):  Recent Labs   Lab Result Units 12/01/23  0058 12/01/23  1554 12/02/23  0038 12/02/23  0850 12/02/23  1555 12/03/23  0034 12/03/23  1927 12/03/23  2326   Sodium mmol/L 163* 161* 160* 157* 156* 156*  155* 157* 155*   Potassium mmol/L 4.0  --  4.0  --   --  3.7  --  4.2   Chloride mmol/L 127*  --  126*  --   --  123*  --  122*   CO2 mmol/L 20*  --  19*  --   --  21*  --  18*   Glucose mg/dL 151*  " --  176*  --   --  203*  --  312*   BUN mg/dL 33*  --  38*  --   --  38*  --  50*   Creatinine mg/dL 0.9  --  1.0  --   --  1.0  --  1.4   Albumin g/dL 2.4*  --  2.3*  --   --  2.4*  --  2.4*   Total Bilirubin mg/dL 0.6  --  0.6  --   --  0.8  --  0.6   Alkaline Phosphatase U/L 60  --  61  --   --  73  --  91   AST U/L 27  --  27  --   --  31  --  32   ALT U/L 30  --  28  --   --  30  --  33   Magnesium mg/dL 2.3  --  2.4  --   --  2.4  --   --    Phosphorus mg/dL 3.4  --  5.1*  --   --  3.0  3.0  --   --        Drug levels (last 3 results):  No results for input(s): "VANCOMYCINRA", "VANCORANDOM", "VANCOMYCINPE", "VANCOPEAK", "VANCOMYCINTR", "VANCOTROUGH" in the last 72 hours.    Microbiologic Results:  Microbiology Results (last 7 days)       Procedure Component Value Units Date/Time    Culture, Respiratory with Gram Stain [[id number]] Collected: 12/04/23 0039    Order Status: Sent Specimen: Respiratory from Sputum Updated: 12/04/23 0040    Blood culture [[id number]]     Order Status: Sent Specimen: Blood     Blood culture [[id number]]     Order Status: Sent Specimen: Blood     Blood culture [[id number]] Collected: 11/28/23 1218    Order Status: Completed Specimen: Blood from Peripheral, Ankle, Left Updated: 12/03/23 1412     Blood Culture, Routine No growth after 5 days.    Blood culture [[id number]] Collected: 11/28/23 1216    Order Status: Completed Specimen: Blood from Peripheral, Ankle, Right Updated: 12/03/23 1412     Blood Culture, Routine No growth after 5 days.    Blood culture #2 **CANNOT BE ORDERED STAT** [[id number]] Collected: 11/24/23 0131    Order Status: Completed Specimen: Blood Updated: 11/29/23 0612     Blood Culture, Routine No growth after 5 days.    Blood culture #1 **CANNOT BE ORDERED STAT** [[id number]] Collected: 11/23/23 1808    Order Status: Completed Specimen: Blood Updated: 11/28/23 2212     Blood Culture, Routine No growth after 5 days.            "

## 2023-12-04 NOTE — SIGNIFICANT EVENT
Called urgently to the patient's bedside by his nurse for evaluation of tachypnea, tachycardia, and decreased LOC.  His O2 sat also dropped into the low 80%.  He was placed on CPAP with no improvement.      On my arrival the Rapid Response RN was at the bedside along with the Charge RN, patient's RN, and RT.  The patient was minimally responsive with sternal rub w/barely palpable peripheral pulse.  A Rapid Response was called and the CCM team came to the bedside.  NCC ANURAG called as well, as the patient had stepped down from [facility] earlier this afternoon.        The patient was evaluated by both critical care teams and was stepped back up to [facility].  Appreciate the assistance of all who responded.      Whitney Lee DNP, AGACNP-BC, FNP-C  [facility]  Department of Vascular Neurology   [facility]

## 2023-12-04 NOTE — PLAN OF CARE
[facility] Care Plan    POC reviewed with Tim Rhodes and family at 0300. Pt verbalized understanding. Questions and concerns addressed. No acute events overnight. Pt progressing toward goals. Will continue to monitor. See below and flowsheets for full assessment and VS info.   - Neuro exam unchanged overnight  - BP WDL, pt currently on Tod, Vaso, and Levo to maintain Map goals   - Butcher placed  - pt intubated overnight   - L fem TLC placed  - Amio bolus/ggt administered per order  - Precedex titrated to maintain RASS of 0 overnight   - Vanc/Zosyn started  - LR at 50  - total 1500 cc bolus overnight   - Pan cultured patient  - Lactic Acid trending down  - Critical Values called to Paula NP overnight         Is this a stroke patient? yes- Stroke booklet reviewed with patient, risk factors identified for patient and stroke booklet remains at bedside for ongoing education.     Neuro:  Win Coma Scale  Best Eye Response: 1-->(E1) none  Best Motor Response: 5-->(M5) localizes pain  Best Verbal Response: 1-->(V1) none  Win Coma Scale Score: 7  Assessment Qualifiers: patient not sedated/intubated  Pupil PERRLA: yes     24hr Temp:  [98.1 °F (36.7 °C)-102.2 °F (39 °C)]     CV:   Rhythm: normal sinus rhythm  BP goals:   SBP < 180  MAP > 65    Resp:      Vent Mode: A/C  Set Rate: 18 BPM  Oxygen Concentration (%): 40  Vt Set: 440 mL  PEEP/CPAP: 5 cmH20  Pressure Support: 5 cmH20    Plan: wean to extubate    GI/:     Diet/Nutrition Received: NPO, tube feeding  Last Bowel Movement: 12/01/23  Voiding Characteristics: external catheter    Intake/Output Summary (Last 24 hours) at 12/4/2023 0726  Last data filed at 12/4/2023 0630  Gross per 24 hour   Intake 3076.94 ml   Output 1120 ml   Net 1956.94 ml     Unmeasured Output  Stool Occurrence: 1  Pad Count: 1    Labs/Accuchecks:  Recent Labs   Lab 12/04/23 0428   WBC 18.33*   RBC 3.62*   HGB 10.2*   HCT 34.6*   PLT 26*      Recent Labs   Lab 12/04/23 0428   *   K 4.3   CO2  "19*   *   BUN 60*   CREATININE 2.1*   ALKPHOS 73   ALT 51*   AST 65*   BILITOT 0.6    No results for input(s): "PROTIME", "INR", "APTT", "HEPANTIXA" in the last 168 hours.   Recent Labs   Lab 11/29/23  0440   TROPONINI 0.121*       Electrolytes: N/A - electrolytes WDL  Accuchecks: Q6H    Gtts:   amiodarone in dextrose 5% 1 mg/min (12/04/23 0605)    amiodarone in dextrose 5%      dexmedeTOMIDine (Precedex) infusion (titrating) 0.6 mcg/kg/hr (12/04/23 0605)    dextrose 10 % in water (D10W)      lactated ringers 50 mL/hr at 12/04/23 0605    NORepinephrine bitartrate-D5W 0.02 mcg/kg/min (12/04/23 0605)    phenylephrine      vasopressin 0.06 Units/min (12/04/23 0605)       LDA/Wounds:  Lines/Drains/Airways       Central Venous Catheter Line  Duration             Percutaneous Central Line Insertion/Assessment - Triple Lumen  12/04/23 0246 Femoral Vein Left <1 day              Drain  Duration                  Gastrostomy/Enterostomy 12/01/23 1145 Percutaneous endoscopic gastrostomy (PEG) LUQ feeding 2 days         Urethral Catheter 12/04/23 0633 Non-latex <1 day              Airway  Duration                  Airway - Non-Surgical 12/04/23 0130 Endotracheal Tube <1 day              Peripheral Intravenous Line  Duration                  Peripheral IV - Single Lumen 11/29/23 0041 18 G;1 3/4 in Anterior;Right Upper Arm 5 days         Midline Catheter Insertion/Assessment  - Single Lumen 11/29/23 1255 Left basilic vein (medial side of arm) 20g x 10cm 4 days         Peripheral IV - Single Lumen 12/03/23 0245 20 G Anterior;Distal;Left Forearm 1 day         Peripheral IV - Single Lumen 12/04/23 0245 20 G Anterior;Right Forearm <1 day                  Wounds: Yes  Wound care consulted: Yes   Problem: Adult Inpatient Plan of Care  Goal: Plan of Care Review  Outcome: Ongoing, Progressing  Goal: Patient-Specific Goal (Individualized)  Outcome: Ongoing, Progressing  Goal: Absence of Hospital-Acquired Illness or Injury  Outcome: " Ongoing, Progressing  Goal: Optimal Comfort and Wellbeing  Outcome: Ongoing, Progressing  Goal: Readiness for Transition of Care  Outcome: Ongoing, Progressing     Problem: Adjustment to Illness (Stroke, Ischemic/Transient Ischemic Attack)  Goal: Optimal Coping  Outcome: Ongoing, Progressing     Problem: Bowel Elimination Impaired (Stroke, Ischemic/Transient Ischemic Attack)  Goal: Effective Bowel Elimination  Outcome: Ongoing, Progressing     Problem: Cerebral Tissue Perfusion (Stroke, Ischemic/Transient Ischemic Attack)  Goal: Optimal Cerebral Tissue Perfusion  Outcome: Ongoing, Progressing     Problem: Cognitive Impairment (Stroke, Ischemic/Transient Ischemic Attack)  Goal: Optimal Cognitive Function  Outcome: Ongoing, Progressing     Problem: Communication Impairment (Stroke, Ischemic/Transient Ischemic Attack)  Goal: Improved Communication Skills  Outcome: Ongoing, Progressing     Problem: Functional Ability Impaired (Stroke, Ischemic/Transient Ischemic Attack)  Goal: Optimal Functional Ability  Outcome: Ongoing, Progressing     Problem: Respiratory Compromise (Stroke, Ischemic/Transient Ischemic Attack)  Goal: Effective Oxygenation and Ventilation  Outcome: Ongoing, Progressing     Problem: Sensorimotor Impairment (Stroke, Ischemic/Transient Ischemic Attack)  Goal: Improved Sensorimotor Function  Outcome: Ongoing, Progressing     Problem: Swallowing Impairment (Stroke, Ischemic/Transient Ischemic Attack)  Goal: Optimal Eating and Swallowing without Aspiration  Outcome: Ongoing, Progressing     Problem: Urinary Elimination Impaired (Stroke, Ischemic/Transient Ischemic Attack)  Goal: Effective Urinary Elimination  Outcome: Ongoing, Progressing     Problem: Diabetes Comorbidity  Goal: Blood Glucose Level Within Targeted Range  Outcome: Ongoing, Progressing     Problem: Fall Injury Risk  Goal: Absence of Fall and Fall-Related Injury  Outcome: Ongoing, Progressing     Problem: Restraint, Nonbehavioral  (Nonviolent)  Goal: Absence of Harm or Injury  Outcome: Ongoing, Progressing     Problem: Skin Injury Risk Increased  Goal: Skin Health and Integrity  Outcome: Ongoing, Progressing     Problem: Infection  Goal: Absence of Infection Signs and Symptoms  Outcome: Ongoing, Progressing     Problem: Impaired Wound Healing  Goal: Optimal Wound Healing  Outcome: Ongoing, Progressing

## 2023-12-04 NOTE — SUBJECTIVE & OBJECTIVE
Objective:     Vitals:  Temp: 100.2 °F (37.9 °C)  Pulse: 81  Rhythm: normal sinus rhythm  BP: (!) 187/72  MAP (mmHg): 104  Resp: (!) 29  SpO2: 100 %  Oxygen Concentration (%): 40  Vent Mode: A/C  Set Rate: 18 BPM  Vt Set: 440 mL  PEEP/CPAP: 5 cmH20  Peak Airway Pressure: 19 cmH20  Mean Airway Pressure: 12 cmH20  Plateau Pressure: 0 cmH20    Temp  Min: 98.1 °F (36.7 °C)  Max: 102.2 °F (39 °C)  Pulse  Min: 53  Max: 171  BP  Min: 42/27  Max: 211/86  MAP (mmHg)  Min: 32  Max: 133  Resp  Min: 8  Max: 51  SpO2  Min: 80 %  Max: 100 %  Oxygen Concentration (%)  Min: 40  Max: 100    12/03 0701 - 12/04 0700  In: 3685 [I.V.:1248.1]  Out: 1170 [Urine:1170]   Unmeasured Output  Stool Occurrence: 1  Pad Count: 1        Physical Exam  Vital signs, lab studies, and imaging reviewed by me  comatose, cranial II-XII intact, L weak  Extremities cool with poor cap refill, no jvd  Pocus w depressed ef and inappropriately without tachycardia  Breathing comfortably, breath sounds clear  Belly soft, nontender, no hepatosplenomegaly, peg site c/d/I   Bruisng and oozing from ladonna site  Butcher in place with yellow urine       Medications:  Continuousinsulin regular 1 units/mL infusion orderable (DKA), Last Rate: 4 Units/hr (12/04/23 1400)  NORepinephrine bitartrate-D5W, Last Rate: 0.24 mcg/kg/min (12/04/23 1400)  phenylephrine, Last Rate: 5 mcg/kg/min (12/04/23 1400)  vasopressin, Last Rate: 0.04 Units/min (12/04/23 1406)    Scheduledatorvastatin, 40 mg, Daily  erythromycin, , QID  famotidine, 20 mg, Daily  fludrocortisone, 100 mcg, BID  hydrocortisone sodium succinate, 100 mg, Q8H  insulin detemir U-100, 3 Units, BID  lactated ringers, 1,000 mL, Once  levETIRAcetam (Keppra) IV (PEDS and ADULTS), 750 mg, Q12H  micafungin (MYCAMINE) IVPB, 100 mg, Q24H  piperacillin-tazobactam (Zosyn) IV (PEDS and ADULTS) (extended infusion is not appropriate), 4.5 g, Q8H  polyethylene glycol, 17 g, Daily  polymyxin B sulf-trimethoprim, 1 drop, QID  silodosin,  4 mg, Daily    PRNacetaminophen, 650 mg, Q4H PRN  albuterol-ipratropium, 3 mL, Q4H PRN  artificial tears, 2 drop, QID PRN  dextrose 10%, 12.5 g, PRN  dextrose 10%, 25 g, PRN  fentaNYL, 25 mcg, Q1H PRN  hydrALAZINE, 10 mg, Q4H PRN  labetalol, 10 mg, Q4H PRN  ondansetron, 4 mg, Q6H PRN  vancomycin - pharmacy to dose, , pharmacy to manage frequency

## 2023-12-04 NOTE — ASSESSMENT & PLAN NOTE
R MCA syndrome  Afib on EKG during admission   Thrombectomy unsuccessful   12/3 s/p peg    -holding asa in setting of thrombocytopenia and shock

## 2023-12-04 NOTE — PROCEDURES
"Tim Rhodes is a 83 y.o. male patient.    Temp: 99 °F (37.2 °C) (12/03/23 2300)  Pulse: (!) 122 (12/03/23 2300)  Resp: (!) 42 (12/03/23 2300)  BP: (!) 133/95 (12/03/23 2300)  SpO2: 100 % (12/03/23 2300)  Weight: 77.6 kg (171 lb 1.2 oz) (11/27/23 0912)  Height: 5' 3" (160 cm) (11/27/23 0912)       Intubation    Date/Time: 12/4/2023 1:16 AM  Location procedure was performed: [facility]    Performed by: Natanael Moya MD  Authorized by: Natanael Moya MD  Consent Done: Emergent Situation  Indications: respiratory distress, respiratory failure and airway protection  Description of findings: vomitus in upper airway with gastric contents   Intubation method: video-assisted  Patient status: unconscious  Preoxygenation: BVM  Pretreatment medications: none  Sedatives: etomidate  Paralytic: rocuronium  Laryngoscope size: Mac 3  Tube size: 8.0 mm  Tube type: cuffed  Number of attempts: 1  Cricoid pressure: no  Cords visualized: yes  Post-procedure assessment: chest rise and CO2 detector  Breath sounds: rales/crackles  Cuff inflated: yes  ETT to teeth: 23 cm  Tube secured with: ETT darby  Chest x-ray interpreted by radiologist.  Chest x-ray findings: endotracheal tube in appropriate position  Patient tolerance: Patient tolerated the procedure well with no immediate complications  Complications: No  Estimated blood loss (mL): 0  Specimens: No  Implants: No          12/4/2023    "

## 2023-12-04 NOTE — SIGNIFICANT EVENT
"Medical Emergency Team Consult Note  Critical Care Medicine    CC: Embolic stroke involving right middle cerebral artery  Date: 12/03/2023  Admit Date: 11/23/2023  Hospital Length of Stay: 10  MRN: [id number]  The patient location is: Bed 959/959 A  Dx: Embolic stroke involving right middle cerebral artery  Code Status: Full Code   Chart Reviewed: 12/03/2023, 10:50 PM      SUBJECTIVE:     HPI:  Tim Rhodes has a past medical history of Arthritis, Weiss's esophagus, CHF (congestive heart failure), Embolic stroke involving right middle cerebral artery, GERD (gastroesophageal reflux disease), and Type 2 diabetes mellitus.    Significant Events: Called urgently to the bedside by Richard Rapid response nurse to evaluate the patient for worsening respiratory status and decreased level of consciousness. Upon arrival, pt minimally responsive, unable to get spO2 or blood pressure reading. Pt currently on bipap 100%, 14/10.       OBJECTIVE:     Physical Exam  Vitals and nursing note reviewed.   Constitutional:       Appearance: He is ill-appearing.   Cardiovascular:      Rate and Rhythm: Normal rate and regular rhythm.      Pulses: Normal pulses.      Heart sounds: Normal heart sounds.   Pulmonary:      Effort: Respiratory distress present.   Abdominal:      General: Bowel sounds are normal.      Palpations: Abdomen is soft.   Skin:     General: Skin is warm and dry.      Coloration: Skin is pale.   Neurological:      Comments: Minimally responsive         Last VS: BP (!) 109/54   Pulse 95   Temp 98.4 °F (36.9 °C)   Resp 18   Ht 5' 3" (1.6 m)   Wt 77.6 kg (171 lb 1.2 oz)   SpO2 98%   BMI 30.30 kg/m²     24H Vital Sign Range:    Temp:  [98.1 °F (36.7 °C)-99.4 °F (37.4 °C)]   Pulse:  []   Resp:  [17-34]   BP: (109-170)/(54-77)   SpO2:  [95 %-100 %]     Level of Consciousness (AVPU): alert      Intake/Output Summary (Last 24 hours) at 12/3/2023 2250  Last data filed at 12/3/2023 2125  Gross per 24 hour   Intake " "1667.34 ml   Output 850 ml   Net 817.34 ml       Recent Labs     12/01/23  0058 12/02/23  0038 12/03/23  0307   WBC 11.87 9.72 11.66   HGB 13.4* 12.5* 13.7*   HCT 43.9 41.1 44.1    166 59*       Recent Labs     12/01/23  0058 12/01/23  1554 12/02/23  0038 12/02/23  0850 12/02/23  1555 12/03/23  0034 12/03/23  1927   *   < > 160*   < > 156* 156*  155* 157*   K 4.0  --  4.0  --   --  3.7  --    *  --  126*  --   --  123*  --    CO2 20*  --  19*  --   --  21*  --    BUN 33*  --  38*  --   --  38*  --    CREATININE 0.9  --  1.0  --   --  1.0  --    *  --  176*  --   --  203*  --    PHOS 3.4  --  5.1*  --   --  3.0  3.0  --    MG 2.3  --  2.4  --   --  2.4  --     < > = values in this interval not displayed.        No results for input(s): "PH", "PCO2", "PO2", "HCO3", "POCSATURATED", "BE" in the last 72 hours.     Lab Results   Component Value Date    LACTATE 1.2 11/24/2023    LACTATE 1.0 11/24/2023    LACTATE 1.0 11/24/2023         ASSESSMENT AND PLAN :     Respiratory distress:    Pt on bipap 100%, 14/10. Minimally responsive. Unsure of baseline.   Vascular neurology and NCC at bedside.     Plan:  Pt to be transferred back to [facility].           Uninterrupted Critical Care/Counseling Time (not including procedures): 20  Critical care was time spent personally by me on the following activities: development of treatment plan with patient or surrogate and bedside caregivers, discussions with consultants, evaluation of patient's response to treatment, examination of patient, ordering and performing treatments and interventions, ordering and review of laboratory studies, ordering and review of radiographic studies, pulse oximetry, re-evaluation of patient's condition. This critical care time did not overlap with that of any other provider or involve time for any procedures.        Tatiana Dunlap [facility]-  Critical Care Medicine  12/03/2023 11:06 PM        "

## 2023-12-04 NOTE — PROGRESS NOTES
"[facility] - Neuro Critical Care  Adult Nutrition  Progress Note    SUMMARY       Recommendations    1. Modify EN: Novasource Renal @ 30 mL/hr to provide 1440 kcal, 65 g PRO and 516 mL fluid as renal labs improve patient may return to   Glucerna 1.5 @ 40ml/hr, providing 1440 kcal, 79g of protein, and 729 mL FW.  - Monitor for s/s of intolerance such as residuals >500ml, n/v/d, or abdominal distension.   3. ADAT as tolerated, texture per SLP.   4. RD to monitor and follow up per MD orders.    Goals: Meet % EEN/EPN by RD f/u date  Nutrition Goal Status: progressing towards goal  Communication of RD Recs: other (comment) (POC)    Assessment and Plan    Nutrition Problem  Inadequate oral intake     Related to (etiology):   Inability to consume sufficient energy    Signs and Symptoms (as evidenced by):   NPO    Interventions/Recommendations (treatment strategy):  Collaboration of nutrition care w/other providers    Nutrition Diagnosis Status:   New       Reason for Assessment    Reason For Assessment: RD follow-up  Diagnosis: other (see comments) (Embolic stroke involving right middle cerebral artery)  Relevant Medical History: 2TDM, CHF, GERD, Barretts espophagus  Interdisciplinary Rounds: did not attend  General Information Comments: RD follow-up. Pt remains NPO, tolerating TF. No issues noted. TF running at goal rate.  Nutrition Discharge Planning: Pending clinincal course    Nutrition Risk Screen    Nutrition Risk Screen: difficulty chewing/swallowing    Nutrition/Diet History    Patient Reported Diet/Restrictions/Preferences: no oral intake  Spiritual, Cultural Beliefs, Nondenominational Practices, Values that Affect Care: no    Anthropometrics    Temp: (!) 100.9 °F (38.3 °C)  Height Method: Estimated  Height: 5' 3" (160 cm)  Height (inches): 63 in  Weight Method: Bed Scale  Weight: 77.6 kg (171 lb)  Weight (lb): 171 lb  Ideal Body Weight (IBW), Male: 124 lb  % Ideal Body Weight, Male (lb): 137.9 %  BMI (Calculated): " 30.3  BMI Grade: 30 - 34.9- obesity - grade I       Lab/Procedures/Meds    Pertinent Labs Reviewed: reviewed  Pertinent Labs Comments: Na 153, Cl 117, CO2 17, BUN 60, Creat 2.1, GFR 31, glu 526, AST 65, ALT 51  Pertinent Medications Reviewed: reviewed  Pertinent Medications Comments: aspirin, atorvastatin, famotidine, heparin, senna, silodosin, micafungin, keppra, insulin, polyethylene glycol      Estimated/Assessed Needs    Weight Used For Calorie Calculations: 56.3 kg (124 lb 1.9 oz) (IBW)  Energy Calorie Requirements (kcal): 9137-2495 kcal/d (25-30kcal x IBW)  Energy Need Method: Kcal/kg (25-30kcal x IBW)  Protein Requirements: 77-93g (1.0-1.2 g/kg (elderly + obese))  Weight Used For Protein Calculations: 77.6 kg (171 lb 1.2 oz)  Fluid Requirements (mL): 1ml/kcal of fluid or per MD     RDA Method (mL): 1407  CHO Requirement: 175-211 g      Nutrition Prescription Ordered    Current Diet Order: NPO  Current Nutrition Support Formula Ordered: Glucerna 1.5  Current Nutrition Support Rate Ordered: 40 (ml) (mL)  Current Nutrition Support Frequency Ordered: Continuous    Evaluation of Received Nutrient/Fluid Intake    Enteral Calories (kcal): 1440  Enteral Protein (gm): 79  Enteral (Free Water) Fluid (mL): 729  I/O: +2.51 L  Comments: LBM: 12/1  % Intake of Estimated Energy Needs: 75 - 100 %  % Meal Intake: NPO    Nutrition Risk    Level of Risk/Frequency of Follow-up: low - moderate (f/u 1-2x/week)     Monitor and Evaluation    Food and Nutrient Intake: enteral nutrition intake, parenteral nutrition intake  Food and Nutrient Adminstration: enteral and parenteral nutrition administration  Knowledge/Beliefs/Attitudes: food and nutrition knowledge/skill, beliefs and attitudes  Physical Activity and Function: nutrition-related ADLs and IADLs  Anthropometric Measurements: body mass index, weight change, weight, height/length  Biochemical Data, Medical Tests and Procedures: lipid profile, electrolyte and renal panel,  gastrointestinal profile, glucose/endocrine profile, inflammatory profile  Nutrition-Focused Physical Findings: skin, head and eyes, extremities, muscles and bones, overall appearance     Nutrition Follow-Up    RD Follow-up?: Yes    Ankur Treviño Registration Eligible, Provisional LDN

## 2023-12-04 NOTE — SIGNIFICANT EVENT
Resp status has not improved and pt remains with labored shallow resp effort. Dr fajardo notified and he is at bedside for intubation       CINDY Mitchell NP   NCC

## 2023-12-04 NOTE — NURSING
MD Griffin @ bedside to intubate   RTx2 and RN x4  's, O2 sats 85%   Pt being bagged by RT   0106 30mg etomidate   0107 70mg sonia given     0108 's O2 sats 100%  Color change noted, BBS auscultated, 24 @ teeth 8.0 inch tube

## 2023-12-04 NOTE — ASSESSMENT & PLAN NOTE
Mixed (septic, hypovolemic, cardiogenic from chronotropic incompetence from amio?)  Febrile  Leukocytosis  Mosf (live, hypotension, afib rvr, hyperglycemia, acute encephalopathy, thrombocytopenia/coagulopathy, acute respiratory failure, nagma w lactic acidosis not clearing on repeat this am)    Cultures pending  Yeast in resp culture (usually colonization but pt was intubated less than 12 hrs ago)  Cxr clear    -broad spec abx, add micafungin empirically  -map>65, on three pressors, add hydrocort/fludrocort, fluid responsive cont aggressive fluid resuscitation, trial bicarb bolus, monitor on flotrac  -consider ct c/a/p if no improvement (eval peg site, no signs of peritonitis currently)  -hold amio and avn blockade for now  -minimize sedation  -repeat tte  -euglycemia on insulin gtt, eunatrmia, euthermia  -plts>20k unless active bleeding, hold asa for now    d/w family, high risk for mortality

## 2023-12-05 PROBLEM — K72.00 LIVER FAILURE, ACUTE: Status: ACTIVE | Noted: 2023-01-01

## 2023-12-05 PROBLEM — N17.9 AKI (ACUTE KIDNEY INJURY): Status: ACTIVE | Noted: 2023-01-01

## 2023-12-05 PROBLEM — D69.6 THROMBOCYTOPENIA: Status: ACTIVE | Noted: 2023-01-01

## 2023-12-05 PROBLEM — Z99.11 ON MECHANICALLY ASSISTED VENTILATION: Status: ACTIVE | Noted: 2023-01-01

## 2023-12-05 NOTE — ANESTHESIA POSTPROCEDURE EVALUATION
Anesthesia Post Evaluation    Patient: Tim Morvant    Procedure(s) Performed: Procedure(s) (LRB):  INSERTION, PEG TUBE (N/A)    Final Anesthesia Type: general      Patient location during evaluation: PACU  Patient participation: Yes- Able to Participate  Level of consciousness: awake and alert and oriented  Post-procedure vital signs: reviewed and stable  Pain management: adequate  Airway patency: patent    PONV status at discharge: No PONV  Anesthetic complications: no      Cardiovascular status: hemodynamically stable  Respiratory status: unassisted, spontaneous ventilation and room air  Hydration status: euvolemic  Follow-up not needed.        Vital signs stable    No case tracking events are documented in the log.      Pain/Vilma Score: Pain Rating Prior to Med Admin: 0 (12/4/2023  5:38 PM)  Pain Rating Post Med Admin: 0 (12/4/2023 10:05 PM)

## 2023-12-05 NOTE — NURSING
On 0500 assessment, RN could not palpate radial pulses bilaterally. Doppler obtained and unable to hear pulses via doppler. Confirmed with second RN. Ryankabrittni PA notified of assessment. PA to bedside and requests Ultrasound. Bilateral Radial pulses noted to be absent via ultrasound. Posterior tibial pulses seen bilaterally. RN ordered to decrease Tod and increase Levo with Provider at bedside. See I&O flowsheet for details on titrations with PA at bedside for verbal orders on titrations. RN also ordered to decrease Prop ggt to 40 if the pt tolerated decreased sedation. Ggt decreased and pt stable. RN WCTM and escalate further concerns to NCC team.

## 2023-12-05 NOTE — SUBJECTIVE & OBJECTIVE
Interval History:  Remains intubated and sedated on propofol. Amio resumed last night for a-fib w/ RVR and multiple runs of v-tach. Currently requiring 3 pressors to maintain BP, vascular surgery consulted for absent peripheral pulses and cyanotic extremities. Family meeting today to discuss plan, see ACP note for details.     Review of Systems   Unable to perform ROS: Intubated     Objective:     Vitals:  Temp: 99.3 °F (37.4 °C)  Pulse: 77  Rhythm: atrial rhythm  BP: (!) 110/58  MAP (mmHg): 77  Resp: (!) 30  SpO2: (!) 90 %  Oxygen Concentration (%): 40  Vent Mode: A/C  Set Rate: 16 BPM  Vt Set: 440 mL  PEEP/CPAP: 5 cmH20  Peak Airway Pressure: 14 cmH20  Mean Airway Pressure: 10 cmH20  Plateau Pressure: 0 cmH20    Temp  Min: 98.8 °F (37.1 °C)  Max: 100.6 °F (38.1 °C)  Pulse  Min: 36  Max: 159  BP  Min: 78/42  Max: 217/86  MAP (mmHg)  Min: 56  Max: 139  Resp  Min: 17  Max: 39  SpO2  Min: 78 %  Max: 100 %  Oxygen Concentration (%)  Min: 40  Max: 40    12/04 0701 - 12/05 0700  In: 8101.5 [I.V.:5845.3]  Out: 1800 [Urine:1800]   Unmeasured Output  Stool Occurrence: 1  Pad Count: 1        Physical Exam  Vitals and nursing note reviewed.   Constitutional:       Interventions: He is sedated and intubated.   Eyes:      Pupils: Pupils are equal, round, and reactive to light.   Cardiovascular:      Rate and Rhythm: Rhythm irregular. Extrasystoles are present.     Pulses: Decreased pulses (BUE/BLE).   Pulmonary:      Effort: Pulmonary effort is normal. He is intubated.      Breath sounds: Rhonchi present.   Abdominal:      General: Bowel sounds are normal.      Palpations: Abdomen is soft.   Skin:     Capillary Refill: Capillary refill takes more than 3 seconds.   Neurological:      GCS: GCS eye subscore is 1. GCS verbal subscore is 1. GCS motor subscore is 4.      Comments:   -Exam completed on propofol @ 40mcg  -E1 V1T M4  -PERRL  -Localizes w/ RUE/RLE; moves spontaneously  -No response to noxious stimuli w/  LUE/LLE  -Cough/gag/corneal reflexes present       Unable to test orientation, language, memory, judgment, insight, fund of knowledge, hearing, shoulder shrug, tongue protrusion, coordination, gait due to level of consciousness.       Medications:  Continuousamiodarone in dextrose 5%, Last Rate: 1 mg/min (12/05/23 1400)  fentanyl citrate-0.9%NaCl (PF), Last Rate: 50 mcg/hr (12/05/23 1400)  insulin regular 1 units/mL infusion orderable (DKA), Last Rate: 20.95 Units/hr (12/05/23 1400)  lactated ringers, Last Rate: 50 mL/hr at 12/05/23 1400  NORepinephrine bitartrate-D5W, Last Rate: 0.38 mcg/kg/min (12/05/23 1400)  phenylephrine, Last Rate: 2.5 mcg/kg/min (12/05/23 1400)  propofoL, Last Rate: Stopped (12/05/23 1231)  vasopressin, Last Rate: 0.04 Units/min (12/05/23 1400)    Schedulederythromycin, , QID  famotidine, 20 mg, Daily  fludrocortisone, 100 mcg, Daily  hydrocortisone sodium succinate, 100 mg, Q8H  levETIRAcetam (Keppra) IV (PEDS and ADULTS), 750 mg, Q12H  micafungin (MYCAMINE) IVPB, 100 mg, Q24H  nitroGLYCERIN 2% TD oint, 0.5 inch, Q6H  piperacillin-tazobactam (Zosyn) IV (PEDS and ADULTS) (extended infusion is not appropriate), 4.5 g, Q8H  polyethylene glycol, 17 g, BID  polymyxin B sulf-trimethoprim, 1 drop, QID  senna-docusate 8.6-50 mg, 2 tablet, BID    PRNacetaminophen, 650 mg, Q4H PRN  dextrose 10%, 12.5 g, PRN  dextrose 10%, 25 g, PRN  fentanyl, 25 mcg, Q30 Min PRN  hydrALAZINE, 10 mg, Q4H PRN  labetalol, 10 mg, Q4H PRN  ondansetron, 4 mg, Q6H PRN  vancomycin - pharmacy to dose, , pharmacy to manage frequency      Today I personally reviewed pertinent medications, lines/drains/airways, imaging, cardiology results, laboratory results, microbiology results, notably: CTH; CMP; CBC    Diet  Diet NPO  Diet NPO

## 2023-12-05 NOTE — NURSING
Raymon MABRY notified of pt heart rhythm. Pt experiencing several runs of V tach alarms on monitor and frequent PVCs, up to 68/min according to the monitor. All other VSS. No new orders at this time. RN WCTM

## 2023-12-05 NOTE — PLAN OF CARE
[facility] Care Plan    POC reviewed with Tim Rhodes and family at 0300. Pt verbalized understanding. Questions and concerns addressed. No acute events overnight. Pt progressing toward goals. Will continue to monitor. See below and flowsheets for full assessment and VS info.   -neuro exam suppressed r/t sedation requirements. See flowsheets for details. Raymon MABRY aware of exam.   - BP WDL. Vaso, Tod and Levo remain on. Titrations outside of order sets done per verbal order with Augustus MABRY at bedside. Per PA, RN to decrease Tod and increase Levo for MAP goals  - Multiple abdnormal EKG strips noted overnight with the monitor alarming V tach. Frequent PVCs noted as well with the monitor showing up to 60/min. See previous note for details.   - Fent push x2 r/t vent dyssynchrony   - Prop ggt initiated   - Amio ggt started   - 1g of Ca Gluconate given   - 2g of Mag administered   - Potassium currently being replaced per central line   - Nitro Paste applied to Hands and feet. Sign placed on bed   - Team aware of loss of bilateral radial pulse. Pulse not heard on doppler and not seen by PA upon ultrasound. Brachial pulses doppler without issue. Posterior tibial pulses also seen on ultrasound.   - LR remains at 50  - all critical values called to Ryankal PA (Calcium, Platelets)        Is this a stroke patient? yes- Stroke booklet reviewed with patient, risk factors identified for patient and stroke booklet remains at bedside for ongoing education.     Neuro:  Win Coma Scale  Best Eye Response: 1-->(E1) none  Best Motor Response: 4-->(M4) withdraws from pain  Best Verbal Response: 1-->(V1) none  Win Coma Scale Score: 6  Assessment Qualifiers: patient chemically sedated or paralyzed, patient intubated  Pupil PERRLA: yes     24hr Temp:  [98.6 °F (37 °C)-101.1 °F (38.4 °C)]     CV:   Rhythm: normal sinus rhythm  BP goals:   SBP < 160  MAP > 65    Resp:      Vent Mode: A/C  Set Rate: 16 BPM  Oxygen Concentration (%):  40  Vt Set: 440 mL  PEEP/CPAP: 5 cmH20  Pressure Support: 5 cmH20    Plan: wean to extubate    GI/:     Diet/Nutrition Received: NPO  Last Bowel Movement: 12/04/23  Voiding Characteristics: urethral catheter (bladder)    Intake/Output Summary (Last 24 hours) at 12/5/2023 0610  Last data filed at 12/5/2023 0605  Gross per 24 hour   Intake 8461.26 ml   Output 2390 ml   Net 6071.26 ml     Unmeasured Output  Stool Occurrence: 1  Pad Count: 1    Labs/Accuchecks:  Recent Labs   Lab 12/04/23  1549 12/05/23  0013 12/05/23  0305 12/05/23  0456   WBC 25.25*  --  23.88*  --    RBC 3.57*  --  3.20*  --    HGB 10.3*  --  9.2*  --    HCT 31.6*   < > 28.8* 25*   PLT 25*  --   --   --     < > = values in this interval not displayed.      Recent Labs   Lab 12/05/23  0305   *   K 3.4*   CO2 15*   *   BUN 50*   CREATININE 1.7*   ALKPHOS 70   *   AST 1,002*   BILITOT 0.8      Recent Labs   Lab 12/04/23  1549   INR 1.8*      Recent Labs   Lab 11/29/23  0440   TROPONINI 0.121*       Electrolytes: Electrolytes replaced  Accuchecks: Q1H    Gtts:   amiodarone in dextrose 5% 0.5 mg/min (12/05/23 0605)    insulin regular 1 units/mL infusion orderable (DKA) 12.9 Units/hr (12/05/23 0605)    lactated ringers 50 mL/hr at 12/05/23 0605    NORepinephrine bitartrate-D5W 0.32 mcg/kg/min (12/05/23 0605)    phenylephrine 1.5 mcg/kg/min (12/05/23 0605)    propofoL 40 mcg/kg/min (12/05/23 0605)    vasopressin 0.04 Units/min (12/05/23 0605)       LDA/Wounds:  Lines/Drains/Airways       Central Venous Catheter Line  Duration             Percutaneous Central Line Insertion/Assessment - Triple Lumen  12/04/23 0246 Femoral Vein Left 1 day              Drain  Duration                  Gastrostomy/Enterostomy 12/01/23 1145 Percutaneous endoscopic gastrostomy (PEG) LUQ feeding 3 days         Urethral Catheter 12/04/23 0633 Non-latex <1 day              Airway  Duration                  Airway - Non-Surgical 12/04/23 0130 Endotracheal Tube 1  day              Arterial Line  Duration             Arterial Line 12/04/23 1210 Right Femoral <1 day              Peripheral Intravenous Line  Duration                  Peripheral IV - Single Lumen 11/29/23 0041 18 G;1 3/4 in Anterior;Right Upper Arm 6 days         Midline Catheter Insertion/Assessment  - Single Lumen 11/29/23 1255 Left basilic vein (medial side of arm) 20g x 10cm 5 days         Peripheral IV - Single Lumen 12/04/23 0245 20 G Anterior;Right Forearm 1 day                  Wounds: Yes  Wound care consulted: Yes   Problem: Cerebral Tissue Perfusion (Stroke, Ischemic/Transient Ischemic Attack)  Goal: Optimal Cerebral Tissue Perfusion  Outcome: Ongoing, Progressing  Intervention: Protect and Optimize Cerebral Perfusion  Flowsheets (Taken 12/5/2023 0609)  Sensory Stimulation Regulation: quiet environment promoted  Cerebral Perfusion Promotion: blood pressure monitored  Fluid/Electrolyte Management: fluids provided  Stabilization Measures: airway opened     Problem: Functional Ability Impaired (Stroke, Ischemic/Transient Ischemic Attack)  Goal: Optimal Functional Ability  Outcome: Ongoing, Progressing  Problem: Sensorimotor Impairment (Stroke, Ischemic/Transient Ischemic Attack)  Goal: Improved Sensorimotor Function  Intervention: Optimize Range of Motion, Motor Control and Function  Flowsheets (Taken 12/5/2023 0609)  Positioning: Shoulder: positioned shoulder flat when supine  Spasticity Management: positioned with supportive device  Positioning/Transfer Devices:   in use   pillows  Range of Motion: ROM (range of motion) performed  Intervention: Optimize Sensory and Perceptual Ability  Flowsheets (Taken 12/5/2023 0609)  Pressure Reduction Techniques: frequent weight shift encouraged  Sensation Impairment Protection: cues provided for safety  Pressure Reduction Devices:   alternating pressure pump (ADD)   specialty bed utilized   pressure-redistributing mattress utilized

## 2023-12-05 NOTE — PROGRESS NOTES
Pharmacokinetic Assessment Follow Up: IV Vancomycin    Vancomycin serum concentration assessment(s):    The random level was drawn correctly and can be used to guide therapy at this time. The measurement is below the desired definitive target range of 15 to 20 mcg/mL.    Vancomycin Regimen Plan:  Give Vancomycin 1250 mg IV x1 dose today  Re-dose when the random level is less than 20 mcg/mL, next level to be drawn at 02:00 on 12/6/23    Drug levels (last 3 results):  Recent Labs   Lab Result Units 12/05/23  0107   Vancomycin, Random ug/mL 10.5       Pharmacy will continue to follow and monitor vancomycin.    Please contact pharmacy at extension [phone] for questions regarding this assessment.    Thank you for the consult,   Ford Leija       Patient brief summary:  Tim Rhodes is a 83 y.o. male initiated on antimicrobial therapy with IV Vancomycin for treatment of bacteremia    The patient's current regimen is pulse dosing when random level is less than 20 mcg/ml    Drug Allergies:   Review of patient's allergies indicates:  No Known Allergies    Actual Body Weight:   77.6 kg    Renal Function:   Estimated Creatinine Clearance: 33.4 mL/min (A) (based on SCr of 1.6 mg/dL (H)).,     Dialysis Method (if applicable):  N/A    CBC (last 72 hours):  Recent Labs   Lab Result Units 12/03/23  0307 12/03/23  2326 12/04/23  0428 12/04/23  1549   WBC K/uL 11.66 21.98* 18.33* 25.25*   Hemoglobin g/dL 13.7* 12.5* 10.2* 10.3*   Hematocrit % 44.1 41.5 34.6* 31.6*   Platelets K/uL 59* 40* 26* 25*   Gran % % 85.3* 79.1* 96.0* 87.9*   Lymph % % 7.2* 12.1* 1.0* 4.2*   Mono % % 5.5 6.0 0.0* 3.6*   Eosinophil % % 0.6 0.3 0.0 0.1   Basophil % % 0.3 0.5 0.0 0.4   Differential Method  Automated Automated Manual Automated       Metabolic Panel (last 72 hours):  Recent Labs   Lab Result Units 12/02/23  0850 12/02/23  1555 12/03/23  0034 12/03/23  1927 12/03/23  2326 12/04/23  0039 12/04/23  0428 12/04/23  0745 12/04/23  1549 12/04/23 2058  12/04/23  2307   Sodium mmol/L 157* 156* 156*  155* 157* 155*  --  153*  --  151* 150*  --    Potassium mmol/L  --   --  3.7  --  4.2  --  4.3 3.4* 3.6 3.9 3.8   Chloride mmol/L  --   --  123*  --  122*  --  117*  --  122* 121*  --    CO2 mmol/L  --   --  21*  --  18*  --  19*  --  19* 18*  --    Glucose mg/dL  --   --  203*  --  312*  --  526*  --  287* 327*  --    Glucose, UA   --   --   --   --   --  4+*  --   --   --   --   --    BUN mg/dL  --   --  38*  --  50*  --  60*  --  54* 51*  --    Creatinine mg/dL  --   --  1.0  --  1.4  --  2.1*  --  1.7* 1.6*  --    Albumin g/dL  --   --  2.4*  --  2.4*  --  1.9*  --  1.7* 1.7*  --    Total Bilirubin mg/dL  --   --  0.8  --  0.6  --  0.6  --  0.9 0.8  --    Alkaline Phosphatase U/L  --   --  73  --  91  --  73  --  75 70  --    AST U/L  --   --  31  --  32  --  65*  --  1,429* 1,252*  --    ALT U/L  --   --  30  --  33  --  51*  --  893* 885*  --    Magnesium mg/dL  --   --  2.4  --   --   --  2.4  --   --   --   --    Phosphorus mg/dL  --   --  3.0  3.0  --   --   --   --   --   --   --   --        Vancomycin Administrations:  vancomycin given in the last 96 hours                     vancomycin 1,500 mg in dextrose 5 % (D5W) 250 mL IVPB (Vial-Mate) (mg) 1,500 mg New Bag 12/04/23 0237                    Microbiologic Results:  Microbiology Results (last 7 days)       Procedure Component Value Units Date/Time    Rapid Organism ID by PCR (from Blood culture) [[id number]] Collected: 12/04/23 0121    Order Status: No result Updated: 12/05/23 0148    Blood culture [[id number]] Collected: 12/04/23 0121    Order Status: Completed Specimen: Blood from Peripheral, Ankle, Right Updated: 12/05/23 0147     Blood Culture, Routine Gram stain miguel bottle: Gram positive cocci in clusters resembling Staph      Results called to and read back by:Alvarado Patel RN 12/05/2023  01:47    Narrative:      Blood cultures x 2 different sites. 4 bottles total. Please  draw cultures before  administering antibiotics.    Culture, Respiratory with Gram Stain [[id number]] Collected: 12/04/23 0220    Order Status: Completed Specimen: Respiratory from Sputum Updated: 12/04/23 1025     Gram Stain (Respiratory) <10 epithelial cells per low power field.     Gram Stain (Respiratory) Few WBC's     Gram Stain (Respiratory) Many yeast    Blood culture [[id number]] Collected: 12/04/23 0124    Order Status: Completed Specimen: Blood from Peripheral, Wrist, Left Updated: 12/04/23 0915     Blood Culture, Routine No Growth to date    Narrative:      Blood cultures from 2 different sites. 4 bottles total.  Please draw before starting antibiotics.    Culture, Respiratory with Gram Stain [[id number]] Collected: 12/04/23 0039    Order Status: Sent Specimen: Respiratory from Sputum Updated: 12/04/23 0040    Blood culture [[id number]] Collected: 11/28/23 1218    Order Status: Completed Specimen: Blood from Peripheral, Ankle, Left Updated: 12/03/23 1412     Blood Culture, Routine No growth after 5 days.    Blood culture [[id number]] Collected: 11/28/23 1216    Order Status: Completed Specimen: Blood from Peripheral, Ankle, Right Updated: 12/03/23 1412     Blood Culture, Routine No growth after 5 days.    Blood culture #2 **CANNOT BE ORDERED STAT** [[id number]] Collected: 11/24/23 0131    Order Status: Completed Specimen: Blood Updated: 11/29/23 0612     Blood Culture, Routine No growth after 5 days.    Blood culture #1 **CANNOT BE ORDERED STAT** [[id number]] Collected: 11/23/23 1808    Order Status: Completed Specimen: Blood Updated: 11/28/23 2212     Blood Culture, Routine No growth after 5 days.

## 2023-12-05 NOTE — CONSULTS
[facility]  Infectious Disease  Consult Note    Patient Name: Tim Rhodes  MRN: [id number]  Admission Date: 11/23/2023  Hospital Length of Stay: 12 days  Attending Physician: Lg Yuen DO  Primary Care Provider: No, Primary Doctor     Isolation Status: No active isolations    Patient information was obtained from relative(s), past medical records, and ER records.      Inpatient consult to Infectious Diseases  Consult performed by: Diomedes Ledezma MD  Consult ordered by: Raphael Pina MD        Assessment/Plan:     ID  Septic shock  84 yo man admitted with right MCA CVA and a-fib, developing sepsis and readmitted to [facility]  - ID consulted for sepsis  - broadly covered empirically with Zosyn, vancomycin, and micafungin  - this should cover the usual hospital acquired suspects  - likely MRSE in blood but that would be an unusual cause of septic shock  - repeat cultures pending  - continue Zosyn for possible aspiration events/sepsis  - vancomycin for MRSE  - continue empiric micafungin for another 24h or so  - final abx recs will depend on final culture results as well as patient's clinical response to therapy  - d/w Nursing    Thank you for your consult. I will follow-up with patient. Please contact us if you have any additional questions.    Diomedes Ledezma MD  Infectious Disease  [facility] - Neuro Critical Care    Subjective:     Principal Problem: Embolic stroke involving right middle cerebral artery    HPI: Mr. Rhodes is an 83 y.o. man admitted with embolic CVA from atrial fibrillation. He was transferred from [facility] with AMS and seizure due to suspected right MCA occlusion. [place] [facility] [place]. Patient was intubated and sedated at OSH for airway protection and transferred here on 11/23/23. Seen by NI and taken for angiogram but other interventions were not possible. Remined in [facility] until 12/3 and transferred to floor but developed SOB and  respiratory distress, necessitating transfer back to [facility] on 12/4. Empiric abx (Zosyn, vancomycin, and micafungin) started due to hypotension and possible sepsis. Labs reveals persistent leukocytosis and thrombocytopenia. Peripheral blood cultures obtained are growing likely MRSE. ID is consulted for sepsis. The patient is critically ill on pressors with mottling of hands and feet. Remains intubated. Family at bedside.    Past Medical History:   Diagnosis Date    Arthritis     Weiss's esophagus     CHF (congestive heart failure)     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/23/2023    GERD (gastroesophageal reflux disease)     Type 2 diabetes mellitus        Past Surgical History:   Procedure Laterality Date    CEREBRAL ANGIOGRAM N/A 11/23/2023    Procedure: ANGIOGRAM-CEREBRAL;  Surgeon: Valery Hyman;  Location: [facility];  Service: Anesthesiology;  Laterality: N/A;    INSERTION, PEG TUBE N/A 12/1/2023    Procedure: INSERTION, PEG TUBE;  Surgeon: Tanner Villarreal MD;  Location: [facility] ([address] [place] [address]);  Service: Endoscopy;  Laterality: N/A;       Review of patient's allergies indicates:  No Known Allergies    Medications:  Medications Prior to Admission   Medication Sig    amLODIPine (NORVASC) 10 MG tablet Take 10 mg by mouth once daily.    atorvastatin (LIPITOR) 20 MG tablet Take 20 mg by mouth once daily.    cloNIDine (CATAPRES) 0.1 MG tablet Take 0.1 mg by mouth 2 (two) times daily.    glipiZIDE (GLUCOTROL) 10 MG tablet Take 10 mg by mouth 2 (two) times daily.    JARDIANCE 25 mg tablet Take 25 mg by mouth once daily.    metoprolol succinate (TOPROL-XL) 100 MG 24 hr tablet Take 100 mg by mouth once daily.    omeprazole (PRILOSEC) 20 MG capsule Take 20 mg by mouth 2 (two) times daily.    oxybutynin (DITROPAN) 5 MG Tab Take 5 mg by mouth once daily.    RYBELSUS 14 mg tablet Take 14 mg by mouth once daily.    tamsulosin (FLOMAX) 0.4 mg Cap Take 0.4 mg by mouth once daily.     Antibiotics (From  admission, onward)      Start     Stop Route Frequency Ordered    12/04/23 0145  piperacillin-tazobactam (ZOSYN) 4.5 g in dextrose 5 % in water (D5W) 100 mL IVPB (MB+)         12/12/23 0144 IV Every 8 hours (non-standard times) 12/04/23 0043    12/04/23 0132  vancomycin - pharmacy to dose  (vancomycin IVPB (PEDS and ADULTS))        See Hyperspace for full Linked Orders Report.    -- IV pharmacy to manage frequency 12/04/23 0034    11/30/23 1700  erythromycin 5 mg/gram (0.5 %) ophthalmic ointment         12/10/23 1659 Both Eyes 4 times daily 11/30/23 1527    11/30/23 1700  polymyxin B sulf-trimethoprim 10,000 unit- 1 mg/mL ophthalmic drops 1 drop         12/10/23 1659 Both Eyes 4 times daily 11/30/23 1527          Antifungals (From admission, onward)      Start     Stop Route Frequency Ordered    12/04/23 1300  micafungin 100 mg in sodium chloride 0.9 % 100 mL IVPB (MB+)         -- IV Every 24 hours (non-standard times) 12/04/23 1210          Antivirals (From admission, onward)      None               There is no immunization history on file for this patient.    Family History    None       Social History     Socioeconomic History    Marital status: Unknown     Social Determinants of Health     Housing Stability: Low Risk  (11/24/2023)    Housing Stability Vital Sign     Unable to Pay for Housing in the Last Year: No     Number of Places Lived in the Last Year: 1     Unstable Housing in the Last Year: No     Review of Systems   Unable to perform ROS: Intubated     Objective:     Vital Signs (Most Recent):  Temp: 99.1 °F (37.3 °C) (12/05/23 1139)  Pulse: 80 (12/05/23 1139)  Resp: (!) 30 (12/05/23 1139)  BP: (!) 150/107 (12/05/23 1100)  SpO2: 100 % (12/05/23 1139) Vital Signs (24h Range):  Temp:  [98.6 °F (37 °C)-100.9 °F (38.3 °C)] 99.1 °F (37.3 °C)  Pulse:  [] 80  Resp:  [17-41] 30  SpO2:  [85 %-100 %] 100 %  BP: ()/() 150/107  Arterial Line BP: (0-285)/(-49-79) 118/52     Weight: 77.6 kg (171  lb)  Body mass index is 30.29 kg/m².    Estimated Creatinine Clearance: 35.6 mL/min (A) (based on SCr of 1.5 mg/dL (H)).     Physical Exam  Vitals and nursing note reviewed.   Constitutional:       Appearance: Normal appearance. He is ill-appearing.   HENT:      Head: Normocephalic.      Right Ear: External ear normal.      Left Ear: External ear normal.   Eyes:      Pupils: Pupils are equal, round, and reactive to light.   Cardiovascular:      Rate and Rhythm: Rhythm irregular.   Pulmonary:      Breath sounds: Rhonchi present. No rales.   Abdominal:      Tenderness: There is no guarding or rebound.   Musculoskeletal:         General: No swelling.   Skin:     Coloration: Skin is pale.          Significant Labs: Blood Culture:   Recent Labs   Lab 11/24/23  0131 11/28/23  1216 11/28/23  1218 12/04/23  0121 12/04/23  0124   LABBLOO No growth after 5 days. No growth after 5 days. No growth after 5 days. Gram stain miguel bottle: Gram positive cocci in clusters resembling Staph  Results called to and read back by:Alvarado Patel RN 12/05/2023  01:47 Gram stain miguel bottle: Gram positive cocci in clusters resembling Staph  Results called to and read back by: Alvarado Patel RN 12/05/2023  01:47     CBC:   Recent Labs   Lab 12/04/23  1549 12/05/23  0013 12/05/23  0305 12/05/23  0456 12/05/23  0813   WBC 25.25*  --  23.88*  --  25.81*   HGB 10.3*  --  9.2*  --  9.1*   HCT 31.6*   < > 28.8* 25* 27.8*   PLT 25*  --  23*  --  25*    < > = values in this interval not displayed.     CMP:   Recent Labs   Lab 12/04/23  2058 12/04/23  2307 12/05/23  0305 12/05/23  0813   *  --  147* 146*   K 3.9 3.8 3.4* 4.0   *  --  118* 118*   CO2 18*  --  15* 16*   *  --  334* 221*   BUN 51*  --  50* 50*   CREATININE 1.6*  --  1.7* 1.5*   CALCIUM 6.9*  --  7.3* 7.4*   PROT 4.7*  --  4.7* 5.0*   ALBUMIN 1.7*  --  1.6* 1.7*   BILITOT 0.8  --  0.8 0.7   ALKPHOS 70  --  70 76   AST 1,252*  --  1,002* 943*   *  --  923* 958*  "  ANIONGAP 11  --  14 12     Procalcitonin:   Recent Labs   Lab 12/03/23  2326   PROCAL 0.43*     Respiratory Culture:   Recent Labs   Lab 12/04/23  0220   GSRESP <10 epithelial cells per low power field.  Few WBC's  Many yeast     Urine Culture: No results for input(s): "LABURIN" in the last 4320 hours.    Significant Imaging: I have reviewed all pertinent imaging results/findings within the past 24 hours.              "

## 2023-12-05 NOTE — SUBJECTIVE & OBJECTIVE
Past Medical History:   Diagnosis Date    Arthritis     Weiss's esophagus     CHF (congestive heart failure)     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/23/2023    GERD (gastroesophageal reflux disease)     Type 2 diabetes mellitus        Past Surgical History:   Procedure Laterality Date    CEREBRAL ANGIOGRAM N/A 11/23/2023    Procedure: ANGIOGRAM-CEREBRAL;  Surgeon: Valery Hyman;  Location: [facility];  Service: Anesthesiology;  Laterality: N/A;    INSERTION, PEG TUBE N/A 12/1/2023    Procedure: INSERTION, PEG TUBE;  Surgeon: Tanner Villarreal MD;  Location: [facility] ([address] [place] [address]);  Service: Endoscopy;  Laterality: N/A;       Review of patient's allergies indicates:  No Known Allergies    Medications:  Medications Prior to Admission   Medication Sig    amLODIPine (NORVASC) 10 MG tablet Take 10 mg by mouth once daily.    atorvastatin (LIPITOR) 20 MG tablet Take 20 mg by mouth once daily.    cloNIDine (CATAPRES) 0.1 MG tablet Take 0.1 mg by mouth 2 (two) times daily.    glipiZIDE (GLUCOTROL) 10 MG tablet Take 10 mg by mouth 2 (two) times daily.    JARDIANCE 25 mg tablet Take 25 mg by mouth once daily.    metoprolol succinate (TOPROL-XL) 100 MG 24 hr tablet Take 100 mg by mouth once daily.    omeprazole (PRILOSEC) 20 MG capsule Take 20 mg by mouth 2 (two) times daily.    oxybutynin (DITROPAN) 5 MG Tab Take 5 mg by mouth once daily.    RYBELSUS 14 mg tablet Take 14 mg by mouth once daily.    tamsulosin (FLOMAX) 0.4 mg Cap Take 0.4 mg by mouth once daily.     Antibiotics (From admission, onward)      Start     Stop Route Frequency Ordered    12/04/23 0145  piperacillin-tazobactam (ZOSYN) 4.5 g in dextrose 5 % in water (D5W) 100 mL IVPB (MB+)         12/12/23 0144 IV Every 8 hours (non-standard times) 12/04/23 0043    12/04/23 0132  vancomycin - pharmacy to dose  (vancomycin IVPB (PEDS and ADULTS))        See Hyperspace for full Linked Orders Report.    -- IV pharmacy to manage frequency 12/04/23 0034     11/30/23 1700  erythromycin 5 mg/gram (0.5 %) ophthalmic ointment         12/10/23 1659 Both Eyes 4 times daily 11/30/23 1527    11/30/23 1700  polymyxin B sulf-trimethoprim 10,000 unit- 1 mg/mL ophthalmic drops 1 drop         12/10/23 1659 Both Eyes 4 times daily 11/30/23 1527          Antifungals (From admission, onward)      Start     Stop Route Frequency Ordered    12/04/23 1300  micafungin 100 mg in sodium chloride 0.9 % 100 mL IVPB (MB+)         -- IV Every 24 hours (non-standard times) 12/04/23 1210          Antivirals (From admission, onward)      None               There is no immunization history on file for this patient.    Family History    None       Social History     Socioeconomic History    Marital status: Unknown     Social Determinants of Health     Housing Stability: Low Risk  (11/24/2023)    Housing Stability Vital Sign     Unable to Pay for Housing in the Last Year: No     Number of Places Lived in the Last Year: 1     Unstable Housing in the Last Year: No     Review of Systems   Unable to perform ROS: Intubated     Objective:     Vital Signs (Most Recent):  Temp: 99.1 °F (37.3 °C) (12/05/23 1139)  Pulse: 80 (12/05/23 1139)  Resp: (!) 30 (12/05/23 1139)  BP: (!) 150/107 (12/05/23 1100)  SpO2: 100 % (12/05/23 1139) Vital Signs (24h Range):  Temp:  [98.6 °F (37 °C)-100.9 °F (38.3 °C)] 99.1 °F (37.3 °C)  Pulse:  [] 80  Resp:  [17-41] 30  SpO2:  [85 %-100 %] 100 %  BP: ()/() 150/107  Arterial Line BP: (0-285)/(-49-79) 118/52     Weight: 77.6 kg (171 lb)  Body mass index is 30.29 kg/m².    Estimated Creatinine Clearance: 35.6 mL/min (A) (based on SCr of 1.5 mg/dL (H)).     Physical Exam  Vitals and nursing note reviewed.   Constitutional:       Appearance: Normal appearance. He is ill-appearing.   HENT:      Head: Normocephalic.      Right Ear: External ear normal.      Left Ear: External ear normal.   Eyes:      Pupils: Pupils are equal, round, and reactive to light.  "  Cardiovascular:      Rate and Rhythm: Rhythm irregular.   Pulmonary:      Breath sounds: Rhonchi present. No rales.   Abdominal:      Tenderness: There is no guarding or rebound.   Musculoskeletal:         General: No swelling.   Skin:     Coloration: Skin is pale.          Significant Labs: Blood Culture:   Recent Labs   Lab 11/24/23  0131 11/28/23  1216 11/28/23  1218 12/04/23  0121 12/04/23  0124   LABBLOO No growth after 5 days. No growth after 5 days. No growth after 5 days. Gram stain miguel bottle: Gram positive cocci in clusters resembling Staph  Results called to and read back by:Alvarado Patel RN 12/05/2023  01:47 Gram stain miguel bottle: Gram positive cocci in clusters resembling Staph  Results called to and read back by: Alvarado Patel RN 12/05/2023  01:47     CBC:   Recent Labs   Lab 12/04/23  1549 12/05/23  0013 12/05/23  0305 12/05/23  0456 12/05/23  0813   WBC 25.25*  --  23.88*  --  25.81*   HGB 10.3*  --  9.2*  --  9.1*   HCT 31.6*   < > 28.8* 25* 27.8*   PLT 25*  --  23*  --  25*    < > = values in this interval not displayed.     CMP:   Recent Labs   Lab 12/04/23  2058 12/04/23  2307 12/05/23  0305 12/05/23  0813   *  --  147* 146*   K 3.9 3.8 3.4* 4.0   *  --  118* 118*   CO2 18*  --  15* 16*   *  --  334* 221*   BUN 51*  --  50* 50*   CREATININE 1.6*  --  1.7* 1.5*   CALCIUM 6.9*  --  7.3* 7.4*   PROT 4.7*  --  4.7* 5.0*   ALBUMIN 1.7*  --  1.6* 1.7*   BILITOT 0.8  --  0.8 0.7   ALKPHOS 70  --  70 76   AST 1,252*  --  1,002* 943*   *  --  923* 958*   ANIONGAP 11  --  14 12     Procalcitonin:   Recent Labs   Lab 12/03/23  2326   PROCAL 0.43*     Respiratory Culture:   Recent Labs   Lab 12/04/23  0220   GSRESP <10 epithelial cells per low power field.  Few WBC's  Many yeast     Urine Culture: No results for input(s): "LABURIN" in the last 4320 hours.    Significant Imaging: I have reviewed all pertinent imaging results/findings within the past 24 hours.  "

## 2023-12-05 NOTE — HPI
Mr. Rhodes is an 83 y.o. man admitted with embolic CVA from atrial fibrillation. He was transferred from [facility] with AMS and seizure due to suspected right MCA occlusion. [place] [facility] [place]. Patient was intubated and sedated at OSH for airway protection and transferred here on 11/23/23. Seen by NI and taken for angiogram but other interventions were not possible. Remined in [facility] until 12/3 and transferred to floor but developed SOB and respiratory distress, necessitating transfer back to [facility] on 12/4. Empiric abx (Zosyn, vancomycin, and micafungin) started due to hypotension and possible sepsis. Labs reveals persistent leukocytosis and thrombocytopenia. Peripheral blood cultures obtained are growing likely MRSE. ID is consulted for sepsis. The patient is critically ill on pressors with mottling of hands and feet. Remains intubated. Family at bedside.

## 2023-12-05 NOTE — ASSESSMENT & PLAN NOTE
82 yo man admitted with right MCA CVA and a-fib, developing sepsis and readmitted to [facility]  - ID consulted for sepsis  - broadly covered empirically with Zosyn, vancomycin, and micafungin  - this should cover the usual hospital acquired suspects  - likely MRSE in blood but that would be an unusual cause of septic shock  - continue Zosyn for possible aspiration events/sepsis  - vancomycin for MRSE  - continue empiric micafungin for another 24h or so  - final abx recs will depend on final culture results as well as patient's clinical response to therapy  - d/w Nursing

## 2023-12-05 NOTE — ACP (ADVANCE CARE PLANNING)
Advance Care Planning     Today a voluntary meeting took place: other (conference room) [facility] Conference room    Patient Participation: Patient is unable to participate     Attendees (Name and  Relationship to patient):  Patient adult children, brother, sister-in-law    Staff attendees (Name and  Role): Myself (Lg Yuen DO Neuro critical care staff physician), Socorro Stout NP, Aminta David staff bedside RN    ACP Conversation (General): Understanding of current condition Discussed circumstances of past 48 hours including Mr Rhodes's severe deterioration following  step down to floor service.  Discussed working diagnosis of multifactorial (likely distributive) shock and multi-organ failure, and his current need for multiple vasopressors to maintain life sustaining pressures, ventilator requirement, broad spectrum antibiotics, and lactic acidosis.  Discussed some minor improvements in acidosis and oxygenation over past 24 hours but that he continues to show signs of renal and hepatic failure and has not cleared lactic despite maximal therapies. Also discussed that ID is on case to assist in source determination but that Mr Rhodes is unfortunately too unstable right now to go downstairs for CT scans.          We did then discuss Mr Rhodes's potential preferences for care, in the event he would be sitting in conference room with us and had opportunity to make own decision.  I discussed that I cannot guarantee his survival over next several days, and that even if he does survive present illness, will almost certainly have substantially more debility and dependence than prior to this decompensation event, given already-present large volume stroke and now significant critical illness.  Explained he may be dependent of care for rest of his natural life even in event of survival, although we cannot know these things for sure. Family shared with me that he had not directly discussed such topics with them, but  relayed that while he would not want to be perpetually dependent on machines to live, that he would likely be accepting of adequate time period to attempt recovery even in current state of critical illness.  We did discuss possible limitations on extreme care in event his heart were to stop despite above maximal therapies - family discussed amongst selves without us present in room and did relay that Mr Rhodes would not want to be artificially resuscitated should his heart stop in the ICU.     Code Status: Partial code -- continued intubation, medications including pressors, antibiotics, and cardioversion with retained pulse would be ok - however no chest compressions or defibrillations should his heart stop.       Goals of care: The family endorses that what is most important right now is to focus on improvement in condition but with limits to invasive therapies    Accordingly, we have decided that the best plan to meet the patient's goals includes continuing with treatment with specific limitations in event of cardiac arrest as outlined above.       Length of ACP   conversation in minutes: 40 minutes     Lg Yuen DO  Neuro Critical Care  12/05/2023  4:48 PM

## 2023-12-05 NOTE — NURSING
Pt now in [facility] and having frequent PVCs. CLOTILDE Colmenares notified and ordered to give 500cc LR bolus. WCTM.

## 2023-12-06 PROBLEM — N17.9 AKI (ACUTE KIDNEY INJURY): Status: ACTIVE | Noted: 2023-01-01

## 2023-12-06 PROBLEM — I47.29 OTHER VENTRICULAR TACHYCARDIA: Status: ACTIVE | Noted: 2023-01-01

## 2023-12-06 PROBLEM — N17.0 ACUTE RENAL FAILURE WITH TUBULAR NECROSIS: Status: ACTIVE | Noted: 2023-01-01

## 2023-12-06 PROBLEM — D65 DIC (DISSEMINATED INTRAVASCULAR COAGULATION): Status: ACTIVE | Noted: 2023-01-01

## 2023-12-06 NOTE — PLAN OF CARE
[facility] Care Plan    POC reviewed with Tim Rhodes and family at 1400. Pt's family verbalized understanding. Questions and concerns addressed. No acute events today. Pt progressing toward goals. Will continue to monitor. See below and flowsheets for full assessment and VS info.   -labs trended and replaced  -amio increased to 1  -levo, vaso, and elinor gtts continued  -KUB complete  -LR, insulin, and fent gtt continued              Is this a stroke patient? yes- Stroke booklet reviewed with patient and family, risk factors identified for patient and stroke booklet remains at bedside for ongoing education.     Neuro:  Win Coma Scale  Best Eye Response: 1-->(E1) none  Best Motor Response: 4-->(M4) withdraws from pain  Best Verbal Response: 1-->(V1) none  Win Coma Scale Score: 6  Assessment Qualifiers: patient chemically sedated or paralyzed, patient intubated  Pupil PERRLA: yes     24 hr Temp:  [98.8 °F (37.1 °C)-99.5 °F (37.5 °C)]     CV:   Rhythm: atrial rhythm  BP goals:   SBP <  none  MAP > 65    Resp:      Vent Mode: A/C  Set Rate: 16 BPM  Oxygen Concentration (%): 40  Vt Set: 440 mL  PEEP/CPAP: 5 cmH20  Pressure Support: 5 cmH20    Plan: N/A    GI/:     Diet/Nutrition Received: NPO  Last Bowel Movement: 12/01/23  Voiding Characteristics: urethral catheter (bladder)    Intake/Output Summary (Last 24 hours) at 12/5/2023 1900  Last data filed at 12/5/2023 1800  Gross per 24 hour   Intake 5390.15 ml   Output 845 ml   Net 4545.15 ml     Unmeasured Output  Stool Occurrence: 1  Pad Count: 1    Labs/Accuchecks:  Recent Labs   Lab 12/05/23  1529   WBC 26.23*   RBC 2.96*   HGB 8.3*   HCT 25.0*   PLT 21*      Recent Labs   Lab 12/05/23  1529      K 3.0*   CO2 13*   *   BUN 51*   CREATININE 1.6*   ALKPHOS 74   *   *   BILITOT 0.7      Recent Labs   Lab 12/05/23  0907   INR 1.5*      Recent Labs   Lab 11/29/23  0440   TROPONINI 0.121*       Electrolytes: Electrolytes replaced  Accuchecks:  Q1H    Gtts:   amiodarone in dextrose 5% 1 mg/min (12/05/23 1839)    fentanyl citrate-0.9%NaCl (PF) 50 mcg/hr (12/05/23 1700)    insulin regular 1 units/mL infusion orderable (DKA) 8.95 Units/hr (12/05/23 1700)    lactated ringers 50 mL/hr at 12/05/23 1700    NORepinephrine bitartrate-D5W 0.38 mcg/kg/min (12/05/23 1735)    phenylephrine 2.5 mcg/kg/min (12/05/23 1732)    propofoL Stopped (12/05/23 1231)    vasopressin 0.04 Units/min (12/05/23 1734)       LDA/Wounds:  Lines/Drains/Airways       Central Venous Catheter Line  Duration             Percutaneous Central Line Insertion/Assessment - Triple Lumen  12/04/23 0246 Femoral Vein Left 1 day              Drain  Duration                  Gastrostomy/Enterostomy 12/01/23 1145 Percutaneous endoscopic gastrostomy (PEG) LUQ feeding 4 days         Urethral Catheter 12/04/23 0633 Non-latex 1 day              Airway  Duration                  Airway - Non-Surgical 12/04/23 0130 Endotracheal Tube 1 day              Arterial Line  Duration             Arterial Line 12/04/23 1210 Right Femoral 1 day              Peripheral Intravenous Line  Duration                  Midline Catheter Insertion/Assessment  - Single Lumen 11/29/23 1255 Left basilic vein (medial side of arm) 20g x 10cm 6 days         Peripheral IV - Single Lumen 11/29/23 0041 18 G;1 3/4 in Anterior;Right Upper Arm 6 days         Peripheral IV - Single Lumen 12/04/23 0245 20 G Anterior;Right Forearm 1 day                  Wounds: No  Wound care consulted: No

## 2023-12-06 NOTE — ASSESSMENT & PLAN NOTE
84 yo man admitted with right MCA CVA and a-fib, developing sepsis and readmitted to [facility]CC  - only Staph epi in blood  - broadly covered empirically with Zosyn, vancomycin, and micafungin  - follow-up imaging but prognosis seems poor overall  - continuing empiric abx   - discussed with Ismael

## 2023-12-06 NOTE — SUBJECTIVE & OBJECTIVE
Oncology Treatment Plan:   [No matching plan found]    Medications:  Continuous Infusions:   amiodarone in dextrose 5% 0.5 mg/min (12/06/23 1426)    fentanyl citrate-0.9%NaCl (PF) 50 mcg/hr (12/06/23 1629)    insulin regular 1 units/mL infusion orderable (DKA) 8.95 Units/hr (12/06/23 1300)    lactated ringers 50 mL/hr at 12/06/23 1300    NORepinephrine bitartrate-D5W 0.26 mcg/kg/min (12/06/23 1424)    propofoL Stopped (12/05/23 1231)    vasopressin 0.04 Units/min (12/06/23 1300)     Scheduled Meds:   erythromycin   Both Eyes QID    famotidine  20 mg Per G Tube Daily    fludrocortisone  100 mcg Per G Tube Daily    hydrocortisone sodium succinate  100 mg Intravenous Q8H    levETIRAcetam (Keppra) IV (PEDS and ADULTS)  750 mg Intravenous Q12H    nitroGLYCERIN 2% TD oint  0.5 inch Topical (Top) Q6H    phenylephrine HCl in 0.9% NaCl        piperacillin-tazobactam (Zosyn) IV (PEDS and ADULTS) (extended infusion is not appropriate)  4.5 g Intravenous Q8H    polyethylene glycol  17 g Per G Tube BID    polymyxin B sulf-trimethoprim  1 drop Both Eyes QID    senna-docusate 8.6-50 mg  2 tablet Per G Tube BID    sodium bicarbonate         PRN Meds:0.9%  NaCl infusion (for blood administration), 0.9%  NaCl infusion (for blood administration), 0.9%  NaCl infusion (for blood administration), acetaminophen, dextrose 10%, dextrose 10%, fentanyl, ondansetron, phenylephrine HCl in 0.9% NaCl, sodium bicarbonate, Pharmacy to dose Vancomycin consult **AND** vancomycin - pharmacy to dose     Review of patient's allergies indicates:  No Known Allergies     Past Medical History:   Diagnosis Date    Arthritis     Weiss's esophagus     CHF (congestive heart failure)     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/23/2023    GERD (gastroesophageal reflux disease)     Type 2 diabetes mellitus      Past Surgical History:   Procedure Laterality Date    CEREBRAL ANGIOGRAM N/A 11/23/2023    Procedure: ANGIOGRAM-CEREBRAL;  Surgeon:  SurgeonValery;  Location: [facility];  Service: Anesthesiology;  Laterality: N/A;    INSERTION, PEG TUBE N/A 12/1/2023    Procedure: INSERTION, PEG TUBE;  Surgeon: Tanner Villarreal MD;  Location: [facility] (2ND FLR);  Service: Endoscopy;  Laterality: N/A;     Family History    None       Tobacco Use    Smoking status: Not on file    Smokeless tobacco: Not on file   Substance and Sexual Activity    Alcohol use: Not on file    Drug use: Not on file    Sexual activity: Not on file       Review of Systems   Unable to perform ROS: Intubated     Objective:     Vital Signs (Most Recent):  Temp: 97.3 °F (36.3 °C) (12/06/23 1630)  Pulse: 61 (12/06/23 1630)  Resp: (!) 23 (12/06/23 1630)  BP: (!) 108/46 (12/06/23 1330)  SpO2: 95 % (12/06/23 1630) Vital Signs (24h Range):  Temp:  [97.3 °F (36.3 °C)-99.5 °F (37.5 °C)] 97.3 °F (36.3 °C)  Pulse:  [] 61  Resp:  [0-28] 23  SpO2:  [95 %-99 %] 95 %  BP: (104-193)/() 108/46  Arterial Line BP: (101-180)/(45-74) 143/55     Weight: 77.6 kg (171 lb)  Body mass index is 30.29 kg/m².  Body surface area is 1.86 meters squared.      Intake/Output Summary (Last 24 hours) at 12/6/2023 1650  Last data filed at 12/6/2023 1300  Gross per 24 hour   Intake 5752.93 ml   Output 365 ml   Net 5387.93 ml        Physical Exam  Constitutional:       Appearance: He is ill-appearing. He is not diaphoretic.      Interventions: He is sedated and intubated.   HENT:      Head: Normocephalic and atraumatic.      Right Ear: External ear normal.      Left Ear: External ear normal.      Mouth/Throat:      Comments: ET tube in place.  Cardiovascular:      Rate and Rhythm: Normal rate.   Pulmonary:      Effort: Pulmonary effort is normal. He is intubated.   Abdominal:      General: There is no distension.      Palpations: Abdomen is soft.   Musculoskeletal:         General: Swelling present.      Cervical back: Neck supple.   Skin:     Coloration: Skin is pale.      Findings: Bruising present.       Comments: Skin mottling noted bilateral upper and lower extremities   Neurological:      Comments: Intubated          Significant Labs:   CBC:   Recent Labs   Lab 12/06/23  0012 12/06/23  0124 12/06/23  0600 12/06/23  0908 12/06/23  1207 12/06/23  1554   WBC 25.80*  --  20.96* 25.81*  --  24.11*   HGB 7.5*  --  6.1* 7.6*  --  7.1*   HCT 22.7*   < > 18.9* 22.8* 20* 21.7*   PLT 17*  --  54* 42*  --   --     < > = values in this interval not displayed.    and CMP:   Recent Labs   Lab 12/06/23  0908 12/06/23  1207 12/06/23  1554    141 139   K 4.1 3.9 5.1    109 109   CO2 19* 16* 16*   * 117* 136*   BUN 57* 53* 58*   CREATININE 2.0* 2.1* 2.1*   CALCIUM 7.3* 7.4* 7.9*   PROT 4.7* 4.6* 4.5*   ALBUMIN 1.7* 1.7* 1.6*   BILITOT 1.3* 1.4* 1.5*   ALKPHOS 107 99 101   * 750* 596*   * 862* 810*   ANIONGAP 14 16 14       Diagnostic Results:  I have reviewed all pertinent imaging results/findings within the past 24 hours.

## 2023-12-06 NOTE — ASSESSMENT & PLAN NOTE
-Amio gtt @ 1  -Increased episodes of v-tach when rate decreased to 0.5  -CMP q4h  -Goal K+ > 4, Mag > 2

## 2023-12-06 NOTE — PROGRESS NOTES
Pharmacokinetic Assessment Follow Up: IV Vancomycin    Vancomycin serum concentration assessment(s):    The random level was drawn correctly and can be used to guide therapy at this time. The measurement is within the desired definitive target range of 15 to 20 mcg/mL.    Vancomycin Regimen Plan:  Give Vancomycin 750 mg IV x1 dose today  Re-dose when the random level is less than 20 mcg/mL, next level to be drawn at 02:00 on 12/7/23    Drug levels (last 3 results):  Recent Labs   Lab Result Units 12/05/23  0107 12/06/23  0158   Vancomycin, Random ug/mL 10.5 15.8       Pharmacy will continue to follow and monitor vancomycin.    Please contact pharmacy at extension [phone] for questions regarding this assessment.    Thank you for the consult,   Ford Leija       Patient brief summary:  Tim Rhodes is a 83 y.o. male initiated on antimicrobial therapy with IV Vancomycin for treatment of bacteremia    The patient's current regimen is Pulse dosing    Drug Allergies:   Review of patient's allergies indicates:  No Known Allergies    Actual Body Weight:   77.6 kg    Renal Function:   Estimated Creatinine Clearance: 25.4 mL/min (A) (based on SCr of 2.1 mg/dL (H)).,     Dialysis Method (if applicable):  N/A    CBC (last 72 hours):  Recent Labs   Lab Result Units 12/03/23  0307 12/03/23  2326 12/04/23  0428 12/04/23  1549 12/05/23  0305 12/05/23  0813 12/05/23  1529 12/06/23  0012   WBC K/uL 11.66 21.98* 18.33* 25.25* 23.88* 25.81* 26.23* 25.80*   Hemoglobin g/dL 13.7* 12.5* 10.2* 10.3* 9.2* 9.1* 8.3* 7.5*   Hematocrit % 44.1 41.5 34.6* 31.6* 28.8* 27.8* 25.0* 22.7*   Platelets K/uL 59* 40* 26* 25* 23* 25* 21* 17*   Gran % % 85.3* 79.1* 96.0* 87.9* 78.0* 82.4* 80.9* 86.0*   Lymph % % 7.2* 12.1* 1.0* 4.2* 7.0* 7.4* 7.4* 5.0*   Mono % % 5.5 6.0 0.0* 3.6* 4.0 6.2 6.4 7.0   Eosinophil % % 0.6 0.3 0.0 0.1 0.0 0.1 0.1 1.0   Basophil % % 0.3 0.5 0.0 0.4 0.0 0.3 0.4 0.0   Differential Method  Automated Automated Manual Automated Manual  Automated Automated Manual       Metabolic Panel (last 72 hours):  Recent Labs   Lab Result Units 12/03/23  1927 12/03/23  2326 12/04/23  0039 12/04/23  0428 12/04/23  0745 12/04/23  1549 12/04/23  2058 12/04/23  2307 12/05/23  0305 12/05/23  0813 12/05/23  1218 12/05/23  1529 12/05/23  1916 12/06/23  0012   Sodium mmol/L 157* 155*  --  153*  --  151* 150*  --  147* 146* 145 145 144 144   Potassium mmol/L  --  4.2  --  4.3 3.4* 3.6 3.9 3.8 3.4* 4.0 3.2* 3.0* 3.9 4.0   Chloride mmol/L  --  122*  --  117*  --  122* 121*  --  118* 118* 116* 116* 116* 113*   CO2 mmol/L  --  18*  --  19*  --  19* 18*  --  15* 16* 13* 13* 13* 12*   Glucose mg/dL  --  312*  --  526*  --  287* 327*  --  334* 221* 184* 134* 135* 147*   Glucose, UA   --   --  4+*  --   --   --   --   --   --   --   --   --   --   --    BUN mg/dL  --  50*  --  60*  --  54* 51*  --  50* 50* 50* 51* 50* 52*   Creatinine mg/dL  --  1.4  --  2.1*  --  1.7* 1.6*  --  1.7* 1.5* 1.7* 1.6* 1.8* 2.1*   Albumin g/dL  --  2.4*  --  1.9*  --  1.7* 1.7*  --  1.6* 1.7* 1.6* 1.6* 1.5* 1.5*   Total Bilirubin mg/dL  --  0.6  --  0.6  --  0.9 0.8  --  0.8 0.7 0.8 0.7 0.8 1.0   Alkaline Phosphatase U/L  --  91  --  73  --  75 70  --  70 76 72 74 76 85   AST U/L  --  32  --  65*  --  1,429* 1,252*  --  1,002* 943* 772* 718* 657* 683*   ALT U/L  --  33  --  51*  --  893* 885*  --  923* 958* 913* 916* 890* 927*   Magnesium mg/dL  --   --   --  2.4  --   --   --   --  2.0 2.6  --  2.5  --  2.4   Phosphorus mg/dL  --   --   --   --   --   --   --   --  4.3 3.6  --  4.0  --  5.7*       Vancomycin Administrations:  vancomycin given in the last 96 hours                     vancomycin 1,250 mg in dextrose 5 % (D5W) 250 mL IVPB (Vial-Mate) (mg) 1,250 mg New Bag 12/05/23 0251    vancomycin 1,500 mg in dextrose 5 % (D5W) 250 mL IVPB (Vial-Mate) (mg) 1,500 mg New Bag 12/04/23 0232                    Microbiologic Results:  Microbiology Results (last 7 days)       Procedure Component Value  Units Date/Time    Blood culture [[id number]] Collected: 12/04/23 0124    Order Status: Completed Specimen: Blood from Peripheral, Wrist, Left Updated: 12/05/23 1007     Blood Culture, Routine Gram stain miguel bottle: Gram positive cocci in clusters resembling Staph      Results called to and read back by: Alvarado Patel RN 12/05/2023  01:47    Narrative:      Blood cultures from 2 different sites. 4 bottles total.  Please draw before starting antibiotics.    Rapid Organism ID by PCR (from Blood culture) [[id number]]  (Abnormal) Collected: 12/04/23 0121    Order Status: Completed Updated: 12/05/23 0314     Enterococcus faecalis Not Detected     Enterococcus faecium Not Detected     Listeria monocytogenes Not Detected     Staphylococcus spp. See species for ID     Staphylococcus aureus Not Detected     Staphylococcus epidermidis Detected     Staphylococcus lugdunensis Not Detected     Streptococcus species Not Detected     Streptococcus agalactiae Not Detected     Streptococcus pneumoniae Not Detected     Streptococcus pyogenes Not Detected     Acinetobacter calcoaceticus/baumannii complex Not Detected     Bacteroides fragilis Not Detected     Enterobacterales Not Detected     Enterobacter cloacae complex Not Detected     Escherichia coli Not Detected     Klebsiella aerogenes Not Detected     Klebsiella oxytoca Not Detected     Klebsiella pneumoniae group Not Detected     Proteus Not Detected     Salmonella sp Not Detected     Serratia marcescens Not Detected     Haemophilus influenzae Not Detected     Neisseria meningtidis Not Detected     Pseudomonas aeruginosa Not Detected     Stenotrophomonas maltophilia Not Detected     Candida albicans Not Detected     Candida auris Not Detected     Candida glabrata Not Detected     Candida krusei Not Detected     Candida parapsilosis Not Detected     Candida tropicalis Not Detected     Cryptococcus neoformans/gattii Not Detected     CTX-M (ESBL ) Test Not Applicable      IMP (Carbapenem resistant) Test Not Applicable     KPC resistance gene (Carbapenem resistant) Test Not Applicable     mcr-1  Test Not Applicable     mec A/C  Detected     mec A/C and MREJ (MRSA) gene Test Not Applicable     NDM (Carbapenem resistant) Test Not Applicable     OXA-48-like (Carbapenem resistant) Test Not Applicable     van A/B (VRE gene) Test Not Applicable     VIM (Carbapenem resistant) Test Not Applicable    Narrative:      Blood cultures x 2 different sites. 4 bottles total. Please  draw cultures before administering antibiotics.    Blood culture [[id number]] Collected: 12/04/23 0121    Order Status: Completed Specimen: Blood from Peripheral, Ankle, Right Updated: 12/05/23 0147     Blood Culture, Routine Gram stain miguel bottle: Gram positive cocci in clusters resembling Staph      Results called to and read back by:Alvarado Patel RN 12/05/2023  01:47    Narrative:      Blood cultures x 2 different sites. 4 bottles total. Please  draw cultures before administering antibiotics.    Culture, Respiratory with Gram Stain [[id number]] Collected: 12/04/23 0220    Order Status: Completed Specimen: Respiratory from Sputum Updated: 12/04/23 1025     Gram Stain (Respiratory) <10 epithelial cells per low power field.     Gram Stain (Respiratory) Few WBC's     Gram Stain (Respiratory) Many yeast    Culture, Respiratory with Gram Stain [[id number]] Collected: 12/04/23 0039    Order Status: Sent Specimen: Respiratory from Sputum Updated: 12/04/23 0040    Blood culture [[id number]] Collected: 11/28/23 1218    Order Status: Completed Specimen: Blood from Peripheral, Ankle, Left Updated: 12/03/23 1412     Blood Culture, Routine No growth after 5 days.    Blood culture [[id number]] Collected: 11/28/23 1216    Order Status: Completed Specimen: Blood from Peripheral, Ankle, Right Updated: 12/03/23 1412     Blood Culture, Routine No growth after 5 days.    Blood culture #2 **CANNOT BE ORDERED STAT** [[id number]] Collected:  11/24/23 0131    Order Status: Completed Specimen: Blood Updated: 11/29/23 0612     Blood Culture, Routine No growth after 5 days.

## 2023-12-06 NOTE — SUBJECTIVE & OBJECTIVE
Interval History: Events noted. Remains critically ill. Daughter at bedside. Plan for imaging noted.    Review of Systems   Unable to perform ROS: Intubated     Objective:     Vital Signs (Most Recent):  Temp: 98.1 °F (36.7 °C) (12/06/23 1220)  Pulse: 63 (12/06/23 1220)  Resp: 20 (12/06/23 1220)  BP: (!) 115/47 (12/06/23 1200)  SpO2: 99 % (12/06/23 1220) Vital Signs (24h Range):  Temp:  [98.1 °F (36.7 °C)-99.5 °F (37.5 °C)] 98.1 °F (36.7 °C)  Pulse:  [] 63  Resp:  [16-36] 20  SpO2:  [78 %-100 %] 99 %  BP: (104-193)/() 115/47  Arterial Line BP: (101-180)/(45-74) 112/47     Weight: 77.6 kg (171 lb)  Body mass index is 30.29 kg/m².    Estimated Creatinine Clearance: 25.4 mL/min (A) (based on SCr of 2.1 mg/dL (H)).     Physical Exam  Vitals and nursing note reviewed.   Constitutional:       Appearance: He is ill-appearing.   HENT:      Head: Normocephalic.   Cardiovascular:      Rate and Rhythm: Rhythm irregular.   Pulmonary:      Breath sounds: No rhonchi.   Abdominal:      Tenderness: There is no guarding or rebound.   Musculoskeletal:      Comments: Mottled/cool hands and feet   Skin:     Findings: No erythema.          Significant Labs: Blood Culture:   Recent Labs   Lab 11/24/23  0131 11/28/23  1216 11/28/23  1218 12/04/23  0121 12/04/23  0124   LABBLOO No growth after 5 days. No growth after 5 days. No growth after 5 days. Gram stain miguel bottle: Gram positive cocci in clusters resembling Staph  Results called to and read back by:Alvarado Patel RN 12/05/2023  01:47  STAPHYLOCOCCUS EPIDERMIDIS  Susceptibility pending  * Gram stain miguel bottle: Gram positive cocci in clusters resembling Staph  Results called to and read back by: Alvarado Patel RN 12/05/2023  01:47  STAPHYLOCOCCUS EPIDERMIDIS  Susceptibility pending  *     CBC:   Recent Labs   Lab 12/06/23  0012 12/06/23  0124 12/06/23  0600 12/06/23  0908 12/06/23  1207   WBC 25.80*  --  20.96* 25.81*  --    HGB 7.5*  --  6.1* 7.6*  --    HCT 22.7*    "< > 18.9* 22.8* 20*   PLT 17*  --  54* 42*  --     < > = values in this interval not displayed.     Procalcitonin: No results for input(s): "PROCAL" in the last 48 hours.  Respiratory Culture:   Recent Labs   Lab 12/04/23  0220   GSRESP <10 epithelial cells per low power field.  Few WBC's  Many yeast     Urine Culture: No results for input(s): "LABURIN" in the last 4320 hours.  Wound Culture: No results for input(s): "LABAERO" in the last 4320 hours.    Significant Imaging: I have reviewed all pertinent imaging results/findings within the past 24 hours.  "

## 2023-12-06 NOTE — PROGRESS NOTES
[facility] - Neuro Critical Care  Infectious Disease  Progress Note    Patient Name: Tim Rhodes  MRN: [id number]  Admission Date: 11/23/2023  Length of Stay: 13 days  Attending Physician: Lg Yuen DO  Primary Care Provider: No, Primary Doctor    Isolation Status: No active isolations  Assessment/Plan:      ID  Septic shock  82 yo man admitted with right MCA CVA and a-fib, developing sepsis and readmitted to [facility]  - only Staph epi in blood  - broadly covered empirically with Zosyn, vancomycin, and micafungin  - follow-up imaging but prognosis seems poor overall  - continuing empiric abx   - discussed with Ismael Ledezma MD  Infectious Disease  [facility] - Neuro Critical Care    Subjective:     Principal Problem:Embolic stroke involving right middle cerebral artery    HPI: Mr. Rhodes is an 83 y.o. man admitted with embolic CVA from atrial fibrillation. He was transferred from [facility] with AMS and seizure due to suspected right MCA occlusion. Transferred to [facility] for advanced therapies. Patient was intubated and sedated at OSH for airway protection and transferred here on 11/23/23. Seen by NI and taken for angiogram but other interventions were not possible. Remined in [facility] until 12/3 and transferred to floor but developed SOB and respiratory distress, necessitating transfer back to [facility] on 12/4. Empiric abx (Zosyn, vancomycin, and micafungin) started due to hypotension and possible sepsis. Labs reveals persistent leukocytosis and thrombocytopenia. Peripheral blood cultures obtained are growing likely MRSE. ID is consulted for sepsis. The patient is critically ill on pressors with mottling of hands and feet. Remains intubated. Family at bedside.  Interval History: Events noted. Remains critically ill. Daughter at bedside. Plan for imaging noted.    Review of Systems   Unable to perform ROS: Intubated     Objective:     Vital Signs (Most Recent):  Temp: 98.1 °F (36.7 °C) (12/06/23  "1220)  Pulse: 63 (12/06/23 1220)  Resp: 20 (12/06/23 1220)  BP: (!) 115/47 (12/06/23 1200)  SpO2: 99 % (12/06/23 1220) Vital Signs (24h Range):  Temp:  [98.1 °F (36.7 °C)-99.5 °F (37.5 °C)] 98.1 °F (36.7 °C)  Pulse:  [] 63  Resp:  [16-36] 20  SpO2:  [78 %-100 %] 99 %  BP: (104-193)/() 115/47  Arterial Line BP: (101-180)/(45-74) 112/47     Weight: 77.6 kg (171 lb)  Body mass index is 30.29 kg/m².    Estimated Creatinine Clearance: 25.4 mL/min (A) (based on SCr of 2.1 mg/dL (H)).     Physical Exam  Vitals and nursing note reviewed.   Constitutional:       Appearance: He is ill-appearing.   HENT:      Head: Normocephalic.   Cardiovascular:      Rate and Rhythm: Rhythm irregular.   Pulmonary:      Breath sounds: No rhonchi.   Abdominal:      Tenderness: There is no guarding or rebound.   Musculoskeletal:      Comments: Mottled/cool hands and feet   Skin:     Findings: No erythema.          Significant Labs: Blood Culture:   Recent Labs   Lab 11/24/23  0131 11/28/23  1216 11/28/23  1218 12/04/23  0121 12/04/23  0124   LABBLOO No growth after 5 days. No growth after 5 days. No growth after 5 days. Gram stain miguel bottle: Gram positive cocci in clusters resembling Staph  Results called to and read back by:Alvarado Patel RN 12/05/2023  01:47  STAPHYLOCOCCUS EPIDERMIDIS  Susceptibility pending  * Gram stain miguel bottle: Gram positive cocci in clusters resembling Staph  Results called to and read back by: Alvarado Patel RN 12/05/2023  01:47  STAPHYLOCOCCUS EPIDERMIDIS  Susceptibility pending  *     CBC:   Recent Labs   Lab 12/06/23  0012 12/06/23  0124 12/06/23  0600 12/06/23  0908 12/06/23  1207   WBC 25.80*  --  20.96* 25.81*  --    HGB 7.5*  --  6.1* 7.6*  --    HCT 22.7*   < > 18.9* 22.8* 20*   PLT 17*  --  54* 42*  --     < > = values in this interval not displayed.     Procalcitonin: No results for input(s): "PROCAL" in the last 48 hours.  Respiratory Culture:   Recent Labs   Lab 12/04/23  0220   GSRESP " "<10 epithelial cells per low power field.  Few WBC's  Many yeast     Urine Culture: No results for input(s): "LABURIN" in the last 4320 hours.  Wound Culture: No results for input(s): "LABAERO" in the last 4320 hours.    Significant Imaging: I have reviewed all pertinent imaging results/findings within the past 24 hours.  "

## 2023-12-06 NOTE — PROGRESS NOTES
[facility] - [facility]  Neurocritical Care  Progress Note    Admit Date: 11/23/2023  Service Date: 12/06/2023  Length of Stay: 13    Subjective:     Chief Complaint: Embolic stroke involving right middle cerebral artery    History of Present Illness: The patient is an 83-year-old with PMHx of  CAD and DM admitted to [facility] with large R MCA stroke and seizure.Per chart review, in emergency department at [facility]  presented with with altered mental status and focal seizure activity.  Daughter states patient was able to talk to her on the phone at 10:00 p.m. last night was at baseline.  Patient living in assisted living but still very functional.  Daughter states patien still drives on occasion.  She does not believe he takes any blood thinners.  She went to check on him is afternoon and he had fallen next to the toilet.  Patient unable to provide any history.    Noted to have tonic-clonic activity to the right upper extremity on arrival.  Patient with incomprehensible speech.  Copious vomiting on EMS arrival per paramedic.  Patient unable to contribute to history.  Last presumed normal was 10:00 p.m. last night. Out of window for thrombolytics.  Patient intubated for airway protection at outside hospital. On arrival CTH with large R MCA - stroke team consulted and plan for IR. Patient loaded with Keppra, EEG pending. Lactic acid 11.9 on arrival, pancx and initiated broad spec abx. Patient admitted to [facility] for close monitoring and higher level of care.        Hospital Course: 11/24/2023 MRI complete and reviewed, Nsgy consulted, EEG neg and dced  11/25/2023: initiate baby ASA and sq heparin, follow CT head in AM, SBP <180, initiate tube feeds  11/26/2023 EKG and if afib happens again will treat, Extubated 11/26 at 1400, Low dose precedex for agitation  11/27/2023 EKG, NS 75 cc/hr, consult IR for peg  11/28/23: consult GI for peg  11/29/23: CXR in am  11/30/23: awaiting PEG tube  12/1/23:PEG  today  12/2/2023: start tube feeds, decrease enteral water flushed to 200 q 6 hr, stepdown to stroke team   12/3/2023: pending stepdown to stroke team, decrease enteral water flushes to 100 q6h, add Detemir and increase PRN SSI   12/04/2023 stepped up with shock and respiratory failure requiring 3 pressors  12/05/2023 Amio restarted overnight for a-fib w/ RVR and multiple runs of v-tach. Continues to require increased pressor support.  12/06/2023 V-tach episodes continued overnight, now with longer runs. Currently on lidocaine gtt. Received 1 unit PRBC, 1 unit platelets, and 1 unit of cryo overnight. Off phenylephrine, will attempt to wean levophed as BP allows.     Interval History:  Remains intubated and sedated on Fentanyl gtt @ 50. V-tach episodes continued overnight with longer runs. Multiple lidocaine pushes given w/ minimal improvement. Currently on lidocaine gtt, will monitor lidocaine level q12h and adjust as needed to avoid toxicity. Received 1 unit PRBC, 1 unit platelets, and 1 unit cryo overnight. Off phenylephrine, will attempt to wean levophed as BP allows. MAP goal increased to > 70. Plan for CTH and CT chest/abdomen/pelvis today.      Review of Systems   Unable to perform ROS: Intubated     Objective:     Vitals:  Temp: 98.1 °F (36.7 °C)  Pulse: 63  Rhythm: atrial rhythm  BP: (!) 115/47  MAP (mmHg): 68  Resp: 20  SpO2: 99 %  Oxygen Concentration (%): 40  Vent Mode: A/C  Set Rate: 16 BPM  Vt Set: 440 mL  PEEP/CPAP: 5 cmH20  Peak Airway Pressure: 13 cmH20  Mean Airway Pressure: 7.1 cmH20  Plateau Pressure: 0 cmH20    Temp  Min: 98.1 °F (36.7 °C)  Max: 99.5 °F (37.5 °C)  Pulse  Min: 36  Max: 110  BP  Min: 104/72  Max: 193/120  MAP (mmHg)  Min: 68  Max: 141  Resp  Min: 16  Max: 36  SpO2  Min: 78 %  Max: 100 %  Oxygen Concentration (%)  Min: 40  Max: 40    12/05 0701 - 12/06 0700  In: 6193.7 [I.V.:4764.4]  Out: 370 [Urine:370]   Unmeasured Output  Stool Occurrence: 1  Pad Count: 1        Physical  Exam  Vitals and nursing note reviewed.   Constitutional:       Interventions: He is sedated and intubated.   Eyes:      Pupils: Pupils are equal, round, and reactive to light.   Cardiovascular:      Rate and Rhythm: Rhythm irregular. Extrasystoles are present.     Pulses: Decreased pulses (BUE/BLE).           Radial pulses are 0 on the right side and 0 on the left side.        Dorsalis pedis pulses are 0 on the right side and 0 on the left side.        Posterior tibial pulses are detected w/ Doppler on the right side and detected w/ Doppler on the left side.      Comments: Bilateral radial and DP pulses absent  Bilateral brachial/posterior tibial detected w/ Doppler  Pulmonary:      Effort: Pulmonary effort is normal. He is intubated.      Breath sounds: Rhonchi present.   Abdominal:      General: Bowel sounds are normal.      Palpations: Abdomen is soft.   Skin:     Capillary Refill: Capillary refill takes more than 3 seconds.      Coloration: Skin is cyanotic and mottled.      Comments: Bilateral hands and feet cyanotic  Bilateral arms and legs mottled   Neurological:      GCS: GCS eye subscore is 4. GCS verbal subscore is 1. GCS motor subscore is 4.      Comments:   -Exam completed on propofol @ 40mcg  -E4 V1T M4  -PERRL  -Localizes w/ RUE/RLE; moves spontaneously/purposefully  -No response to noxious stimuli w/ LUE/LLE  -Cough/gag/corneal reflexes present       Unable to test orientation, language, memory, judgment, insight, fund of knowledge, hearing, shoulder shrug, tongue protrusion, coordination, gait due to level of consciousness.       Medications:  Continuousamiodarone in dextrose 5%, Last Rate: 0.5 mg/min (12/06/23 1200)  fentanyl citrate-0.9%NaCl (PF), Last Rate: 50 mcg/hr (12/06/23 1200)  insulin regular 1 units/mL infusion orderable (DKA), Last Rate: 12.95 Units/hr (12/06/23 1200)  lactated ringers, Last Rate: 50 mL/hr at 12/06/23 1200  LIDOcaine, Last Rate: 1 mg/min (12/06/23 1200)  NORepinephrine  bitartrate-D5W, Last Rate: 0.14 mcg/kg/min (12/06/23 1200)  propofoL, Last Rate: Stopped (12/05/23 1231)  vasopressin, Last Rate: 0.04 Units/min (12/06/23 1200)    Schedulederythromycin, , QID  famotidine, 20 mg, Daily  fludrocortisone, 100 mcg, Daily  hydrocortisone sodium succinate, 100 mg, Q8H  levETIRAcetam (Keppra) IV (PEDS and ADULTS), 750 mg, Q12H  micafungin (MYCAMINE) IVPB, 100 mg, Q24H  nitroGLYCERIN 2% TD oint, 0.5 inch, Q6H  piperacillin-tazobactam (Zosyn) IV (PEDS and ADULTS) (extended infusion is not appropriate), 4.5 g, Q8H  polyethylene glycol, 17 g, BID  polymyxin B sulf-trimethoprim, 1 drop, QID  potassium chloride in water, 40 mEq, Once  senna-docusate 8.6-50 mg, 2 tablet, BID    PRN0.9%  NaCl infusion (for blood administration), , Q24H PRN  0.9%  NaCl infusion (for blood administration), , Q24H PRN  0.9%  NaCl infusion (for blood administration), , Q24H PRN  acetaminophen, 650 mg, Q4H PRN  dextrose 10%, 12.5 g, PRN  dextrose 10%, 25 g, PRN  fentanyl, 25 mcg, Q30 Min PRN  ondansetron, 4 mg, Q6H PRN  vancomycin - pharmacy to dose, , pharmacy to manage frequency      Today I personally reviewed pertinent medications, lines/drains/airways, imaging, cardiology results, laboratory results, microbiology results, notably: CBC, CMP, ABGs, lactic acid    Diet  Diet NPO  Diet NPO    Assessment/Plan:     Neuro  * Embolic stroke involving right middle cerebral artery  -R MCA occlusion, s/p unsuccessful thrombectomy  -Neuro checks q4h  -Keppra BID  -Holding AC/AP in setting of thrombocytopenia and shock  -Vascular neurology following  -CTH today    Pulmonary  On mechanically assisted ventilation  -Current vent settings below  Vent Mode: A/C  Oxygen Concentration (%):  [40] 40  Resp Rate Total:  [17 br/min-36 br/min] 20 br/min  Vt Set:  [440 mL] 440 mL  PEEP/CPAP:  [5 cmH20] 5 cmH20  Mean Airway Pressure:  [6.9 cmF62-77 cmH20] 7.1 cmH20  -ABGs q6h  -CXR daily  -Wean vent as tolerated  -Fentanyl PRN vent  dyssynchrony     Cardiac/Vascular  Paroxysmal atrial fibrillation  -Amio gtt decreased to 0.5  -CMP q4h  -Goal K+ > 4, Mag > 2    Renal/  AYAN (acute kidney injury)  -Elevated BUN/creatinine  -Minimal urine output over last 24h  -Net + 15L since admission  -Lasix 40 IV x 1  -Avoid nephrotoxins  -Renally dose meds  -CMP q4h    ID  Septic shock  -Afebrile  -Continued leukocytosis  -Lactic acid remains elevated  -Trend lactic acid w/ ABGs + lytes q6h  -Goal K+ > 4, Mag > 2, Ca+ > 8, ionized Ca+ > 1.5  -Sputum culture + for yeast  -2 of 2 blood culture bottles + for staph epidermis  -Repeat blood cultures x 2 today  -Continue micafungin, Zosyn, vancomycin  -ID following  -Increase MAP goal > 70  -Phenylephrine discontinued, wean levophed as BP allows  -Continue vasopressin @ 0.04  -Lidocaine gtt @ 1mg/min  -Lidocaine level q12h w/ goal 1.5-5; stop gtt w/ level >/= 4  -Continue hydrocortisone/fludrocortisone  -Flotrac  -CT chest/abd/pelvis today   -Platelet goal >20k unless active bleeding, hold AC/AP for now    Hematology  Thrombocytopenia  -Platelets 17 overnight - 1 unit platelets given w/ improvement  -Platelet goal > 20 unless active bleeding  -Hold AC/AP   -Received 1 unit cryo overnight for fibrinogen 106; 175 on repeat @ 0900  -CBC q8h  -Fibrinogen in AM  -HIT panel +  -CT chest/abd/pelvis today  -May need argatroban pending results of CT    Endocrine  Diabetes mellitus  -Continue insulin gtt per protocol  -Accuchecks    GI  Liver failure, acute  -AST/ALT remain elevated since admission  -CMP q4h  -Avoid hepatotoxins           The patient is being Prophylaxed for:  Venous Thromboembolism with: Mechanical  Stress Ulcer with: H2B  Ventilator Pneumonia with: chlorhexidine oral care    Activity Orders            Elevate HOB 30 starting at 12/05 1220    Turn patient starting at 11/28 0715    Straight Cath starting at 11/25 1809    Progressive Mobility Protocol (mobilize patient to their highest level of functioning  at least twice daily) starting at 11/23 2000    Diet NPO: NPO starting at 11/23 1821          Partial Code    Socorro Stout NP  Neurocritical Care  [facility] - Neuro Critical Care

## 2023-12-06 NOTE — HPI
Mr. Rhodes is an obese (BMI ~30) 83-year-old with type 2 diabetes mellitus not on insulin (most recent hemoglobin A1c 6.6%), GERD, hypertension, HLD, CAD, Weiss's esophagus, OA as well as several over co-morbid conditions who was admitted on 11/23 with new large right MCA stroke with focal seizure activity in addition to shock with serum lactate of 11.9. He was loaded with Keppra. Cerebral angiogram with thrombectomy and angioplasty was attempted to no avail. Appears his lactic acidosis and CPK improved and he was ultimately extubated on 11/26. He was ultimately stepped down on 12/3 to Vascular Neurology service however on the chauncey of 12/3 he was found to be respiratory distress, tachycardia and encephalopathy and was noted to be in septic shock for which he was stepped back up on x3 vasopressors and developed atrial fibrillation for RVR. Since that time he has remained on vasopressor support with worsening renal function and diminishing UOP. Nephrology has been consulted for assistance with management of AYAN.

## 2023-12-06 NOTE — PLAN OF CARE
[facility] Care Plan    POC reviewed with Tim Rhodes and family at 0300. Pt verbalized understanding. Questions and concerns addressed. No acute events overnight. Pt progressing toward goals. Will continue to monitor. See below and flowsheets for full assessment and VS info.   - neuro exam unchanged  - BP WDL with use of Vaso, Levo and Tod overnight. Tod ggt titrated off and levo requirements increased. All titrations outside of order parameters made by verbal order with Mickal PA at bedside   -12 Lead EKG completed, multiple alarms of V tach overnight  - Lidocaine 1mg/kg IVP administered x1  - Lidocaine 0.5mg/kg IVP administered x3  - Lidocaine ggt initiated  - 1 unit of Cryo administered   - 1 unit of platelets administered   - 2 amps of bicarb administered  - potassium replaced  - Calcium replaced  - insilin ggt, amio ggt and LR ggt continues without change   - All lab values reviewed with Mickal PA when resulted  - PRN Fent boluses x2        Is this a stroke patient? yes- Stroke booklet reviewed with patient, risk factors identified for patient and stroke booklet remains at bedside for ongoing education.     Neuro:  Win Coma Scale  Best Eye Response: 1-->(E1) none  Best Motor Response: 4-->(M4) withdraws from pain  Best Verbal Response: 1-->(V1) none  [place] Coma Scale Score: 6  Assessment Qualifiers: patient chemically sedated or paralyzed, patient intubated  Pupil PERRLA: yes     24hr Temp:  [98.4 °F (36.9 °C)-99.5 °F (37.5 °C)]     CV:   Rhythm: atrial rhythm  BP goals:   SBP < 220  MAP > 65    Resp:      Vent Mode: A/C  Set Rate: 16 BPM  Oxygen Concentration (%): 40  Vt Set: 440 mL  PEEP/CPAP: 5 cmH20  Pressure Support: 5 cmH20    Plan: wean to extubate    GI/:     Diet/Nutrition Received: NPO  Last Bowel Movement: 12/04/23  Voiding Characteristics: urethral catheter (bladder)    Intake/Output Summary (Last 24 hours) at 12/6/2023 0618  Last data filed at 12/6/2023 0605  Gross per 24 hour   Intake 6199.72  "ml   Output 290 ml   Net 5909.72 ml     Unmeasured Output  Stool Occurrence: 1  Pad Count: 1    Labs/Accuchecks:  Recent Labs   Lab 12/06/23  0012 12/06/23  0124 12/06/23  0540   WBC 25.80*  --   --    RBC 2.67*  --   --    HGB 7.5*  --   --    HCT 22.7*   < > 16*   PLT 17*  --   --     < > = values in this interval not displayed.      Recent Labs   Lab 12/06/23  0405      K 3.6   CO2 18*      BUN 56*   CREATININE 2.0*   ALKPHOS 94   *   *   BILITOT 1.1*      Recent Labs   Lab 12/05/23  0907   INR 1.5*    No results for input(s): "CPK", "CPKMB", "TROPONINI", "MB" in the last 168 hours.    Electrolytes: Electrolytes replaced  Accuchecks: Q1H    Gtts:   amiodarone in dextrose 5% 1 mg/min (12/06/23 0605)    fentanyl citrate-0.9%NaCl (PF) 50 mcg/hr (12/06/23 0605)    insulin regular 1 units/mL infusion orderable (DKA) 17.95 Units/hr (12/06/23 0605)    lactated ringers 50 mL/hr at 12/06/23 0605    LIDOcaine 1 mg/min (12/06/23 0605)    NORepinephrine bitartrate-D5W 0.48 mcg/kg/min (12/06/23 0605)    phenylephrine Stopped (12/06/23 0337)    propofoL Stopped (12/05/23 1231)    vasopressin 0.04 Units/min (12/06/23 0605)       LDA/Wounds:  Lines/Drains/Airways       Central Venous Catheter Line  Duration             Percutaneous Central Line Insertion/Assessment - Triple Lumen  12/04/23 0246 Femoral Vein Left 2 days              Drain  Duration                  Gastrostomy/Enterostomy 12/01/23 1145 Percutaneous endoscopic gastrostomy (PEG) LUQ feeding 4 days         Urethral Catheter 12/04/23 0633 Non-latex 1 day              Airway  Duration                  Airway - Non-Surgical 12/04/23 0130 Endotracheal Tube 2 days              Arterial Line  Duration             Arterial Line 12/04/23 1210 Right Femoral 1 day              Peripheral Intravenous Line  Duration                  Peripheral IV - Single Lumen 11/29/23 0041 18 G;1 3/4 in Anterior;Right Upper Arm 7 days         Midline Catheter " Insertion/Assessment  - Single Lumen 11/29/23 1255 Left basilic vein (medial side of arm) 20g x 10cm 6 days         Peripheral IV - Single Lumen 12/04/23 0245 20 G Anterior;Right Forearm 2 days                  Wounds: Yes  Wound care consulted: Yes   Problem: Adult Inpatient Plan of Care  Goal: Plan of Care Review  Outcome: Ongoing, Not Progressing  Goal: Patient-Specific Goal (Individualized)  Outcome: Ongoing, Not Progressing  Goal: Absence of Hospital-Acquired Illness or Injury  Outcome: Ongoing, Not Progressing  Goal: Optimal Comfort and Wellbeing  Outcome: Ongoing, Not Progressing  Goal: Readiness for Transition of Care  Outcome: Ongoing, Not Progressing     Problem: Adjustment to Illness (Stroke, Ischemic/Transient Ischemic Attack)  Goal: Optimal Coping  Outcome: Ongoing, Not Progressing     Problem: Bowel Elimination Impaired (Stroke, Ischemic/Transient Ischemic Attack)  Goal: Effective Bowel Elimination  Outcome: Ongoing, Not Progressing     Problem: Cerebral Tissue Perfusion (Stroke, Ischemic/Transient Ischemic Attack)  Goal: Optimal Cerebral Tissue Perfusion  Outcome: Ongoing, Not Progressing     Problem: Cognitive Impairment (Stroke, Ischemic/Transient Ischemic Attack)  Goal: Optimal Cognitive Function  Outcome: Ongoing, Not Progressing     Problem: Communication Impairment (Stroke, Ischemic/Transient Ischemic Attack)  Goal: Improved Communication Skills  Outcome: Ongoing, Not Progressing     Problem: Functional Ability Impaired (Stroke, Ischemic/Transient Ischemic Attack)  Goal: Optimal Functional Ability  Outcome: Ongoing, Not Progressing     Problem: Respiratory Compromise (Stroke, Ischemic/Transient Ischemic Attack)  Goal: Effective Oxygenation and Ventilation  Outcome: Ongoing, Not Progressing     Problem: Sensorimotor Impairment (Stroke, Ischemic/Transient Ischemic Attack)  Goal: Improved Sensorimotor Function  Outcome: Ongoing, Not Progressing     Problem: Swallowing Impairment (Stroke,  Ischemic/Transient Ischemic Attack)  Goal: Optimal Eating and Swallowing without Aspiration  Outcome: Ongoing, Not Progressing     Problem: Urinary Elimination Impaired (Stroke, Ischemic/Transient Ischemic Attack)  Goal: Effective Urinary Elimination  Outcome: Ongoing, Not Progressing     Problem: Diabetes Comorbidity  Goal: Blood Glucose Level Within Targeted Range  Outcome: Ongoing, Not Progressing     Problem: Fall Injury Risk  Goal: Absence of Fall and Fall-Related Injury  Outcome: Ongoing, Not Progressing     Problem: Restraint, Nonbehavioral (Nonviolent)  Goal: Absence of Harm or Injury  Outcome: Ongoing, Not Progressing     Problem: Skin Injury Risk Increased  Goal: Skin Health and Integrity  Outcome: Ongoing, Not Progressing     Problem: Infection  Goal: Absence of Infection Signs and Symptoms  Outcome: Ongoing, Not Progressing     Problem: Impaired Wound Healing  Goal: Optimal Wound Healing  Outcome: Ongoing, Not Progressing     Problem: Adjustment to Illness (Sepsis/Septic Shock)  Goal: Optimal Coping  Outcome: Ongoing, Not Progressing     Problem: Bleeding (Sepsis/Septic Shock)  Goal: Absence of Bleeding  Outcome: Ongoing, Not Progressing     Problem: Glycemic Control Impaired (Sepsis/Septic Shock)  Goal: Blood Glucose Level Within Desired Range  Outcome: Ongoing, Not Progressing     Problem: Infection Progression (Sepsis/Septic Shock)  Goal: Absence of Infection Signs and Symptoms  Outcome: Ongoing, Not Progressing     Problem: Nutrition Impaired (Sepsis/Septic Shock)  Goal: Optimal Nutrition Intake  Outcome: Ongoing, Not Progressing     Problem: Fluid and Electrolyte Imbalance (Acute Kidney Injury/Impairment)  Goal: Fluid and Electrolyte Balance  Outcome: Ongoing, Not Progressing     Problem: Oral Intake Inadequate (Acute Kidney Injury/Impairment)  Goal: Optimal Nutrition Intake  Outcome: Ongoing, Not Progressing     Problem: Renal Function Impairment (Acute Kidney Injury/Impairment)  Goal: Effective  Renal Function  Outcome: Ongoing, Not Progressing

## 2023-12-06 NOTE — CONSULTS
[facility]  Hematology/Oncology  Consult Note    Patient Name: Tim Rhodes  MRN: [id number]  Admission Date: 11/23/2023  Hospital Length of Stay: 13 days  Code Status: Partial Code   Attending Provider: Lg Yuen DO  Consulting Provider: Sania Gaxiola MD  Primary Care Physician: Jeannette, Primary Doctor  Principal Problem:Embolic stroke involving right middle cerebral artery    Inpatient consult to Hematology/Oncology  Consult performed by: Sania Gaxiola MD  Consult ordered by: Socorro Stout NP        Subjective:     HPI:  Mr. Tim Rhodes is an 83 year old man with HTN, DMII who presented on 11/23 with new right MCA stroke and seizures. He was initially admitted to the Neurocritical care unit. He underwent cerebral angiogram with thrombectomy and angioplasty. He had PEG tube placed on 12/01. He initially improved and was stepped down to the floor. On 12/03, he was stepped back up to the Neurocritical care unit for acute hypoxic respiratory failure requiring intubation and septic shock requiring 3 pressors. His course has been complicated by AYAN, liver injury, A.fib and thrombocytopenia. HIT panel was obtained and was 2.57. Patient was started on prophylactic heparin on 11/24 and this was discontinued on 12/01. 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    Oncology Treatment Plan:   [No matching plan found]    Medications:  Continuous Infusions:   amiodarone in dextrose 5% 0.5 mg/min (12/06/23 1426)    fentanyl citrate-0.9%NaCl (PF) 50 mcg/hr (12/06/23 1629)    insulin regular 1 units/mL infusion orderable (DKA) 8.95 Units/hr (12/06/23 1300)    lactated ringers 50 mL/hr at 12/06/23 1300    NORepinephrine bitartrate-D5W 0.26 mcg/kg/min (12/06/23 1424)    propofoL Stopped (12/05/23 1231)    vasopressin 0.04 Units/min (12/06/23 1300)     Scheduled Meds:   erythromycin   Both Eyes QID    famotidine  20 mg Per G Tube Daily    fludrocortisone  100 mcg Per G Tube Daily    hydrocortisone sodium succinate   100 mg Intravenous Q8H    levETIRAcetam (Keppra) IV (PEDS and ADULTS)  750 mg Intravenous Q12H    nitroGLYCERIN 2% TD oint  0.5 inch Topical (Top) Q6H    phenylephrine HCl in 0.9% NaCl        piperacillin-tazobactam (Zosyn) IV (PEDS and ADULTS) (extended infusion is not appropriate)  4.5 g Intravenous Q8H    polyethylene glycol  17 g Per G Tube BID    polymyxin B sulf-trimethoprim  1 drop Both Eyes QID    senna-docusate 8.6-50 mg  2 tablet Per G Tube BID    sodium bicarbonate         PRN Meds:0.9%  NaCl infusion (for blood administration), 0.9%  NaCl infusion (for blood administration), 0.9%  NaCl infusion (for blood administration), acetaminophen, dextrose 10%, dextrose 10%, fentanyl, ondansetron, phenylephrine HCl in 0.9% NaCl, sodium bicarbonate, Pharmacy to dose Vancomycin consult **AND** vancomycin - pharmacy to dose     Review of patient's allergies indicates:  No Known Allergies     Past Medical History:   Diagnosis Date    Arthritis     Weiss's esophagus     CHF (congestive heart failure)     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/23/2023    GERD (gastroesophageal reflux disease)     Type 2 diabetes mellitus      Past Surgical History:   Procedure Laterality Date    CEREBRAL ANGIOGRAM N/A 11/23/2023    Procedure: ANGIOGRAM-CEREBRAL;  Surgeon: Valery Hyman;  Location: [facility];  Service: Anesthesiology;  Laterality: N/A;    INSERTION, PEG TUBE N/A 12/1/2023    Procedure: INSERTION, PEG TUBE;  Surgeon: Tanner Villarreal MD;  Location: [address];  Service: Endoscopy;  Laterality: N/A;     Family History    None       Tobacco Use    Smoking status: Not on file    Smokeless tobacco: Not on file   Substance and Sexual Activity    Alcohol use: Not on file    Drug use: Not on file    Sexual activity: Not on file       Review of Systems   Unable to perform ROS: Intubated     Objective:     Vital Signs (Most Recent):  Temp: 97.3 °F (36.3 °C) (12/06/23 1630)  Pulse: 61 (12/06/23  1630)  Resp: (!) 23 (12/06/23 1630)  BP: (!) 108/46 (12/06/23 1330)  SpO2: 95 % (12/06/23 1630) Vital Signs (24h Range):  Temp:  [97.3 °F (36.3 °C)-99.5 °F (37.5 °C)] 97.3 °F (36.3 °C)  Pulse:  [] 61  Resp:  [0-28] 23  SpO2:  [95 %-99 %] 95 %  BP: (104-193)/() 108/46  Arterial Line BP: (101-180)/(45-74) 143/55     Weight: 77.6 kg (171 lb)  Body mass index is 30.29 kg/m².  Body surface area is 1.86 meters squared.      Intake/Output Summary (Last 24 hours) at 12/6/2023 1650  Last data filed at 12/6/2023 1300  Gross per 24 hour   Intake 5752.93 ml   Output 365 ml   Net 5387.93 ml        Physical Exam  Constitutional:       Appearance: He is ill-appearing. He is not diaphoretic.      Interventions: He is sedated and intubated.   HENT:      Head: Normocephalic and atraumatic.      Right Ear: External ear normal.      Left Ear: External ear normal.      Mouth/Throat:      Comments: ET tube in place.  Cardiovascular:      Rate and Rhythm: Normal rate.   Pulmonary:      Effort: Pulmonary effort is normal. He is intubated.   Abdominal:      General: There is no distension.      Palpations: Abdomen is soft.   Musculoskeletal:         General: Swelling present.      Cervical back: Neck supple.   Skin:     Coloration: Skin is pale.      Findings: Bruising present.      Comments: Skin mottling noted bilateral upper and lower extremities   Neurological:      Comments: Intubated          Significant Labs:   CBC:   Recent Labs   Lab 12/06/23  0012 12/06/23  0124 12/06/23  0600 12/06/23  0908 12/06/23  1207 12/06/23  1554   WBC 25.80*  --  20.96* 25.81*  --  24.11*   HGB 7.5*  --  6.1* 7.6*  --  7.1*   HCT 22.7*   < > 18.9* 22.8* 20* 21.7*   PLT 17*  --  54* 42*  --   --     < > = values in this interval not displayed.    and CMP:   Recent Labs   Lab 12/06/23  0908 12/06/23  1207 12/06/23  1554    141 139   K 4.1 3.9 5.1    109 109   CO2 19* 16* 16*   * 117* 136*   BUN 57* 53* 58*   CREATININE 2.0*  2.1* 2.1*   CALCIUM 7.3* 7.4* 7.9*   PROT 4.7* 4.6* 4.5*   ALBUMIN 1.7* 1.7* 1.6*   BILITOT 1.3* 1.4* 1.5*   ALKPHOS 107 99 101   * 750* 596*   * 862* 810*   ANIONGAP 14 16 14       Diagnostic Results:  I have reviewed all pertinent imaging results/findings within the past 24 hours.  Assessment/Plan:     Thrombocytopenia  83 year old man with HTN, DMII who presented on 11/23 with new right MCA stroke and seizures. His hospital course has been complicated by septic shock, acute hypoxic respiratory failure, AYAN, liver injury, A.fib and thrombocytopenia. 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    Recommendations:  - Recommend to start argatroban due to high risk of developing thrombosis. Patient does not appear to have active signs of bleeding. Follow up CT abdomen/pelvis.  - Advise against platelet transfusions unless there is massive bleeding as platelets can precipitate coagulopathy.  - Due to multi-organ failure as mentioned above, prognosis is poor.      Patient seen and discussed with attending, Dr. Cardoso. The above was discussed with the primary team.     Thank you for your consult. I will follow-up with patient. Please contact us if you have any additional questions.    Sania Gaxiola MD  Hematology/Oncology  [facility]

## 2023-12-06 NOTE — SIGNIFICANT EVENT
At bedside for extended periods throughout the night for management of arrhythmia causing hemodynamic instability. Pt in afiv with periods of Vtach w a pulse as visualized on arterial line. Vtach episodes became more frequent and prolonged with stabilized electrolytes and on an amio gtt at 1. Vaso, Levo and Tod requirements significantly increased, causing arrhythmia to become worse. Called on-call physician for approval of a lidocaine trial. Trial of 1mg/kg IV lidocaine 2% with significant improvement in rhythm and BP. The results were temporary resulting in subsequent 0.5mg/kg IV lidocaine 2% push x3 over the course of around 3h. Started a lidocaine gtt.    Fibrinogen 106, given 1U cryo. Plt <17, given 1U plts. Hgb 6.8 given 1U RBC.    CO2 18, given 2 amps bicarb (50mEq) with improvement in pressor requirements. Replaced calcium.     Family updated at bedside throughout the night.    Raymon Coffman PA-C  Neurocritical Care

## 2023-12-06 NOTE — ASSESSMENT & PLAN NOTE
-R MCA occlusion, s/p unsuccessful thrombectomy  -Neuro checks q4h  -Keppra BID  -Holding AC/AP in setting of thrombocytopenia and shock  -Vascular neurology following

## 2023-12-06 NOTE — SUBJECTIVE & OBJECTIVE
Interval History:  Remains intubated and sedated on Fentanyl gtt @ 50. V-tach episodes continued overnight with longer runs. Multiple lidocaine pushes given w/ minimal improvement. Currently on lidocaine gtt, will monitor lidocaine level q12h and adjust as needed to avoid toxicity. Received 1 unit PRBC, 1 unit platelets, and 1 unit cryo overnight. Off phenylephrine, will attempt to wean levophed as BP allows. MAP goal increased to > 70. Plan for CTH and CT chest/abdomen/pelvis today.      Review of Systems   Unable to perform ROS: Intubated     Objective:     Vitals:  Temp: 98.1 °F (36.7 °C)  Pulse: 63  Rhythm: atrial rhythm  BP: (!) 115/47  MAP (mmHg): 68  Resp: 20  SpO2: 99 %  Oxygen Concentration (%): 40  Vent Mode: A/C  Set Rate: 16 BPM  Vt Set: 440 mL  PEEP/CPAP: 5 cmH20  Peak Airway Pressure: 13 cmH20  Mean Airway Pressure: 7.1 cmH20  Plateau Pressure: 0 cmH20    Temp  Min: 98.1 °F (36.7 °C)  Max: 99.5 °F (37.5 °C)  Pulse  Min: 36  Max: 110  BP  Min: 104/72  Max: 193/120  MAP (mmHg)  Min: 68  Max: 141  Resp  Min: 16  Max: 36  SpO2  Min: 78 %  Max: 100 %  Oxygen Concentration (%)  Min: 40  Max: 40    12/05 0701 - 12/06 0700  In: 6193.7 [I.V.:4764.4]  Out: 370 [Urine:370]   Unmeasured Output  Stool Occurrence: 1  Pad Count: 1        Physical Exam  Vitals and nursing note reviewed.   Constitutional:       Interventions: He is sedated and intubated.   Eyes:      Pupils: Pupils are equal, round, and reactive to light.   Cardiovascular:      Rate and Rhythm: Rhythm irregular. Extrasystoles are present.     Pulses: Decreased pulses (BUE/BLE).           Radial pulses are 0 on the right side and 0 on the left side.        Dorsalis pedis pulses are 0 on the right side and 0 on the left side.        Posterior tibial pulses are detected w/ Doppler on the right side and detected w/ Doppler on the left side.      Comments: Bilateral radial and DP pulses absent  Bilateral brachial/posterior tibial detected w/  Doppler  Pulmonary:      Effort: Pulmonary effort is normal. He is intubated.      Breath sounds: Rhonchi present.   Abdominal:      General: Bowel sounds are normal.      Palpations: Abdomen is soft.   Skin:     Capillary Refill: Capillary refill takes more than 3 seconds.      Coloration: Skin is cyanotic and mottled.      Comments: Bilateral hands and feet cyanotic  Bilateral arms and legs mottled   Neurological:      GCS: GCS eye subscore is 4. GCS verbal subscore is 1. GCS motor subscore is 4.      Comments:   -Exam completed on propofol @ 40mcg  -E4 V1T M4  -PERRL  -Localizes w/ RUE/RLE; moves spontaneously/purposefully  -No response to noxious stimuli w/ LUE/LLE  -Cough/gag/corneal reflexes present       Unable to test orientation, language, memory, judgment, insight, fund of knowledge, hearing, shoulder shrug, tongue protrusion, coordination, gait due to level of consciousness.       Medications:  Continuousamiodarone in dextrose 5%, Last Rate: 0.5 mg/min (12/06/23 1200)  fentanyl citrate-0.9%NaCl (PF), Last Rate: 50 mcg/hr (12/06/23 1200)  insulin regular 1 units/mL infusion orderable (DKA), Last Rate: 12.95 Units/hr (12/06/23 1200)  lactated ringers, Last Rate: 50 mL/hr at 12/06/23 1200  LIDOcaine, Last Rate: 1 mg/min (12/06/23 1200)  NORepinephrine bitartrate-D5W, Last Rate: 0.14 mcg/kg/min (12/06/23 1200)  propofoL, Last Rate: Stopped (12/05/23 1231)  vasopressin, Last Rate: 0.04 Units/min (12/06/23 1200)    Schedulederythromycin, , QID  famotidine, 20 mg, Daily  fludrocortisone, 100 mcg, Daily  hydrocortisone sodium succinate, 100 mg, Q8H  levETIRAcetam (Keppra) IV (PEDS and ADULTS), 750 mg, Q12H  micafungin (MYCAMINE) IVPB, 100 mg, Q24H  nitroGLYCERIN 2% TD oint, 0.5 inch, Q6H  piperacillin-tazobactam (Zosyn) IV (PEDS and ADULTS) (extended infusion is not appropriate), 4.5 g, Q8H  polyethylene glycol, 17 g, BID  polymyxin B sulf-trimethoprim, 1 drop, QID  potassium chloride in water, 40 mEq,  Once  senna-docusate 8.6-50 mg, 2 tablet, BID    PRN0.9%  NaCl infusion (for blood administration), , Q24H PRN  0.9%  NaCl infusion (for blood administration), , Q24H PRN  0.9%  NaCl infusion (for blood administration), , Q24H PRN  acetaminophen, 650 mg, Q4H PRN  dextrose 10%, 12.5 g, PRN  dextrose 10%, 25 g, PRN  fentanyl, 25 mcg, Q30 Min PRN  ondansetron, 4 mg, Q6H PRN  vancomycin - pharmacy to dose, , pharmacy to manage frequency      Today I personally reviewed pertinent medications, lines/drains/airways, imaging, cardiology results, laboratory results, microbiology results, notably: CBC, CMP, ABGs, lactic acid    Diet  Diet NPO  Diet NPO

## 2023-12-06 NOTE — ASSESSMENT & PLAN NOTE
-Currently afebrile  -Worsening leukocytosis  -Lactic acid remains elevated  -Trend lactic acid q6h w/ CMP  -Goal K+ > 4, Mag > 2  -Sputum culture + for yeast  -1 of 2 blood culture bottles + for staph; ? contaminant  -Continue micafungin, Zosyn, vancomycin  -ID consulted  -Map goal > 65 - currently on levophed, phenylephrine, and vasopressin  -Continue hydrocort/fludrocort  -Flotrac  -Abdominal XR with no acute abnormalities  -Consider CT chest/abd/pelvis, but too unstable at this time   -Transition propofol to fentanyl gtt  -Platelet goal >20k unless active bleeding, hold AC/AP for now  -Family meeting today; patient now partial code

## 2023-12-06 NOTE — ASSESSMENT & PLAN NOTE
-Platelets 17 overnight - 1 unit platelets given w/ improvement  -Platelet goal > 20 unless active bleeding  -Hold AC/AP   -Received 1 unit cryo overnight for fibrinogen 106; 175 on repeat @ 0900  -CBC q8h  -Fibrinogen in AM  -HIT panel +  -CT chest/abd/pelvis today  -May need argatroban pending results of CT

## 2023-12-06 NOTE — SUBJECTIVE & OBJECTIVE
Past Medical History:   Diagnosis Date    Arthritis     Weiss's esophagus     CHF (congestive heart failure)     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/23/2023    GERD (gastroesophageal reflux disease)     Type 2 diabetes mellitus        Past Surgical History:   Procedure Laterality Date    CEREBRAL ANGIOGRAM N/A 11/23/2023    Procedure: ANGIOGRAM-CEREBRAL;  Surgeon: Valery Hyman;  Location: [facility];  Service: Anesthesiology;  Laterality: N/A;    INSERTION, PEG TUBE N/A 12/1/2023    Procedure: INSERTION, PEG TUBE;  Surgeon: Tanner Villarreal MD;  Location: [facility] ([address] [place] [address]);  Service: Endoscopy;  Laterality: N/A;       Review of patient's allergies indicates:  No Known Allergies  Current Facility-Administered Medications   Medication Frequency    0.9%  NaCl infusion (for blood administration) Q24H PRN    0.9%  NaCl infusion (for blood administration) Q24H PRN    0.9%  NaCl infusion (for blood administration) Q24H PRN    acetaminophen oral solution 650 mg Q4H PRN    amiodarone 360 mg/200 mL (1.8 mg/mL) infusion Continuous    calcium gluconate 1 g in dextrose 5 % in water (D5W) 50 mL IVPB (MB+) Q1H    dextrose 10% bolus 125 mL 125 mL PRN    dextrose 10% bolus 250 mL 250 mL PRN    erythromycin 5 mg/gram (0.5 %) ophthalmic ointment QID    famotidine tablet 20 mg Daily    fentaNYL 2500 mcg in 0.9% sodium chloride 250 mL infusion premix (non-titrating) (conc: 10mcg/mL) Continuous    fentanyl IV bolus from bag/infusion 25 mcg Q30 Min PRN    fludrocortisone tablet 100 mcg Daily    hydrocortisone sodium succinate injection 100 mg Q8H    insulin regular in 0.9 % NaCl 100 unit/100 mL (1 unit/mL) infusion Continuous    lactated ringers infusion Continuous    levETIRAcetam injection 750 mg Q12H    LIDOcaine 2000 mg in D5W 250 mL infusion Continuous    nitroGLYCERIN 2% TD oint ointment 0.5 inch Q6H    NORepinephrine 32 mg in dextrose 5 % (D5W) 250 mL infusion Continuous    ondansetron injection 4 mg Q6H  PRN    piperacillin-tazobactam (ZOSYN) 4.5 g in dextrose 5 % in water (D5W) 100 mL IVPB (MB+) Q8H    polyethylene glycol packet 17 g BID    polymyxin B sulf-trimethoprim 10,000 unit- 1 mg/mL ophthalmic drops 1 drop QID    propofol (DIPRIVAN) 10 mg/mL infusion Continuous    senna-docusate 8.6-50 mg per tablet 2 tablet BID    vancomycin - pharmacy to dose pharmacy to manage frequency    vasopressin (PITRESSIN) 0.2 Units/mL in dextrose 5 % (D5W) 100 mL infusion Continuous     Family History    None       Tobacco Use    Smoking status: Not on file    Smokeless tobacco: Not on file   Substance and Sexual Activity    Alcohol use: Not on file    Drug use: Not on file    Sexual activity: Not on file     Review of Systems   Unable to perform ROS: Intubated     Objective:     Vital Signs (Most Recent):  Temp: 97.9 °F (36.6 °C) (12/06/23 1350)  Pulse: 61 (12/06/23 1350)  Resp: 18 (12/06/23 1350)  BP: (!) 108/46 (12/06/23 1330)  SpO2: 99 % (12/06/23 1350) Vital Signs (24h Range):  Temp:  [97.9 °F (36.6 °C)-99.5 °F (37.5 °C)] 97.9 °F (36.6 °C)  Pulse:  [] 61  Resp:  [16-31] 18  SpO2:  [84 %-99 %] 99 %  BP: (104-193)/() 108/46  Arterial Line BP: (101-180)/(45-74) 144/57     Weight: 77.6 kg (171 lb) (12/04/23 1523)  Body mass index is 30.29 kg/m².  Body surface area is 1.86 meters squared.    I/O last 3 completed shifts:  In: 9215.4 [I.V.:7130.5; Blood:348; NG/GT:80; IV Piggyback:1656.9]  Out: 1075 [Urine:1075]     Physical Exam  Vitals and nursing note reviewed.   Constitutional:       Appearance: He is ill-appearing. He is not diaphoretic.      Interventions: He is sedated and intubated.   HENT:      Head: Normocephalic and atraumatic.      Right Ear: External ear normal.      Left Ear: External ear normal.      Nose: Nose normal.      Mouth/Throat:      Comments: ET tube in place.  Eyes:      General: No scleral icterus.        Right eye: No discharge.         Left eye: No discharge.      Conjunctiva/sclera:  Conjunctivae normal.   Cardiovascular:      Rate and Rhythm: Normal rate.      Heart sounds: No murmur heard.     No friction rub. No gallop.   Pulmonary:      Effort: He is intubated.      Breath sounds: Wheezing present. No rhonchi or rales.      Comments: Transmitted ventilatory sounds appreciated.  Abdominal:      General: Bowel sounds are normal. There is no distension.      Palpations: Abdomen is soft.      Comments: PEG tube in place.   Genitourinary:     Comments: Butcher catheter in place.  Musculoskeletal:      Cervical back: Neck supple.      Right lower leg: Edema present.      Left lower leg: Edema present.      Comments: Bilateral pitting edema which extends proximally.   Skin:     General: Skin is warm and dry.      Coloration: Skin is pale.      Findings: Lesion and rash present.      Comments: Skin mottling noted to distal aspect of extremities with some gangrenous digits noted.   Neurological:      Comments: Unable to fully assess.   Psychiatric:      Comments: Unable to fully assess.          Significant Labs:  ABGs:   Recent Labs   Lab 12/06/23  1207   PH 7.457*   PCO2 27.2*   HCO3 19.2*   POCSATURATED 100   BE -5*     BMP:   Recent Labs   Lab 12/06/23  0908 12/06/23  1207   * 117*    141   K 4.1 3.9    109   CO2 19* 16*   BUN 57* 53*   CREATININE 2.0* 2.1*   CALCIUM 7.3* 7.4*   MG 2.3  --      CBC:   Recent Labs   Lab 12/06/23  0908 12/06/23  1207   WBC 25.81*  --    RBC 2.60*  --    HGB 7.6*  --    HCT 22.8* 20*   PLT 42*  --    MCV 88  --    MCH 29.2  --    MCHC 33.3  --      CMP:   Recent Labs   Lab 12/06/23  1207   *   CALCIUM 7.4*   ALBUMIN 1.7*   PROT 4.6*      K 3.9   CO2 16*      BUN 53*   CREATININE 2.1*   ALKPHOS 99   *   *   BILITOT 1.4*     Coagulation:   Recent Labs   Lab 12/06/23  0908   INR 1.3*     LFTs:   Recent Labs   Lab 12/06/23  1207   *   *   ALKPHOS 99   BILITOT 1.4*   PROT 4.6*   ALBUMIN 1.7*     Microbiology  Results (last 7 days)       Procedure Component Value Units Date/Time    Blood culture [[id number]]  (Abnormal) Collected: 12/04/23 0124    Order Status: Completed Specimen: Blood from Peripheral, Wrist, Left Updated: 12/06/23 1238     Blood Culture, Routine Gram stain miguel bottle: Gram positive cocci in clusters resembling Staph      Results called to and read back by: Alvarado Patel RN 12/05/2023  01:47      STAPHYLOCOCCUS EPIDERMIDIS  Susceptibility pending      Narrative:      Blood cultures from 2 different sites. 4 bottles total.  Please draw before starting antibiotics.    Blood culture [[id number]]  (Abnormal) Collected: 12/04/23 0121    Order Status: Completed Specimen: Blood from Peripheral, Ankle, Right Updated: 12/06/23 1236     Blood Culture, Routine Gram stain miguel bottle: Gram positive cocci in clusters resembling Staph      Results called to and read back by:Alvarado Patel RN 12/05/2023  01:47      STAPHYLOCOCCUS EPIDERMIDIS  Susceptibility pending      Narrative:      Blood cultures x 2 different sites. 4 bottles total. Please  draw cultures before administering antibiotics.    Blood culture [[id number]] Collected: 12/06/23 1007    Order Status: Sent Specimen: Blood from Peripheral, Antecubital, Left Updated: 12/06/23 1015    Blood culture [[id number]] Collected: 12/06/23 0949    Order Status: Sent Specimen: Blood from Peripheral, Foot, Right Updated: 12/06/23 1015    Rapid Organism ID by PCR (from Blood culture) [[id number]]  (Abnormal) Collected: 12/04/23 0121    Order Status: Completed Updated: 12/05/23 0314     Enterococcus faecalis Not Detected     Enterococcus faecium Not Detected     Listeria monocytogenes Not Detected     Staphylococcus spp. See species for ID     Staphylococcus aureus Not Detected     Staphylococcus epidermidis Detected     Staphylococcus lugdunensis Not Detected     Streptococcus species Not Detected     Streptococcus agalactiae Not Detected     Streptococcus pneumoniae  Not Detected     Streptococcus pyogenes Not Detected     Acinetobacter calcoaceticus/baumannii complex Not Detected     Bacteroides fragilis Not Detected     Enterobacterales Not Detected     Enterobacter cloacae complex Not Detected     Escherichia coli Not Detected     Klebsiella aerogenes Not Detected     Klebsiella oxytoca Not Detected     Klebsiella pneumoniae group Not Detected     Proteus Not Detected     Salmonella sp Not Detected     Serratia marcescens Not Detected     Haemophilus influenzae Not Detected     Neisseria meningtidis Not Detected     Pseudomonas aeruginosa Not Detected     Stenotrophomonas maltophilia Not Detected     Candida albicans Not Detected     Candida auris Not Detected     Candida glabrata Not Detected     Candida krusei Not Detected     Candida parapsilosis Not Detected     Candida tropicalis Not Detected     Cryptococcus neoformans/gattii Not Detected     CTX-M (ESBL ) Test Not Applicable     IMP (Carbapenem resistant) Test Not Applicable     KPC resistance gene (Carbapenem resistant) Test Not Applicable     mcr-1  Test Not Applicable     mec A/C  Detected     mec A/C and MREJ (MRSA) gene Test Not Applicable     NDM (Carbapenem resistant) Test Not Applicable     OXA-48-like (Carbapenem resistant) Test Not Applicable     van A/B (VRE gene) Test Not Applicable     VIM (Carbapenem resistant) Test Not Applicable    Narrative:      Blood cultures x 2 different sites. 4 bottles total. Please  draw cultures before administering antibiotics.    Culture, Respiratory with Gram Stain [[id number]] Collected: 12/04/23 0220    Order Status: Completed Specimen: Respiratory from Sputum Updated: 12/04/23 1025     Gram Stain (Respiratory) <10 epithelial cells per low power field.     Gram Stain (Respiratory) Few WBC's     Gram Stain (Respiratory) Many yeast    Culture, Respiratory with Gram Stain [[id number]] Collected: 12/04/23 0039    Order Status: Sent Specimen: Respiratory from Sputum  Updated: 12/04/23 0040    Blood culture [[id number]] Collected: 11/28/23 1218    Order Status: Completed Specimen: Blood from Peripheral, Ankle, Left Updated: 12/03/23 1412     Blood Culture, Routine No growth after 5 days.    Blood culture [[id number]] Collected: 11/28/23 1216    Order Status: Completed Specimen: Blood from Peripheral, Ankle, Right Updated: 12/03/23 1412     Blood Culture, Routine No growth after 5 days.          Specimen (24h ago, onward)      None          Recent Labs   Lab 12/04/23  0039   COLORU Yellow   SPECGRAV >1.030*   PHUR 5.0   PROTEINUA Trace*   BACTERIA Rare   NITRITE Negative   LEUKOCYTESUR Negative     Urinary sediment on 12/06/2023:                                      Significant Imaging:  I have reviewed all imagining in the last 24 hours.

## 2023-12-06 NOTE — PROGRESS NOTES
[facility] - Neuro Critical Care  Neurocritical Care  Progress Note    Admit Date: 11/23/2023  Service Date: 12/05/2023  Length of Stay: 12    Subjective:     Chief Complaint: Embolic stroke involving right middle cerebral artery    History of Present Illness: The patient is an 83-year-old with PMHx of  CAD and DM admitted to [facility] with large R MCA stroke and seizure.Per chart review, in emergency department at [facility]  presented with with altered mental status and focal seizure activity.  Daughter states patient was able to talk to her on the phone at 10:00 p.m. last night was at baseline.  Patient living in assisted living but still very functional.  Daughter states patien still drives on occasion.  She does not believe he takes any blood thinners.  She went to check on him is afternoon and he had fallen next to the toilet.  Patient unable to provide any history.    Noted to have tonic-clonic activity to the right upper extremity on arrival.  Patient with incomprehensible speech.  Copious vomiting on EMS arrival per paramedic.  Patient unable to contribute to history.  Last presumed normal was 10:00 p.m. last night. Out of window for thrombolytics.  Patient intubated for airway protection at outside hospital. On arrival CTH with large R MCA - stroke team consulted and plan for IR. Patient loaded with Keppra, EEG pending. Lactic acid 11.9 on arrival, pancx and initiated broad spec abx. Patient admitted to [facility] for close monitoring and higher level of care.        Hospital Course: 11/24/2023 MRI complete and reviewed, Nsgy consulted, EEG neg and dced  11/25/2023: initiate baby ASA and sq heparin, follow CT head in AM, SBP <180, initiate tube feeds  11/26/2023 EKG and if afib happens again will treat, Extubated 11/26 at 1400, Low dose precedex for agitation  11/27/2023 EKG, NS 75 cc/hr, consult IR for peg  11/28/23: consult GI for peg  11/29/23: CXR in am  11/30/23: awaiting PEG tube  12/1/23:PEG  today  12/2/2023: start tube feeds, decrease enteral water flushed to 200 q 6 hr, stepdown to stroke team   12/3/2023: pending stepdown to stroke team, decrease enteral water flushes to 100 q6h, add Detemir and increase PRN SSI   12/04/2023 stepped up with shock and respiratory failure requiring 3 pressors  12/05/2023 Amio restarted overnight for a-fib w/ RVR and multiple runs of v-tach. Continues to require increased pressor support.    Interval History:  Remains intubated and sedated on propofol. Amio resumed last night for a-fib w/ RVR and multiple runs of v-tach. Currently requiring 3 pressors to maintain BP, vascular surgery consulted for absent peripheral pulses and cyanotic extremities. Family meeting today to discuss plan, see ACP note for details.     Review of Systems   Unable to perform ROS: Intubated     Objective:     Vitals:  Temp: 99.3 °F (37.4 °C)  Pulse: 77  Rhythm: atrial rhythm  BP: (!) 110/58  MAP (mmHg): 77  Resp: (!) 30  SpO2: (!) 90 %  Oxygen Concentration (%): 40  Vent Mode: A/C  Set Rate: 16 BPM  Vt Set: 440 mL  PEEP/CPAP: 5 cmH20  Peak Airway Pressure: 14 cmH20  Mean Airway Pressure: 10 cmH20  Plateau Pressure: 0 cmH20    Temp  Min: 98.8 °F (37.1 °C)  Max: 100.6 °F (38.1 °C)  Pulse  Min: 36  Max: 159  BP  Min: 78/42  Max: 217/86  MAP (mmHg)  Min: 56  Max: 139  Resp  Min: 17  Max: 39  SpO2  Min: 78 %  Max: 100 %  Oxygen Concentration (%)  Min: 40  Max: 40    12/04 0701 - 12/05 0700  In: 8101.5 [I.V.:5845.3]  Out: 1800 [Urine:1800]   Unmeasured Output  Stool Occurrence: 1  Pad Count: 1        Physical Exam  Vitals and nursing note reviewed.   Constitutional:       Interventions: He is sedated and intubated.   Eyes:      Pupils: Pupils are equal, round, and reactive to light.   Cardiovascular:      Rate and Rhythm: Rhythm irregular. Extrasystoles are present.     Pulses: Decreased pulses (BUE/BLE).   Pulmonary:      Effort: Pulmonary effort is normal. He is intubated.      Breath sounds:  Rhonchi present.   Abdominal:      General: Bowel sounds are normal.      Palpations: Abdomen is soft.   Skin:     Capillary Refill: Capillary refill takes more than 3 seconds.   Neurological:      GCS: GCS eye subscore is 1. GCS verbal subscore is 1. GCS motor subscore is 4.      Comments:   -Exam completed on propofol @ 40mcg  -E1 V1T M4  -PERRL  -Localizes w/ RUE/RLE; moves spontaneously  -No response to noxious stimuli w/ LUE/LLE  -Cough/gag/corneal reflexes present       Unable to test orientation, language, memory, judgment, insight, fund of knowledge, hearing, shoulder shrug, tongue protrusion, coordination, gait due to level of consciousness.       Medications:  Continuousamiodarone in dextrose 5%, Last Rate: 1 mg/min (12/05/23 1400)  fentanyl citrate-0.9%NaCl (PF), Last Rate: 50 mcg/hr (12/05/23 1400)  insulin regular 1 units/mL infusion orderable (DKA), Last Rate: 20.95 Units/hr (12/05/23 1400)  lactated ringers, Last Rate: 50 mL/hr at 12/05/23 1400  NORepinephrine bitartrate-D5W, Last Rate: 0.38 mcg/kg/min (12/05/23 1400)  phenylephrine, Last Rate: 2.5 mcg/kg/min (12/05/23 1400)  propofoL, Last Rate: Stopped (12/05/23 1231)  vasopressin, Last Rate: 0.04 Units/min (12/05/23 1400)    Schedulederythromycin, , QID  famotidine, 20 mg, Daily  fludrocortisone, 100 mcg, Daily  hydrocortisone sodium succinate, 100 mg, Q8H  levETIRAcetam (Keppra) IV (PEDS and ADULTS), 750 mg, Q12H  micafungin (MYCAMINE) IVPB, 100 mg, Q24H  nitroGLYCERIN 2% TD oint, 0.5 inch, Q6H  piperacillin-tazobactam (Zosyn) IV (PEDS and ADULTS) (extended infusion is not appropriate), 4.5 g, Q8H  polyethylene glycol, 17 g, BID  polymyxin B sulf-trimethoprim, 1 drop, QID  senna-docusate 8.6-50 mg, 2 tablet, BID    PRNacetaminophen, 650 mg, Q4H PRN  dextrose 10%, 12.5 g, PRN  dextrose 10%, 25 g, PRN  fentanyl, 25 mcg, Q30 Min PRN  hydrALAZINE, 10 mg, Q4H PRN  labetalol, 10 mg, Q4H PRN  ondansetron, 4 mg, Q6H PRN  vancomycin - pharmacy to dose, ,  pharmacy to manage frequency      Today I personally reviewed pertinent medications, lines/drains/airways, imaging, cardiology results, laboratory results, microbiology results, notably: CTH; CMP; CBC    Diet  Diet NPO  Diet NPO    Assessment/Plan:     Neuro  * Embolic stroke involving right middle cerebral artery  -R MCA occlusion, s/p unsuccessful thrombectomy  -Neuro checks q4h  -Keppra BID  -Holding AC/AP in setting of thrombocytopenia and shock  -Vascular neurology following      Pulmonary  On mechanically assisted ventilation  -Current vent settings below  Vent Mode: A/C  Oxygen Concentration (%):  [40] 40  Resp Rate Total:  [20 br/min-37 br/min] 25 br/min  Vt Set:  [440 mL] 440 mL  PEEP/CPAP:  [5 cmH20] 5 cmH20  Mean Airway Pressure:  [9.3 kjZ17-70 cmH20] 10 cmH20  -ABGs q6h  -CXR daily  -Wean vent as tolerated  -Fentanyl PRN vent dyssynchrony     Cardiac/Vascular  Paroxysmal atrial fibrillation  -Amio gtt @ 1  -Increased episodes of v-tach when rate decreased to 0.5  -CMP q4h  -Goal K+ > 4, Mag > 2    Renal/  AYAN (acute kidney injury)  -BUN/creatinine uptrending  -Avoid nephrotoxins  -Renally dose meds  -CMP q4h    ID  Septic shock  -Currently afebrile  -Worsening leukocytosis  -Lactic acid remains elevated  -Trend lactic acid q6h w/ CMP  -Goal K+ > 4, Mag > 2  -Sputum culture + for yeast  -1 of 2 blood culture bottles + for staph; ? contaminant  -Continue micafungin, Zosyn, vancomycin  -ID consulted  -Map goal > 65 - currently on levophed, phenylephrine, and vasopressin  -Continue hydrocort/fludrocort  -Flotrac  -Abdominal XR with no acute abnormalities  -Consider CT chest/abd/pelvis, but too unstable at this time   -Transition propofol to fentanyl gtt  -Platelet goal >20k unless active bleeding, hold AC/AP for now  -Family meeting today; patient now partial code    Hematology  Thrombocytopenia  -Platelets downtrending, currently 21  -Platelet goal > 20 unless active bleeding  -Hold AC/AP   -CBC q8h  -HIT  panel  -Fibrinogen low, will repeat at [facility] - if decreases, will give cryo    Endocrine  Diabetes mellitus  -Insulin gtt per protocol  -Accuchecks    GI  Liver failure, acute  -AST/ALT significantly elevated since admission  -CMP q4h  -Avoid hepatotoxins           The patient is being Prophylaxed for:  Venous Thromboembolism with: Mechanical  Stress Ulcer with: H2B  Ventilator Pneumonia with: chlorhexidine oral care    Activity Orders            Elevate HOB 30 starting at 12/05 1220    Turn patient starting at 11/28 0715    Straight Cath starting at 11/25 1809    Progressive Mobility Protocol (mobilize patient to their highest level of functioning at least twice daily) starting at 11/23 2000    Diet NPO: NPO starting at 11/23 1821          Partial Code    Socorro Stuot NP  Neurocritical Care  [facility] - Neuro Critical Care

## 2023-12-06 NOTE — ASSESSMENT & PLAN NOTE
-Elevated BUN/creatinine  -Minimal urine output over last 24h  -Net + 15L since admission  -Lasix 40 IV x 1  -Avoid nephrotoxins  -Renally dose meds  -CMP q4h

## 2023-12-06 NOTE — HPI
Mr. Tim Rhodes is an 83 year old man with HTN, DMII who presented on 11/23 with new right MCA stroke and seizures. He was initially admitted to the Neurocritical care unit. He underwent cerebral angiogram with thrombectomy and angioplasty. He had PEG tube placed on 12/01. He initially improved and was stepped down to the floor. On 12/03, he was stepped back up to the Neurocritical care unit for acute hypoxic respiratory failure requiring intubation and septic shock requiring 3 pressors. His course has been complicated by AYAN, liver injury, A.fib and thrombocytopenia. HIT panel was obtained and was 2.57. Patient was started on prophylactic heparin on 11/24 and this was discontinued on 12/01. 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

## 2023-12-06 NOTE — ASSESSMENT & PLAN NOTE
-Current vent settings below  Vent Mode: A/C  Oxygen Concentration (%):  [40] 40  Resp Rate Total:  [17 br/min-36 br/min] 20 br/min  Vt Set:  [440 mL] 440 mL  PEEP/CPAP:  [5 cmH20] 5 cmH20  Mean Airway Pressure:  [6.9 poJ52-79 cmH20] 7.1 cmH20  -ABGs q6h  -CXR daily  -Wean vent as tolerated  -Fentanyl PRN vent dyssynchrony

## 2023-12-06 NOTE — ASSESSMENT & PLAN NOTE
-Platelets downtrending, currently 21  -Platelet goal > 20 unless active bleeding  -Hold AC/AP   -CBC q8h  -HIT panel  -Fibrinogen low, will repeat at [facility] - if decreases, will give cryo

## 2023-12-06 NOTE — ASSESSMENT & PLAN NOTE
-Afebrile  -Continued leukocytosis  -Lactic acid remains elevated  -Trend lactic acid w/ ABGs + lytes q6h  -Goal K+ > 4, Mag > 2, Ca+ > 8, ionized Ca+ > 1.5  -Sputum culture + for yeast  -2 of 2 blood culture bottles + for staph epidermis  -Repeat blood cultures x 2 today  -Continue micafungin, Zosyn, vancomycin  -ID following  -Increase MAP goal > 70  -Phenylephrine discontinued, wean levophed as BP allows  -Continue vasopressin @ 0.04  -Lidocaine gtt @ 1mg/min  -Lidocaine level q12h w/ goal 1.5-5; stop gtt w/ level >/= 4  -Continue hydrocortisone/fludrocortisone  -Flotrac  -CT chest/abd/pelvis today   -Platelet goal >20k unless active bleeding, hold AC/AP for now

## 2023-12-06 NOTE — ASSESSMENT & PLAN NOTE
-R MCA occlusion, s/p unsuccessful thrombectomy  -Neuro checks q4h  -Keppra BID  -Holding AC/AP in setting of thrombocytopenia and shock  -Vascular neurology following  -CTH today

## 2023-12-06 NOTE — ASSESSMENT & PLAN NOTE
-Current vent settings below  Vent Mode: A/C  Oxygen Concentration (%):  [40] 40  Resp Rate Total:  [20 br/min-37 br/min] 25 br/min  Vt Set:  [440 mL] 440 mL  PEEP/CPAP:  [5 cmH20] 5 cmH20  Mean Airway Pressure:  [9.3 lqN33-65 cmH20] 10 cmH20  -ABGs q6h  -CXR daily  -Wean vent as tolerated  -Fentanyl PRN vent dyssynchrony

## 2023-12-06 NOTE — ASSESSMENT & PLAN NOTE
- acute tubular injury secondary to shock with urinary sediment with muddy brown casts, granular casts and some leucine and uric acid stones noted  - follow-up repeat metabolic panel and monitor UOP, s/p 80 mg IV Lasix with minimal response  - anticipate patient may require RRT soon for which consent obtained by son, if indications for RRT met (i.e. worsening electrolyte derangements, acidosis, hypervolemia, etc.)  - agree with serial metabolic panels Q4H, if hyperkalemia recommend trial of Lasix 240 mg IV with Diuril 500 mg IV  - renal diet/tube feeds when not NPO  - strict I/O's and daily weights  - keep MAP > 65 mmHg  - renally all dose medications to eGFR  - avoid nephrotoxic agents wean feasible (i.e. NSAIDs, intra-arterial contrast, supra-therapeutic vancomycin levels, etc.)

## 2023-12-06 NOTE — CONSULTS
[facility]  Nephrology  Consult Note    Patient Name: Tim Rhodes  MRN: [id number]  Admission Date: 11/23/2023  Hospital Length of Stay: 13 days  Attending Provider: Lg Yuen DO   Primary Care Physician: Jeannette, Primary Doctor  Principal Problem:Embolic stroke involving right middle cerebral artery    Inpatient consult to Nephrology  Consult performed by: Butch Gill MD  Consult ordered by: Socorro Stout NP        Subjective:     HPI: Mr. Rhodes is an obese (BMI ~30) 83-year-old with type 2 diabetes mellitus not on insulin (most recent hemoglobin A1c 6.6%), GERD, hypertension, HLD, CAD, Weiss's esophagus, OA as well as several over co-morbid conditions who was admitted on 11/23 with new large right MCA stroke with focal seizure activity in addition to shock with serum lactate of 11.9. He was loaded with Keppra. Cerebral angiogram with thrombectomy and angioplasty was attempted to no avail. Appears his lactic acidosis and CPK improved and he was ultimately extubated on 11/26. He was ultimately stepped down on 12/3 to Vascular Neurology service however on the chauncey of 12/3 he was found to be respiratory distress, tachycardia and encephalopathy and was noted to be in septic shock for which he was stepped back up on x3 vasopressors and developed atrial fibrillation for RVR. Since that time he has remained on vasopressor support with worsening renal function and diminishing UOP. Nephrology has been consulted for assistance with management of AYAN.     Past Medical History:   Diagnosis Date    Arthritis     Weiss's esophagus     CHF (congestive heart failure)     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/23/2023    GERD (gastroesophageal reflux disease)     Type 2 diabetes mellitus        Past Surgical History:   Procedure Laterality Date    CEREBRAL ANGIOGRAM N/A 11/23/2023    Procedure: ANGIOGRAM-CEREBRAL;  Surgeon: Valery Hyman;  Location: [facility];  Service:  Anesthesiology;  Laterality: N/A;    INSERTION, PEG TUBE N/A 12/1/2023    Procedure: INSERTION, PEG TUBE;  Surgeon: Tanner Villarreal MD;  Location: [address]);  Service: Endoscopy;  Laterality: N/A;       Review of patient's allergies indicates:  No Known Allergies  Current Facility-Administered Medications   Medication Frequency    0.9%  NaCl infusion (for blood administration) Q24H PRN    0.9%  NaCl infusion (for blood administration) Q24H PRN    0.9%  NaCl infusion (for blood administration) Q24H PRN    acetaminophen oral solution 650 mg Q4H PRN    amiodarone 360 mg/200 mL (1.8 mg/mL) infusion Continuous    calcium gluconate 1 g in dextrose 5 % in water (D5W) 50 mL IVPB (MB+) Q1H    dextrose 10% bolus 125 mL 125 mL PRN    dextrose 10% bolus 250 mL 250 mL PRN    erythromycin 5 mg/gram (0.5 %) ophthalmic ointment QID    famotidine tablet 20 mg Daily    fentaNYL 2500 mcg in 0.9% sodium chloride 250 mL infusion premix (non-titrating) (conc: 10mcg/mL) Continuous    fentanyl IV bolus from bag/infusion 25 mcg Q30 Min PRN    fludrocortisone tablet 100 mcg Daily    hydrocortisone sodium succinate injection 100 mg Q8H    insulin regular in 0.9 % NaCl 100 unit/100 mL (1 unit/mL) infusion Continuous    lactated ringers infusion Continuous    levETIRAcetam injection 750 mg Q12H    LIDOcaine 2000 mg in D5W 250 mL infusion Continuous    nitroGLYCERIN 2% TD oint ointment 0.5 inch Q6H    NORepinephrine 32 mg in dextrose 5 % (D5W) 250 mL infusion Continuous    ondansetron injection 4 mg Q6H PRN    piperacillin-tazobactam (ZOSYN) 4.5 g in dextrose 5 % in water (D5W) 100 mL IVPB (MB+) Q8H    polyethylene glycol packet 17 g BID    polymyxin B sulf-trimethoprim 10,000 unit- 1 mg/mL ophthalmic drops 1 drop QID    propofol (DIPRIVAN) 10 mg/mL infusion Continuous    senna-docusate 8.6-50 mg per tablet 2 tablet BID    vancomycin - pharmacy to dose pharmacy to manage frequency    vasopressin (PITRESSIN) 0.2 Units/mL in dextrose  5 % (D5W) 100 mL infusion Continuous     Family History    None       Tobacco Use    Smoking status: Not on file    Smokeless tobacco: Not on file   Substance and Sexual Activity    Alcohol use: Not on file    Drug use: Not on file    Sexual activity: Not on file     Review of Systems   Unable to perform ROS: Intubated     Objective:     Vital Signs (Most Recent):  Temp: 97.9 °F (36.6 °C) (12/06/23 1350)  Pulse: 61 (12/06/23 1350)  Resp: 18 (12/06/23 1350)  BP: (!) 108/46 (12/06/23 1330)  SpO2: 99 % (12/06/23 1350) Vital Signs (24h Range):  Temp:  [97.9 °F (36.6 °C)-99.5 °F (37.5 °C)] 97.9 °F (36.6 °C)  Pulse:  [] 61  Resp:  [16-31] 18  SpO2:  [84 %-99 %] 99 %  BP: (104-193)/() 108/46  Arterial Line BP: (101-180)/(45-74) 144/57     Weight: 77.6 kg (171 lb) (12/04/23 1523)  Body mass index is 30.29 kg/m².  Body surface area is 1.86 meters squared.    I/O last 3 completed shifts:  In: 9215.4 [I.V.:7130.5; Blood:348; NG/GT:80; IV Piggyback:1656.9]  Out: 1075 [Urine:1075]     Physical Exam  Vitals and nursing note reviewed.   Constitutional:       Appearance: He is ill-appearing. He is not diaphoretic.      Interventions: He is sedated and intubated.   HENT:      Head: Normocephalic and atraumatic.      Right Ear: External ear normal.      Left Ear: External ear normal.      Nose: Nose normal.      Mouth/Throat:      Comments: ET tube in place.  Eyes:      General: No scleral icterus.        Right eye: No discharge.         Left eye: No discharge.      Conjunctiva/sclera: Conjunctivae normal.   Cardiovascular:      Rate and Rhythm: Normal rate.      Heart sounds: No murmur heard.     No friction rub. No gallop.   Pulmonary:      Effort: He is intubated.      Breath sounds: Wheezing present. No rhonchi or rales.      Comments: Transmitted ventilatory sounds appreciated.  Abdominal:      General: Bowel sounds are normal. There is no distension.      Palpations: Abdomen is soft.      Comments: PEG tube in  place.   Genitourinary:     Comments: Butcher catheter in place.  Musculoskeletal:      Cervical back: Neck supple.      Right lower leg: Edema present.      Left lower leg: Edema present.      Comments: Bilateral pitting edema which extends proximally.   Skin:     General: Skin is warm and dry.      Coloration: Skin is pale.      Findings: Lesion and rash present.      Comments: Skin mottling noted to distal aspect of extremities with some gangrenous digits noted.   Neurological:      Comments: Unable to fully assess.   Psychiatric:      Comments: Unable to fully assess.          Significant Labs:  ABGs:   Recent Labs   Lab 12/06/23  1207   PH 7.457*   PCO2 27.2*   HCO3 19.2*   POCSATURATED 100   BE -5*     BMP:   Recent Labs   Lab 12/06/23  0908 12/06/23  1207   * 117*    141   K 4.1 3.9    109   CO2 19* 16*   BUN 57* 53*   CREATININE 2.0* 2.1*   CALCIUM 7.3* 7.4*   MG 2.3  --      CBC:   Recent Labs   Lab 12/06/23  0908 12/06/23  1207   WBC 25.81*  --    RBC 2.60*  --    HGB 7.6*  --    HCT 22.8* 20*   PLT 42*  --    MCV 88  --    MCH 29.2  --    MCHC 33.3  --      CMP:   Recent Labs   Lab 12/06/23  1207   *   CALCIUM 7.4*   ALBUMIN 1.7*   PROT 4.6*      K 3.9   CO2 16*      BUN 53*   CREATININE 2.1*   ALKPHOS 99   *   *   BILITOT 1.4*     Coagulation:   Recent Labs   Lab 12/06/23  0908   INR 1.3*     LFTs:   Recent Labs   Lab 12/06/23  1207   *   *   ALKPHOS 99   BILITOT 1.4*   PROT 4.6*   ALBUMIN 1.7*     Microbiology Results (last 7 days)       Procedure Component Value Units Date/Time    Blood culture [[id number]]  (Abnormal) Collected: 12/04/23 0124    Order Status: Completed Specimen: Blood from Peripheral, Wrist, Left Updated: 12/06/23 1238     Blood Culture, Routine Gram stain miguel bottle: Gram positive cocci in clusters resembling Staph      Results called to and read back by: Alvarado Patel RN 12/05/2023  01:47      STAPHYLOCOCCUS  EPIDERMIDIS  Susceptibility pending      Narrative:      Blood cultures from 2 different sites. 4 bottles total.  Please draw before starting antibiotics.    Blood culture [[id number]]  (Abnormal) Collected: 12/04/23 0121    Order Status: Completed Specimen: Blood from Peripheral, Ankle, Right Updated: 12/06/23 1236     Blood Culture, Routine Gram stain miguel bottle: Gram positive cocci in clusters resembling Staph      Results called to and read back by:Alvarado Patel RN 12/05/2023  01:47      STAPHYLOCOCCUS EPIDERMIDIS  Susceptibility pending      Narrative:      Blood cultures x 2 different sites. 4 bottles total. Please  draw cultures before administering antibiotics.    Blood culture [[id number]] Collected: 12/06/23 1007    Order Status: Sent Specimen: Blood from Peripheral, Antecubital, Left Updated: 12/06/23 1015    Blood culture [[id number]] Collected: 12/06/23 0949    Order Status: Sent Specimen: Blood from Peripheral, Foot, Right Updated: 12/06/23 1015    Rapid Organism ID by PCR (from Blood culture) [[id number]]  (Abnormal) Collected: 12/04/23 0121    Order Status: Completed Updated: 12/05/23 0314     Enterococcus faecalis Not Detected     Enterococcus faecium Not Detected     Listeria monocytogenes Not Detected     Staphylococcus spp. See species for ID     Staphylococcus aureus Not Detected     Staphylococcus epidermidis Detected     Staphylococcus lugdunensis Not Detected     Streptococcus species Not Detected     Streptococcus agalactiae Not Detected     Streptococcus pneumoniae Not Detected     Streptococcus pyogenes Not Detected     Acinetobacter calcoaceticus/baumannii complex Not Detected     Bacteroides fragilis Not Detected     Enterobacterales Not Detected     Enterobacter cloacae complex Not Detected     Escherichia coli Not Detected     Klebsiella aerogenes Not Detected     Klebsiella oxytoca Not Detected     Klebsiella pneumoniae group Not Detected     Proteus Not Detected     Salmonella sp  Not Detected     Serratia marcescens Not Detected     Haemophilus influenzae Not Detected     Neisseria meningtidis Not Detected     Pseudomonas aeruginosa Not Detected     Stenotrophomonas maltophilia Not Detected     Candida albicans Not Detected     Candida auris Not Detected     Candida glabrata Not Detected     Candida krusei Not Detected     Candida parapsilosis Not Detected     Candida tropicalis Not Detected     Cryptococcus neoformans/gattii Not Detected     CTX-M (ESBL ) Test Not Applicable     IMP (Carbapenem resistant) Test Not Applicable     KPC resistance gene (Carbapenem resistant) Test Not Applicable     mcr-1  Test Not Applicable     mec A/C  Detected     mec A/C and MREJ (MRSA) gene Test Not Applicable     NDM (Carbapenem resistant) Test Not Applicable     OXA-48-like (Carbapenem resistant) Test Not Applicable     van A/B (VRE gene) Test Not Applicable     VIM (Carbapenem resistant) Test Not Applicable    Narrative:      Blood cultures x 2 different sites. 4 bottles total. Please  draw cultures before administering antibiotics.    Culture, Respiratory with Gram Stain [[id number]] Collected: 12/04/23 0220    Order Status: Completed Specimen: Respiratory from Sputum Updated: 12/04/23 1025     Gram Stain (Respiratory) <10 epithelial cells per low power field.     Gram Stain (Respiratory) Few WBC's     Gram Stain (Respiratory) Many yeast    Culture, Respiratory with Gram Stain [[id number]] Collected: 12/04/23 0039    Order Status: Sent Specimen: Respiratory from Sputum Updated: 12/04/23 0040    Blood culture [[id number]] Collected: 11/28/23 1218    Order Status: Completed Specimen: Blood from Peripheral, Ankle, Left Updated: 12/03/23 1412     Blood Culture, Routine No growth after 5 days.    Blood culture [[id number]] Collected: 11/28/23 1216    Order Status: Completed Specimen: Blood from Peripheral, Ankle, Right Updated: 12/03/23 1412     Blood Culture, Routine No growth after 5 days.           Specimen (24h ago, onward)      None          Recent Labs   Lab 12/04/23  0039   COLORU Yellow   SPECGRAV >1.030*   PHUR 5.0   PROTEINUA Trace*   BACTERIA Rare   NITRITE Negative   LEUKOCYTESUR Negative     Urinary sediment on 12/06/2023:                                      Significant Imaging:  I have reviewed all imagining in the last 24 hours.  Assessment/Plan:     Neuro  * Embolic stroke involving right middle cerebral artery  - management per primary team    Renal/  Acute renal failure with tubular necrosis  - acute tubular injury secondary to shock with urinary sediment with muddy brown casts, granular casts and some leucine and uric acid stones noted  - follow-up repeat metabolic panel and monitor UOP, s/p 80 mg IV Lasix with minimal response  - anticipate patient may require RRT soon for which consent obtained by son, if indications for RRT met (i.e. worsening electrolyte derangements, acidosis, hypervolemia, etc.)  - agree with serial metabolic panels Q4H, if hyperkalemia recommend trial of Lasix 240 mg IV with Diuril 500 mg IV  - renal diet/tube feeds when not NPO  - strict I/O's and daily weights  - keep MAP > 65 mmHg  - renally all dose medications to eGFR  - avoid nephrotoxic agents wean feasible (i.e. NSAIDs, intra-arterial contrast, supra-therapeutic vancomycin levels, etc.)    ID  Septic shock  - management per primary team      Thank you for your consult. I will follow-up with patient. Please contact us if you have any additional questions.    Butch Gill MD  Nephrology  [facility]

## 2023-12-06 NOTE — ASSESSMENT & PLAN NOTE
83 year old man with HTN, DMII who presented on 11/23 with new right MCA stroke and seizures. His hospital course has been complicated by septic shock, acute hypoxic respiratory failure, AYAN, liver injury, A.fib and thrombocytopenia. 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    Recommendations:  - Recommend to start argatroban due to high risk of developing thrombosis. Patient does not appear to have active signs of bleeding. Follow up CT abdomen/pelvis.  - Advise against platelet transfusions unless there is massive bleeding as platelets can precipitate coagulopathy.  - Due to multi-organ failure as mentioned above, prognosis is poor.

## 2023-12-07 PROBLEM — Z51.5 COMFORT MEASURES ONLY STATUS: Status: ACTIVE | Noted: 2023-01-01

## 2023-12-07 NOTE — PROGRESS NOTES
Lines reversed.  Arterial lumen spasms during blood return with slight resistance to flushing.  Venous lumen with no issues.  PBP set to 1066; machine does not allow for 1065  Dialysate rate set to 1070; machine does not allow for 1065       12/07/23 0043   Treatment   Treatment Type Other  (CVVHDF)   Treatment Status New start   Dialysis Machine Number [id number]   Dialyzer Time (hours) 0   Solutions Labeled and Current  Yes   Access Temporary Cath;Left;IJ   Catheter Dressing Intact  Yes   Alarms Engaged Yes   CRRT Comments CVVHDF started per MD orders

## 2023-12-07 NOTE — DISCHARGE SUMMARY
Death Note  Neurocritical Care      Admit Date: 2023    Date of Death: 2023     Attending Physician: No att. providers found    Discharge Physician/APC: Lg Yuen DO    Principal Diagnoses: Embolic stroke involving right middle cerebral artery    Preliminary Cause of Death: Embolic stroke involving right middle cerebral artery    Other Secondary Diagnoses:   Patient Active Problem List   Diagnosis    Embolic stroke involving right middle cerebral artery    Seizure-like activity    Hemiparesis, left    Hyperkalemia    Diabetes mellitus    Aspiration pneumonia due to vomit    Paroxysmal atrial fibrillation    Encounter for PEG (percutaneous endoscopic gastrostomy)    Obstructive sleep apnea, adult    Septic shock    Thrombocytopenia    Liver failure, acute    Acute renal failure with tubular necrosis    On mechanically assisted ventilation    AYAN (acute kidney injury)    DIC (disseminated intravascular coagulation)    Other ventricular tachycardia    Comfort measures only status       Discharged Condition:     HPI & Hospital Course:   History of Present Illness: The patient is an 83-year-old with PMHx of  CAD and DM admitted to [facility] with large R MCA stroke and seizure.Per chart review, in emergency department at [facility]  presented with with altered mental status and focal seizure activity.  Daughter states patient was able to talk to her on the phone at 10:00 p.m. last night was at baseline.  Patient living in assisted living but still very functional.  Daughter states patien still drives on occasion.  She does not believe he takes any blood thinners.  She went to check on him is afternoon and he had fallen next to the toilet.  Patient unable to provide any history.    Noted to have tonic-clonic activity to the right upper extremity on arrival.  Patient with incomprehensible speech.  Copious vomiting on EMS arrival per paramedic.  Patient unable to contribute to history.  Last  presumed normal was 10:00 p.m. last night. Out of window for thrombolytics.  Patient intubated for airway protection at outside hospital. On arrival CTH with large R MCA - stroke team consulted and plan for IR. Patient loaded with Keppra, EEG pending. Lactic acid 11.9 on arrival, pancx and initiated broad spec abx. Patient admitted to [facility] for close monitoring and higher level of care.         Hospital Course: 11/24/2023 MRI complete and reviewed, Nsgy consulted, EEG neg and dced  11/25/2023: initiate baby ASA and sq heparin, follow CT head in AM, SBP <180, initiate tube feeds  11/26/2023 EKG and if afib happens again will treat, Extubated 11/26 at 1400, Low dose precedex for agitation  11/27/2023 EKG, NS 75 cc/hr, consult IR for peg  11/28/23: consult GI for peg  11/29/23: CXR in am  11/30/23: awaiting PEG tube  12/1/23:PEG today  12/2/2023: start tube feeds, decrease enteral water flushed to 200 q 6 hr, stepdown to stroke team   12/3/2023: pending stepdown to stroke team, decrease enteral water flushes to 100 q6h, add Detemir and increase PRN SSI   12/04/2023 stepped up with shock and respiratory failure requiring 3 pressors  12/05/2023 Amio restarted overnight for a-fib w/ RVR and multiple runs of v-tach. Continues to require increased pressor support.  12/06/2023 V-tach episodes continued overnight, now with longer runs. Currently on lidocaine gtt. Received 1 unit PRBC, 1 unit platelets, and 1 unit of cryo overnight. Off phenylephrine, will attempt to wean levophed as BP allows.  12/07/2023 Remained hemodynamically unstable overnight. Maxed on 3 pressors multiple times throughout the night. He had significant acidosis, pH 7.1, bicarb 7.5. A total of 10 amps of bicarb given throughout the night. Was therapeutic on bivalrubin, went for CTH, which was negative for hemorrhage. Transport of pt required 3 elinor bumps to maintain MAP >65. Started on Prismax. Given 1U RBC. Fibrinogen 116, Given 1U cryo. Replaced  "electrolytes multiple times throughout the night. Lactic increased to 16.66. LDH 2,489.   Pt's ears became more dusky and pulse ox no longer detecting oxygen saturation. Tried both ears in multiple areas. Pt's nose, ears, penis, and distal extremities all with skin changes suggestive of poor/no perfusion.    Discussions with daughter, Cheryl, at bedside throughout the night about pt's status. Daughter was upset about pt's "frostbite." Discussed at length why pt has a similar appearance to frostbite. Pt's daughter very upset at the thought that her father may be in pain. Requested son, Tim Tellez, be called to discuss current situation. Family arrived at bedside at 6:45am,  discussion had with all siblings present about comfort care. Decision was made to move forward with comfort care. Code status changed to DNR, transitioned to comfort care.   Time of death 0915    Socorro Stout NP  Neurocritical Care      Attestation:  Agree with above documentation per NP.     Lg Yuen, DO  Neuro Critical Care  12/07/2023  5:57 PM      "

## 2023-12-07 NOTE — PROGRESS NOTES
Pharmacokinetic Assessment Follow Up: IV Vancomycin    Vancomycin serum concentration assessment(s):    The random level was drawn correctly and can be used to guide therapy at this time. The measurement is within the desired definitive target range of 15 to 20 mcg/mL.    Vancomycin Regimen Plan:    Continue regimen to Vancomycin 750 mg IV once with next serum trough concentration measured at 1530 on 12/7 (~12 hours post dose.)    Drug levels (last 3 results):  Recent Labs   Lab Result Units 12/05/23  0107 12/06/23  0158 12/07/23  0202   Vancomycin, Random ug/mL 10.5 15.8 14.5       Pharmacy will continue to follow and monitor vancomycin.    Please contact pharmacy at extension [phone] for questions regarding this assessment.    Thank you for the consult,   Mari Friend       Patient brief summary:  Tim Rhodes is a 83 y.o. male initiated on antimicrobial therapy with IV Vancomycin for treatment of bacteremia        Drug Allergies:   Review of patient's allergies indicates:  No Known Allergies    Actual Body Weight:   77.6 kg    Renal Function:   Estimated Creatinine Clearance: 19.1 mL/min (A) (based on SCr of 2.8 mg/dL (H)).,     Dialysis Method (if applicable):  CRRT    CBC (last 72 hours):  Recent Labs   Lab Result Units 12/04/23  0428 12/04/23  1549 12/05/23  0305 12/05/23  0813 12/05/23  1529 12/06/23  0012 12/06/23  0600 12/06/23  0908 12/06/23  1554 12/07/23  0008   WBC K/uL 18.33* 25.25* 23.88* 25.81* 26.23* 25.80* 20.96* 25.81* 24.11* 31.59*   Hemoglobin g/dL 10.2* 10.3* 9.2* 9.1* 8.3* 7.5* 6.1* 7.6* 7.1* 6.5*   Hematocrit % 34.6* 31.6* 28.8* 27.8* 25.0* 22.7* 18.9* 22.8* 21.7* 20.8*   Platelets K/uL 26* 25* 23* 25* 21* 17* 54* 42* 29* 28*   Gran % % 96.0* 87.9* 78.0* 82.4* 80.9* 86.0* 91.0* 93.0* 87.0* 74.0*   Lymph % % 1.0* 4.2* 7.0* 7.4* 7.4* 5.0* 5.0* 0.0* 1.0* 7.0*   Mono % % 0.0* 3.6* 4.0 6.2 6.4 7.0 4.0 1.0* 4.0 5.0   Eosinophil % % 0.0 0.1 0.0 0.1 0.1 1.0 0.0 0.0 0.0 0.0   Basophil % % 0.0 0.4 0.0  0.3 0.4 0.0 0.0 0.0 0.0 0.0   Differential Method  Manual Automated Manual Automated Automated Manual Manual Manual Manual Manual       Metabolic Panel (last 72 hours):  Recent Labs   Lab Result Units 12/04/23  0428 12/04/23  0745 12/04/23  1549 12/04/23  2058 12/04/23  2307 12/05/23  0305 12/05/23  0813 12/05/23  1218 12/05/23  1529 12/05/23  1916 12/06/23  0012 12/06/23  0405 12/06/23  0908 12/06/23  1207 12/06/23  1554 12/06/23  2015 12/06/23  2125 12/07/23  0008   Sodium mmol/L 153*  --  151* 150*  --  147* 146* 145 145 144 144 143 142 141 139 140 137 138   Potassium mmol/L 4.3 3.4* 3.6 3.9 3.8 3.4* 4.0 3.2* 3.0* 3.9 4.0 3.6 4.1 3.9 5.1 5.0 4.9 5.6*   Chloride mmol/L 117*  --  122* 121*  --  118* 118* 116* 116* 116* 113* 110 109 109 109 108 108 108   CO2 mmol/L 19*  --  19* 18*  --  15* 16* 13* 13* 13* 12* 18* 19* 16* 16* 14* 10* 7*   Glucose mg/dL 526*  --  287* 327*  --  334* 221* 184* 134* 135* 147* 196* 132* 117* 136* 141* 161* 195*   BUN mg/dL 60*  --  54* 51*  --  50* 50* 50* 51* 50* 52* 56* 57* 53* 58* 63* 65* 61*   Creatinine mg/dL 2.1*  --  1.7* 1.6*  --  1.7* 1.5* 1.7* 1.6* 1.8* 2.1* 2.0* 2.0* 2.1* 2.1* 2.3*  2.2* 2.3* 2.8*   Albumin g/dL 1.9*  --  1.7* 1.7*  --  1.6* 1.7* 1.6* 1.6* 1.5* 1.5* 1.7* 1.7* 1.7* 1.6* 1.6*  1.5* 1.5* 1.5*   Total Bilirubin mg/dL 0.6  --  0.9 0.8  --  0.8 0.7 0.8 0.7 0.8 1.0 1.1* 1.3* 1.4* 1.5* 1.5*  1.6*  --  1.6*   Alkaline Phosphatase U/L 73  --  75 70  --  70 76 72 74 76 85 94 107 99 101 106  107  --  115   AST U/L 65*  --  1,429* 1,252*  --  1,002* 943* 772* 718* 657* 683* 759* 841* 750* 596* 636*  633*  --  801*   ALT U/L 51*  --  893* 885*  --  923* 958* 913* 916* 890* 927* 881* 927* 862* 810* 871*  862*  --  993*   Magnesium mg/dL 2.4  --   --   --   --  2.0 2.6  --  2.5  --  2.4  --  2.3  --  2.2  --  2.3 2.6   Phosphorus mg/dL  --   --   --   --   --  4.3 3.6  --  4.0  --  5.7*  --  5.5*  --  6.6*  --  7.7* 10.4*       Vancomycin Administrations:  vancomycin  given in the last 96 hours                     vancomycin 750 mg in dextrose 5 % (D5W) 250 mL IVPB (Vial-Mate) (mg) 750 mg New Bag 12/06/23 0324    vancomycin 1,250 mg in dextrose 5 % (D5W) 250 mL IVPB (Vial-Mate) (mg) 1,250 mg New Bag 12/05/23 0251    vancomycin 1,500 mg in dextrose 5 % (D5W) 250 mL IVPB (Vial-Mate) (mg) 1,500 mg New Bag 12/04/23 0232                    Microbiologic Results:  Microbiology Results (last 7 days)       Procedure Component Value Units Date/Time    Blood culture [[id number]] Collected: 12/06/23 0949    Order Status: Completed Specimen: Blood from Peripheral, Foot, Right Updated: 12/06/23 1715     Blood Culture, Routine No Growth to date    Blood culture [[id number]] Collected: 12/06/23 1007    Order Status: Completed Specimen: Blood from Peripheral, Antecubital, Left Updated: 12/06/23 1715     Blood Culture, Routine No Growth to date    Culture, Respiratory with Gram Stain [[id number]]  (Abnormal) Collected: 12/04/23 0220    Order Status: Completed Specimen: Respiratory from Sputum Updated: 12/06/23 1701     Respiratory Culture CANDIDA GLABRATA  Moderate       Gram Stain (Respiratory) <10 epithelial cells per low power field.     Gram Stain (Respiratory) Few WBC's     Gram Stain (Respiratory) Many yeast    Blood culture [[id number]]  (Abnormal) Collected: 12/04/23 0124    Order Status: Completed Specimen: Blood from Peripheral, Wrist, Left Updated: 12/06/23 1238     Blood Culture, Routine Gram stain miguel bottle: Gram positive cocci in clusters resembling Staph      Results called to and read back by: Alvarado Patel RN 12/05/2023  01:47      STAPHYLOCOCCUS EPIDERMIDIS  Susceptibility pending      Narrative:      Blood cultures from 2 different sites. 4 bottles total.  Please draw before starting antibiotics.    Blood culture [[id number]]  (Abnormal) Collected: 12/04/23 0121    Order Status: Completed Specimen: Blood from Peripheral, Ankle, Right Updated: 12/06/23 1236     Blood Culture,  Routine Gram stain miguel bottle: Gram positive cocci in clusters resembling Staph      Results called to and read back by:Alvarado Patel RN 12/05/2023  01:47      STAPHYLOCOCCUS EPIDERMIDIS  Susceptibility pending      Narrative:      Blood cultures x 2 different sites. 4 bottles total. Please  draw cultures before administering antibiotics.    Rapid Organism ID by PCR (from Blood culture) [[id number]]  (Abnormal) Collected: 12/04/23 0121    Order Status: Completed Updated: 12/05/23 0314     Enterococcus faecalis Not Detected     Enterococcus faecium Not Detected     Listeria monocytogenes Not Detected     Staphylococcus spp. See species for ID     Staphylococcus aureus Not Detected     Staphylococcus epidermidis Detected     Staphylococcus lugdunensis Not Detected     Streptococcus species Not Detected     Streptococcus agalactiae Not Detected     Streptococcus pneumoniae Not Detected     Streptococcus pyogenes Not Detected     Acinetobacter calcoaceticus/baumannii complex Not Detected     Bacteroides fragilis Not Detected     Enterobacterales Not Detected     Enterobacter cloacae complex Not Detected     Escherichia coli Not Detected     Klebsiella aerogenes Not Detected     Klebsiella oxytoca Not Detected     Klebsiella pneumoniae group Not Detected     Proteus Not Detected     Salmonella sp Not Detected     Serratia marcescens Not Detected     Haemophilus influenzae Not Detected     Neisseria meningtidis Not Detected     Pseudomonas aeruginosa Not Detected     Stenotrophomonas maltophilia Not Detected     Candida albicans Not Detected     Candida auris Not Detected     Candida glabrata Not Detected     Candida krusei Not Detected     Candida parapsilosis Not Detected     Candida tropicalis Not Detected     Cryptococcus neoformans/gattii Not Detected     CTX-M (ESBL ) Test Not Applicable     IMP (Carbapenem resistant) Test Not Applicable     KPC resistance gene (Carbapenem resistant) Test Not Applicable      mcr-1  Test Not Applicable     mec A/C  Detected     mec A/C and MREJ (MRSA) gene Test Not Applicable     NDM (Carbapenem resistant) Test Not Applicable     OXA-48-like (Carbapenem resistant) Test Not Applicable     van A/B (VRE gene) Test Not Applicable     VIM (Carbapenem resistant) Test Not Applicable    Narrative:      Blood cultures x 2 different sites. 4 bottles total. Please  draw cultures before administering antibiotics.    Culture, Respiratory with Gram Stain [[id number]] Collected: 12/04/23 0039    Order Status: Sent Specimen: Respiratory from Sputum Updated: 12/04/23 0040    Blood culture [[id number]] Collected: 11/28/23 1218    Order Status: Completed Specimen: Blood from Peripheral, Ankle, Left Updated: 12/03/23 1412     Blood Culture, Routine No growth after 5 days.    Blood culture [[id number]] Collected: 11/28/23 1216    Order Status: Completed Specimen: Blood from Peripheral, Ankle, Right Updated: 12/03/23 1412     Blood Culture, Routine No growth after 5 days.

## 2023-12-07 NOTE — PROCEDURES
"Tim Rhodes is a 83 y.o. male patient.    Temp: 97 °F (36.1 °C) (12/06/23 1820)  Pulse: 64 (12/06/23 1820)  Resp: 19 (12/06/23 1833)  BP: (!) 112/46 (12/06/23 1400)  SpO2: 96 % (12/06/23 1820)  Weight: 77.6 kg (171 lb) (12/04/23 1523)  Height: 5' 3" (160 cm) (12/06/23 1545)       Central Line    Date/Time: 12/6/2023 7:05 PM    Performed by: Hadley Chatterjee MD  Authorized by: Hadley Chatterjee MD    Location procedure was performed:  [facility] NEURO [facility]IT[facility]  Consent Done ?:  Yes  Time out complete?: Verified correct patient, procedure, equipment, staff, and site/side    Indications:  Hemodialysis  Anesthesia:  See MAR for details  Preparation:  Skin prepped with ChloraPrep  Skin prep agent dried: Skin prep agent completely dried prior to procedure    Sterile barriers: All five maximal sterile barriers used - gloves, gown, cap, mask and large sterile sheet    Hand hygiene: Hand hygiene performed immediately prior to central venous catheter insertion    Location:  Left internal jugular  Catheter type:  Trialysis  Catheter size:  13 Fr  Ultrasound guidance: Yes    Vessel Caliber:  Small and medium   patent  Comprressibility:  Normal  Needle advanced into vessel with real time ultrasound guidance.    Guidewire confirmed in vessel.    Steril sheath on probe.    Sterile gel used.  Manometry: Yes    Number of attempts:  2  Securement:  Line sutured, chlorhexidine patch, sterile dressing applied and blood return through all ports  Complications: No    XRay:  Placement verified by x-ray, verified by fluoroscopy and successful placement  Adverse Events:  NoneTermination Site: superior vena cava      12/6/2023    "

## 2023-12-07 NOTE — NURSING
Manometry for Central Line Procedure     Guidewire Removal:  Yes    Manometry Performed: yes    Manometry performed by: MD Shena

## 2023-12-07 NOTE — PHYSICIAN QUERY
PT Name: Tim Rhodes  MR #: [id number]    DOCUMENTATION CLARIFICATION     CDS/: Karthikeyan Mccormack RN          Contact information:  [email or web address]  This form is a permanent document in the medical record.     Query Date: December 7, 2023    By submitting this query, we are merely seeking further clarification of documentation. Please utilize your independent clinical judgment when addressing the question(s) below.    The Medical Record contains the following:   Indicators   Supporting Clinical Findings Location in Medical Record   x AMS, Confusion,  LOC, etc.  Encephalopathy   - likely multifactorial  - sepsis metabolic acidosis, stroke     Lactic Acidosis  - Lactate >5  - trend  - IVF     Leukocytosis   - WBC >21  - Pan cx   - Broad spec antibiotics         Sepsis   - Antibiotics  - Vasopressor support     Called to bedside for rapid response of a patient previously in [facility] now on NPU. Pt was found by nurse in resp distress with extended periods of apnea. Rapid response was called. He was placed on BIPAP and levo gtt and transferred to [facility] for a higher level of care. Dr Moya at bedside on arrival to [facility] discussed pt condition change and plan of care.    Significant Event 12/3 (Paula)   x Acute/Chronic Illness PMHx of  CAD and DM admitted to [facility] with large R MCA stroke and seizure.Per chart review, in emergency department at [facility]  presented with with altered mental status and focal seizure activity.       Septic shock  Mixed (septic, hypovolemic, cardiogenic from chronotropic incompetence from amio?)  Febrile  Leukocytosis  Mosf (live, hypotension, afib rvr, hyperglycemia, acute encephalopathy, thrombocytopenia/coagulopathy, acute respiratory failure, nagma w lactic acidosis not clearing on repeat this am)     Cultures pending  Yeast in resp culture (usually colonization but pt was intubated less than 12 hrs ago)  Cxr clear     -broad spec abx, add micafungin empirically  -map>65, on  three pressors, add hydrocort/fludrocort, fluid responsive cont aggressive fluid resuscitation, trial bicarb bolus, monitor on flotrac  -consider ct c/a/p if no improvement (eval peg site, no signs of peritonitis currently)  -hold amio and avn blockade for now   Progress Note NCC (Juanir) 12/4    Radiology Findings      Electrolyte Imbalance      Medication      Treatment             x Other Called to bedside as patient on comfort measures is asystole on monitor.     At bedside patient without spontaneous respirations, no cardiac sounds to auscultation, no breath sounds to auscultation, pupils fixed and unreactive.  No pulse palpated at carotid.     Time of death 09:15, family notified and condolences offered.    Progress Note NCC 12/7 (Wilfrid)     The noted clinical guidelines are only system guidelines and do not replace the providers clinical judgment.    The National [place] of Neurologic Disorders and Stroke (NINDS) of the NIH describes encephalopathy as any diffuse disease of the brain that alters brain function or structure.    Provider, please specify the diagnosis or diagnoses associated with above clinical findings.  [  x ] Metabolic Encephalopathy - Due to electrolyte imbalance, metabolic derangements, or infectious processes, includes Septic Encephalopathy, Uremic Encephalopathy   [   ] Encephalopathy, unspecified      [   ] Other Encephalopathy (please specify): ____________________   [   ] Other neurological condition- Includes Post-ictal altered mental status (please specify condition): __________   [   ]  Clinically Undetermined         Please document in your progress notes daily for the duration of treatment until resolved, and include in your discharge summary.    References:  JUAN C Andrews RN, CCDS. (2018, June 9). Notes from the Instructor: Encephalopathy tips. Retrieved October 22, 2020, from [email or web address]    ICD-9-CM Coding Clinic First  Quarter 2013, Effective with discharges: October 21, 2013 [facility] § Seizure with encephalopathy due to postictal state (2013).    ICD-10-CM/PCS [employer] Integrated Codebook (Version V.20.8.10.0) [Computer software]. (2020). Retrieved October 21, 2020.    National [place] of Neurological Disorders and Stroke. (2019, March 27). Retrieved October 22, 2020, from https://www.ninds.nih.gov/Disorders/All-Disorders/Owjgdfibzulksu-Bbsvislcivg-Ftix    Form No. 25157

## 2023-12-07 NOTE — PLAN OF CARE
[facility] - Neuro Critical Care  Discharge Final Note    Primary Care Provider: No, Primary Doctor    Expected Discharge Date: 2023    Per MD, patient   Time of death 09:15     Final Discharge Note (most recent)       Final Note - 23 0947          Final Note    Assessment Type Final Discharge Note     Anticipated Discharge Disposition                      Filomena Nuñez RN, CCRN-K, [facility]  Neuro-Critical Care   X [phone]

## 2023-12-07 NOTE — PLAN OF CARE
[facility] Care Plan    POC reviewed with Tim Rhodes and family at 1400. Pt's family verbalized understanding. Questions and concerns addressed. No acute events today. Pt progressing toward goals. Will continue to monitor. See below and flowsheets for full assessment and VS info.   -80mg lasix given, UO did not improve, Wilfrid SEWELL aware and nephrology consulted  -K and calcium replaced  -elinor discontinued  -levo now max concentration  -amio gtt now at .5mg  -lidocaine discontinued  -blood cultures complete  -CT complete          Is this a stroke patient? yes- Stroke booklet reviewed with patient and family, risk factors identified for patient and stroke booklet remains at bedside for ongoing education.     Neuro:  Causey Coma Scale  Best Eye Response: 1-->(E1) none  Best Motor Response: 4-->(M4) withdraws from pain  Best Verbal Response: 1-->(V1) none  Causey Coma Scale Score: 6  Assessment Qualifiers: patient chemically sedated or paralyzed, patient intubated  Pupil PERRLA: yes     24 hr Temp:  [97 °F (36.1 °C)-99.5 °F (37.5 °C)]     CV:   Rhythm: atrial rhythm  BP goals:   SBP <  N/A  MAP > 65    Resp:      Vent Mode: A/C  Set Rate: 16 BPM  Oxygen Concentration (%): 40  Vt Set: 440 mL  PEEP/CPAP: 5 cmH20  Pressure Support: 5 cmH20    Plan: N/A    GI/:     Diet/Nutrition Received: NPO  Last Bowel Movement: 12/01/23  Voiding Characteristics: urethral catheter (bladder)    Intake/Output Summary (Last 24 hours) at 12/6/2023 1832  Last data filed at 12/6/2023 1800  Gross per 24 hour   Intake 6108.52 ml   Output 380 ml   Net 5728.52 ml     Unmeasured Output  Stool Occurrence: 1  Pad Count: 1    Labs/Accuchecks:  Recent Labs   Lab 12/06/23  1554 12/06/23  1751   WBC 24.11*  --    RBC 2.44*  --    HGB 7.1*  --    HCT 21.7* 19*   PLT 29*  --       Recent Labs   Lab 12/06/23  1554      K 5.1   CO2 16*      BUN 58*   CREATININE 2.1*   ALKPHOS 101   *   *   BILITOT 1.5*      Recent Labs   Lab  "12/06/23  0908   INR 1.3*    No results for input(s): "CPK", "CPKMB", "TROPONINI", "MB" in the last 168 hours.    Electrolytes: Electrolytes replaced  Accuchecks: Q1H    Gtts:   amiodarone in dextrose 5% 0.5 mg/min (12/06/23 1800)    fentanyl citrate-0.9%NaCl (PF) 50 mcg/hr (12/06/23 1800)    insulin regular 1 units/mL infusion orderable (DKA) 3.95 Units/hr (12/06/23 1800)    lactated ringers 50 mL/hr at 12/06/23 1800    NORepinephrine bitartrate-D5W 0.261 mcg/kg/min (12/06/23 1800)    propofoL Stopped (12/05/23 1231)    vasopressin 0.04 Units/min (12/06/23 1800)       LDA/Wounds:  Lines/Drains/Airways       Central Venous Catheter Line  Duration             Percutaneous Central Line Insertion/Assessment - Triple Lumen  12/04/23 0246 Femoral Vein Left 2 days              Drain  Duration                  Gastrostomy/Enterostomy 12/01/23 1145 Percutaneous endoscopic gastrostomy (PEG) LUQ feeding 5 days         Urethral Catheter 12/04/23 0633 Non-latex 2 days              Airway  Duration                  Airway - Non-Surgical 12/04/23 0130 Endotracheal Tube 2 days              Arterial Line  Duration             Arterial Line 12/04/23 1210 Right Femoral 2 days              Peripheral Intravenous Line  Duration                  Midline Catheter Insertion/Assessment  - Single Lumen 11/29/23 1255 Left basilic vein (medial side of arm) 20g x 10cm 7 days         Peripheral IV - Single Lumen 11/29/23 0041 18 G;1 3/4 in Anterior;Right Upper Arm 7 days         Peripheral IV - Single Lumen 12/04/23 0245 20 G Anterior;Right Forearm 2 days                  Wounds: No  Wound care consulted: No    "

## 2023-12-07 NOTE — SIGNIFICANT EVENT
"Pt remained hemodynamically unstable overnight. He was maxed on 3 pressors multiple times throughout the night. He had significant acidosis, pH 7.1, bicarb 7.5. A total of 10 amps of bicarb given throughout the night. Was therapeutic on bivalrubin, went for CTH, which was negative for hemorrhage. Transport of pt required 3 elinor bumps to maintain MAP >65. Started on Prismax. Given 1U RBC. Fibrinogen 116, Given 1U cryo. Replaced electrolytes multiple times throughout the night. Lactic increased to 16.66. LDH 2,489.   Pt's ears became more dusky and pulse ox no longer detecting oxygen saturation. Tried both ears in multiple areas. Pt's nose, ears, penis, and distal extremities all with skin changes suggestive of poor/no perfusion.    Discussions with daughter, Cheryl, at bedside throughout the night about pt's status. Daughter was upset about pt's "frostbite." Discussed at length why pt has a similar appearance to frostbite. Pt's daughter very upset at the thought that her father may be in pain. She requested that I call Tim Domingo To discuss the current situation. Explained to Tim domingo The situation. They said they would arrive in the AM to transition to comfort.    Family arrived at bedside at 6:45am. Had a discussion with all siblings present about comfort care. Decision was made to move forward with comfort care.   Code status changed to DNR in [facility]. Comfort orders placed.     Raymon Coffman PA-C  Neurocritical Care  "

## 2023-12-07 NOTE — PROGRESS NOTES
Tim Rhodes [id number] is a 83 y.o. male who has been consulted for vancomycin dosing.    Pharmacy consult for vancomycin dosing is no longer required.  Vancomycin was discontinued.    Thank you for allowing us to participate in this patient's care.     - Paul Case, BrandonD

## 2023-12-07 NOTE — PLAN OF CARE
[facility] Care Plan    POC reviewed with Tim Rhodes and family at 0300. Pt verbalized understanding. Questions and concerns addressed. No acute events overnight. Pt progressing toward goals. Will continue to monitor. See below and flowsheets for full assessment and VS info.   -neuro exam unchanged  - Augustus MABRY made aware of progressive purple black color in bilateral upper extremities, bilateral lower extremities, the nose, the ears, and the penis/scrotum   - Vaso, Levo and Tod titrated overnight to keep MAPs above 65. PA at bedside for verbal orders on all titrations made outside of order set  - Butcher remains in place with minimal urine output  - Prismax started overnight. See hourly flowsheets for details. Prismax rinsed back at 0640 per Augustus PA   - Pulse ox taken off overnight as not getting accurate waveform and RN unable to obtain a peripheral Spo2. Augustus MABRY aware  - Multiple amps of bicarb administered overnight   - 1 unit of Cryo given overnight   - 1 unit of PRBcs given overnight   - CTH complete  - Angio max started and at a therapeutic level  - Family aproached RN about discussing comfort measures overnight, Augustus MABYR notified and to bedside to discuss with family. Orders placed to dc LR, IVPBs, Insulin ggt, AngioMax, Flotrac, and Prismax.         Is this a stroke patient? yes- Stroke booklet reviewed with patient, risk factors identified for patient and stroke booklet remains at bedside for ongoing education.     Neuro:  Win Coma Scale  Best Eye Response: 2-->(E2) to pain  Best Motor Response: 4-->(M4) withdraws from pain  Best Verbal Response: 1-->(V1) none  [place] Coma Scale Score: 7  Assessment Qualifiers: patient chemically sedated or paralyzed, patient intubated  Pupil PERRLA: yes     24hr Temp:  [96.4 °F (35.8 °C)-98.6 °F (37 °C)]     CV:   Rhythm: normal sinus rhythm  BP goals:   SBP < 220  MAP > 65    Resp:      Vent Mode: A/C  Set Rate: 16 BPM  Oxygen Concentration (%): 40  Vt Set: 440  "mL  PEEP/CPAP: 5 cmH20  Pressure Support: 5 cmH20    Plan: wean to extubate    GI/:     Diet/Nutrition Received: NPO  Last Bowel Movement: 12/04/23  Voiding Characteristics: urethral catheter (bladder)    Intake/Output Summary (Last 24 hours) at 12/7/2023 0716  Last data filed at 12/7/2023 0610  Gross per 24 hour   Intake 4739.54 ml   Output 958.8 ml   Net 3780.74 ml     Unmeasured Output  Stool Occurrence: 1  Pad Count: 1    Labs/Accuchecks:  Recent Labs   Lab 12/07/23  0502 12/07/23  0556   WBC 32.67*  --    RBC 2.81*  --    HGB 8.4*  --    HCT 23.8* 18*   PLT 28*  --       Recent Labs   Lab 12/07/23  0314   *  148*   K 4.0  4.0   CO2 12*  12*     107   BUN 52*  52*   CREATININE 2.2*  2.2*   ALKPHOS 118   ALT 1,213*   AST 1,311*   BILITOT 2.1*      Recent Labs   Lab 12/06/23  0908 12/06/23 2015 12/07/23  0203   INR 1.3*  --   --    APTT  --    < > 76.6*    < > = values in this interval not displayed.    No results for input(s): "CPK", "CPKMB", "TROPONINI", "MB" in the last 168 hours.    Electrolytes: Electrolytes replaced  Accuchecks: Q1H    Gtts:   fentanyl citrate-0.9%NaCl (PF) 50 mcg/hr (12/07/23 0605)    morphine 0.5 mg/hr (12/07/23 0710)       LDA/Wounds:  Lines/Drains/Airways       Central Venous Catheter Line  Duration             Percutaneous Central Line Insertion/Assessment - Triple Lumen  12/04/23 0246 Femoral Vein Left 3 days    Percutaneous Central Line Insertion/Assessment - Triple Lumen  12/06/23 1840 Internal Jugular Left <1 day              Drain  Duration                  Gastrostomy/Enterostomy 12/01/23 1145 Percutaneous endoscopic gastrostomy (PEG) LUQ feeding 5 days         Urethral Catheter 12/04/23 0633 Non-latex 3 days              Airway  Duration                  Airway - Non-Surgical 12/04/23 0130 Endotracheal Tube 3 days              Arterial Line  Duration             Arterial Line 12/04/23 1210 Right Femoral 2 days              Peripheral Intravenous Line  " Duration                  Peripheral IV - Single Lumen 11/29/23 0041 18 G;1 3/4 in Anterior;Right Upper Arm 8 days         Midline Catheter Insertion/Assessment  - Single Lumen 11/29/23 1255 Left basilic vein (medial side of arm) 20g x 10cm 7 days         Peripheral IV - Single Lumen 12/04/23 0245 20 G Anterior;Right Forearm 3 days                  Wounds: Yes  Wound care consulted: Yes   Problem: Adult Inpatient Plan of Care  Goal: Plan of Care Review  Outcome: Ongoing, Not Progressing  Goal: Patient-Specific Goal (Individualized)  Outcome: Ongoing, Not Progressing  Goal: Absence of Hospital-Acquired Illness or Injury  Outcome: Ongoing, Not Progressing  Goal: Optimal Comfort and Wellbeing  Outcome: Ongoing, Not Progressing  Goal: Readiness for Transition of Care  Outcome: Ongoing, Not Progressing     Problem: Adjustment to Illness (Stroke, Ischemic/Transient Ischemic Attack)  Goal: Optimal Coping  Outcome: Ongoing, Not Progressing     Problem: Bowel Elimination Impaired (Stroke, Ischemic/Transient Ischemic Attack)  Goal: Effective Bowel Elimination  Outcome: Ongoing, Not Progressing     Problem: Cerebral Tissue Perfusion (Stroke, Ischemic/Transient Ischemic Attack)  Goal: Optimal Cerebral Tissue Perfusion  Outcome: Ongoing, Not Progressing  Intervention: Protect and Optimize Cerebral Perfusion  Flowsheets (Taken 12/7/2023 0716)  Sensory Stimulation Regulation: quiet environment promoted  Cerebral Perfusion Promotion: blood pressure monitored  Fluid/Electrolyte Management: fluids provided  Stabilization Measures: airway opened     Problem: Cognitive Impairment (Stroke, Ischemic/Transient Ischemic Attack)  Goal: Optimal Cognitive Function  Outcome: Ongoing, Not Progressing     Problem: Communication Impairment (Stroke, Ischemic/Transient Ischemic Attack)  Goal: Improved Communication Skills  Outcome: Ongoing, Not Progressing     Problem: Functional Ability Impaired (Stroke, Ischemic/Transient Ischemic Attack)  Goal:  Optimal Functional Ability  Outcome: Ongoing, Not Progressing     Problem: Respiratory Compromise (Stroke, Ischemic/Transient Ischemic Attack)  Goal: Effective Oxygenation and Ventilation  Outcome: Ongoing, Not Progressing     Problem: Sensorimotor Impairment (Stroke, Ischemic/Transient Ischemic Attack)  Goal: Improved Sensorimotor Function  Outcome: Ongoing, Not Progressing     Problem: Swallowing Impairment (Stroke, Ischemic/Transient Ischemic Attack)  Goal: Optimal Eating and Swallowing without Aspiration  Outcome: Ongoing, Not Progressing     Problem: Urinary Elimination Impaired (Stroke, Ischemic/Transient Ischemic Attack)  Goal: Effective Urinary Elimination  Outcome: Ongoing, Not Progressing     Problem: Diabetes Comorbidity  Goal: Blood Glucose Level Within Targeted Range  Outcome: Ongoing, Not Progressing     Problem: Fall Injury Risk  Goal: Absence of Fall and Fall-Related Injury  Outcome: Ongoing, Not Progressing     Problem: Restraint, Nonbehavioral (Nonviolent)  Goal: Absence of Harm or Injury  Outcome: Ongoing, Not Progressing     Problem: Skin Injury Risk Increased  Goal: Skin Health and Integrity  Outcome: Ongoing, Not Progressing     Problem: Infection  Goal: Absence of Infection Signs and Symptoms  Outcome: Ongoing, Not Progressing     Problem: Impaired Wound Healing  Goal: Optimal Wound Healing  Outcome: Ongoing, Not Progressing     Problem: Adjustment to Illness (Sepsis/Septic Shock)  Goal: Optimal Coping  Outcome: Ongoing, Not Progressing     Problem: Bleeding (Sepsis/Septic Shock)  Goal: Absence of Bleeding  Outcome: Ongoing, Not Progressing     Problem: Glycemic Control Impaired (Sepsis/Septic Shock)  Goal: Blood Glucose Level Within Desired Range  Outcome: Ongoing, Not Progressing     Problem: Infection Progression (Sepsis/Septic Shock)  Goal: Absence of Infection Signs and Symptoms  Outcome: Ongoing, Not Progressing     Problem: Nutrition Impaired (Sepsis/Septic Shock)  Goal: Optimal  Nutrition Intake  Outcome: Ongoing, Not Progressing     Problem: Fluid and Electrolyte Imbalance (Acute Kidney Injury/Impairment)  Goal: Fluid and Electrolyte Balance  Outcome: Ongoing, Not Progressing     Problem: Oral Intake Inadequate (Acute Kidney Injury/Impairment)  Goal: Optimal Nutrition Intake  Outcome: Ongoing, Not Progressing     Problem: Renal Function Impairment (Acute Kidney Injury/Impairment)  Goal: Effective Renal Function  Outcome: Ongoing, Not Progressing     Problem: Device-Related Complication Risk (CRRT (Continuous Renal Replacement Therapy))  Goal: Safe, Effective Therapy Delivery  Outcome: Ongoing, Not Progressing     Problem: Hypothermia (CRRT (Continuous Renal Replacement Therapy))  Goal: Body Temperature Maintained in Desired Range  Outcome: Ongoing, Not Progressing     Problem: Infection (CRRT (Continuous Renal Replacement Therapy))  Goal: Absence of Infection Signs and Symptoms  Outcome: Ongoing, Not Progressing     Problem: Palliative Care  Goal: Enhanced Quality of Life  Outcome: Ongoing, Not Progressing

## 2023-12-07 NOTE — NURSING
Pt transferred to CT via bed with cardiac and O2 monitoring with RN x3, RT, and MD. Insulin and Fent gtts delayed during transfer and resumed upon arrival to room. All other gtts continued with back up bags available. Pt arrived back to room 9065. Pt tolerated well.

## 2023-12-07 NOTE — SIGNIFICANT EVENT
Called to bedside as patient on comfort measures is asystole on monitor.    At bedside patient without spontaneous respirations, no cardiac sounds to auscultation, no breath sounds to auscultation, pupils fixed and unreactive.  No pulse palpated at carotid.    Time of death 09:15, family notified and condolences offered.    Lg Yuen DO  Neuro Critical Care  12/07/2023  9:28 AM

## 2023-12-07 NOTE — PLAN OF CARE
Nephrology Chart Review    Intake/Output Summary (Last 24 hours) at 12/7/2023 0831  Last data filed at 12/7/2023 0701  Gross per 24 hour   Intake 4775.99 ml   Output 943.8 ml   Net 3832.19 ml     Vitals:    12/07/23 0801 12/07/23 0803 12/07/23 0804 12/07/23 0815   BP:       BP Location:       Patient Position:       Pulse: 102 102 103 92   Resp: 20 20 (!) 22 (!) 0   Temp:       TempSrc:       SpO2:       Weight:       Height:         Recent Labs   Lab 12/06/23  2125 12/07/23  0008 12/07/23  0314    138 148*  148*   K 4.9 5.6* 4.0  4.0    108 107  107   CO2 10* 7* 12*  12*   BUN 65* 61* 52*  52*   CREATININE 2.3* 2.8* 2.2*  2.2*   CALCIUM 7.4* 7.3* 7.0*  7.0*   PHOS 7.7* 10.4* 8.5*     A1c - 11/23/2023 6.6     Continued deterioration in clinical status overnight with increasing vasopressor requirements/worsening shock. Per primary team this morning patient has been transition to comfort care and Prismax/CRRT rinsed back. HD nurse notified. No other related issues identified. Please call nephrology as needed. We will sign-off at this time.    Butch Gill MD  Nephrology

## 2023-12-09 LAB
BACTERIA BLD CULT: ABNORMAL

## 2023-12-11 LAB
BACTERIA BLD CULT: NORMAL
BLD PROD TYP BPU: NORMAL
BLOOD UNIT EXPIRATION DATE: NORMAL
BLOOD UNIT TYPE CODE: 5100
BLOOD UNIT TYPE: NORMAL
CODING SYSTEM: NORMAL
CROSSMATCH INTERPRETATION: NORMAL
DISPENSE STATUS: NORMAL
TRANS ERYTHROCYTES VOL PATIENT: NORMAL ML

## 2024-02-19 LAB
LABORATORY COMMENT REPORT: ABNORMAL
LABORATORY COMMENT REPORT: ABNORMAL
SRA 0.1IU/ML UFH SER-ACNC: 93 %
SRA 100IU/ML UFH SER-ACNC: <5 %
SRA INTERP SER-IMP: ABNORMAL
SRA UFH SER-IMP: POSITIVE

## 2024-03-11 ENCOUNTER — DOCUMENTATION ONLY (OUTPATIENT)
Dept: NEUROLOGY | Facility: HOSPITAL | Age: 84
End: 2024-03-11
Payer: MEDICAID

## 2025-06-20 NOTE — PLAN OF CARE
*-*-*-*-*-*-*-  [facility]    Monday - Thursday 8 a.m. - 8 p.m.  Friday                        8 a.m. - 8 p.m.    Saturday (and holidays) 8 a.m. - 4 p.m.  Sunday                                     Closed  *-*-*-*-*-*-*-  [facility] We Have New Hours on Fridays, we are now open til 8pm    [email or web address]  See current wait times for [facility] in real-time!  Reserve your waiting-room spot in line!   Receive text/email messages if our wait times are long,  so that you can do your waiting at home or work, instead of in our waiting room.     Note: This system does not guarantee an \"appointment time\" to see a provider. Also, patients may be called out of the waiting room \"ahead of turn\" in emergency situations, at discretion of Urgent Care staff.  ----------------  Thank you for choosing [facility] today. We hope you had a pleasant experience and we look forward to serving your future needs.    If you have any questions about your VISIT, please call [phone].    If you have any questions about your BILL, please call [phone].    If you need a copy of your MEDICAL RECORD, please call [phone].    If you receive a survey in the mail about today's services, we hope that you will take a few minutes to let us know about your experience.  --------------------------------------------------------------------------------------------------------------  UNLESS OTHERWISE INSTRUCTED BY YOUR URGENT CARE PROVIDER TODAY, all follow-up for your medical issues should be managed by your primary care provider. The Urgent Care does not manage chronic medical issues or refill medications. You are responsible for scheduling and keeping any necessary follow-up visits with your primary care provider after this visit today.   --------------------------------------------------------------------------------------------------------------  IF YOU WERE PRESCRIBED AN ANTIBIOTIC TODAY:  "[facility] Care Plan    POC reviewed with Tim Rhodes and family at 1400. Pt verbalized understanding. Questions and concerns addressed. No acute events today. Pt progressing toward goals. Will continue to monitor. See below and flowsheets for full assessment and VS info.   Fentanyl Off because of low BP  Bolus LR 500cc  Patient on antibiotic.  NSR consulted  Move RS spont, LLE withdraw, LUE no movement   Pupil R(4.5, 1.33) L(4.8, 1.43)            Is this a stroke patient? yes- Stroke booklet reviewed with patient and family, risk factors identified for patient and stroke booklet remains at bedside for ongoing education.     Neuro:  Win Coma Scale  Best Eye Response: 1-->(E1) none  Best Motor Response: 5-->(M5) localizes pain  Best Verbal Response: 1-->(V1) none  [place] Coma Scale Score: 7  Assessment Qualifiers: patient intubated  Pupil PERRLA: yes     24 hr Temp:  [98.3 °F (36.8 °C)-98.9 °F (37.2 °C)]     CV:   Rhythm: normal sinus rhythm, sinus bradycardia  BP goals:   SBP < 220  MAP > 65    Resp:      Vent Mode: A/C  Set Rate: 14 BPM  Oxygen Concentration (%): 40  Vt Set: 500 mL  PEEP/CPAP: 5 cmH20    Plan: N/A    GI/:     Diet/Nutrition Received: NPO  Last Bowel Movement: 11/23/23  Voiding Characteristics: external catheter    Intake/Output Summary (Last 24 hours) at 11/24/2023 1808  Last data filed at 11/24/2023 1801  Gross per 24 hour   Intake 1065.47 ml   Output 400 ml   Net 665.47 ml          Labs/Accuchecks:  Recent Labs   Lab 11/24/23  0345   WBC 17.54*   RBC 4.94   HGB 13.9*   HCT 42.3         Recent Labs   Lab 11/24/23  0345   *   K 4.1   CO2 18*   *   BUN 26*   CREATININE 1.2   ALKPHOS 74   ALT 37   *   BILITOT 0.8    No results for input(s): "PROTIME", "INR", "APTT", "HEPANTIXA" in the last 168 hours.   Recent Labs   Lab 11/23/23  1339 11/23/23  1915 11/24/23  0803   CPK  --    < > 5407*   TROPONINI 0.063  --   --     < > = values in this interval not displayed. "       Electrolytes: N/A - electrolytes WDL  Accuchecks: Q6H    Gtts:   fentanyl 75 mcg/hr (11/24/23 1801)       LDA/Wounds:  Lines/Drains/Airways       Drain  Duration                  NG/OG Tube 11/23/23 1343 [place] sump 18 Fr. Center mouth 1 day              Airway  Duration                  Airway - Non-Surgical 11/23/23 1340 Endotracheal Tube 1 day              Peripheral Intravenous Line  Duration                  Peripheral IV - Single Lumen 11/23/23 1330 18 G Left Forearm 1 day         Peripheral IV - Single Lumen 11/23/23 1400 18 G Right Wrist 1 day                  Wounds: No  Wound care consulted: No    We recommend taking an over-the-counter probiotic (Such as Florajen 3 for adults, or [id number] for children -- AVAILABLE IN THE [facility] and other local pharmacies too) once a day for the entire duration of your antibiotics, and continuing it for 2 weeks after the antibiotics are finished. This will help reduce your chance of developing antibiotic-related diarrhea and/or yeast infections.  --------------------------------------------------------------------------------------------------------------